# Patient Record
Sex: FEMALE | Race: WHITE | Employment: OTHER | URBAN - NONMETROPOLITAN AREA
[De-identification: names, ages, dates, MRNs, and addresses within clinical notes are randomized per-mention and may not be internally consistent; named-entity substitution may affect disease eponyms.]

---

## 2017-01-25 ENCOUNTER — TELEPHONE (OUTPATIENT)
Dept: FAMILY MEDICINE CLINIC | Age: 82
End: 2017-01-25

## 2017-02-21 DIAGNOSIS — R60.0 PEDAL EDEMA: ICD-10-CM

## 2017-02-21 DIAGNOSIS — I10 ESSENTIAL HYPERTENSION: Primary | ICD-10-CM

## 2017-02-21 RX ORDER — FUROSEMIDE 20 MG/1
TABLET ORAL
Qty: 270 TABLET | Refills: 1 | Status: SHIPPED | OUTPATIENT
Start: 2017-02-21 | End: 2017-11-17 | Stop reason: SDUPTHER

## 2017-02-22 RX ORDER — METOPROLOL SUCCINATE 100 MG/1
TABLET, EXTENDED RELEASE ORAL
Qty: 90 TABLET | Refills: 1 | Status: SHIPPED | OUTPATIENT
Start: 2017-02-22 | End: 2017-03-29 | Stop reason: SDUPTHER

## 2017-03-06 ENCOUNTER — TELEPHONE (OUTPATIENT)
Dept: NEPHROLOGY | Age: 82
End: 2017-03-06

## 2017-03-06 ENCOUNTER — OFFICE VISIT (OUTPATIENT)
Dept: NEPHROLOGY | Age: 82
End: 2017-03-06

## 2017-03-06 ENCOUNTER — OFFICE VISIT (OUTPATIENT)
Dept: PODIATRY | Age: 82
End: 2017-03-06

## 2017-03-06 ENCOUNTER — HOSPITAL ENCOUNTER (OUTPATIENT)
Age: 82
Setting detail: SPECIMEN
Discharge: HOME | End: 2017-03-06
Payer: MEDICARE

## 2017-03-06 VITALS
BODY MASS INDEX: 40.97 KG/M2 | HEART RATE: 62 BPM | HEIGHT: 61 IN | WEIGHT: 217 LBS | SYSTOLIC BLOOD PRESSURE: 122 MMHG | DIASTOLIC BLOOD PRESSURE: 76 MMHG

## 2017-03-06 VITALS
WEIGHT: 221 LBS | DIASTOLIC BLOOD PRESSURE: 60 MMHG | OXYGEN SATURATION: 96 % | HEIGHT: 61 IN | HEART RATE: 69 BPM | SYSTOLIC BLOOD PRESSURE: 118 MMHG | BODY MASS INDEX: 41.72 KG/M2

## 2017-03-06 DIAGNOSIS — N18.30 CKD (CHRONIC KIDNEY DISEASE) STAGE 3, GFR 30-59 ML/MIN (HCC): ICD-10-CM

## 2017-03-06 DIAGNOSIS — I10 ESSENTIAL HYPERTENSION: ICD-10-CM

## 2017-03-06 DIAGNOSIS — E11.9 TYPE 2 DIABETES MELLITUS WITHOUT COMPLICATION, WITHOUT LONG-TERM CURRENT USE OF INSULIN (HCC): ICD-10-CM

## 2017-03-06 DIAGNOSIS — E11.51 CONTROLLED TYPE 2 DM WITH PERIPHERAL CIRCULATORY DISORDER (HCC): Primary | ICD-10-CM

## 2017-03-06 DIAGNOSIS — B35.1 DERMATOPHYTOSIS OF NAIL: ICD-10-CM

## 2017-03-06 DIAGNOSIS — R76.8 P-ANCA AND MPO ANTIBODIES POSITIVE: Primary | ICD-10-CM

## 2017-03-06 LAB
CALCIUM IONIZED: 1.2 MMOL/L (ref 1.13–1.33)
PTH INTACT: 90.28 PG/ML (ref 15–65)

## 2017-03-06 PROCEDURE — 11721 DEBRIDE NAIL 6 OR MORE: CPT | Performed by: PODIATRIST

## 2017-03-06 PROCEDURE — 99213 OFFICE O/P EST LOW 20 MIN: CPT | Performed by: INTERNAL MEDICINE

## 2017-03-07 LAB — VITAMIN D 25-HYDROXY: 60 NG/ML (ref 30–100)

## 2017-03-09 LAB
ANCA MYELOPEROXIDASE: 204 AU/ML
ANCA PROTEINASE 3: 1 AU/ML
ANCA REFERENCE RANGE:: ABNORMAL
GBM ANTIBODY IGG: 6 AU/ML

## 2017-03-10 ENCOUNTER — TELEPHONE (OUTPATIENT)
Dept: NEPHROLOGY | Age: 82
End: 2017-03-10

## 2017-03-15 ENCOUNTER — PROCEDURE VISIT (OUTPATIENT)
Dept: CARDIOLOGY | Age: 82
End: 2017-03-15
Payer: MEDICARE

## 2017-03-15 DIAGNOSIS — I44.2 CHB (COMPLETE HEART BLOCK) (HCC): ICD-10-CM

## 2017-03-15 DIAGNOSIS — Z95.0 CARDIAC PACEMAKER IN SITU: Primary | ICD-10-CM

## 2017-03-15 PROCEDURE — 93288 INTERROG EVL PM/LDLS PM IP: CPT | Performed by: INTERNAL MEDICINE

## 2017-03-28 ENCOUNTER — HOSPITAL ENCOUNTER (OUTPATIENT)
Age: 82
Setting detail: SPECIMEN
Discharge: HOME | End: 2017-03-28
Payer: MEDICARE

## 2017-03-28 DIAGNOSIS — N18.30 CKD (CHRONIC KIDNEY DISEASE) STAGE 3, GFR 30-59 ML/MIN (HCC): ICD-10-CM

## 2017-03-28 DIAGNOSIS — R76.8 P-ANCA AND MPO ANTIBODIES POSITIVE: ICD-10-CM

## 2017-03-28 LAB
-: ABNORMAL
-: ABNORMAL
ABSOLUTE EOS #: 0.2 K/UL (ref 0–0.4)
ABSOLUTE LYMPH #: 2.2 K/UL (ref 1–4.8)
ABSOLUTE MONO #: 0.5 K/UL (ref 0.1–1.2)
AMORPHOUS: ABNORMAL
AMORPHOUS: ABNORMAL
ANION GAP SERPL CALCULATED.3IONS-SCNC: 14 MMOL/L (ref 9–17)
BACTERIA: ABNORMAL
BACTERIA: ABNORMAL
BASOPHILS # BLD: 1 % (ref 0–2)
BASOPHILS ABSOLUTE: 0.1 K/UL (ref 0–0.2)
BILIRUBIN URINE: NEGATIVE
BILIRUBIN URINE: NEGATIVE
BUN BLDV-MCNC: 31 MG/DL (ref 8–23)
BUN/CREAT BLD: 29 (ref 9–20)
CALCIUM IONIZED: 1.22 MMOL/L (ref 1.13–1.33)
CALCIUM SERPL-MCNC: 9.2 MG/DL (ref 8.6–10.4)
CASTS UA: ABNORMAL /LPF (ref 0–2)
CASTS UA: ABNORMAL /LPF (ref 0–2)
CHLORIDE BLD-SCNC: 99 MMOL/L (ref 98–107)
CO2: 29 MMOL/L (ref 20–31)
COLOR: ABNORMAL
COLOR: ABNORMAL
COMMENT UA: ABNORMAL
COMMENT UA: ABNORMAL
CREAT SERPL-MCNC: 1.08 MG/DL (ref 0.5–0.9)
CREATININE URINE: 71.6 MG/DL (ref 28–217)
CRYSTALS, UA: ABNORMAL /HPF
CRYSTALS, UA: ABNORMAL /HPF
DIFFERENTIAL TYPE: ABNORMAL
EOSINOPHILS RELATIVE PERCENT: 2 % (ref 1–4)
EPITHELIAL CELLS UA: ABNORMAL /HPF (ref 0–5)
EPITHELIAL CELLS UA: ABNORMAL /HPF (ref 0–5)
GFR AFRICAN AMERICAN: 58 ML/MIN
GFR NON-AFRICAN AMERICAN: 48 ML/MIN
GFR SERPL CREATININE-BSD FRML MDRD: ABNORMAL ML/MIN/{1.73_M2}
GFR SERPL CREATININE-BSD FRML MDRD: ABNORMAL ML/MIN/{1.73_M2}
GLUCOSE BLD-MCNC: 125 MG/DL (ref 70–99)
GLUCOSE URINE: NEGATIVE
GLUCOSE URINE: NEGATIVE
HCT VFR BLD CALC: 40.6 % (ref 36–46)
HEMOGLOBIN: 12.9 G/DL (ref 12–16)
KETONES, URINE: NEGATIVE
KETONES, URINE: NEGATIVE
LEUKOCYTE ESTERASE, URINE: NEGATIVE
LEUKOCYTE ESTERASE, URINE: NEGATIVE
LYMPHOCYTES # BLD: 23 % (ref 24–44)
MCH RBC QN AUTO: 30.5 PG (ref 26–34)
MCHC RBC AUTO-ENTMCNC: 31.8 G/DL (ref 31–37)
MCV RBC AUTO: 95.8 FL (ref 80–100)
MONOCYTES # BLD: 5 % (ref 1–7)
MUCUS: ABNORMAL
MUCUS: ABNORMAL
NITRITE, URINE: NEGATIVE
NITRITE, URINE: NEGATIVE
OTHER OBSERVATIONS UA: ABNORMAL
OTHER OBSERVATIONS UA: ABNORMAL
PDW BLD-RTO: 14.3 % (ref 11–14.5)
PH UA: 7 (ref 5–6)
PH UA: 7 (ref 5–6)
PHOSPHORUS: 3.5 MG/DL (ref 2.6–4.5)
PLATELET # BLD: 258 K/UL (ref 140–450)
PLATELET ESTIMATE: ABNORMAL
PMV BLD AUTO: 8.1 FL (ref 6–12)
POTASSIUM SERPL-SCNC: 4.8 MMOL/L (ref 3.7–5.3)
PROTEIN UA: NEGATIVE
PROTEIN UA: NEGATIVE
PTH INTACT: 70.98 PG/ML (ref 15–65)
RBC # BLD: 4.23 M/UL (ref 4–5.2)
RBC # BLD: ABNORMAL 10*6/UL
RBC UA: ABNORMAL /HPF (ref 0–4)
RBC UA: ABNORMAL /HPF (ref 0–4)
RENAL EPITHELIAL, UA: ABNORMAL /HPF
RENAL EPITHELIAL, UA: ABNORMAL /HPF
SEG NEUTROPHILS: 69 % (ref 36–66)
SEGMENTED NEUTROPHILS ABSOLUTE COUNT: 6.8 K/UL (ref 1.8–7.7)
SODIUM BLD-SCNC: 142 MMOL/L (ref 135–144)
SPECIFIC GRAVITY UA: 1 (ref 1.01–1.02)
SPECIFIC GRAVITY UA: 1 (ref 1.01–1.02)
TOTAL PROTEIN, URINE: 16 MG/DL
TRICHOMONAS: ABNORMAL
TRICHOMONAS: ABNORMAL
TURBIDITY: ABNORMAL
TURBIDITY: ABNORMAL
URINE HGB: NEGATIVE
URINE HGB: NEGATIVE
UROBILINOGEN, URINE: NORMAL
UROBILINOGEN, URINE: NORMAL
VITAMIN D 25-HYDROXY: 66.9 NG/ML (ref 30–100)
VITAMIN D 25-HYDROXY: 66.9 NG/ML (ref 30–100)
WBC # BLD: 9.8 K/UL (ref 3.5–11)
WBC # BLD: ABNORMAL 10*3/UL
WBC UA: ABNORMAL /HPF (ref 0–4)
WBC UA: ABNORMAL /HPF (ref 0–4)
YEAST: ABNORMAL
YEAST: ABNORMAL

## 2017-03-28 PROCEDURE — 83735 ASSAY OF MAGNESIUM: CPT | Performed by: PHYSICIAN ASSISTANT

## 2017-03-28 PROCEDURE — 84156 ASSAY OF PROTEIN URINE: CPT | Performed by: INTERNAL MEDICINE

## 2017-03-28 PROCEDURE — 82570 ASSAY OF URINE CREATININE: CPT | Performed by: INTERNAL MEDICINE

## 2017-03-28 PROCEDURE — 85025 COMPLETE CBC W/AUTO DIFF WBC: CPT | Performed by: INTERNAL MEDICINE

## 2017-03-28 PROCEDURE — 81001 URINALYSIS AUTO W/SCOPE: CPT | Performed by: INTERNAL MEDICINE

## 2017-03-28 PROCEDURE — 84100 ASSAY OF PHOSPHORUS: CPT | Performed by: INTERNAL MEDICINE

## 2017-03-29 ENCOUNTER — OFFICE VISIT (OUTPATIENT)
Dept: FAMILY MEDICINE CLINIC | Age: 82
End: 2017-03-29
Payer: MEDICARE

## 2017-03-29 VITALS
RESPIRATION RATE: 16 BRPM | HEART RATE: 80 BPM | WEIGHT: 212 LBS | HEIGHT: 61 IN | DIASTOLIC BLOOD PRESSURE: 68 MMHG | SYSTOLIC BLOOD PRESSURE: 116 MMHG | BODY MASS INDEX: 40.02 KG/M2

## 2017-03-29 DIAGNOSIS — I10 ESSENTIAL HYPERTENSION: ICD-10-CM

## 2017-03-29 DIAGNOSIS — E78.00 PURE HYPERCHOLESTEROLEMIA: ICD-10-CM

## 2017-03-29 DIAGNOSIS — E66.01 MORBID OBESITY WITH BMI OF 40.0-44.9, ADULT (HCC): ICD-10-CM

## 2017-03-29 DIAGNOSIS — G47.33 OSA (OBSTRUCTIVE SLEEP APNEA): ICD-10-CM

## 2017-03-29 DIAGNOSIS — M15.9 PRIMARY OSTEOARTHRITIS INVOLVING MULTIPLE JOINTS: ICD-10-CM

## 2017-03-29 DIAGNOSIS — R89.9 ABNORMAL LABORATORY TEST: ICD-10-CM

## 2017-03-29 DIAGNOSIS — E11.22 TYPE 2 DIABETES MELLITUS WITH STAGE 3 CHRONIC KIDNEY DISEASE, WITHOUT LONG-TERM CURRENT USE OF INSULIN (HCC): ICD-10-CM

## 2017-03-29 DIAGNOSIS — N18.3 CKD (CHRONIC KIDNEY DISEASE), STAGE 3 (MODERATE): ICD-10-CM

## 2017-03-29 DIAGNOSIS — H91.93 HEARING LOSS, BILATERAL: ICD-10-CM

## 2017-03-29 DIAGNOSIS — N18.30 TYPE 2 DIABETES MELLITUS WITH STAGE 3 CHRONIC KIDNEY DISEASE, WITHOUT LONG-TERM CURRENT USE OF INSULIN (HCC): ICD-10-CM

## 2017-03-29 DIAGNOSIS — Z91.81 AT HIGH RISK FOR FALLS: Primary | ICD-10-CM

## 2017-03-29 LAB — MAGNESIUM: 2.3 MG/DL (ref 1.6–2.6)

## 2017-03-29 PROCEDURE — 99214 OFFICE O/P EST MOD 30 MIN: CPT | Performed by: PHYSICIAN ASSISTANT

## 2017-03-29 RX ORDER — LISINOPRIL 20 MG/1
20 TABLET ORAL DAILY
Qty: 90 TABLET | Refills: 1 | Status: SHIPPED | OUTPATIENT
Start: 2017-03-29 | End: 2017-12-08 | Stop reason: SDUPTHER

## 2017-03-29 RX ORDER — METOPROLOL SUCCINATE 100 MG/1
TABLET, EXTENDED RELEASE ORAL
Qty: 90 TABLET | Refills: 1 | Status: SHIPPED | OUTPATIENT
Start: 2017-03-29 | End: 2017-11-17 | Stop reason: SDUPTHER

## 2017-03-29 ASSESSMENT — PATIENT HEALTH QUESTIONNAIRE - PHQ9
SUM OF ALL RESPONSES TO PHQ9 QUESTIONS 1 & 2: 0
2. FEELING DOWN, DEPRESSED OR HOPELESS: 0
1. LITTLE INTEREST OR PLEASURE IN DOING THINGS: 0
SUM OF ALL RESPONSES TO PHQ QUESTIONS 1-9: 0

## 2017-03-29 ASSESSMENT — ENCOUNTER SYMPTOMS: SHORTNESS OF BREATH: 1

## 2017-04-03 ENCOUNTER — OFFICE VISIT (OUTPATIENT)
Dept: NEPHROLOGY | Age: 82
End: 2017-04-03
Payer: MEDICARE

## 2017-04-03 VITALS
SYSTOLIC BLOOD PRESSURE: 142 MMHG | HEIGHT: 61 IN | DIASTOLIC BLOOD PRESSURE: 74 MMHG | BODY MASS INDEX: 40.78 KG/M2 | WEIGHT: 216 LBS

## 2017-04-03 DIAGNOSIS — I50.30 DIASTOLIC CONGESTIVE HEART FAILURE, UNSPECIFIED CONGESTIVE HEART FAILURE CHRONICITY: ICD-10-CM

## 2017-04-03 DIAGNOSIS — N18.30 CKD (CHRONIC KIDNEY DISEASE), STAGE III (HCC): Primary | ICD-10-CM

## 2017-04-03 DIAGNOSIS — I10 ESSENTIAL HYPERTENSION: ICD-10-CM

## 2017-04-03 DIAGNOSIS — E11.9 TYPE 2 DIABETES MELLITUS WITHOUT COMPLICATION, WITHOUT LONG-TERM CURRENT USE OF INSULIN (HCC): ICD-10-CM

## 2017-04-03 PROCEDURE — 99213 OFFICE O/P EST LOW 20 MIN: CPT | Performed by: INTERNAL MEDICINE

## 2017-04-21 ENCOUNTER — OFFICE VISIT (OUTPATIENT)
Dept: FAMILY MEDICINE CLINIC | Age: 82
End: 2017-04-21
Payer: MEDICARE

## 2017-04-21 VITALS
BODY MASS INDEX: 40.59 KG/M2 | DIASTOLIC BLOOD PRESSURE: 60 MMHG | RESPIRATION RATE: 16 BRPM | SYSTOLIC BLOOD PRESSURE: 124 MMHG | WEIGHT: 215 LBS | HEIGHT: 61 IN | HEART RATE: 60 BPM

## 2017-04-21 DIAGNOSIS — N18.30 TYPE 2 DIABETES MELLITUS WITH STAGE 3 CHRONIC KIDNEY DISEASE, WITHOUT LONG-TERM CURRENT USE OF INSULIN (HCC): Primary | ICD-10-CM

## 2017-04-21 DIAGNOSIS — E11.22 TYPE 2 DIABETES MELLITUS WITH STAGE 3 CHRONIC KIDNEY DISEASE, WITHOUT LONG-TERM CURRENT USE OF INSULIN (HCC): Primary | ICD-10-CM

## 2017-04-21 PROCEDURE — 99214 OFFICE O/P EST MOD 30 MIN: CPT | Performed by: PHYSICIAN ASSISTANT

## 2017-04-27 ASSESSMENT — ENCOUNTER SYMPTOMS
CHEST TIGHTNESS: 0
WHEEZING: 0
NAUSEA: 0
DIARRHEA: 0
CONSTIPATION: 0
TROUBLE SWALLOWING: 0
VOMITING: 0
COUGH: 0
SHORTNESS OF BREATH: 0
SORE THROAT: 0

## 2017-05-02 DIAGNOSIS — F41.9 ANXIETY: ICD-10-CM

## 2017-05-02 RX ORDER — SERTRALINE HYDROCHLORIDE 25 MG/1
12.5 TABLET, FILM COATED ORAL DAILY
Qty: 30 TABLET | Refills: 0 | Status: SHIPPED | OUTPATIENT
Start: 2017-05-02 | End: 2017-06-22 | Stop reason: SDUPTHER

## 2017-05-15 ENCOUNTER — OFFICE VISIT (OUTPATIENT)
Dept: PODIATRY | Age: 82
End: 2017-05-15
Payer: MEDICARE

## 2017-05-15 VITALS
WEIGHT: 220.6 LBS | DIASTOLIC BLOOD PRESSURE: 74 MMHG | HEIGHT: 61 IN | BODY MASS INDEX: 41.65 KG/M2 | SYSTOLIC BLOOD PRESSURE: 132 MMHG | HEART RATE: 64 BPM

## 2017-05-15 DIAGNOSIS — B35.1 DERMATOPHYTOSIS OF NAIL: ICD-10-CM

## 2017-05-15 DIAGNOSIS — E11.51 CONTROLLED TYPE 2 DM WITH PERIPHERAL CIRCULATORY DISORDER (HCC): Primary | ICD-10-CM

## 2017-05-15 PROCEDURE — 11721 DEBRIDE NAIL 6 OR MORE: CPT | Performed by: PODIATRIST

## 2017-06-12 ENCOUNTER — OFFICE VISIT (OUTPATIENT)
Dept: PRIMARY CARE CLINIC | Age: 82
End: 2017-06-12
Payer: MEDICARE

## 2017-06-12 VITALS
SYSTOLIC BLOOD PRESSURE: 178 MMHG | OXYGEN SATURATION: 94 % | HEIGHT: 61 IN | HEART RATE: 68 BPM | DIASTOLIC BLOOD PRESSURE: 100 MMHG | BODY MASS INDEX: 40.02 KG/M2 | WEIGHT: 212 LBS

## 2017-06-12 DIAGNOSIS — T63.481A INSECT STING ALLERGY, CURRENT REACTION, ACCIDENTAL OR UNINTENTIONAL, INITIAL ENCOUNTER: Primary | ICD-10-CM

## 2017-06-12 PROCEDURE — 99213 OFFICE O/P EST LOW 20 MIN: CPT | Performed by: FAMILY MEDICINE

## 2017-06-12 RX ORDER — PREDNISONE 20 MG/1
40 TABLET ORAL DAILY
Qty: 8 TABLET | Refills: 0 | Status: SHIPPED | OUTPATIENT
Start: 2017-06-12 | End: 2017-06-16

## 2017-06-12 ASSESSMENT — ENCOUNTER SYMPTOMS: COLOR CHANGE: 1

## 2017-06-22 ENCOUNTER — OFFICE VISIT (OUTPATIENT)
Dept: CARDIOLOGY | Age: 82
End: 2017-06-22
Payer: MEDICARE

## 2017-06-22 VITALS
DIASTOLIC BLOOD PRESSURE: 82 MMHG | HEIGHT: 61 IN | WEIGHT: 213 LBS | BODY MASS INDEX: 40.22 KG/M2 | SYSTOLIC BLOOD PRESSURE: 124 MMHG | HEART RATE: 68 BPM

## 2017-06-22 DIAGNOSIS — I49.5 SSS (SICK SINUS SYNDROME) (HCC): ICD-10-CM

## 2017-06-22 DIAGNOSIS — F41.9 ANXIETY: ICD-10-CM

## 2017-06-22 DIAGNOSIS — I10 ESSENTIAL HYPERTENSION: Primary | ICD-10-CM

## 2017-06-22 PROCEDURE — 99213 OFFICE O/P EST LOW 20 MIN: CPT | Performed by: INTERNAL MEDICINE

## 2017-06-22 PROCEDURE — 93000 ELECTROCARDIOGRAM COMPLETE: CPT | Performed by: INTERNAL MEDICINE

## 2017-06-22 RX ORDER — SERTRALINE HYDROCHLORIDE 25 MG/1
12.5 TABLET, FILM COATED ORAL DAILY
Qty: 30 TABLET | Refills: 5 | Status: SHIPPED | OUTPATIENT
Start: 2017-06-22 | End: 2017-11-17 | Stop reason: SDUPTHER

## 2017-07-24 ENCOUNTER — OFFICE VISIT (OUTPATIENT)
Dept: PODIATRY | Age: 82
End: 2017-07-24
Payer: MEDICARE

## 2017-07-24 VITALS
SYSTOLIC BLOOD PRESSURE: 122 MMHG | DIASTOLIC BLOOD PRESSURE: 74 MMHG | HEIGHT: 61 IN | WEIGHT: 216.4 LBS | BODY MASS INDEX: 40.86 KG/M2 | HEART RATE: 68 BPM

## 2017-07-24 DIAGNOSIS — B35.1 DERMATOPHYTOSIS OF NAIL: ICD-10-CM

## 2017-07-24 DIAGNOSIS — E11.51 CONTROLLED TYPE 2 DM WITH PERIPHERAL CIRCULATORY DISORDER (HCC): Primary | ICD-10-CM

## 2017-07-24 DIAGNOSIS — M19.079 INFLAMMATION OF FOOT JOINT: ICD-10-CM

## 2017-07-24 PROCEDURE — 73630 X-RAY EXAM OF FOOT: CPT | Performed by: PODIATRIST

## 2017-07-24 PROCEDURE — 99213 OFFICE O/P EST LOW 20 MIN: CPT | Performed by: PODIATRIST

## 2017-07-24 PROCEDURE — L3260 AMBULATORY SURGICAL BOOT EAC: HCPCS | Performed by: PODIATRIST

## 2017-07-24 PROCEDURE — 11721 DEBRIDE NAIL 6 OR MORE: CPT | Performed by: PODIATRIST

## 2017-07-27 ENCOUNTER — OFFICE VISIT (OUTPATIENT)
Dept: FAMILY MEDICINE CLINIC | Age: 82
End: 2017-07-27
Payer: MEDICARE

## 2017-07-27 VITALS
DIASTOLIC BLOOD PRESSURE: 78 MMHG | BODY MASS INDEX: 40.63 KG/M2 | WEIGHT: 215.2 LBS | RESPIRATION RATE: 12 BRPM | TEMPERATURE: 97.9 F | OXYGEN SATURATION: 97 % | HEIGHT: 61 IN | HEART RATE: 69 BPM | SYSTOLIC BLOOD PRESSURE: 124 MMHG

## 2017-07-27 DIAGNOSIS — S80.811A ABRASION, RIGHT LOWER LEG, INITIAL ENCOUNTER: Primary | ICD-10-CM

## 2017-07-27 PROCEDURE — 99213 OFFICE O/P EST LOW 20 MIN: CPT | Performed by: NURSE PRACTITIONER

## 2017-07-27 ASSESSMENT — ENCOUNTER SYMPTOMS
SINUS PRESSURE: 0
TROUBLE SWALLOWING: 0
CHEST TIGHTNESS: 0
ALLERGIC/IMMUNOLOGIC NEGATIVE: 1
DIARRHEA: 0
NAUSEA: 0
COUGH: 0
RESPIRATORY NEGATIVE: 1
GASTROINTESTINAL NEGATIVE: 1
COLOR CHANGE: 1
VOMITING: 0
CONSTIPATION: 0
EYES NEGATIVE: 1
SHORTNESS OF BREATH: 0
ABDOMINAL PAIN: 0

## 2017-08-04 ENCOUNTER — OFFICE VISIT (OUTPATIENT)
Dept: FAMILY MEDICINE CLINIC | Age: 82
End: 2017-08-04
Payer: MEDICARE

## 2017-08-04 VITALS
BODY MASS INDEX: 40.78 KG/M2 | HEIGHT: 61 IN | WEIGHT: 216 LBS | OXYGEN SATURATION: 98 % | HEART RATE: 63 BPM | DIASTOLIC BLOOD PRESSURE: 70 MMHG | SYSTOLIC BLOOD PRESSURE: 110 MMHG

## 2017-08-04 DIAGNOSIS — E11.9 TYPE 2 DIABETES MELLITUS WITHOUT COMPLICATION, WITHOUT LONG-TERM CURRENT USE OF INSULIN (HCC): ICD-10-CM

## 2017-08-04 DIAGNOSIS — I10 ESSENTIAL HYPERTENSION: ICD-10-CM

## 2017-08-04 DIAGNOSIS — Z87.39 HISTORY OF GOUT: ICD-10-CM

## 2017-08-04 DIAGNOSIS — M79.672 LEFT FOOT PAIN: ICD-10-CM

## 2017-08-04 DIAGNOSIS — T14.8XXA ABRASION: Primary | ICD-10-CM

## 2017-08-04 PROCEDURE — 99214 OFFICE O/P EST MOD 30 MIN: CPT | Performed by: PHYSICIAN ASSISTANT

## 2017-08-04 RX ORDER — COLCHICINE 0.6 MG/1
0.6 TABLET ORAL DAILY
Qty: 30 TABLET | Refills: 5 | Status: SHIPPED | OUTPATIENT
Start: 2017-08-04 | End: 2017-12-28 | Stop reason: CLARIF

## 2017-08-04 RX ORDER — CLOTRIMAZOLE AND BETAMETHASONE DIPROPIONATE 10; .64 MG/G; MG/G
CREAM TOPICAL
Qty: 30 G | Refills: 0 | Status: ON HOLD | OUTPATIENT
Start: 2017-08-04 | End: 2018-11-28

## 2017-08-04 ASSESSMENT — ENCOUNTER SYMPTOMS
COLOR CHANGE: 1
DIARRHEA: 0
CONSTIPATION: 1
NAUSEA: 0

## 2017-08-08 ENCOUNTER — NURSE ONLY (OUTPATIENT)
Dept: LAB | Age: 82
End: 2017-08-08

## 2017-08-08 VITALS — SYSTOLIC BLOOD PRESSURE: 130 MMHG | DIASTOLIC BLOOD PRESSURE: 80 MMHG

## 2017-08-08 DIAGNOSIS — Z01.30 BLOOD PRESSURE CHECK: Primary | ICD-10-CM

## 2017-09-14 ENCOUNTER — NURSE ONLY (OUTPATIENT)
Dept: LAB | Age: 82
End: 2017-09-14
Payer: MEDICARE

## 2017-09-14 VITALS — SYSTOLIC BLOOD PRESSURE: 140 MMHG | DIASTOLIC BLOOD PRESSURE: 68 MMHG

## 2017-09-14 DIAGNOSIS — Z01.30 BLOOD PRESSURE CHECK: ICD-10-CM

## 2017-09-14 DIAGNOSIS — Z23 NEED FOR VACCINATION: Primary | ICD-10-CM

## 2017-09-14 PROCEDURE — 90662 IIV NO PRSV INCREASED AG IM: CPT | Performed by: PHYSICIAN ASSISTANT

## 2017-09-14 PROCEDURE — G0008 ADMIN INFLUENZA VIRUS VAC: HCPCS | Performed by: PHYSICIAN ASSISTANT

## 2017-09-14 PROCEDURE — 99999 PR OFFICE/OUTPT VISIT,PROCEDURE ONLY: CPT | Performed by: PHYSICIAN ASSISTANT

## 2017-09-20 ENCOUNTER — PROCEDURE VISIT (OUTPATIENT)
Dept: CARDIOLOGY | Age: 82
End: 2017-09-20
Payer: MEDICARE

## 2017-09-20 DIAGNOSIS — I49.5 SSS (SICK SINUS SYNDROME) (HCC): ICD-10-CM

## 2017-09-20 DIAGNOSIS — I44.2 THIRD DEGREE HEART BLOCK (HCC): ICD-10-CM

## 2017-09-20 DIAGNOSIS — Z95.0 CARDIAC PACEMAKER IN SITU: Primary | ICD-10-CM

## 2017-09-20 PROCEDURE — 93288 INTERROG EVL PM/LDLS PM IP: CPT | Performed by: INTERNAL MEDICINE

## 2017-09-28 ENCOUNTER — HOSPITAL ENCOUNTER (OUTPATIENT)
Dept: LAB | Age: 82
Setting detail: SPECIMEN
Discharge: HOME | End: 2017-09-28
Payer: MEDICARE

## 2017-09-28 ENCOUNTER — NURSE ONLY (OUTPATIENT)
Dept: LAB | Age: 82
End: 2017-09-28

## 2017-09-28 VITALS — SYSTOLIC BLOOD PRESSURE: 140 MMHG | DIASTOLIC BLOOD PRESSURE: 78 MMHG

## 2017-09-28 DIAGNOSIS — Z01.30 BLOOD PRESSURE CHECK: Primary | ICD-10-CM

## 2017-09-28 DIAGNOSIS — N18.30 CKD (CHRONIC KIDNEY DISEASE), STAGE III (HCC): ICD-10-CM

## 2017-09-28 LAB
-: ABNORMAL
ABSOLUTE EOS #: 0.1 K/UL (ref 0–0.4)
ABSOLUTE LYMPH #: 2.6 K/UL (ref 1–4.8)
ABSOLUTE MONO #: 0.6 K/UL (ref 0.1–1.2)
AMORPHOUS: ABNORMAL
ANION GAP SERPL CALCULATED.3IONS-SCNC: 13 MMOL/L (ref 9–17)
BACTERIA: ABNORMAL
BASOPHILS # BLD: 1 % (ref 0–1)
BASOPHILS ABSOLUTE: 0.1 K/UL (ref 0–0.2)
BILIRUBIN URINE: NEGATIVE
BUN BLDV-MCNC: 32 MG/DL (ref 8–23)
BUN/CREAT BLD: 33 (ref 9–20)
CALCIUM IONIZED: 1.22 MMOL/L (ref 1.13–1.33)
CALCIUM SERPL-MCNC: 9.1 MG/DL (ref 8.6–10.4)
CASTS UA: ABNORMAL /LPF (ref 0–2)
CHLORIDE BLD-SCNC: 103 MMOL/L (ref 98–107)
CO2: 29 MMOL/L (ref 20–31)
COLOR: NORMAL
COMMENT UA: NORMAL
CREAT SERPL-MCNC: 0.98 MG/DL (ref 0.5–0.9)
CREATININE URINE: 14.5 MG/DL (ref 28–217)
CRYSTALS, UA: ABNORMAL /HPF
DIFFERENTIAL TYPE: NORMAL
EOSINOPHILS RELATIVE PERCENT: 1 % (ref 1–7)
EPITHELIAL CELLS UA: ABNORMAL /HPF (ref 0–5)
GFR AFRICAN AMERICAN: >60 ML/MIN
GFR NON-AFRICAN AMERICAN: 54 ML/MIN
GFR SERPL CREATININE-BSD FRML MDRD: ABNORMAL ML/MIN/{1.73_M2}
GFR SERPL CREATININE-BSD FRML MDRD: ABNORMAL ML/MIN/{1.73_M2}
GLUCOSE BLD-MCNC: 116 MG/DL (ref 70–99)
GLUCOSE URINE: NEGATIVE
HCT VFR BLD CALC: 40.5 % (ref 36–46)
HEMOGLOBIN: 12.9 G/DL (ref 12–16)
KETONES, URINE: NEGATIVE
LEUKOCYTE ESTERASE, URINE: NEGATIVE
LYMPHOCYTES # BLD: 27 % (ref 16–46)
MCH RBC QN AUTO: 29.7 PG (ref 26–34)
MCHC RBC AUTO-ENTMCNC: 31.9 G/DL (ref 31–37)
MCV RBC AUTO: 92.9 FL (ref 80–100)
MONOCYTES # BLD: 6 % (ref 4–11)
MUCUS: ABNORMAL
NITRITE, URINE: NEGATIVE
OTHER OBSERVATIONS UA: ABNORMAL
PDW BLD-RTO: 13.7 % (ref 11–14.5)
PH UA: 6 (ref 5–6)
PHOSPHORUS: 3 MG/DL (ref 2.6–4.5)
PLATELET # BLD: 247 K/UL (ref 140–450)
PLATELET ESTIMATE: NORMAL
PMV BLD AUTO: 7.9 FL (ref 6–12)
POTASSIUM SERPL-SCNC: 4.6 MMOL/L (ref 3.7–5.3)
PROTEIN UA: NEGATIVE
PTH INTACT: 78.13 PG/ML (ref 15–65)
RBC # BLD: 4.36 M/UL (ref 4–5.2)
RBC # BLD: NORMAL 10*6/UL
RBC UA: ABNORMAL /HPF (ref 0–4)
RENAL EPITHELIAL, UA: ABNORMAL /HPF
SEG NEUTROPHILS: 65 % (ref 43–77)
SEGMENTED NEUTROPHILS ABSOLUTE COUNT: 6.4 K/UL (ref 1.8–7.7)
SODIUM BLD-SCNC: 145 MMOL/L (ref 135–144)
SPECIFIC GRAVITY UA: 1.01 (ref 1.01–1.02)
TOTAL PROTEIN, URINE: 5 MG/DL
TRICHOMONAS: ABNORMAL
TURBIDITY: NORMAL
URINE HGB: NEGATIVE
UROBILINOGEN, URINE: NORMAL
VITAMIN D 25-HYDROXY: 68.4 NG/ML (ref 30–100)
WBC # BLD: 9.8 K/UL (ref 3.5–11)
WBC # BLD: NORMAL 10*3/UL
WBC UA: ABNORMAL /HPF (ref 0–4)
YEAST: ABNORMAL

## 2017-09-28 PROCEDURE — 80048 BASIC METABOLIC PNL TOTAL CA: CPT

## 2017-09-28 PROCEDURE — 81001 URINALYSIS AUTO W/SCOPE: CPT

## 2017-09-28 PROCEDURE — 36415 COLL VENOUS BLD VENIPUNCTURE: CPT

## 2017-09-28 PROCEDURE — 82306 VITAMIN D 25 HYDROXY: CPT

## 2017-09-28 PROCEDURE — 82330 ASSAY OF CALCIUM: CPT

## 2017-09-28 PROCEDURE — 83516 IMMUNOASSAY NONANTIBODY: CPT

## 2017-09-28 PROCEDURE — 84100 ASSAY OF PHOSPHORUS: CPT

## 2017-09-28 PROCEDURE — 82570 ASSAY OF URINE CREATININE: CPT

## 2017-09-28 PROCEDURE — 85025 COMPLETE CBC W/AUTO DIFF WBC: CPT

## 2017-09-28 PROCEDURE — 83970 ASSAY OF PARATHORMONE: CPT

## 2017-09-28 PROCEDURE — 84156 ASSAY OF PROTEIN URINE: CPT

## 2017-10-02 ENCOUNTER — OFFICE VISIT (OUTPATIENT)
Dept: PODIATRY | Age: 82
End: 2017-10-02
Payer: MEDICARE

## 2017-10-02 ENCOUNTER — OFFICE VISIT (OUTPATIENT)
Dept: NEPHROLOGY | Age: 82
End: 2017-10-02
Payer: MEDICARE

## 2017-10-02 VITALS
BODY MASS INDEX: 42.29 KG/M2 | HEIGHT: 61 IN | DIASTOLIC BLOOD PRESSURE: 70 MMHG | RESPIRATION RATE: 20 BRPM | WEIGHT: 224 LBS | SYSTOLIC BLOOD PRESSURE: 136 MMHG | HEART RATE: 72 BPM

## 2017-10-02 VITALS
WEIGHT: 224.8 LBS | HEIGHT: 61 IN | HEART RATE: 72 BPM | SYSTOLIC BLOOD PRESSURE: 140 MMHG | BODY MASS INDEX: 42.44 KG/M2 | DIASTOLIC BLOOD PRESSURE: 76 MMHG

## 2017-10-02 DIAGNOSIS — E11.51 CONTROLLED TYPE 2 DM WITH PERIPHERAL CIRCULATORY DISORDER (HCC): Primary | ICD-10-CM

## 2017-10-02 DIAGNOSIS — N18.30 CKD (CHRONIC KIDNEY DISEASE), STAGE III (HCC): Primary | ICD-10-CM

## 2017-10-02 DIAGNOSIS — R76.8 P-ANCA AND MPO ANTIBODIES POSITIVE: ICD-10-CM

## 2017-10-02 DIAGNOSIS — B35.1 DERMATOPHYTOSIS OF NAIL: ICD-10-CM

## 2017-10-02 DIAGNOSIS — I10 ESSENTIAL HYPERTENSION: ICD-10-CM

## 2017-10-02 DIAGNOSIS — E11.9 TYPE 2 DIABETES MELLITUS WITHOUT COMPLICATION, WITHOUT LONG-TERM CURRENT USE OF INSULIN (HCC): ICD-10-CM

## 2017-10-02 LAB
ANCA MYELOPEROXIDASE: 198 AU/ML
ANCA PROTEINASE 3: 8 AU/ML

## 2017-10-02 PROCEDURE — 11721 DEBRIDE NAIL 6 OR MORE: CPT | Performed by: PODIATRIST

## 2017-10-02 PROCEDURE — 99213 OFFICE O/P EST LOW 20 MIN: CPT | Performed by: INTERNAL MEDICINE

## 2017-10-02 NOTE — PROGRESS NOTES
Subjective:  Patient presents to [facility] today for routine diabetic foot care. Patient denies any new problems with their feet. Patient's diabetic control has been not changed. Allergies   Allergen Reactions    Statins [Statins] Other (See Comments)     Muscle aches, elevated LFT's       Past Medical History:   Diagnosis Date    Basal cell cancer     nose    Dry skin dermatitis 12/3/2015    Dyspnea     chronic    Edema     Chronic    Gout     Hard of hearing     Hiatal hernia     Hypercholesterolemia     Hypertension     Hypomagnesemia 12/3/2015    Macular degeneration, dry     Obstructive sleep apnea     Open-angle glaucoma     Borderline.  Peripheral edema 12/3/2015    Sick sinus syndrome [facility])     with pacemaker  placement    Type 2 diabetes mellitus ([facility] [address]     Venous insufficiency     Vitamin D deficiency        Prior to Admission medications    Medication Sig Start Date End Date Taking? Authorizing Provider   clotrimazole-betamethasone (LOTRISONE) 1-0.05 % cream Apply topically 2 times daily for 7 days.  8/4/17  Yes JARRETT Wright   colchicine (COLCRYS) 0.6 MG tablet Take 1 tablet by mouth daily 8/4/17  Yes JARRETT Wright   sertraline (ZOLOFT) 25 MG tablet Take 0.5 tablets by mouth daily 6/22/17  Yes JARRETT Shanks   metoprolol succinate (TOPROL XL) 100 MG extended release tablet TAKE 1 TABLET DAILY 3/29/17  Yes JARRETT Wright   lisinopril (PRINIVIL;ZESTRIL) 20 MG tablet Take 1 tablet by mouth daily 3/29/17  Yes JARRETT Wright   furosemide (LASIX) 20 MG tablet TAKE 1 TABLET (20 MG) AND ALTERNATE WITH 2 TABLETS (40 MG) EVERY OTHER DAY  Patient taking differently: Takes 40 mg in AM and 40 mg in PM 2/21/17  Yes JARRETT Shanks   omeprazole (PRILOSEC) 20 MG delayed release capsule Take 1 capsule by mouth daily as needed (gastritis) 12/28/16  Yes JARRETT Wright   clotrimazole-betamethasone (LOTRISONE) 1-0.05 % cream Apply topically 2 times daily for seven days. 10/31/16  Yes JARRETT Lemus   Handicap Placard MISC by Does not apply route Issue parking placard for person with disability. [facility] [phone]   Applicant meets the qualifying disability criteria. Length of time expected to have disability: __x___Lifetime   _____Other: 7/29/16  Yes JARRETT Lemus   magnesium oxide (MAG-OX) 400 MG tablet Take 1 tablet by mouth nightly 12/21/15  Yes Blanka Negrete DO   colchicine (COLCRYS) 0.6 MG tablet Use as directed in case of gout flare. Two at onset of gout attack and may repeat in one hour if needed. 12/21/15  Yes Blanka Negrete DO   Cholecalciferol (VITAMIN D) 2000 UNITS CAPS capsule Take 1 capsule by mouth daily   Yes Historical Provider, MD   ammonium lactate (LAC-HYDRIN) 12 % lotion Apply to bilateral feet and legs and scaly spots twice daily to moisturize. 12/3/15  Yes Blanka Negrete DO   latanoprost (XALATAN) 0.005 % ophthalmic solution  1/15/15  Yes Historical Provider, MD   polyethylene glycol (MIRALAX) powder Take 17 g by mouth daily as needed. 8/19/13  Yes Keisha Low MD   Acetaminophen (TYLENOL PO) Take  by mouth as needed. Rarely uses   Yes Historical Provider, MD   aspirin 81 MG EC tablet Take 81 mg by mouth daily    Yes Historical Provider, MD       Social History   Substance Use Topics    Smoking status: Never Smoker    Smokeless tobacco: Never Used    Alcohol use No     Review of Systems: All 12 systems reviewed and pertinent positives noted above. Objective:  Vascular: DP and PT pulses palpable 2/4, bilateral.  CFT <3 seconds, bilateral.  Hair growth present to the level of the digits, bilateral.  Edema present, bilateral.  Varicosities present, bilateral. Erythema absent, bilateral. Distal Rubor absent bilateral.  Temperature within normal limits bilateral. Hyperpigmentation present bilateral. Atrophic skin yes.     Neurological: Sensation intact to light touch to level

## 2017-10-02 NOTE — MR AVS SNAPSHOT
After Visit Summary             Lanette Cardona   10/2/2017 1:10 PM   Office Visit    Description:  Female : 10/27/1928   Provider:  Lavonne Ellison MD   Department:  [facility] Nephrology              Your Follow-Up and Future Appointments         Below is a list of your follow-up and future appointments. This may not be a complete list as you may have made appointments directly with providers that we are not aware of or your providers may have made some for you. Please call your providers to confirm appointments. It is important to keep your appointments. Please bring your current insurance card, photo ID, co-pay, and all medication bottles to your appointment. If self-pay, payment is expected at the time of service. Your To-Do List     Future Appointments Provider Department Dept Phone    10/2/2017 1:30 PM Kathrin Tse DPM [facility] Podiatry [phone]    Please arrive 15 minutes prior to appointment, bring photo ID and insurance card. 2017 2:00 PM Nazanin Johnson, [address] [facility] [phone]    Please arrive 15 minutes prior to appointment, bring photo ID and insurance card. 2017 2:00 PM Jan Leahy MD [facility] Cardiology [phone]    Please arrive 15 minutes prior to appointment, bring photo ID and insurance card. 2017 9:00 AM SCHEDULE, [address] [place] [address] Cardiology [phone]    3/28/2018 11:00 AM SCHEDULE, [address] [facility]  Laboratory [phone]    If this is a fasting lab, please do not eat or drink past midnight other than water. 2018 1:10 PM Lavonne Ellison MD [facility] Nephrology [phone]    Please arrive 15 minutes prior to appointment, bring photo ID and insurance card.     Standing Orders Interval Expires    Anti-Neutrophilic Cytoplasmic Antibody [MXG899 Custom]  Every 6 Months until 10/2/2018 10/3/6623    Basic Metabolic Panel [JMC90 Custom]   10/3/2018    CBC Auto Differential [FSI2359 Custom]   10/3/2018 Creatinine, Random Urine [NTU620 Custom]   10/3/2018    Phosphorus [NTI676 Custom]   10/3/2018    Protein, urine, random [ZTQ488 Custom]   10/3/2018    PTH, INTACT WITH IONIZED CALCIUM [KPR6565 Custom]   10/3/2018    Vitamin D 25 Hydroxy [DPI368 Custom]   10/3/2018         Information from Your Visit        Department     Name Address Phone Fax    [address] [place] [address] Nephrology [facility] [address] Bozena Shelton [phone]      You Were Seen for:         Comments    CKD (chronic kidney disease), stage III   [666823]         Vital Signs     Blood Pressure Pulse Height Weight Body Mass Index Smoking Status    140/76 72 5' 1\" (1.549 m) 224 lb 12.8 oz (102 kg) 42.48 kg/m2 Never Smoker      Additional Information about your Body Mass Index (BMI)           Your BMI as listed above is considered obese (30 or more). BMI is an estimate of body fat, calculated from your height and weight. The higher your BMI, the greater your risk of heart disease, high blood pressure, type 2 diabetes, stroke, gallstones, arthritis, sleep apnea, and certain cancers. BMI is not perfect. It may overestimate body fat in athletes and people who are more muscular. Even a small weight loss (between 5 and 10 percent of your current weight) by decreasing your calorie intake and becoming more physically active will help lower your risk of developing or worsening diseases associated with obesity. Learn more at: ZALP.co.uk             Medications and Orders      Your Current Medications Are              clotrimazole-betamethasone (LOTRISONE) 1-0.05 % cream Apply topically 2 times daily for 7 days.     colchicine (COLCRYS) 0.6 MG tablet Take 1 tablet by mouth daily    sertraline (ZOLOFT) 25 MG tablet Take 0.5 tablets by mouth daily    metoprolol succinate (TOPROL XL) 100 MG extended release tablet TAKE 1 TABLET DAILY    lisinopril (PRINIVIL;ZESTRIL) 20 MG tablet Take 1 tablet by mouth daily

## 2017-10-02 NOTE — PROGRESS NOTES
Nephrology Progress Note    Lanette YAN [address] [facility], [place]      SUBJECTIVE      Chief Complaint   Patient presents with    Follow-up     6mo vadim ckd stage 3   10/2/2017  Patient is here for follow-up of her chronic kidney disease stage III. She feels okay, energy levels are good, she does complain of some right knee pain but does not take any pain medication. She is inconsistent with her Lasix use and is only taking it once a day instead of twice a day. She has gained about 8 pounds of weight since last visit. Looks like a blood pressure medications were discontinued through her PCP except for Lasix and lisinopril due to low blood pressure episode. Blood pressures are stable at this time. Labs from September 2017 shows a blood urea nitrogen of 32 and a creatinine of 0.9, potassium of 4.6, calcium ionized 1.2, PTH was 78, vitamin D was 68, hemoglobin was 12. 9.\  MPO antibodies still 193, PR3  was normal at 8 - she has no symptoms of malaise and tiredness , rash , sinusitis , cough , fever . She does not want to be referred to rheumatology       4.3.17  Patient is here for followup. She feels well, leg edema is down with Lasix 40 mg by mouth twice a day. She is not taking Aldactone currently. Pt's repeat ANCA testing is positive for MPO Level was 200 and PR3 was negative at 1. She does not have any microscopic hematuria, proteinuria is 0.2grams and creatinine is 1 which is her baseline . No signs and symptoms for vasculitis, no cutaneous lesions, sinusitis. Likely she has ANCA/MPO positivity sec to allopurinol use without any active renal vasculitis although could only be confirmed on kidney biopsy. This is discussed with patient and her daughter when her results showed up. No need for kidney biopsy and patient also does not want it.   Most recent labs from 3/28/2017 shows a blood urea nitrogen of 21 and cr of 1.08, sodium 142, potassium of 4.8, hemoglobin of 12.9, calcium of 9.2, PTH of 70, was 0.4, UA had 0-4 RBCs  Blood pressures are stable. Leg swelling has increased and she has gained 7 pounds from last visit. She is currently taking Lasix 20 alternating with 40 mg every other day and Aldactone 12.5 by mouth daily    5/2/2016  Patient here for followup. She was last seen by me last month for her renal dysfunction. Chronic kidney disease workup with the paraproteinemia workup was all negative, k/l was 1.7 and serum immunofixation was negative. Creatinine from April 2016 was up at 1.48 and baseline normally runs around 1.2. Sodium was slightly elevated at 145, bun was 35. She could have been a little dehydrated at that time and she had upper respiratory tract infection at that time. Urine analysis that was done in the past 5-10 RBCs and she tells me she had seen urology for cystoscopy and that was negative. Urine protein creatinine ratio was about 0.2. Ultrasound of the kidneys showed 9.3-9.4 cm kidneys. She was taken off losartan 100 mg on last visit that she was taking in conjunction with lisinopril 20 mg twice a day and was put on Aldactone 12.5 mg. When she was sick and blood pressure was slightly low in the 335 systolic but now it is in the 240M and 634D systolic. Her blood pressure seems to be a well-controlled and today it was 140/70. She complains of abdominal pain and tells me that Prilosec helps and was advised to take it on a daily basis. Her edema is controlled     Pt denies any hx of heavy or prolonged NSAID use and there is no history of OTC herbal medications . No history of dysuria or frequency. Pt denies any hx of recent UTI , incontinence or nocturia or recurrent nephrolithiasis. No unusual skin rashes . No tea coloured urine . No recent procedures involving IV contrast.     Patient Active Problem List    Diagnosis Date Noted    Controlled type 2 DM with peripheral circulatory disorder ([facility] [address] 04/15/2016    Complete heart block ([facility] [address] 04/11/2016    Palpitation 04/11/2016  Peripheral edema 12/03/2015    Hypomagnesemia 12/03/2015    Dry skin dermatitis 12/03/2015    Dermatophytosis of nail 1-5R/L 01/26/2015    Gout     Pacemaker 10/24/2013     Overview Note:     DUAL CHAMBER      Hypercholesterolemia     Obstructive sleep apnea     Hard of hearing     Venous insufficiency     Dyspnea      Overview Note:     chronic      Sick sinus syndrome (HCC)      Overview Note:     with pacemaker  placement      Vitamin D deficiency     Malignant basal cell neoplasm of skin      Overview Note:     nose  replace inactive diagnosis      Hypertension     Type 2 diabetes mellitus without complication (HCC)     Hiatal hernia          CURRENT MEDICATIONS      Current Outpatient Prescriptions   Medication Sig Dispense Refill    clotrimazole-betamethasone (LOTRISONE) 1-0.05 % cream Apply topically 2 times daily for 7 days. 30 g 0    colchicine (COLCRYS) 0.6 MG tablet Take 1 tablet by mouth daily 30 tablet 5    sertraline (ZOLOFT) 25 MG tablet Take 0.5 tablets by mouth daily 30 tablet 5    metoprolol succinate (TOPROL XL) 100 MG extended release tablet TAKE 1 TABLET DAILY 90 tablet 1    lisinopril (PRINIVIL;ZESTRIL) 20 MG tablet Take 1 tablet by mouth daily 90 tablet 1    furosemide (LASIX) 20 MG tablet TAKE 1 TABLET (20 MG) AND ALTERNATE WITH 2 TABLETS (40 MG) EVERY OTHER DAY (Patient taking differently: Takes 40 mg in AM and 40 mg in PM) 270 tablet 1    omeprazole (PRILOSEC) 20 MG delayed release capsule Take 1 capsule by mouth daily as needed (gastritis) 90 capsule 1    clotrimazole-betamethasone (LOTRISONE) 1-0.05 % cream Apply topically 2 times daily for seven days. 30 g 0    Handicap Placard [facility] by Does not apply route Issue parking placard for person with disability. [facility] [phone]   Applicant meets the qualifying disability criteria.   Length of time expected to have disability: __x___Lifetime   _____Other: 1 each 0    magnesium oxide (MAG-OX) 400 MG tablet Take 1 ascites   EXTREMITIES: + edema  NEURO: alert and oriented, no deficits    LABS    CBC:   Lab Results   Component Value Date    WBC 9.8 09/28/2017    HGB 12.9 09/28/2017    HCT 40.5 09/28/2017    MCV 92.9 09/28/2017    RDW 13.7 09/28/2017     09/28/2017    MPV 7.9 09/28/2017      BMP:   Lab Results   Component Value Date     09/28/2017    K 4.6 09/28/2017     09/28/2017    CO2 29 09/28/2017    BUN 32 09/28/2017    CREATININE 0.98 09/28/2017    GLUCOSE 116 09/28/2017    CALCIUM 9.1 09/28/2017      PHOSPHORUS:    Lab Results   Component Value Date    PHOS 3.0 09/28/2017     MAGNESIUM:   Lab Results   Component Value Date    MG 2.3 03/28/2017     ALBUMIN:   Lab Results   Component Value Date    LABALBU 3.7 07/24/2017     PTH: No components found for: PTHINTACT  VIT D3,25 HYDROXY: No results found for: VITD3     IRON:  No results found for: IRON  IRON SATURATION:  No results found for: LABIRON  TIBC:  No results found for: TIBC  FERRITIN:  No results found for: FERRITIN          TAMERA: No results found for: TAMERA    SPEP:   Lab Results   Component Value Date    PROT 7.2 07/24/2017    ALBCAL 4.0 04/13/2016    ALBPCT 55 04/13/2016    LABALPH 0.2 04/13/2016    LABALPH 0.9 04/13/2016    A1PCT 3 04/13/2016    A2PCT 12 04/13/2016    LABBETA 1.1 04/13/2016    BETAPCT 15 04/13/2016    GAMGLOB 1.1 04/13/2016    GGPCT 15 04/13/2016    PATH ELECTRONICALLY SIGNED. Oneyda Rubalcava M.D. 04/14/2016     UPEP:   Lab Results   Component Value Date    TPU 9 04/14/2016    TPU 9 04/14/2016      HEPBSAG:No results found for: HEPBSAG  HEPCAB:No results found for: HEPCAB  C3:   Lab Results   Component Value Date    C3 157 04/13/2016     C4:   Lab Results   Component Value Date    C4 36 04/13/2016     MPO ANCA:   Lab Results   Component Value Date     09/28/2017    .   PR3 ANCA:    Lab Results   Component Value Date    PR3 8 09/28/2017                      URINALYSIS/URINE CHEMISTRIES      URINE CREATININE:    Lab Results Component Value Date    LABCREA 14.5 09/28/2017     URINE EOSINOPHILS : No results found for: UREO  URINE PROTEIN:    Lab Results   Component Value Date    TPU 9 04/14/2016    TPU 9 04/14/2016           RADIOLOGY    No results found for this or any previous visit. ASSESSMENT     1. Chronic kidney disease stage III with baseline creatinine running between 1.2-1.4 milligrams per DL. Most recent creatinine was 0.9 from September 2017 which could be dilutional   With a GFR of around 40-45 ml/min. Etiology likely secondary to diabetic nephropathy  CKD workup was negative and urine protein creatinine ratio  is 0.3 , no significant microscopic hematuria. UA had 0-4 RBCs   Previously urinalysis had microscopic hematuria and she has a history of cystoscopy which was negative in the past. Most recent UA had No diabetes  Ultrasound of the kidneys showed 9.3 and 9.4 cm kidneys   2. Hypertension well-controlled  BP Readings from Last 3 Encounters:   10/02/17 136/70   10/02/17 (!) 140/76   09/28/17 (!) 140/78    :  3. Type 2 Diabetes mellitus    4. Diastolic CHF  5. Aortic valve sclerosis  6. Pulmonary hypertension with right ventricular systolic pressure being 53  7. Lower extremity edema Stable  8. Secondary hyperparathyroidism PTH was 78, vitamin D was 68  9. ANCA/MPO positivity  without any active renal vasculitis although could only be confirmed on kidney biopsy. This is discussed with patient and her daughter when her results showed up. Patient does not want further workup and does not want to be referred to rheumatology . will continue to monitor     PLAN     1. Based on available labs patients renal function is stable. patient's volume status is  acceptable. Importance of tight blood pressure, glycemic control, lipid control was outlined to patient . 2.. Advised to take Lasix 40 mg twice a day due to increased swelling and weight . Watch salt and fluid intake   3.  Repeat ANCA /mpo In 6 months with next set of

## 2017-10-02 NOTE — PROGRESS NOTES
Foot Care Worksheet  PCP: JARRETT Crespo/C  Last visit: 8/4/2017      Nail description:  Thick , Yellow , Crumbly , Marked limitation of ambulation     Pain resulting from thickened and dystrophy of nail plate No    Nails involved  Right   1, 2, 5  (T5-T9)  Left     1, 2, 5  (TA-T4)    Q7 1 Class A Finding - Non traumatic amputation of foot No    Q8 2 Class B Findings - Absent DP pulse No, Absent PT pulse No, Advanced tropic changes (3 required) Yes    Decrease hair growth Yes, Nail changes/thickening Yes, Pigmented changes/discoloration Yes, Skin texture (thin, shiny) Yes, Skin color (rubor/redness) No    Q9 1 Class B and 2 Class C Findings  Claudication No, Temperature change Yes, Paresthesia No, Burning No, Edema Yes

## 2017-10-02 NOTE — MR AVS SNAPSHOT
After Visit Summary             Chichi Cardona   10/2/2017 1:30 PM   Office Visit    Description:  Female : 10/27/1928   Provider:  Grover Gardner DPM   Department:  [address] [place] [address] Road and Future Appointments         Below is a list of your follow-up and future appointments. This may not be a complete list as you may have made appointments directly with providers that we are not aware of or your providers may have made some for you. Please call your providers to confirm appointments. It is important to keep your appointments. Please bring your current insurance card, photo ID, co-pay, and all medication bottles to your appointment. If self-pay, payment is expected at the time of service. Your To-Do List     Future Appointments Provider Department Dept Phone    2017 2:00 PM Haile Engel [place], [facility] [phone]    Please arrive 15 minutes prior to appointment, bring photo ID and insurance card. 2017 1:45 PM Grover Gardner  [address] Podiatry [phone]    Please arrive 15 minutes prior to appointment, bring photo ID and insurance card. 2017 2:00 PM Wicho Engle  [address] Cardiology [phone]    Please arrive 15 minutes prior to appointment, bring photo ID and insurance card. 2017 9:00 AM SCHEDULE, [address] [place] [address] Cardiology [phone]    3/28/2018 11:00 AM SCHEDULE, Lilly Kuo [facility]  Laboratory [phone]    If this is a fasting lab, please do not eat or drink past midnight other than water. 2018 1:10 PM Fred Huynh  [address] Nephrology [phone]    Please arrive 15 minutes prior to appointment, bring photo ID and insurance card.          Information from Your Visit        Department     Name Address Phone Fax    [address] Podiatry [address]zen[address] [place] [phone]      You Were Seen for:         Comments Controlled type 2 DM with peripheral circulatory disorder [facility])   [579000]         Vital Signs     Blood Pressure Pulse Respirations Height Weight Body Mass Index    136/70 (Site: Right Arm, Position: Sitting, Cuff Size: Large Adult) 72 20 5' 1\" (1.549 m) 224 lb (101.6 kg) 42.32 kg/m2    Smoking Status                   Never Smoker           Additional Information about your Body Mass Index (BMI)           Your BMI as listed above is considered obese (30 or more). BMI is an estimate of body fat, calculated from your height and weight. The higher your BMI, the greater your risk of heart disease, high blood pressure, type 2 diabetes, stroke, gallstones, arthritis, sleep apnea, and certain cancers. BMI is not perfect. It may overestimate body fat in athletes and people who are more muscular. Even a small weight loss (between 5 and 10 percent of your current weight) by decreasing your calorie intake and becoming more physically active will help lower your risk of developing or worsening diseases associated with obesity. Learn more at: [email or web address]             Medications and Orders      Your Current Medications Are              clotrimazole-betamethasone (LOTRISONE) 1-0.05 % cream Apply topically 2 times daily for 7 days. colchicine (COLCRYS) 0.6 MG tablet Take 1 tablet by mouth daily    sertraline (ZOLOFT) 25 MG tablet Take 0.5 tablets by mouth daily    metoprolol succinate (TOPROL XL) 100 MG extended release tablet TAKE 1 TABLET DAILY    lisinopril (PRINIVIL;ZESTRIL) 20 MG tablet Take 1 tablet by mouth daily    furosemide (LASIX) 20 MG tablet TAKE 1 TABLET (20 MG) AND ALTERNATE WITH 2 TABLETS (40 MG) EVERY OTHER DAY    omeprazole (PRILOSEC) 20 MG delayed release capsule Take 1 capsule by mouth daily as needed (gastritis)    clotrimazole-betamethasone (LOTRISONE) 1-0.05 % cream Apply topically 2 times daily for seven days. Influenza Virus Vaccine 11/2/2012, 10/20/2011, 9/16/2010    Influenza, High Dose 9/14/2017, 10/12/2016, 10/12/2015, 10/31/2014, 10/16/2013    Pneumococcal 13-valent Conjugate ([id number])  Deferred (Contraindication), 12/3/2015    Pneumococcal Polysaccharide ([id number]) 1/1/2011    Tdap (Boostrix, Adacel) 10/29/2015    Zoster 10/12/2015      Preventive Care        Date Due    Tetanus Combination Vaccine (2 - Td) 10/29/2025            MyChart Signup           [employer] allows you to send messages to your doctor, view your test results, renew your prescriptions, schedule appointments, view visit notes, and more. How Do I Sign Up? 1. In your Internet browser, go to [email or web address]  2. Click on the Sign Up Now link in the Sign In box. You will see the New Member Sign Up page. 3. Enter your [employer] Access Code exactly as it appears below. You will not need to use this code after youve completed the sign-up process. If you do not sign up before the expiration date, you must request a new code. [employer] Access Code: [email or web address]  Expires: 12/1/2017  1:28 PM    4. Enter your Social Security Number ([id number]) and Date of Birth (mm/dd/yyyy) as indicated and click Submit. You will be taken to the next sign-up page. 5. Create a [employer] ID. This will be your [employer] login ID and cannot be changed, so think of one that is secure and easy to remember. 6. Create a [employer] password. You can change your password at any time. 7. Enter your Password Reset Question and Answer. This can be used at a later time if you forget your password. 8. Enter your e-mail address. You will receive e-mail notification when new information is available in [address] 9. Click Sign Up. You can now view your medical record. Additional Information  If you have questions, please contact the physician practice where you receive care. Remember, [employer] is NOT to be used for urgent needs.  For medical emergencies, dial 911. For questions regarding your [employer] account call [phone]. If you have a clinical question, please call your doctor's office.

## 2017-10-12 ENCOUNTER — TELEPHONE (OUTPATIENT)
Dept: FAMILY MEDICINE CLINIC | Age: 82
End: 2017-10-12

## 2017-10-12 NOTE — TELEPHONE ENCOUNTER
Called patient's daughter, Sander Nunez, to inform her to have patient come and repeat lab work. Daughter not available to speak to.  Will call back at a later time

## 2017-10-30 ENCOUNTER — TELEPHONE (OUTPATIENT)
Dept: PRIMARY CARE CLINIC | Age: 82
End: 2017-10-30

## 2017-10-30 ENCOUNTER — NURSE ONLY (OUTPATIENT)
Dept: LAB | Age: 82
End: 2017-10-30

## 2017-10-30 ENCOUNTER — HOSPITAL ENCOUNTER (OUTPATIENT)
Dept: LAB | Age: 82
Setting detail: SPECIMEN
Discharge: HOME | End: 2017-10-30
Payer: MEDICARE

## 2017-10-30 DIAGNOSIS — Z87.39 HISTORY OF GOUT: ICD-10-CM

## 2017-10-30 DIAGNOSIS — Z01.30 BLOOD PRESSURE CHECK: Primary | ICD-10-CM

## 2017-10-30 DIAGNOSIS — M79.672 LEFT FOOT PAIN: ICD-10-CM

## 2017-10-30 LAB
SEDIMENTATION RATE, ERYTHROCYTE: 61 MM (ref 0–30)
URIC ACID: 9.5 MG/DL (ref 2.4–5.7)

## 2017-10-30 PROCEDURE — 36415 COLL VENOUS BLD VENIPUNCTURE: CPT

## 2017-10-30 PROCEDURE — 85651 RBC SED RATE NONAUTOMATED: CPT

## 2017-10-30 PROCEDURE — 84550 ASSAY OF BLOOD/URIC ACID: CPT

## 2017-10-30 NOTE — TELEPHONE ENCOUNTER
----- Message from Mando Alvarez, [address] sent at 10/30/2017 12:38 PM EDT -----      ----- Message -----  From: Nayely Hull LPN  Sent: [id number]/09/9032  11:10 AM  To: JARRETT Sosa

## 2017-11-17 ENCOUNTER — OFFICE VISIT (OUTPATIENT)
Dept: FAMILY MEDICINE CLINIC | Age: 82
End: 2017-11-17
Payer: MEDICARE

## 2017-11-17 VITALS
BODY MASS INDEX: 41.16 KG/M2 | DIASTOLIC BLOOD PRESSURE: 76 MMHG | HEIGHT: 61 IN | OXYGEN SATURATION: 98 % | WEIGHT: 218 LBS | SYSTOLIC BLOOD PRESSURE: 124 MMHG | HEART RATE: 69 BPM

## 2017-11-17 DIAGNOSIS — F41.9 ANXIETY: ICD-10-CM

## 2017-11-17 DIAGNOSIS — N18.30 TYPE 2 DIABETES MELLITUS WITH STAGE 3 CHRONIC KIDNEY DISEASE, WITHOUT LONG-TERM CURRENT USE OF INSULIN (HCC): Primary | ICD-10-CM

## 2017-11-17 DIAGNOSIS — R60.0 PEDAL EDEMA: ICD-10-CM

## 2017-11-17 DIAGNOSIS — K21.9 GASTROESOPHAGEAL REFLUX DISEASE WITHOUT ESOPHAGITIS: ICD-10-CM

## 2017-11-17 DIAGNOSIS — I10 ESSENTIAL HYPERTENSION: ICD-10-CM

## 2017-11-17 DIAGNOSIS — E11.22 TYPE 2 DIABETES MELLITUS WITH STAGE 3 CHRONIC KIDNEY DISEASE, WITHOUT LONG-TERM CURRENT USE OF INSULIN (HCC): Primary | ICD-10-CM

## 2017-11-17 PROCEDURE — 99214 OFFICE O/P EST MOD 30 MIN: CPT | Performed by: PHYSICIAN ASSISTANT

## 2017-11-17 RX ORDER — SERTRALINE HYDROCHLORIDE 25 MG/1
25 TABLET, FILM COATED ORAL DAILY
Qty: 90 TABLET | Refills: 1 | Status: SHIPPED | OUTPATIENT
Start: 2017-11-17 | End: 2018-03-15 | Stop reason: DRUGHIGH

## 2017-11-17 RX ORDER — METOPROLOL SUCCINATE 100 MG/1
TABLET, EXTENDED RELEASE ORAL
Qty: 90 TABLET | Refills: 1 | Status: SHIPPED | OUTPATIENT
Start: 2017-11-17 | End: 2018-04-23 | Stop reason: SDUPTHER

## 2017-11-17 RX ORDER — FUROSEMIDE 20 MG/1
TABLET ORAL
Qty: 270 TABLET | Refills: 1 | Status: SHIPPED | OUTPATIENT
Start: 2017-11-17 | End: 2018-04-02 | Stop reason: ALTCHOICE

## 2017-11-17 RX ORDER — SERTRALINE HYDROCHLORIDE 25 MG/1
25 TABLET, FILM COATED ORAL DAILY
Qty: 30 TABLET | Refills: 3 | Status: SHIPPED | OUTPATIENT
Start: 2017-11-17 | End: 2018-03-15 | Stop reason: DRUGHIGH

## 2017-11-17 ASSESSMENT — ENCOUNTER SYMPTOMS
CHEST TIGHTNESS: 1
CONSTIPATION: 0

## 2017-11-17 NOTE — PROGRESS NOTES
Subjective:      Patient ID: Aurea Jha is a 80 y.o. female. Patient is seen for follow up on her HTN and other health issues. Denies recent illness and her pedal edema has been down lately. She and her daughter are happy about this. They do have support hose and use at times. Has some questions about her zoloft dose. Wondering if it should be adjusted. Review of Systems   Constitutional: Positive for activity change and fatigue. Negative for appetite change and chills. HENT: Negative for congestion, postnasal drip, rhinorrhea, sinus pain, sinus pressure, sneezing, sore throat and trouble swallowing. Respiratory: Positive for chest tightness. Negative for cough, shortness of breath and wheezing. At 3 am has chest heaviness. Daughter feels it is anxiety. No brain cracking. Sees cardiology in 2 weeks. Cardiovascular: Positive for chest pain. Negative for palpitations and leg swelling. Gastrointestinal: Negative for abdominal distention, constipation, diarrhea, nausea and vomiting. Genitourinary: Negative for difficulty urinating and dysuria. Musculoskeletal: Positive for gait problem. Chaparrita Nail in bathroom bent over too far lost balance. Her son was there. No injury. Skin: Negative for rash. Neurological: Negative for dizziness, weakness, light-headedness and headaches. Psychiatric/Behavioral: Positive for sleep disturbance. Past Medical History:   Diagnosis Date    Basal cell cancer     nose    Dry skin dermatitis 12/3/2015    Dyspnea     chronic    Edema     Chronic    Gout     Hard of hearing     Hiatal hernia     Hypercholesterolemia     Hypertension     Hypomagnesemia 12/3/2015    Macular degeneration, dry     Obstructive sleep apnea     Open-angle glaucoma     Borderline.    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     FRACTURE SURGERY Right     wrist    HYSTERECTOMY      PACEMAKER INSERTION      sick sinus syndrome    TUBAL LIGATION  1970     Social History     Social History    Marital status:      Spouse name: N/A    Number of children: N/A    Years of education: N/A     Occupational History    retired       Social History Main Topics    Smoking status: Never Smoker    Smokeless tobacco: Never Used    Alcohol use No    Drug use: No    Sexual activity: Not on file     Other Topics Concern    Not on file     Social History Narrative    No narrative on file     Family History   Problem Relation Age of Onset    Other Mother      sepsis    Diabetes Mother     Other Father      rheumatic heart disease       Allergies   Allergen Reactions    Statins [Statins] Other (See Comments)     Muscle aches, elevated LFT's       /76   Pulse 69   Ht 5' 1\" (1.549 m)   Wt 218 lb (98.9 kg)   SpO2 98%   BMI 41.19 kg/m²     Objective:   Physical Exam   Constitutional: She is oriented to person, place, and time. She appears well-developed and well-nourished. No distress. HENT:   Head: Normocephalic and atraumatic. Nose: Nose normal.   Mouth/Throat: Oropharynx is clear and moist. No oropharyngeal exudate. TM's are dull. Uvula midline tongue symmetric. Eyes: Conjunctivae are normal. No scleral icterus. Neck: Normal range of motion. Neck supple. No JVD present. No thyromegaly present. Thyroid is non tender nor palpable. No carotid bruits noted. Cardiovascular: Normal rate and regular rhythm. Murmur heard. II/VI systolic murmur noted along LLSB to apex.    Pulmonary/Chest:

## 2017-11-17 NOTE — PROGRESS NOTES
Chronic Disease Visit Information    BP Readings from Last 3 Encounters:   11/17/17 124/76   10/02/17 136/70   10/02/17 (!) 140/76          Hemoglobin A1C (%)   Date Value   07/24/2017 6.1 (H)   03/28/2017 5.9   11/01/2016 5.9     Microalb/Crt. Ratio (mcg/mg creat)   Date Value   03/28/2017 30 (H)     LDL Cholesterol (mg/dL)   Date Value   03/28/2017 160 (H)     HDL (mg/dL)   Date Value   03/28/2017 40 (L)     BUN (mg/dL)   Date Value   09/28/2017 32 (H)     CREATININE (mg/dL)   Date Value   09/28/2017 0.98 (H)     Glucose (mg/dL)   Date Value   09/28/2017 116 (H)            Have you changed or started any medications since your last visit including any over-the-counter medicines, vitamins, or herbal medicines? no   Are you having any side effects from any of your medications? -  no  Have you stopped taking any of your medications? Is so, why? -  no    Have you seen any other physician or provider since your last visit? No  Have you had any other diagnostic tests since your last visit? No  Have you been seen in the emergency room and/or had an admission to a hospital since we last saw you? No  Have you had your annual diabetic retinal (eye) exam? No  Have you had your routine dental cleaning in the past 6 months? yes     Have you activated your "[employer] account? If not, what are your barriers?  No:     Patient Care Team:  JARRETT Mancini as PCP - General (Family Medicine)  Federico Mancini as PCP - S Attributed Provider         Medical History Review  Past Medical, Family, and Social History reviewed and does contribute to the patient presenting condition    Health Maintenance   Topic Date Due    DTaP/Tdap/Td vaccine (2 - Td) 10/29/2025    Zostavax vaccine  Completed    Flu vaccine  Completed    Pneumococcal low/med risk  Completed

## 2017-11-20 RX ORDER — FUROSEMIDE 20 MG/1
40 TABLET ORAL 2 TIMES DAILY
Qty: 270 TABLET | Refills: 1 | Status: SHIPPED | OUTPATIENT
Start: 2017-11-20 | End: 2018-04-02 | Stop reason: ALTCHOICE

## 2017-11-20 RX ORDER — OMEPRAZOLE 20 MG/1
20 CAPSULE, DELAYED RELEASE ORAL DAILY
Qty: 30 CAPSULE | Refills: 5 | Status: SHIPPED | OUTPATIENT
Start: 2017-11-20 | End: 2018-03-20 | Stop reason: SDUPTHER

## 2017-11-27 ASSESSMENT — ENCOUNTER SYMPTOMS
COUGH: 0
NAUSEA: 0
SINUS PAIN: 0
WHEEZING: 0
SHORTNESS OF BREATH: 0
DIARRHEA: 0
VOMITING: 0
TROUBLE SWALLOWING: 0
SINUS PRESSURE: 0
SORE THROAT: 0
ABDOMINAL DISTENTION: 0
RHINORRHEA: 0

## 2017-12-08 DIAGNOSIS — I10 ESSENTIAL HYPERTENSION: ICD-10-CM

## 2017-12-08 RX ORDER — LISINOPRIL 20 MG/1
20 TABLET ORAL DAILY
Qty: 90 TABLET | Refills: 1 | Status: SHIPPED | OUTPATIENT
Start: 2017-12-08 | End: 2018-05-15 | Stop reason: SDUPTHER

## 2017-12-08 NOTE — TELEPHONE ENCOUNTER
Try having patient take half tab daily and monitor BP's   If elevate  Will have to stay on same dose of zestril but if stay low this will be good for her kidneys

## 2017-12-14 ENCOUNTER — OFFICE VISIT (OUTPATIENT)
Dept: PODIATRY | Age: 82
End: 2017-12-14
Payer: MEDICARE

## 2017-12-14 ENCOUNTER — HOSPITAL ENCOUNTER (OUTPATIENT)
Dept: LAB | Age: 82
Setting detail: SPECIMEN
Discharge: HOME | End: 2017-12-14
Payer: MEDICARE

## 2017-12-14 ENCOUNTER — OFFICE VISIT (OUTPATIENT)
Dept: CARDIOLOGY | Age: 82
End: 2017-12-14
Payer: MEDICARE

## 2017-12-14 VITALS
HEART RATE: 76 BPM | RESPIRATION RATE: 20 BRPM | DIASTOLIC BLOOD PRESSURE: 72 MMHG | BODY MASS INDEX: 41.42 KG/M2 | SYSTOLIC BLOOD PRESSURE: 126 MMHG | HEIGHT: 61 IN | WEIGHT: 219.4 LBS

## 2017-12-14 VITALS
HEART RATE: 76 BPM | BODY MASS INDEX: 41.35 KG/M2 | WEIGHT: 219 LBS | HEIGHT: 61 IN | SYSTOLIC BLOOD PRESSURE: 126 MMHG | DIASTOLIC BLOOD PRESSURE: 72 MMHG

## 2017-12-14 DIAGNOSIS — E11.22 TYPE 2 DIABETES MELLITUS WITH STAGE 3 CHRONIC KIDNEY DISEASE, WITHOUT LONG-TERM CURRENT USE OF INSULIN (HCC): ICD-10-CM

## 2017-12-14 DIAGNOSIS — E11.51 CONTROLLED TYPE 2 DM WITH PERIPHERAL CIRCULATORY DISORDER (HCC): Primary | ICD-10-CM

## 2017-12-14 DIAGNOSIS — R06.02 SOB (SHORTNESS OF BREATH): ICD-10-CM

## 2017-12-14 DIAGNOSIS — N18.30 TYPE 2 DIABETES MELLITUS WITH STAGE 3 CHRONIC KIDNEY DISEASE, WITHOUT LONG-TERM CURRENT USE OF INSULIN (HCC): ICD-10-CM

## 2017-12-14 DIAGNOSIS — I10 ESSENTIAL HYPERTENSION: Primary | ICD-10-CM

## 2017-12-14 DIAGNOSIS — I49.5 SICK SINUS SYNDROME (HCC): ICD-10-CM

## 2017-12-14 DIAGNOSIS — I10 ESSENTIAL HYPERTENSION: ICD-10-CM

## 2017-12-14 DIAGNOSIS — B35.1 DERMATOPHYTOSIS OF NAIL: ICD-10-CM

## 2017-12-14 LAB
ALBUMIN SERPL-MCNC: 3.8 G/DL (ref 3.5–5.2)
ALBUMIN/GLOBULIN RATIO: 1 (ref 1–2.5)
ALP BLD-CCNC: 108 U/L (ref 35–104)
ALT SERPL-CCNC: 11 U/L (ref 5–33)
ANION GAP SERPL CALCULATED.3IONS-SCNC: 10 MMOL/L (ref 9–17)
AST SERPL-CCNC: 17 U/L
BILIRUB SERPL-MCNC: 0.39 MG/DL (ref 0.3–1.2)
BUN BLDV-MCNC: 30 MG/DL (ref 8–23)
BUN/CREAT BLD: 27 (ref 9–20)
CALCIUM SERPL-MCNC: 9.4 MG/DL (ref 8.6–10.4)
CHLORIDE BLD-SCNC: 98 MMOL/L (ref 98–107)
CHOLESTEROL/HDL RATIO: 5.9
CHOLESTEROL: 243 MG/DL
CO2: 34 MMOL/L (ref 20–31)
CREAT SERPL-MCNC: 1.12 MG/DL (ref 0.5–0.9)
ESTIMATED AVERAGE GLUCOSE: 137 MG/DL
GFR AFRICAN AMERICAN: 56 ML/MIN
GFR NON-AFRICAN AMERICAN: 46 ML/MIN
GFR SERPL CREATININE-BSD FRML MDRD: ABNORMAL ML/MIN/{1.73_M2}
GFR SERPL CREATININE-BSD FRML MDRD: ABNORMAL ML/MIN/{1.73_M2}
GLUCOSE BLD-MCNC: 116 MG/DL (ref 70–99)
HBA1C MFR BLD: 6.4 % (ref 4.8–5.9)
HDLC SERPL-MCNC: 41 MG/DL
LDL CHOLESTEROL: 161 MG/DL (ref 0–130)
POTASSIUM SERPL-SCNC: 5 MMOL/L (ref 3.7–5.3)
SODIUM BLD-SCNC: 142 MMOL/L (ref 135–144)
TOTAL PROTEIN: 7.6 G/DL (ref 6.4–8.3)
TRIGL SERPL-MCNC: 203 MG/DL
VLDLC SERPL CALC-MCNC: ABNORMAL MG/DL (ref 1–30)

## 2017-12-14 PROCEDURE — 11721 DEBRIDE NAIL 6 OR MORE: CPT | Performed by: PODIATRIST

## 2017-12-14 PROCEDURE — 83036 HEMOGLOBIN GLYCOSYLATED A1C: CPT

## 2017-12-14 PROCEDURE — 36415 COLL VENOUS BLD VENIPUNCTURE: CPT

## 2017-12-14 PROCEDURE — 93000 ELECTROCARDIOGRAM COMPLETE: CPT | Performed by: INTERNAL MEDICINE

## 2017-12-14 PROCEDURE — 80053 COMPREHEN METABOLIC PANEL: CPT

## 2017-12-14 PROCEDURE — 99214 OFFICE O/P EST MOD 30 MIN: CPT | Performed by: INTERNAL MEDICINE

## 2017-12-14 PROCEDURE — 80061 LIPID PANEL: CPT

## 2017-12-14 ASSESSMENT — ENCOUNTER SYMPTOMS
SHORTNESS OF BREATH: 1
CHEST TIGHTNESS: 0
NAUSEA: 0
ABDOMINAL DISTENTION: 0
VOMITING: 0

## 2017-12-14 NOTE — PROGRESS NOTES
Subjective:  Patient presents to [facility] today for routine diabetic foot care. Patient denies any new problems with their feet. Patient's diabetic control has been not changed. Allergies   Allergen Reactions    Statins [Statins] Other (See Comments)     Muscle aches, elevated LFT's       Past Medical History:   Diagnosis Date    Basal cell cancer     nose    Dry skin dermatitis 12/3/2015    Dyspnea     chronic    Edema     Chronic    Gout     Hard of hearing     Hiatal hernia     Hypercholesterolemia     Hypertension     Hypomagnesemia 12/3/2015    Macular degeneration, dry     Obstructive sleep apnea     Open-angle glaucoma     Borderline.  Peripheral edema 12/3/2015    Sick sinus syndrome [facility])     with pacemaker  placement    Type 2 diabetes mellitus ([facility] [address]     Venous insufficiency     Vitamin D deficiency        Prior to Admission medications    Medication Sig Start Date End Date Taking? Authorizing Provider   lisinopril (PRINIVIL;ZESTRIL) 20 MG tablet Take 1 tablet by mouth daily 12/8/17  Yes JARRETT Gray   omeprazole (PRILOSEC) 20 MG delayed release capsule Take 1 capsule by mouth Daily prn 11/20/17  Yes JARRETT Engle   furosemide (LASIX) 20 MG tablet Take 2 tablets by mouth 2 times daily Prn edema 11/20/17  Yes JARRETT Engle   metoprolol succinate (TOPROL XL) 100 MG extended release tablet TAKE 1 TABLET DAILY 11/17/17  Yes JARRETT Gray   furosemide (LASIX) 20 MG tablet Takes 40 mg in AM and 40 mg in PM 11/17/17  Yes JARRETT Gray   sertraline (ZOLOFT) 25 MG tablet Take 1 tablet by mouth daily 11/17/17  Yes JARRETT Engle   sertraline (ZOLOFT) 25 MG tablet Take 1 tablet by mouth daily 11/17/17  Yes JARRETT Engle   clotrimazole-betamethasone (LOTRISONE) 1-0.05 % cream Apply topically 2 times daily for 7 days.  8/4/17  Yes JARRETT Gray   colchicine (COLCRYS) 0.6 MG tablet Take 1 tablet by mouth [place] Monofilament, bilateral.  negative Tinel's, bilateral.  negative Valleix sign, bilateral.  Vibratory intact bilateral.  Reflexes Decreased bilateral.  Paresthesias negative. Dysthesias negative. Sharp/dull intact bilateral.    Integument:  Open lesion absent, Bilateral.  Interdigital maceration absent to web spaces,absent Bilateral.  Nails left 1, 2, 5 and right 1, 2, 5 thickened, dystrophic and crumbly, discolored with subungual debris. Fissures absent, Bilateral. Hyperkeratotic tissue is absent. Assessment:   1. Controlled type 2 DM with peripheral circulatory disorder (HCC)  HM DIABETES FOOT EXAM    ME DEBRIDEMENT OF NAILS, 6 OR MORE   2. Dermatophytosis of nail  HM DIABETES FOOT EXAM    ME DEBRIDEMENT OF NAILS, 6 OR MORE       Plan:  Diabetic foot education and exam.  All Nails as mentioned above debrided in length and thickness. Patient advised about OTC treatments for nails and callous. Patient will follow up in 10 weeks for routine foot care or PRN if any further problems arise.

## 2017-12-14 NOTE — PROGRESS NOTES
JARRETT Garcia   clotrimazole-betamethasone (LOTRISONE) 1-0.05 % cream Apply topically 2 times daily for 7 days. 8/4/17  Yes JARRETT Mancini   colchicine (COLCRYS) 0.6 MG tablet Take 1 tablet by mouth daily 8/4/17  Yes JARRETT Mancini   omeprazole (PRILOSEC) 20 MG delayed release capsule Take 1 capsule by mouth daily as needed (gastritis) 12/28/16  Yes JARRETT Mancini   Handicap Placard MISC by Does not apply route Issue parking placard for person with disability. [facility] [id number]   Applicant meets the qualifying disability criteria. Length of time expected to have disability: __x___Lifetime   _____Other: 7/29/16  Yes JARRETT Mancini   magnesium oxide (MAG-OX) 400 MG tablet Take 1 tablet by mouth nightly 12/21/15  Yes Mandi Olvera DO   Cholecalciferol (VITAMIN D) 2000 UNITS CAPS capsule Take 1 capsule by mouth daily   Yes Historical Provider, MD   ammonium lactate (LAC-HYDRIN) 12 % lotion Apply to bilateral feet and legs and scaly spots twice daily to moisturize. 12/3/15  Yes Mandi Olvera DO   latanoprost (XALATAN) 0.005 % ophthalmic solution  1/15/15  Yes Historical Provider, MD   polyethylene glycol (MIRALAX) powder Take 17 g by mouth daily as needed. 8/19/13  Yes Joanne Marquez MD   Acetaminophen (TYLENOL PO) Take  by mouth as needed. Rarely uses   Yes Historical Provider, MD   aspirin 81 MG EC tablet Take 81 mg by mouth daily    Yes Historical Provider, MD       Allergies:  Statins [statins]    Social History:   reports that she has never smoked. She has never used smokeless tobacco. She reports that she does not drink alcohol or use drugs. Review of Systems:  Review of Systems   Constitutional: Negative for chills and fever. HENT: Negative for congestion. Respiratory: Positive for shortness of breath. Negative for chest tightness. Cardiovascular: Negative for chest pain and palpitations.    Gastrointestinal: Negative for abdominal distention, nausea type 2 DM with peripheral circulatory disorder (HCC)       Assessment/Plan:  1. SSS s/p PM insertion. Checked on 9/2017 and functioning normally. 2. Echo on 10/2015, EF 63%, Grade II DD, Borderline LVH. Mild to moderate TR, RVSP 53 mm Hg. Cath in 2011, minimal CAD with EF 55%. Has been feeling short of breath, does have ejection systolic murmur, will do echo. 3. HTN. Well controlled on current medications  4. CKD. Followed by nephrology. 5. DM 2.  Followed by PCP      Jose R Waldron MD  [facility]           [phone]

## 2017-12-14 NOTE — PROGRESS NOTES
Foot Care Worksheet  PCP: JARRETT Guaman/C  Last visit: 11/17/17      Nail description:  Thick , Yellow , Crumbly , Marked limitation of ambulation     Pain resulting from thickened and dystrophy of nail plate No    Nails involved  Right   1, 2, 5  (T5-T9)  Left     1, 2, 5  (TA-T4)    Q7 1 Class A Finding - Non traumatic amputation of foot No    Q8 2 Class B Findings - Absent DP pulse No, Absent PT pulse No, Advanced tropic changes (3 required) Yes    Decrease hair growth Yes, Nail changes/thickening Yes, Pigmented changes/discoloration Yes, Skin texture (thin, shiny) Yes, Skin color (rubor/redness) No    Q9 1 Class B and 2 Class C Findings  Claudication No, Temperature change Yes, Paresthesia No, Burning No, Edema Yes

## 2017-12-15 DIAGNOSIS — E78.00 HYPERCHOLESTEROLEMIA: Primary | ICD-10-CM

## 2017-12-15 DIAGNOSIS — E11.9 TYPE 2 DIABETES MELLITUS WITHOUT COMPLICATION, WITHOUT LONG-TERM CURRENT USE OF INSULIN (HCC): ICD-10-CM

## 2017-12-20 ENCOUNTER — OFFICE VISIT (OUTPATIENT)
Dept: PRIMARY CARE CLINIC | Age: 82
End: 2017-12-20
Payer: MEDICARE

## 2017-12-20 ENCOUNTER — HOSPITAL ENCOUNTER (OUTPATIENT)
Age: 82
Setting detail: SPECIMEN
Discharge: HOME | End: 2017-12-20
Payer: MEDICARE

## 2017-12-20 ENCOUNTER — PROCEDURE VISIT (OUTPATIENT)
Dept: CARDIOLOGY | Age: 82
End: 2017-12-20
Payer: MEDICARE

## 2017-12-20 VITALS
WEIGHT: 221 LBS | OXYGEN SATURATION: 94 % | SYSTOLIC BLOOD PRESSURE: 130 MMHG | TEMPERATURE: 97.4 F | RESPIRATION RATE: 18 BRPM | DIASTOLIC BLOOD PRESSURE: 80 MMHG | HEIGHT: 61 IN | HEART RATE: 73 BPM | BODY MASS INDEX: 41.72 KG/M2

## 2017-12-20 DIAGNOSIS — M10.00 IDIOPATHIC GOUT, UNSPECIFIED CHRONICITY, UNSPECIFIED SITE: Primary | ICD-10-CM

## 2017-12-20 DIAGNOSIS — N39.45 CONTINUOUS LEAKAGE OF URINE: ICD-10-CM

## 2017-12-20 DIAGNOSIS — G47.33 OSA (OBSTRUCTIVE SLEEP APNEA): ICD-10-CM

## 2017-12-20 DIAGNOSIS — I49.5 SICK SINUS SYNDROME (HCC): ICD-10-CM

## 2017-12-20 DIAGNOSIS — I10 ESSENTIAL HYPERTENSION: ICD-10-CM

## 2017-12-20 DIAGNOSIS — F41.1 GENERALIZED ANXIETY DISORDER: ICD-10-CM

## 2017-12-20 DIAGNOSIS — Z95.0 PACEMAKER: Primary | ICD-10-CM

## 2017-12-20 DIAGNOSIS — E11.22 TYPE 2 DIABETES MELLITUS WITH STAGE 3 CHRONIC KIDNEY DISEASE, WITHOUT LONG-TERM CURRENT USE OF INSULIN (HCC): ICD-10-CM

## 2017-12-20 DIAGNOSIS — N18.30 TYPE 2 DIABETES MELLITUS WITH STAGE 3 CHRONIC KIDNEY DISEASE, WITHOUT LONG-TERM CURRENT USE OF INSULIN (HCC): ICD-10-CM

## 2017-12-20 LAB
-: NORMAL
AMORPHOUS: NORMAL
BACTERIA: NORMAL
BILIRUBIN URINE: NEGATIVE
CASTS UA: NORMAL /LPF (ref 0–2)
COLOR: ABNORMAL
COMMENT UA: ABNORMAL
CRYSTALS, UA: NORMAL /HPF
EPITHELIAL CELLS UA: NORMAL /HPF (ref 0–5)
GLUCOSE URINE: NEGATIVE
KETONES, URINE: NEGATIVE
LEUKOCYTE ESTERASE, URINE: NEGATIVE
MUCUS: NORMAL
NITRITE, URINE: NEGATIVE
OTHER OBSERVATIONS UA: NORMAL
PH UA: 7.5 (ref 5–6)
PROTEIN UA: NEGATIVE
RBC UA: NORMAL /HPF (ref 0–4)
RENAL EPITHELIAL, UA: NORMAL /HPF
SPECIFIC GRAVITY UA: 1.01 (ref 1.01–1.02)
TRICHOMONAS: NORMAL
TURBIDITY: ABNORMAL
URINE HGB: ABNORMAL
UROBILINOGEN, URINE: NORMAL
WBC UA: NORMAL /HPF (ref 0–4)
YEAST: NORMAL

## 2017-12-20 PROCEDURE — 99214 OFFICE O/P EST MOD 30 MIN: CPT | Performed by: PHYSICIAN ASSISTANT

## 2017-12-20 PROCEDURE — 81001 URINALYSIS AUTO W/SCOPE: CPT

## 2017-12-20 PROCEDURE — 93288 INTERROG EVL PM/LDLS PM IP: CPT | Performed by: INTERNAL MEDICINE

## 2017-12-20 RX ORDER — COLCHICINE 0.6 MG/1
0.6 TABLET ORAL DAILY
Qty: 90 TABLET | Refills: 1 | Status: SHIPPED | OUTPATIENT
Start: 2017-12-20 | End: 2018-03-20 | Stop reason: SDUPTHER

## 2017-12-20 RX ORDER — COLCHICINE 0.6 MG/1
0.6 TABLET ORAL DAILY
Qty: 30 TABLET | Refills: 3 | Status: SHIPPED | OUTPATIENT
Start: 2017-12-20 | End: 2018-11-28

## 2017-12-20 RX ORDER — TOLTERODINE 4 MG/1
4 CAPSULE, EXTENDED RELEASE ORAL DAILY
Qty: 30 CAPSULE | Refills: 3 | Status: SHIPPED | OUTPATIENT
Start: 2017-12-20 | End: 2018-11-19

## 2017-12-20 ASSESSMENT — ENCOUNTER SYMPTOMS
TROUBLE SWALLOWING: 1
SHORTNESS OF BREATH: 1

## 2017-12-20 NOTE — PROGRESS NOTES
Subjective:      Patient ID: Edil Olson is a 80 y.o. female. Patient is seen following up on lab work and mood. Daughter is concerned about her urinary incontinence. Her pads are dripping. It will run down her leg. It is worse with her lasix. Pads are saturated. No real frequency. Her Mood is much better. Not having anxiety attacks at night. Sleeping better. Not tearful or fretting like she was. Review of Systems   Constitutional: Negative for appetite change, fatigue and fever. HENT: Positive for trouble swallowing. Negative for sore throat. Sometimes has trouble swallowing pills especially vitamins. Bread can be difficult it gets stuck. Does not want to do anything for this right now. Warm water helps. Had a hard time 2 nights ago. Respiratory: Positive for shortness of breath. Cardiovascular: Negative for chest pain. Getting echo in 2 days. Gastrointestinal: Negative for blood in stool, constipation, diarrhea, nausea and vomiting. Genitourinary: Negative for difficulty urinating, dysuria, frequency and hematuria. Musculoskeletal: Positive for arthralgias and gait problem. Skin: Negative for rash and wound. Neurological: Negative for dizziness, light-headedness and headaches. Psychiatric/Behavioral: Negative for sleep disturbance. The patient is nervous/anxious. Sleeping better and not sleeping as much in afternoon. No worrying like she was. Past Medical History:   Diagnosis Date    Basal cell cancer     nose    Dry skin dermatitis 12/3/2015    Dyspnea     chronic    Edema     Chronic    Gout     Hard of hearing     Hiatal hernia     Hypercholesterolemia     Hypertension     Hypomagnesemia 12/3/2015    Macular degeneration, dry     Obstructive sleep apnea     Open-angle glaucoma     Borderline.     Peripheral edema 12/3/2015    Sick sinus syndrome [facility])     with pacemaker  placement    Type 2 diabetes mellitus ([facility] [address]     [address] [place] [address] [phone]      Mucus, UA 12/20/2017 NOT REPORTED  NONE Final    Trichomonas, UA 12/20/2017 NOT REPORTED  NONE Final    Amorphous, UA 12/20/2017 NOT REPORTED  NONE Final    Other Observations UA 12/20/2017 NOT REPORTED  NREQ Final    Yeast, UA 12/20/2017 NOT REPORTED  NONE Final   Hospital Outpatient Visit on 12/14/2017   Component Date Value Ref Range Status    Glucose 12/14/2017 116* 70 - 99 mg/dL Final    BUN 12/14/2017 30* 8 - 23 mg/dL Final    CREATININE 12/14/2017 1.12* 0.50 - 0.90 mg/dL Final    Bun/Cre Ratio 12/14/2017 27* 9 - 20 Final    Calcium 12/14/2017 9.4  8.6 - 10.4 mg/dL Final    Sodium 12/14/2017 142  135 - 144 mmol/L Final    Potassium 12/14/2017 5.0  3.7 - 5.3 mmol/L Final    Chloride 12/14/2017 98  98 - 107 mmol/L Final    CO2 12/14/2017 34* 20 - 31 mmol/L Final    Anion Gap 12/14/2017 10  9 - 17 mmol/L Final    Alkaline Phosphatase 12/14/2017 108* 35 - 104 U/L Final    ALT 12/14/2017 11  5 - 33 U/L Final    AST 12/14/2017 17  <32 U/L Final    Total Bilirubin 12/14/2017 0.39  0.3 - 1.2 mg/dL Final    Total Protein 12/14/2017 7.6  6.4 - 8.3 g/dL Final    Alb 12/14/2017 3.8  3.5 - 5.2 g/dL Final    Albumin/Globulin Ratio 12/14/2017 1.0  1.0 - 2.5 Final    GFR Non- 12/14/2017 46* >60 mL/min Final    GFR  12/14/2017 56* >60 mL/min Final    GFR Comment 12/14/2017        Final    Comment: Average GFR for 79or more years old:   76 mL/min/1.73sq m  Chronic Kidney Disease:   <60 mL/min/1.73sq m  Kidney failure:   <15 mL/min/1.73sq m              eGFR calculated using average adult body mass. Additional eGFR calculator   available at:        [email or web address]        Performed at [facility] [address] [facility] [address] [phone]      GFR Staging 12/14/2017 NOT REPORTED   Final    Hemoglobin A1C 12/14/2017 6.4* 4.8 - 5.9 %

## 2017-12-22 ENCOUNTER — HOSPITAL ENCOUNTER (OUTPATIENT)
Dept: NON INVASIVE DIAGNOSTICS | Age: 82
Discharge: HOME | End: 2017-12-22
Payer: MEDICARE

## 2017-12-22 DIAGNOSIS — R06.02 SOB (SHORTNESS OF BREATH): ICD-10-CM

## 2017-12-22 PROCEDURE — 93306 TTE W/DOPPLER COMPLETE: CPT

## 2017-12-26 NOTE — PROCEDURES
Joeanne marie [address]                   [address] [place] [address]                                  ECHOCARDIOGRAM    PATIENT NAME: Linda Ruth                      :        10/27/1928  MED REC NO:   [id number]                             ROOM:  ACCOUNT NO:   [de-identified]                           ADMIT DATE: 2017  PROVIDER:     Maia Rodgers    DATE OF PROCEDURE:  2017    ORDERING PROVIDER:   Maia Rodgers MD    PRIMARY CARE PROVIDER:  Nichelle Box PA-C    CLINICAL DIAGNOSIS:  Shortness of breath    M-MODE/SECTOR MEASUREMENTS:   (*Measurement made in subxiphoid view)    1. LVEDD (3.7-5.6cm<3.2cm/m2)     4.7  2. LVESD (2.2-4.0cm)    3.3  3. MAS (24-42%)    30%  4. MESS (<9cm)  5. LVIVS thickness (.6-1.2cm)     1.2  6. LVPW thickness (.5-1.0cm) 1.2  7. Estimated mitral valve are by Planimetry  8. Blood pressure  9. LA (1.9-4.0cm<2.2cm/m2)   4.1  10.  RVIDD (.7-2.6cm<1.4cm/m2)  11.  RVW (.3-.9cm)  12.  Pulmonary artery  13. LVOT diameter  14. Ao Root (2.0-3.7<2.2cm/m2)    3.4  15. Heart rate  16. LV Mass - Diastolic (391SB)  17. LV Mass - Systolic (425YV)  18. Estimated Ejection Fraction (Kennedy's) 55%  19. IVC Collapse   YES  20. Diameter (0.9-1.5 cm)  21. LA Volume Index  22. LA Volume  23. RVSP 28    FINDINGS:  1. Global left ventricular systolic function is normal with an estimated  ejection fraction of around 55%. 2. Grade 1 diastolic dysfunction is seen. 3. Mild increased left ventricular wall thickness. 4. Mild biatrial enlargement. 5. Mildly dilated right ventricle with normal systolic function. No  significant valvular regurgitation or stenosis is seen. Estimated RVSP of  28 mmHg. 6. Pacer lead seen on the right side of the heart. 7. No pericardial effusion seen. 8. Normal aortic root dimension.         Clifton Urbano    D: 2017 12:26:49       T: 2017 14:12:14     [email or web address]  Job#: [id number]     Doc#:

## 2017-12-28 ENCOUNTER — TELEPHONE (OUTPATIENT)
Dept: FAMILY MEDICINE CLINIC | Age: 82
End: 2017-12-28

## 2017-12-28 DIAGNOSIS — M10.00 IDIOPATHIC GOUT, UNSPECIFIED CHRONICITY, UNSPECIFIED SITE: Primary | ICD-10-CM

## 2017-12-28 RX ORDER — COLCHICINE 0.6 MG/1
1 CAPSULE ORAL DAILY
Qty: 90 CAPSULE | Refills: 1 | Status: SHIPPED | OUTPATIENT
Start: 2017-12-28 | End: 2018-03-20 | Stop reason: SDUPTHER

## 2017-12-28 NOTE — TELEPHONE ENCOUNTER
Pt's PA for colcrys was approved, but it is  $160/30 days. [employer] did say an alternative was available, mitigare capsules. Do you want to prescribe?

## 2018-01-03 ENCOUNTER — TELEPHONE (OUTPATIENT)
Dept: CARDIOLOGY | Age: 83
End: 2018-01-03

## 2018-01-03 NOTE — TELEPHONE ENCOUNTER
Percy Boas, pt daughter, calling to get results of echo that was done on 12/22/17. Please call Percy Boas  as the pt cannot hear on the phone.     [phone]

## 2018-02-20 ENCOUNTER — TELEPHONE (OUTPATIENT)
Dept: FAMILY MEDICINE CLINIC | Age: 83
End: 2018-02-20

## 2018-03-15 ENCOUNTER — OFFICE VISIT (OUTPATIENT)
Dept: FAMILY MEDICINE CLINIC | Age: 83
End: 2018-03-15
Payer: MEDICARE

## 2018-03-15 ENCOUNTER — HOSPITAL ENCOUNTER (OUTPATIENT)
Dept: LAB | Age: 83
Setting detail: SPECIMEN
Discharge: HOME | End: 2018-03-15
Payer: MEDICARE

## 2018-03-15 ENCOUNTER — HOSPITAL ENCOUNTER (OUTPATIENT)
Dept: GENERAL RADIOLOGY | Age: 83
Discharge: HOME | End: 2018-03-17
Payer: MEDICARE

## 2018-03-15 VITALS
SYSTOLIC BLOOD PRESSURE: 138 MMHG | OXYGEN SATURATION: 94 % | BODY MASS INDEX: 42.29 KG/M2 | DIASTOLIC BLOOD PRESSURE: 70 MMHG | HEART RATE: 75 BPM | WEIGHT: 224 LBS | HEIGHT: 61 IN

## 2018-03-15 DIAGNOSIS — R06.02 SOB (SHORTNESS OF BREATH): ICD-10-CM

## 2018-03-15 DIAGNOSIS — R13.10 DYSPHAGIA, UNSPECIFIED TYPE: ICD-10-CM

## 2018-03-15 DIAGNOSIS — F41.8 DEPRESSION WITH ANXIETY: Primary | ICD-10-CM

## 2018-03-15 DIAGNOSIS — F41.8 DEPRESSION WITH ANXIETY: ICD-10-CM

## 2018-03-15 DIAGNOSIS — I44.2 THIRD DEGREE HEART BLOCK (HCC): ICD-10-CM

## 2018-03-15 DIAGNOSIS — E66.01 MORBID OBESITY WITH BMI OF 40.0-44.9, ADULT (HCC): ICD-10-CM

## 2018-03-15 DIAGNOSIS — E11.51 TYPE II DIABETES MELLITUS WITH PERIPHERAL CIRCULATORY DISORDER (HCC): ICD-10-CM

## 2018-03-15 LAB
ABSOLUTE EOS #: 0.1 K/UL (ref 0–0.4)
ABSOLUTE IMMATURE GRANULOCYTE: ABNORMAL K/UL (ref 0–0.3)
ABSOLUTE LYMPH #: 2 K/UL (ref 1–4.8)
ABSOLUTE MONO #: 1.1 K/UL (ref 0.1–1.2)
ALBUMIN SERPL-MCNC: 3.6 G/DL (ref 3.5–5.2)
ALBUMIN/GLOBULIN RATIO: 0.9 (ref 1–2.5)
ALP BLD-CCNC: 91 U/L (ref 35–104)
ALT SERPL-CCNC: 9 U/L (ref 5–33)
ANION GAP SERPL CALCULATED.3IONS-SCNC: 15 MMOL/L (ref 9–17)
AST SERPL-CCNC: 18 U/L
BASOPHILS # BLD: 1 % (ref 0–1)
BASOPHILS ABSOLUTE: 0.1 K/UL (ref 0–0.2)
BILIRUB SERPL-MCNC: 0.84 MG/DL (ref 0.3–1.2)
BNP INTERPRETATION: ABNORMAL
BUN BLDV-MCNC: 25 MG/DL (ref 8–23)
BUN/CREAT BLD: 23 (ref 9–20)
CALCIUM SERPL-MCNC: 9.4 MG/DL (ref 8.6–10.4)
CHLORIDE BLD-SCNC: 97 MMOL/L (ref 98–107)
CO2: 30 MMOL/L (ref 20–31)
CREAT SERPL-MCNC: 1.08 MG/DL (ref 0.5–0.9)
DIFFERENTIAL TYPE: ABNORMAL
EOSINOPHILS RELATIVE PERCENT: 1 % (ref 1–7)
GFR AFRICAN AMERICAN: 58 ML/MIN
GFR NON-AFRICAN AMERICAN: 48 ML/MIN
GFR SERPL CREATININE-BSD FRML MDRD: ABNORMAL ML/MIN/{1.73_M2}
GFR SERPL CREATININE-BSD FRML MDRD: ABNORMAL ML/MIN/{1.73_M2}
GLUCOSE BLD-MCNC: 101 MG/DL (ref 70–99)
HCT VFR BLD CALC: 40 % (ref 36–46)
HEMOGLOBIN: 13.2 G/DL (ref 12–16)
IMMATURE GRANULOCYTES: ABNORMAL %
LYMPHOCYTES # BLD: 14 % (ref 16–46)
MCH RBC QN AUTO: 30.3 PG (ref 26–34)
MCHC RBC AUTO-ENTMCNC: 33 G/DL (ref 31–37)
MCV RBC AUTO: 91.7 FL (ref 80–100)
MONOCYTES # BLD: 8 % (ref 4–11)
NRBC AUTOMATED: ABNORMAL PER 100 WBC
PDW BLD-RTO: 13.8 % (ref 11–14.5)
PLATELET # BLD: 242 K/UL (ref 140–450)
PLATELET ESTIMATE: ABNORMAL
PMV BLD AUTO: 8.1 FL (ref 6–12)
POTASSIUM SERPL-SCNC: 4.7 MMOL/L (ref 3.7–5.3)
PRO-BNP: 6229 PG/ML
RBC # BLD: 4.37 M/UL (ref 4–5.2)
RBC # BLD: ABNORMAL 10*6/UL
SEG NEUTROPHILS: 76 % (ref 43–77)
SEGMENTED NEUTROPHILS ABSOLUTE COUNT: 10.8 K/UL (ref 1.8–7.7)
SODIUM BLD-SCNC: 142 MMOL/L (ref 135–144)
TOTAL PROTEIN: 7.7 G/DL (ref 6.4–8.3)
WBC # BLD: 14 K/UL (ref 3.5–11)
WBC # BLD: ABNORMAL 10*3/UL

## 2018-03-15 PROCEDURE — 85025 COMPLETE CBC W/AUTO DIFF WBC: CPT

## 2018-03-15 PROCEDURE — 80053 COMPREHEN METABOLIC PANEL: CPT

## 2018-03-15 PROCEDURE — 71046 X-RAY EXAM CHEST 2 VIEWS: CPT

## 2018-03-15 PROCEDURE — 99214 OFFICE O/P EST MOD 30 MIN: CPT | Performed by: PHYSICIAN ASSISTANT

## 2018-03-15 PROCEDURE — 83880 ASSAY OF NATRIURETIC PEPTIDE: CPT

## 2018-03-15 PROCEDURE — 36415 COLL VENOUS BLD VENIPUNCTURE: CPT

## 2018-03-15 ASSESSMENT — ENCOUNTER SYMPTOMS
DIARRHEA: 0
CONSTIPATION: 1
COUGH: 1
APNEA: 0
NAUSEA: 0
CHOKING: 0
TROUBLE SWALLOWING: 1
SHORTNESS OF BREATH: 1
RHINORRHEA: 1
WHEEZING: 0

## 2018-03-15 NOTE — PROGRESS NOTES
rate, regular rhythm and normal heart sounds. Exam reveals no gallop and no friction rub. No murmur heard. Pulmonary/Chest: Effort normal and breath sounds normal. No respiratory distress. She has no wheezes. She has no rales. Abdominal: Soft. Bowel sounds are normal. She exhibits no distension, no fluid wave, no abdominal bruit, no ascites and no mass. There is no hepatosplenomegaly. There is no tenderness. There is no rebound and no guarding. No hernia. Abdomen is obese. Musculoskeletal: She exhibits edema. Legs:  Actually her pedal edema is less than usual.  Has her nl mild lymphedema bilaterally in lower extremities. Skin is dry. Lymphadenopathy:     She has no cervical adenopathy. Neurological: She is alert and oriented to person, place, and time. Skin: Skin is warm and dry. No rash noted. Feet and legs are dry. Gluteal areas were evaluated. She has resolving erythema it's really healing nicely. is really on the right gluteal area. I could see where she had a bedsore but it has flaking and healing nicely. No induration no pain. No real redness per se. No drainage. Psychiatric: She has a normal mood and affect. Her speech is normal and behavior is normal. Judgment and thought content normal. She is not agitated and not actively hallucinating. Thought content is not paranoid and not delusional. Cognition and memory are normal.   Speech is nl responds appropriately when questioned. Good eye contact. She is coherent. Memory intact. She is smiling and interactive. Nursing note and vitals reviewed. BP Readings from Last 3 Encounters:   03/20/18 122/84   03/15/18 138/70   12/20/17 130/80        Wt Readings from Last 3 Encounters:   03/21/18 220 lb 3.2 oz (99.9 kg)   03/20/18 224 lb (101.6 kg)   03/15/18 224 lb (101.6 kg)       Assessment:      1. Depression with anxiety  sertraline (ZOLOFT) 50 MG tablet    Comprehensive Metabolic Panel   2.  Dysphagia, unspecified type  FL

## 2018-03-16 ENCOUNTER — TELEPHONE (OUTPATIENT)
Dept: FAMILY MEDICINE CLINIC | Age: 83
End: 2018-03-16

## 2018-03-16 DIAGNOSIS — R06.02 SOB (SHORTNESS OF BREATH): Primary | ICD-10-CM

## 2018-03-16 DIAGNOSIS — R60.9 EDEMA, UNSPECIFIED TYPE: ICD-10-CM

## 2018-03-16 DIAGNOSIS — R60.0 PEDAL EDEMA: ICD-10-CM

## 2018-03-16 DIAGNOSIS — I50.9 CONGESTIVE HEART FAILURE, UNSPECIFIED CONGESTIVE HEART FAILURE CHRONICITY, UNSPECIFIED CONGESTIVE HEART FAILURE TYPE: ICD-10-CM

## 2018-03-16 RX ORDER — BUMETANIDE 1 MG/1
1 TABLET ORAL DAILY
Qty: 30 TABLET | Refills: 3 | Status: SHIPPED | OUTPATIENT
Start: 2018-03-16 | End: 2018-04-02 | Stop reason: SDUPTHER

## 2018-03-16 NOTE — TELEPHONE ENCOUNTER
Laura Covarrubias calling stating that pt has already been taking 40mg of lasix AM and PM. Please call Laura Covarrubias

## 2018-03-20 ENCOUNTER — OFFICE VISIT (OUTPATIENT)
Dept: CARDIOLOGY CLINIC | Age: 83
End: 2018-03-20
Payer: MEDICARE

## 2018-03-20 VITALS
WEIGHT: 224 LBS | DIASTOLIC BLOOD PRESSURE: 84 MMHG | BODY MASS INDEX: 42.32 KG/M2 | SYSTOLIC BLOOD PRESSURE: 122 MMHG | HEART RATE: 68 BPM

## 2018-03-20 DIAGNOSIS — I49.5 SICK SINUS SYNDROME (HCC): Primary | ICD-10-CM

## 2018-03-20 PROCEDURE — 99213 OFFICE O/P EST LOW 20 MIN: CPT | Performed by: INTERNAL MEDICINE

## 2018-03-21 ENCOUNTER — PROCEDURE VISIT (OUTPATIENT)
Dept: CARDIOLOGY | Age: 83
End: 2018-03-21
Payer: MEDICARE

## 2018-03-21 ENCOUNTER — TELEPHONE (OUTPATIENT)
Dept: FAMILY MEDICINE CLINIC | Age: 83
End: 2018-03-21

## 2018-03-21 ENCOUNTER — TELEPHONE (OUTPATIENT)
Dept: PAIN MANAGEMENT | Age: 83
End: 2018-03-21

## 2018-03-21 ENCOUNTER — OFFICE VISIT (OUTPATIENT)
Dept: PODIATRY | Age: 83
End: 2018-03-21
Payer: MEDICARE

## 2018-03-21 VITALS
BODY MASS INDEX: 41.57 KG/M2 | DIASTOLIC BLOOD PRESSURE: 76 MMHG | HEIGHT: 61 IN | SYSTOLIC BLOOD PRESSURE: 122 MMHG | WEIGHT: 220.2 LBS | RESPIRATION RATE: 20 BRPM | HEART RATE: 68 BPM

## 2018-03-21 DIAGNOSIS — Z95.0 PACEMAKER: Primary | ICD-10-CM

## 2018-03-21 DIAGNOSIS — E11.51 CONTROLLED TYPE 2 DM WITH PERIPHERAL CIRCULATORY DISORDER (HCC): Primary | ICD-10-CM

## 2018-03-21 DIAGNOSIS — I49.5 SSS (SICK SINUS SYNDROME) (HCC): ICD-10-CM

## 2018-03-21 DIAGNOSIS — B35.1 DERMATOPHYTOSIS OF NAIL: ICD-10-CM

## 2018-03-21 DIAGNOSIS — I44.2 COMPLETE HEART BLOCK (HCC): ICD-10-CM

## 2018-03-21 PROCEDURE — 11721 DEBRIDE NAIL 6 OR MORE: CPT | Performed by: PODIATRIST

## 2018-03-21 PROCEDURE — 93288 INTERROG EVL PM/LDLS PM IP: CPT | Performed by: INTERNAL MEDICINE

## 2018-03-21 NOTE — TELEPHONE ENCOUNTER
Pt's daughter presented to the  questioning instructions for her mother's medications. Pt was told my KW to take 2 Lasix and 1 Bumex pill daily. When pt went to Cardio Dr. Dionisio Jaramillo stated pt should not take both medications and that pt could switch to Bumex daily and to BID if needed. Daughter wants to know if they should follow what he says or what KW said to do. Please advise, thank you!

## 2018-03-21 NOTE — PROGRESS NOTES
Foot Care Worksheet  PCP: JARRETT Baldwin/JAYLIN  Last visit: 3/15/2018      Nail description:  Thick , Yellow , Crumbly , Marked limitation of ambulation     Pain resulting from thickened and dystrophy of nail plate No    Nails involved  Right   1, 2, 5  (T5-T9)  Left     1, 2, 5  (TA-T4)    Q7 1 Class A Finding - Non traumatic amputation of foot No    Q8 2 Class B Findings - Absent DP pulse No, Absent PT pulse No, Advanced tropic changes (3 required) Yes    Decrease hair growth Yes, Nail changes/thickening Yes, Pigmented changes/discoloration Yes, Skin texture (thin, shiny) Yes, Skin color (rubor/redness) No    Q9 1 Class B and 2 Class C Findings  Claudication No, Temperature change Yes, Paresthesia No, Burning No, Edema Yes

## 2018-03-21 NOTE — TELEPHONE ENCOUNTER
Patient states she saw Dr. Shakira Heck yesterday and was advised to drink less, patient was unsure what that exactly means. She wanted to know if there is a limit to how much fluids she can intake daily? Please advise.

## 2018-03-22 ENCOUNTER — HOSPITAL ENCOUNTER (OUTPATIENT)
Dept: SPEECH THERAPY | Age: 83
Setting detail: THERAPIES SERIES
Discharge: HOME | End: 2018-03-22
Payer: MEDICARE

## 2018-03-22 ENCOUNTER — HOSPITAL ENCOUNTER (OUTPATIENT)
Dept: GENERAL RADIOLOGY | Age: 83
Discharge: HOME | End: 2018-03-24
Payer: MEDICARE

## 2018-03-22 DIAGNOSIS — R13.10 DYSPHAGIA, UNSPECIFIED TYPE: ICD-10-CM

## 2018-03-22 PROCEDURE — G8998 SWALLOW D/C STATUS: HCPCS

## 2018-03-22 PROCEDURE — G8997 SWALLOW GOAL STATUS: HCPCS

## 2018-03-22 PROCEDURE — 92611 MOTION FLUOROSCOPY/SWALLOW: CPT | Performed by: SPEECH-LANGUAGE PATHOLOGIST

## 2018-03-22 PROCEDURE — 74230 X-RAY XM SWLNG FUNCJ C+: CPT

## 2018-03-22 PROCEDURE — G8996 SWALLOW CURRENT STATUS: HCPCS

## 2018-03-27 ENCOUNTER — HOSPITAL ENCOUNTER (OUTPATIENT)
Dept: LAB | Age: 83
Setting detail: SPECIMEN
Discharge: HOME | End: 2018-03-27
Payer: MEDICARE

## 2018-03-27 DIAGNOSIS — N18.30 CKD (CHRONIC KIDNEY DISEASE), STAGE III (HCC): ICD-10-CM

## 2018-03-27 LAB
ABSOLUTE EOS #: 0.2 K/UL (ref 0–0.4)
ABSOLUTE IMMATURE GRANULOCYTE: NORMAL K/UL (ref 0–0.3)
ABSOLUTE LYMPH #: 2.5 K/UL (ref 1–4.8)
ABSOLUTE MONO #: 0.6 K/UL (ref 0.1–1.2)
ANION GAP SERPL CALCULATED.3IONS-SCNC: 13 MMOL/L (ref 9–17)
BASOPHILS # BLD: 1 % (ref 0–1)
BASOPHILS ABSOLUTE: 0.1 K/UL (ref 0–0.2)
BUN BLDV-MCNC: 29 MG/DL (ref 8–23)
BUN/CREAT BLD: 20 (ref 9–20)
CALCIUM SERPL-MCNC: 9.2 MG/DL (ref 8.6–10.4)
CHLORIDE BLD-SCNC: 100 MMOL/L (ref 98–107)
CO2: 31 MMOL/L (ref 20–31)
CREAT SERPL-MCNC: 1.44 MG/DL (ref 0.5–0.9)
CREATININE URINE: 179.3 MG/DL (ref 28–217)
DIFFERENTIAL TYPE: NORMAL
EOSINOPHILS RELATIVE PERCENT: 2 % (ref 1–7)
GFR AFRICAN AMERICAN: 42 ML/MIN
GFR NON-AFRICAN AMERICAN: 34 ML/MIN
GFR SERPL CREATININE-BSD FRML MDRD: ABNORMAL ML/MIN/{1.73_M2}
GFR SERPL CREATININE-BSD FRML MDRD: ABNORMAL ML/MIN/{1.73_M2}
GLUCOSE BLD-MCNC: 127 MG/DL (ref 70–99)
HCT VFR BLD CALC: 42.5 % (ref 36–46)
HEMOGLOBIN: 13.9 G/DL (ref 12–16)
IMMATURE GRANULOCYTES: NORMAL %
LYMPHOCYTES # BLD: 29 % (ref 16–46)
MCH RBC QN AUTO: 30 PG (ref 26–34)
MCHC RBC AUTO-ENTMCNC: 32.6 G/DL (ref 31–37)
MCV RBC AUTO: 92.1 FL (ref 80–100)
MONOCYTES # BLD: 6 % (ref 4–11)
NRBC AUTOMATED: NORMAL PER 100 WBC
PDW BLD-RTO: 14.1 % (ref 11–14.5)
PHOSPHORUS: 3.6 MG/DL (ref 2.6–4.5)
PLATELET # BLD: 262 K/UL (ref 140–450)
PLATELET ESTIMATE: NORMAL
PMV BLD AUTO: 8 FL (ref 6–12)
POTASSIUM SERPL-SCNC: 4 MMOL/L (ref 3.7–5.3)
RBC # BLD: 4.61 M/UL (ref 4–5.2)
RBC # BLD: NORMAL 10*6/UL
SEG NEUTROPHILS: 62 % (ref 43–77)
SEGMENTED NEUTROPHILS ABSOLUTE COUNT: 5.5 K/UL (ref 1.8–7.7)
SODIUM BLD-SCNC: 144 MMOL/L (ref 135–144)
TOTAL PROTEIN, URINE: 52 MG/DL
VITAMIN D 25-HYDROXY: 67.6 NG/ML (ref 30–100)
WBC # BLD: 8.8 K/UL (ref 3.5–11)
WBC # BLD: NORMAL 10*3/UL

## 2018-03-27 PROCEDURE — 85025 COMPLETE CBC W/AUTO DIFF WBC: CPT

## 2018-03-27 PROCEDURE — 82570 ASSAY OF URINE CREATININE: CPT

## 2018-03-27 PROCEDURE — 36415 COLL VENOUS BLD VENIPUNCTURE: CPT

## 2018-03-27 PROCEDURE — 80048 BASIC METABOLIC PNL TOTAL CA: CPT

## 2018-03-27 PROCEDURE — 83970 ASSAY OF PARATHORMONE: CPT

## 2018-03-27 PROCEDURE — 83516 IMMUNOASSAY NONANTIBODY: CPT

## 2018-03-27 PROCEDURE — 84100 ASSAY OF PHOSPHORUS: CPT

## 2018-03-27 PROCEDURE — 84156 ASSAY OF PROTEIN URINE: CPT

## 2018-03-27 PROCEDURE — 82330 ASSAY OF CALCIUM: CPT

## 2018-03-27 PROCEDURE — 82306 VITAMIN D 25 HYDROXY: CPT

## 2018-03-28 LAB
CALCIUM IONIZED: 1.04 MMOL/L (ref 1.13–1.33)
PTH INTACT: 101.7 PG/ML (ref 15–65)

## 2018-03-29 LAB
ANCA MYELOPEROXIDASE: 236 AU/ML
ANCA PROTEINASE 3: 6 AU/ML

## 2018-04-02 ENCOUNTER — OFFICE VISIT (OUTPATIENT)
Dept: NEPHROLOGY | Age: 83
End: 2018-04-02
Payer: MEDICARE

## 2018-04-02 VITALS
DIASTOLIC BLOOD PRESSURE: 72 MMHG | BODY MASS INDEX: 41.95 KG/M2 | SYSTOLIC BLOOD PRESSURE: 130 MMHG | WEIGHT: 222.2 LBS | HEIGHT: 61 IN | HEART RATE: 80 BPM

## 2018-04-02 DIAGNOSIS — N18.30 CKD (CHRONIC KIDNEY DISEASE), STAGE III (HCC): Primary | ICD-10-CM

## 2018-04-02 DIAGNOSIS — I10 ESSENTIAL HYPERTENSION: ICD-10-CM

## 2018-04-02 DIAGNOSIS — I50.30 DIASTOLIC CONGESTIVE HEART FAILURE, UNSPECIFIED CONGESTIVE HEART FAILURE CHRONICITY: ICD-10-CM

## 2018-04-02 DIAGNOSIS — R60.0 PEDAL EDEMA: ICD-10-CM

## 2018-04-02 DIAGNOSIS — R60.9 EDEMA, UNSPECIFIED TYPE: ICD-10-CM

## 2018-04-02 DIAGNOSIS — R06.02 SOB (SHORTNESS OF BREATH): ICD-10-CM

## 2018-04-02 DIAGNOSIS — I50.9 CONGESTIVE HEART FAILURE, UNSPECIFIED CONGESTIVE HEART FAILURE CHRONICITY, UNSPECIFIED CONGESTIVE HEART FAILURE TYPE: ICD-10-CM

## 2018-04-02 PROCEDURE — 99213 OFFICE O/P EST LOW 20 MIN: CPT | Performed by: INTERNAL MEDICINE

## 2018-04-02 RX ORDER — BUMETANIDE 1 MG/1
1 TABLET ORAL DAILY
Qty: 180 TABLET | Refills: 3 | Status: SHIPPED | OUTPATIENT
Start: 2018-04-02 | End: 2018-12-14 | Stop reason: SDUPTHER

## 2018-04-09 ENCOUNTER — TELEPHONE (OUTPATIENT)
Dept: FAMILY MEDICINE CLINIC | Age: 83
End: 2018-04-09

## 2018-04-09 DIAGNOSIS — R00.2 PALPITATION: ICD-10-CM

## 2018-04-09 DIAGNOSIS — M10.071 ACUTE IDIOPATHIC GOUT OF RIGHT FOOT: Primary | ICD-10-CM

## 2018-04-16 ENCOUNTER — TELEPHONE (OUTPATIENT)
Dept: FAMILY MEDICINE CLINIC | Age: 83
End: 2018-04-16

## 2018-04-16 ENCOUNTER — HOSPITAL ENCOUNTER (OUTPATIENT)
Dept: LAB | Age: 83
Setting detail: SPECIMEN
Discharge: HOME | End: 2018-04-16
Payer: MEDICARE

## 2018-04-16 DIAGNOSIS — R00.2 PALPITATION: ICD-10-CM

## 2018-04-16 DIAGNOSIS — M10.071 ACUTE IDIOPATHIC GOUT OF RIGHT FOOT: ICD-10-CM

## 2018-04-16 LAB
TSH SERPL DL<=0.05 MIU/L-ACNC: 1.57 MIU/L (ref 0.3–5)
URIC ACID: 12.5 MG/DL (ref 2.4–5.7)

## 2018-04-16 PROCEDURE — 84550 ASSAY OF BLOOD/URIC ACID: CPT

## 2018-04-16 PROCEDURE — 36415 COLL VENOUS BLD VENIPUNCTURE: CPT

## 2018-04-16 PROCEDURE — 84443 ASSAY THYROID STIM HORMONE: CPT

## 2018-04-19 ENCOUNTER — OFFICE VISIT (OUTPATIENT)
Dept: FAMILY MEDICINE CLINIC | Age: 83
End: 2018-04-19
Payer: MEDICARE

## 2018-04-19 VITALS
HEART RATE: 74 BPM | HEIGHT: 61 IN | SYSTOLIC BLOOD PRESSURE: 102 MMHG | BODY MASS INDEX: 40.02 KG/M2 | OXYGEN SATURATION: 98 % | DIASTOLIC BLOOD PRESSURE: 64 MMHG | WEIGHT: 212 LBS

## 2018-04-19 DIAGNOSIS — N18.2 DIABETES MELLITUS DUE TO UNDERLYING CONDITION WITH STAGE 2 CHRONIC KIDNEY DISEASE, WITHOUT LONG-TERM CURRENT USE OF INSULIN (HCC): ICD-10-CM

## 2018-04-19 DIAGNOSIS — E08.22 DIABETES MELLITUS DUE TO UNDERLYING CONDITION WITH STAGE 2 CHRONIC KIDNEY DISEASE, WITHOUT LONG-TERM CURRENT USE OF INSULIN (HCC): ICD-10-CM

## 2018-04-19 DIAGNOSIS — I10 ESSENTIAL HYPERTENSION: ICD-10-CM

## 2018-04-19 DIAGNOSIS — M10.00 IDIOPATHIC GOUT, UNSPECIFIED CHRONICITY, UNSPECIFIED SITE: ICD-10-CM

## 2018-04-19 DIAGNOSIS — G47.33 OSA (OBSTRUCTIVE SLEEP APNEA): ICD-10-CM

## 2018-04-19 DIAGNOSIS — H91.10 PRESBYCUSIS, UNSPECIFIED LATERALITY: ICD-10-CM

## 2018-04-19 DIAGNOSIS — N39.46 MIXED STRESS AND URGE URINARY INCONTINENCE: Primary | ICD-10-CM

## 2018-04-19 DIAGNOSIS — E78.00 PURE HYPERCHOLESTEROLEMIA: ICD-10-CM

## 2018-04-19 DIAGNOSIS — E55.9 VITAMIN D DEFICIENCY: ICD-10-CM

## 2018-04-19 DIAGNOSIS — Z45.010 PACEMAKER AT END OF BATTERY LIFE: ICD-10-CM

## 2018-04-19 DIAGNOSIS — R60.0 PEDAL EDEMA: ICD-10-CM

## 2018-04-19 DIAGNOSIS — Z23 NEED FOR SHINGLES VACCINE: ICD-10-CM

## 2018-04-19 PROCEDURE — 99214 OFFICE O/P EST MOD 30 MIN: CPT | Performed by: PHYSICIAN ASSISTANT

## 2018-04-19 ASSESSMENT — PATIENT HEALTH QUESTIONNAIRE - PHQ9
SUM OF ALL RESPONSES TO PHQ9 QUESTIONS 1 & 2: 0
SUM OF ALL RESPONSES TO PHQ QUESTIONS 1-9: 0
2. FEELING DOWN, DEPRESSED OR HOPELESS: 0
1. LITTLE INTEREST OR PLEASURE IN DOING THINGS: 0

## 2018-04-23 ENCOUNTER — PROCEDURE VISIT (OUTPATIENT)
Dept: CARDIOLOGY | Age: 83
End: 2018-04-23
Payer: MEDICARE

## 2018-04-23 DIAGNOSIS — Z95.0 PACEMAKER: Primary | ICD-10-CM

## 2018-04-23 DIAGNOSIS — I10 ESSENTIAL HYPERTENSION: ICD-10-CM

## 2018-04-23 DIAGNOSIS — I49.5 SSS (SICK SINUS SYNDROME) (HCC): ICD-10-CM

## 2018-04-23 PROCEDURE — 93288 INTERROG EVL PM/LDLS PM IP: CPT | Performed by: INTERNAL MEDICINE

## 2018-04-23 RX ORDER — METOPROLOL SUCCINATE 100 MG/1
TABLET, EXTENDED RELEASE ORAL
Qty: 15 TABLET | Refills: 2 | Status: SHIPPED | OUTPATIENT
Start: 2018-04-23 | End: 2018-11-28

## 2018-05-15 DIAGNOSIS — M10.00 IDIOPATHIC GOUT, UNSPECIFIED CHRONICITY, UNSPECIFIED SITE: ICD-10-CM

## 2018-05-15 DIAGNOSIS — I10 ESSENTIAL HYPERTENSION: ICD-10-CM

## 2018-05-15 RX ORDER — LISINOPRIL 20 MG/1
TABLET ORAL
Qty: 90 TABLET | Refills: 1 | Status: SHIPPED | OUTPATIENT
Start: 2018-05-15 | End: 2018-12-20 | Stop reason: SDUPTHER

## 2018-05-15 RX ORDER — COLCHICINE 0.6 MG/1
TABLET, FILM COATED ORAL
Qty: 90 TABLET | Refills: 1 | Status: SHIPPED | OUTPATIENT
Start: 2018-05-15 | End: 2018-12-20 | Stop reason: SDUPTHER

## 2018-05-16 ENCOUNTER — PROCEDURE VISIT (OUTPATIENT)
Dept: CARDIOLOGY | Age: 83
End: 2018-05-16
Payer: MEDICARE

## 2018-05-16 DIAGNOSIS — Z95.0 PACEMAKER: Primary | ICD-10-CM

## 2018-05-16 DIAGNOSIS — I49.5 SSS (SICK SINUS SYNDROME) (HCC): ICD-10-CM

## 2018-05-16 PROCEDURE — 93288 INTERROG EVL PM/LDLS PM IP: CPT | Performed by: INTERNAL MEDICINE

## 2018-05-30 ENCOUNTER — OFFICE VISIT (OUTPATIENT)
Dept: PODIATRY | Age: 83
End: 2018-05-30
Payer: MEDICARE

## 2018-05-30 VITALS
HEART RATE: 68 BPM | HEIGHT: 61 IN | DIASTOLIC BLOOD PRESSURE: 74 MMHG | WEIGHT: 213.8 LBS | SYSTOLIC BLOOD PRESSURE: 128 MMHG | BODY MASS INDEX: 40.37 KG/M2

## 2018-05-30 DIAGNOSIS — B35.1 DERMATOPHYTOSIS OF NAIL: ICD-10-CM

## 2018-05-30 DIAGNOSIS — E11.51 CONTROLLED TYPE 2 DM WITH PERIPHERAL CIRCULATORY DISORDER (HCC): Primary | ICD-10-CM

## 2018-05-30 PROCEDURE — 99999 PR OFFICE/OUTPT VISIT,PROCEDURE ONLY: CPT | Performed by: PODIATRIST

## 2018-05-30 PROCEDURE — 11721 DEBRIDE NAIL 6 OR MORE: CPT | Performed by: PODIATRIST

## 2018-06-13 ENCOUNTER — HOSPITAL ENCOUNTER (OUTPATIENT)
Dept: LAB | Age: 83
Setting detail: SPECIMEN
Discharge: HOME | End: 2018-06-13
Payer: MEDICARE

## 2018-06-13 ENCOUNTER — NURSE ONLY (OUTPATIENT)
Dept: LAB | Age: 83
End: 2018-06-13

## 2018-06-13 VITALS — SYSTOLIC BLOOD PRESSURE: 162 MMHG | HEART RATE: 73 BPM | DIASTOLIC BLOOD PRESSURE: 74 MMHG

## 2018-06-13 DIAGNOSIS — E11.9 TYPE 2 DIABETES MELLITUS WITHOUT COMPLICATION, WITHOUT LONG-TERM CURRENT USE OF INSULIN (HCC): ICD-10-CM

## 2018-06-13 DIAGNOSIS — N39.0 URINARY TRACT INFECTION WITHOUT HEMATURIA, SITE UNSPECIFIED: ICD-10-CM

## 2018-06-13 DIAGNOSIS — N39.46 MIXED STRESS AND URGE URINARY INCONTINENCE: ICD-10-CM

## 2018-06-13 DIAGNOSIS — N39.0 URINARY TRACT INFECTION WITHOUT HEMATURIA, SITE UNSPECIFIED: Primary | ICD-10-CM

## 2018-06-13 DIAGNOSIS — Z01.30 BP CHECK: Primary | ICD-10-CM

## 2018-06-13 DIAGNOSIS — E78.00 HYPERCHOLESTEROLEMIA: ICD-10-CM

## 2018-06-13 LAB
-: ABNORMAL
ALBUMIN SERPL-MCNC: 3.9 G/DL (ref 3.5–5.2)
ALBUMIN/GLOBULIN RATIO: 1.1 (ref 1–2.5)
ALP BLD-CCNC: 85 U/L (ref 35–104)
ALT SERPL-CCNC: 8 U/L (ref 5–33)
AMORPHOUS: ABNORMAL
ANION GAP SERPL CALCULATED.3IONS-SCNC: 11 MMOL/L (ref 9–17)
AST SERPL-CCNC: 13 U/L
BACTERIA: ABNORMAL
BILIRUB SERPL-MCNC: 0.41 MG/DL (ref 0.3–1.2)
BILIRUBIN URINE: NEGATIVE
BUN BLDV-MCNC: 23 MG/DL (ref 8–23)
BUN/CREAT BLD: 21 (ref 9–20)
CALCIUM SERPL-MCNC: 9.3 MG/DL (ref 8.6–10.4)
CASTS UA: ABNORMAL /LPF (ref 0–2)
CHLORIDE BLD-SCNC: 102 MMOL/L (ref 98–107)
CHOLESTEROL/HDL RATIO: 5.9
CHOLESTEROL: 273 MG/DL
CO2: 30 MMOL/L (ref 20–31)
COLOR: ABNORMAL
COMMENT UA: ABNORMAL
CREAT SERPL-MCNC: 1.07 MG/DL (ref 0.5–0.9)
CRYSTALS, UA: ABNORMAL /HPF
EPITHELIAL CELLS UA: >50 /HPF (ref 0–5)
ESTIMATED AVERAGE GLUCOSE: 128 MG/DL
GFR AFRICAN AMERICAN: 59 ML/MIN
GFR NON-AFRICAN AMERICAN: 48 ML/MIN
GFR SERPL CREATININE-BSD FRML MDRD: ABNORMAL ML/MIN/{1.73_M2}
GFR SERPL CREATININE-BSD FRML MDRD: ABNORMAL ML/MIN/{1.73_M2}
GLUCOSE BLD-MCNC: 116 MG/DL (ref 70–99)
GLUCOSE URINE: NEGATIVE
HBA1C MFR BLD: 6.1 % (ref 4.8–5.9)
HDLC SERPL-MCNC: 46 MG/DL
KETONES, URINE: NEGATIVE
LDL CHOLESTEROL: 189 MG/DL (ref 0–130)
LEUKOCYTE ESTERASE, URINE: ABNORMAL
MUCUS: ABNORMAL
NITRITE, URINE: NEGATIVE
OTHER OBSERVATIONS UA: ABNORMAL
PH UA: 6.5 (ref 5–6)
POTASSIUM SERPL-SCNC: 4.4 MMOL/L (ref 3.7–5.3)
PROTEIN UA: ABNORMAL
RBC UA: ABNORMAL /HPF (ref 0–4)
RENAL EPITHELIAL, UA: ABNORMAL /HPF
SODIUM BLD-SCNC: 143 MMOL/L (ref 135–144)
SPECIFIC GRAVITY UA: 1.01 (ref 1.01–1.02)
TOTAL PROTEIN: 7.4 G/DL (ref 6.4–8.3)
TRICHOMONAS: ABNORMAL
TRIGL SERPL-MCNC: 188 MG/DL
TURBIDITY: ABNORMAL
URINE HGB: ABNORMAL
UROBILINOGEN, URINE: NORMAL
VLDLC SERPL CALC-MCNC: ABNORMAL MG/DL (ref 1–30)
WBC UA: ABNORMAL /HPF (ref 0–4)
YEAST: ABNORMAL

## 2018-06-13 PROCEDURE — 80053 COMPREHEN METABOLIC PANEL: CPT

## 2018-06-13 PROCEDURE — 87086 URINE CULTURE/COLONY COUNT: CPT

## 2018-06-13 PROCEDURE — 83036 HEMOGLOBIN GLYCOSYLATED A1C: CPT

## 2018-06-13 PROCEDURE — 36415 COLL VENOUS BLD VENIPUNCTURE: CPT

## 2018-06-13 PROCEDURE — 81001 URINALYSIS AUTO W/SCOPE: CPT

## 2018-06-13 PROCEDURE — 80061 LIPID PANEL: CPT

## 2018-06-20 ENCOUNTER — NURSE ONLY (OUTPATIENT)
Dept: LAB | Age: 83
End: 2018-06-20

## 2018-06-20 ENCOUNTER — NURSE ONLY (OUTPATIENT)
Dept: CARDIOLOGY | Age: 83
End: 2018-06-20
Payer: MEDICARE

## 2018-06-20 VITALS — DIASTOLIC BLOOD PRESSURE: 88 MMHG | SYSTOLIC BLOOD PRESSURE: 138 MMHG

## 2018-06-20 DIAGNOSIS — I10 ESSENTIAL HYPERTENSION: Primary | ICD-10-CM

## 2018-06-20 DIAGNOSIS — I49.5 SSS (SICK SINUS SYNDROME) (HCC): ICD-10-CM

## 2018-06-20 DIAGNOSIS — I44.2 THIRD DEGREE HEART BLOCK (HCC): ICD-10-CM

## 2018-06-20 DIAGNOSIS — Z95.0 CARDIAC PACEMAKER IN SITU: Primary | ICD-10-CM

## 2018-06-20 PROCEDURE — 93288 INTERROG EVL PM/LDLS PM IP: CPT | Performed by: INTERNAL MEDICINE

## 2018-06-21 ENCOUNTER — OFFICE VISIT (OUTPATIENT)
Dept: CARDIOLOGY | Age: 83
End: 2018-06-21
Payer: MEDICARE

## 2018-06-21 VITALS
WEIGHT: 213.6 LBS | HEIGHT: 61 IN | BODY MASS INDEX: 40.33 KG/M2 | DIASTOLIC BLOOD PRESSURE: 84 MMHG | SYSTOLIC BLOOD PRESSURE: 140 MMHG | HEART RATE: 64 BPM

## 2018-06-21 DIAGNOSIS — I10 ESSENTIAL HYPERTENSION: Primary | ICD-10-CM

## 2018-06-21 DIAGNOSIS — E78.00 HYPERCHOLESTEROLEMIA: ICD-10-CM

## 2018-06-21 DIAGNOSIS — I87.2 VENOUS INSUFFICIENCY: ICD-10-CM

## 2018-06-21 PROCEDURE — 93000 ELECTROCARDIOGRAM COMPLETE: CPT | Performed by: INTERNAL MEDICINE

## 2018-06-21 PROCEDURE — 99213 OFFICE O/P EST LOW 20 MIN: CPT | Performed by: INTERNAL MEDICINE

## 2018-07-04 DIAGNOSIS — I10 ESSENTIAL HYPERTENSION: ICD-10-CM

## 2018-07-06 RX ORDER — METOPROLOL SUCCINATE 100 MG/1
TABLET, EXTENDED RELEASE ORAL
Qty: 90 TABLET | Refills: 1 | Status: SHIPPED | OUTPATIENT
Start: 2018-07-06 | End: 2018-12-20 | Stop reason: SDUPTHER

## 2018-07-17 ENCOUNTER — NURSE ONLY (OUTPATIENT)
Dept: LAB | Age: 83
End: 2018-07-17

## 2018-07-17 ENCOUNTER — OFFICE VISIT (OUTPATIENT)
Dept: FAMILY MEDICINE CLINIC | Age: 83
End: 2018-07-17
Payer: MEDICARE

## 2018-07-17 VITALS
SYSTOLIC BLOOD PRESSURE: 126 MMHG | HEIGHT: 61 IN | DIASTOLIC BLOOD PRESSURE: 60 MMHG | BODY MASS INDEX: 39.08 KG/M2 | RESPIRATION RATE: 16 BRPM | HEART RATE: 62 BPM | OXYGEN SATURATION: 96 % | WEIGHT: 207 LBS

## 2018-07-17 DIAGNOSIS — Z23 NEED FOR SHINGLES VACCINE: Primary | ICD-10-CM

## 2018-07-17 DIAGNOSIS — E55.9 VITAMIN D DEFICIENCY: ICD-10-CM

## 2018-07-17 DIAGNOSIS — R80.9 TYPE 2 DIABETES MELLITUS WITH MICROALBUMINURIA, WITHOUT LONG-TERM CURRENT USE OF INSULIN (HCC): ICD-10-CM

## 2018-07-17 DIAGNOSIS — R60.9 PERIPHERAL EDEMA: ICD-10-CM

## 2018-07-17 DIAGNOSIS — G47.33 OSA (OBSTRUCTIVE SLEEP APNEA): ICD-10-CM

## 2018-07-17 DIAGNOSIS — I87.2 VENOUS INSUFFICIENCY: ICD-10-CM

## 2018-07-17 DIAGNOSIS — F41.8 DEPRESSION WITH ANXIETY: ICD-10-CM

## 2018-07-17 DIAGNOSIS — I49.5 SSS (SICK SINUS SYNDROME) (HCC): ICD-10-CM

## 2018-07-17 DIAGNOSIS — H91.13 PRESBYCUSIS OF BOTH EARS: ICD-10-CM

## 2018-07-17 DIAGNOSIS — Z23 NEED FOR VACCINATION: Primary | ICD-10-CM

## 2018-07-17 DIAGNOSIS — F41.1 GENERALIZED ANXIETY DISORDER: ICD-10-CM

## 2018-07-17 DIAGNOSIS — M19.90 GENERALIZED ARTHRITIS: ICD-10-CM

## 2018-07-17 DIAGNOSIS — I10 ESSENTIAL HYPERTENSION: ICD-10-CM

## 2018-07-17 DIAGNOSIS — M10.00 IDIOPATHIC GOUT, UNSPECIFIED CHRONICITY, UNSPECIFIED SITE: ICD-10-CM

## 2018-07-17 DIAGNOSIS — Z45.010 PACEMAKER AT END OF BATTERY LIFE: ICD-10-CM

## 2018-07-17 DIAGNOSIS — E11.29 TYPE 2 DIABETES MELLITUS WITH MICROALBUMINURIA, WITHOUT LONG-TERM CURRENT USE OF INSULIN (HCC): ICD-10-CM

## 2018-07-17 PROCEDURE — 99214 OFFICE O/P EST MOD 30 MIN: CPT | Performed by: PHYSICIAN ASSISTANT

## 2018-07-17 ASSESSMENT — PATIENT HEALTH QUESTIONNAIRE - PHQ9
2. FEELING DOWN, DEPRESSED OR HOPELESS: 0
SUM OF ALL RESPONSES TO PHQ QUESTIONS 1-9: 0
SUM OF ALL RESPONSES TO PHQ9 QUESTIONS 1 & 2: 0
1. LITTLE INTEREST OR PLEASURE IN DOING THINGS: 0

## 2018-07-17 ASSESSMENT — ENCOUNTER SYMPTOMS
CONSTIPATION: 1
DIARRHEA: 0

## 2018-07-17 NOTE — PROGRESS NOTES
[facility]    Subjective:      Patient ID: Tera Garcia is a 80 y.o. y.o. female. Patient is seen following up on health issues hypertension and diabetes. Patient states she is doing really well no real concerns today and daughters agree with this. No recent illness. Denies any shortness of breath chest pain. She follows closely with cardiology since her pacemaker is at end-of-life she still worries about this but they are keeping close tabs on her. She had an anxiety attack one morning about 3 days ago. Had not had in quite a while. In general she states her anxiety and mood has been good and daughters agree with this. She is seen today with her 2 daughters and they are all happy with how she has been doing. She has been a really pleasant mood and sleeps frequently but also is active. Past Medical History:   Diagnosis Date    Basal cell cancer     nose    Dry skin dermatitis 12/3/2015    Dyspnea     chronic    Edema     Chronic    Gout     Hard of hearing     Hiatal hernia     Hypercholesterolemia     Hypertension     Hypomagnesemia 12/3/2015    Macular degeneration, dry     Obstructive sleep apnea     Open-angle glaucoma     Borderline.     Peripheral edema 12/3/2015    Sick sinus syndrome (HCC)     with pacemaker  placement    Type 2 diabetes mellitus ([facility] [address]     Venous insufficiency     Vitamin D deficiency        Past Surgical History:   Procedure Laterality Date    CARDIAC CATHETERIZATION  2010    no blockage    CATARACT REMOVAL WITH IMPLANT Bilateral     cataract    CHOLECYSTECTOMY      COLONOSCOPY      EYE SURGERY  03/31/2017    Biopsy to right eye lid with surgery on 05/31/2017 for squamous cell     FRACTURE SURGERY Right     wrist    HYSTERECTOMY      PACEMAKER INSERTION      sick sinus syndrome    TUBAL LIGATION  1970       Family History   Problem Relation Age of Onset    Other Mother         sepsis    Diabetes Mother    Lacey Maki Other Father normal.   Mouth/Throat: Oropharynx is clear and moist. No oropharyngeal exudate. She is wearing her hearing aids today. TMs are dull. Eyes: Conjunctivae are normal. No scleral icterus. Neck: Normal range of motion. Neck supple. No JVD present. No thyromegaly present. Thyroid nontender no palpable. No carotid bruits noted. Cardiovascular: Normal rate, regular rhythm and normal heart sounds. Exam reveals no gallop and no friction rub. No murmur heard. Pulmonary/Chest: Effort normal and breath sounds normal. She has no wheezes. She has no rales. Kyphosis noted. Abdominal: Soft. Bowel sounds are normal. She exhibits no distension and no mass. There is no tenderness. There is no rebound and no guarding. Abdomen is obese. Musculoskeletal: She exhibits edema. No significant pitting edema noted in the pretibial areas marked improvement. She does have very mild edema in the dorsum of the feet bilaterally. No redness or discoloration noted. Uses a cane in the office. Lymphadenopathy:     She has no cervical adenopathy. Neurological: She is alert and oriented to person, place, and time. Skin: Skin is warm and dry. No rash noted. Psychiatric: She has a normal mood and affect. Her behavior is normal. Judgment and thought content normal.   Speech is normal response properly when questioned. She is coherent. Overall good historian. Nursing note and vitals reviewed. No visits with results within 1 Month(s) from this visit.    Latest known visit with results is:   Hospital Outpatient Visit on 06/13/2018   Component Date Value Ref Range Status    Glucose 06/13/2018 116* 70 - 99 mg/dL Final    BUN 06/13/2018 23  8 - 23 mg/dL Final    CREATININE 06/13/2018 1.07* 0.50 - 0.90 mg/dL Final    Bun/Cre Ratio 06/13/2018 21* 9 - 20 Final    Calcium 06/13/2018 9.3  8.6 - 10.4 mg/dL Final    Sodium 06/13/2018 143  135 - 144 mmol/L Final    Potassium 06/13/2018 4.4  3.7 - 5.3 mmol/L Final    Chloride 06/13/2018 102  98 - 107 mmol/L Final    CO2 06/13/2018 30  20 - 31 mmol/L Final    Anion Gap 06/13/2018 11  9 - 17 mmol/L Final    Alkaline Phosphatase 06/13/2018 85  35 - 104 U/L Final    ALT 06/13/2018 8  5 - 33 U/L Final    AST 06/13/2018 13  <32 U/L Final    Total Bilirubin 06/13/2018 0.41  0.3 - 1.2 mg/dL Final    Total Protein 06/13/2018 7.4  6.4 - 8.3 g/dL Final    Alb 06/13/2018 3.9  3.5 - 5.2 g/dL Final    Albumin/Globulin Ratio 06/13/2018 1.1  1.0 - 2.5 Final    GFR Non- 06/13/2018 48* >60 mL/min Final    GFR  06/13/2018 59* >60 mL/min Final    GFR Comment 06/13/2018        Final    Comment: Average GFR for 79or more years old:   76 mL/min/1.73sq m  Chronic Kidney Disease:   <60 mL/min/1.73sq m  Kidney failure:   <15 mL/min/1.73sq m              eGFR calculated using average adult body mass. Additional eGFR calculator   available at:        [email or web address]        Performed at [facility] [address] [facility] [address] [place] [phone]      GFR Staging 06/13/2018 NOT REPORTED   Final    Cholesterol 06/13/2018 273* <200 mg/dL Final    Comment:    Cholesterol Guidelines:      <200  Desirable   200-240  Borderline      >240  Undesirable         HDL 06/13/2018 46  >40 mg/dL Final    Comment:    HDL Guidelines:    <40     Undesirable   40-59    Borderline    >59     Desirable         LDL Cholesterol 06/13/2018 189* 0 - 130 mg/dL Final    Comment:    LDL Guidelines:     <100    Desirable   100-129   Near to/above Desirable   130-159   Borderline      >159   Undesirable     Direct (measured) LDL and calculated LDL are not interchangeable tests.       Chol/HDL Ratio 06/13/2018 5.9* <5 Final            Triglycerides 06/13/2018 188* <150 mg/dL Final    Comment:    Triglyceride Guidelines:     <150   Desirable   150-199  Borderline   200-499  High     >499   Very high   Based on AHA Guidelines for fasting triglyceride, October 2012. Performed at [facility] [address] [address] [phone]      VLDL 06/13/2018 NOT REPORTED  1 - 30 mg/dL Final    Hemoglobin A1C 06/13/2018 6.1* 4.8 - 5.9 % Final    Estimated Avg Glucose 06/13/2018 128  mg/dL Final    Comment: Performed at [facility] [address] [id number] [phone]      Color, UA 06/13/2018 NOT REPORTED  YEL Final    Turbidity UA 06/13/2018 NOT REPORTED  CLEAR Final    Glucose, Ur 06/13/2018 NEGATIVE  NEG Final    Bilirubin Urine 06/13/2018 NEGATIVE  NEG Final    Ketones, Urine 06/13/2018 NEGATIVE  NEG Final    Specific Gravity, UA 06/13/2018 1.010  1.010 - 1.025 Final    Urine Hgb 06/13/2018 TRACE* NEG Final    pH, UA 06/13/2018 6.5* 5.0 - 6.0 Final    Protein, UA 06/13/2018 TRACE* NEG Final    Urobilinogen, Urine 06/13/2018 Normal  NORM Final    Nitrite, Urine 06/13/2018 NEGATIVE  NEG Final    Leukocyte Esterase, Urine 06/13/2018 TRACE* NEG Final    Comment: Performed at [facility] [address] [address] [phone]      Urinalysis Comments 06/13/2018 NOT REPORTED   Final    - 06/13/2018        Final    WBC, UA 06/13/2018 0 TO 4  0 - 4 /HPF Final    RBC, UA 06/13/2018 0 TO 4  0 - 4 /HPF Final    Casts UA 06/13/2018 0 TO 4  0 - 2 /LPF Final    HYALINE    Crystals UA 06/13/2018 NOT REPORTED  NONE /HPF Final    Epithelial Cells UA 06/13/2018 >50  0 - 5 /HPF Final    Renal Epithelial, Urine 06/13/2018 NOT REPORTED  0 /HPF Final    Bacteria, UA 06/13/2018 TRACE* NONE Final    Comment: Performed at [facility] [address] [address] [phone]      Mucus, UA 06/13/2018 NOT REPORTED  NONE Final    Trichomonas, UA 06/13/2018 NOT REPORTED  NONE Final    Amorphous, UA 06/13/2018 NOT REPORTED  NONE Final    Other Observations UA 06/13/2018 NOT REPORTED  NREQ Final    Yeast, UA 06/13/2018 NOT REPORTED  NONE Final         Assessment & Plan:       Diagnosis Orders   1. Need for shingles vaccine  zoster recombinant adjuvanted vaccine (SHINGRIX) 50 MCG SUSR injection   2. Depression with anxiety     3. Type 2 diabetes mellitus with microalbuminuria, without long-term current use of insulin ([address]     4. SSS (sick sinus syndrome) ([address]     5. Pacemaker at end of battery life     6. Essential hypertension     7. ÓSCAR (obstructive sleep apnea)     8. Venous insufficiency     9. Presbycusis of both ears     10. Vitamin D deficiency     11. Idiopathic gout, unspecified chronicity, unspecified site     12. Peripheral edema     13. Generalized anxiety disorder     14. Generalized arthritis         Reviewed recent labs with patient and daughters  Gave copy of labs as well  Answered all their questions  Diet as tolerated  Good nutrition hydration  Coping mechanisms  Fall prevention  Follow-up with me 3 months sooner if problems  Follow-up with specialty as scheduled    Lanette received counseling on the following healthy behaviors: nutrition and medication adherence    Patient given educational materials on Diabetes, Hyperlipidemia, Nutrition and Hypertension    I have instructed Lanette to complete a self tracking handout on Blood Sugars , Blood Pressures , Weights and mood and instructed them to bring it with them to her next appointment. Discussed use, benefit, and side effects of prescribed medications. Barriers to medication compliance addressed. All patient questions answered. Pt voiced understanding.        91 JARRETT Juarez  7/17/2018 2:17 PM    (Please note that portions of this note were completed with a voice recognition program.  Efforts were made to edit the dictations but occasionally words are mis-transcribed.)

## 2018-07-17 NOTE — PROGRESS NOTES
Shingrix immunization given IM, left arm, as ordered. Patient tolerated it well, no questions re: VIS information. Hugo Quick,  NDC# 67688-230-79, Lot number [id number], expiration date 2/8/21. Patient supplied medication. See attached scan.

## 2018-07-25 ENCOUNTER — PROCEDURE VISIT (OUTPATIENT)
Dept: CARDIOLOGY | Age: 83
End: 2018-07-25
Payer: MEDICARE

## 2018-07-25 DIAGNOSIS — Z95.0 CARDIAC PACEMAKER IN SITU: Primary | ICD-10-CM

## 2018-07-25 PROCEDURE — 93288 INTERROG EVL PM/LDLS PM IP: CPT | Performed by: INTERNAL MEDICINE

## 2018-07-28 ASSESSMENT — ENCOUNTER SYMPTOMS
WHEEZING: 0
NAUSEA: 0
CHEST TIGHTNESS: 0
VOMITING: 0
ABDOMINAL PAIN: 0
COUGH: 0
ABDOMINAL DISTENTION: 0
SHORTNESS OF BREATH: 0
BLOOD IN STOOL: 0

## 2018-08-08 ENCOUNTER — OFFICE VISIT (OUTPATIENT)
Dept: PODIATRY | Age: 83
End: 2018-08-08
Payer: MEDICARE

## 2018-08-08 VITALS
RESPIRATION RATE: 20 BRPM | HEIGHT: 61 IN | WEIGHT: 210.2 LBS | DIASTOLIC BLOOD PRESSURE: 64 MMHG | HEART RATE: 76 BPM | BODY MASS INDEX: 39.69 KG/M2 | SYSTOLIC BLOOD PRESSURE: 126 MMHG

## 2018-08-08 DIAGNOSIS — E11.51 CONTROLLED TYPE 2 DM WITH PERIPHERAL CIRCULATORY DISORDER (HCC): Primary | ICD-10-CM

## 2018-08-08 DIAGNOSIS — B35.1 DERMATOPHYTOSIS OF NAIL: ICD-10-CM

## 2018-08-08 PROCEDURE — 11721 DEBRIDE NAIL 6 OR MORE: CPT | Performed by: PODIATRIST

## 2018-08-08 PROCEDURE — 99999 PR OFFICE/OUTPT VISIT,PROCEDURE ONLY: CPT | Performed by: PODIATRIST

## 2018-08-08 NOTE — PROGRESS NOTES
Foot Care Worksheet  PCP: JARRETT Don/C  Last visit: 07/17/2018      Nail description:  Thick , Yellow , Crumbly , Marked limitation of ambulation     Pain resulting from thickened and dystrophy of nail plate No    Nails involved  Right   1, 2, 5  (T5-T9)  Left     1, 2, 5  (TA-T4)    Q7 1 Class A Finding - Non traumatic amputation of foot No    Q8 2 Class B Findings - Absent DP pulse No, Absent PT pulse No, Advanced tropic changes (3 required) Yes    Decrease hair growth Yes, Nail changes/thickening Yes, Pigmented changes/discoloration Yes, Skin texture (thin, shiny) Yes, Skin color (rubor/redness) No    Q9 1 Class B and 2 Class C Findings  Claudication No, Temperature change Yes, Paresthesia No, Burning No, Edema Yes
touch to level of digits, bilateral.  Protective sensation intact 10/10 sites via 5.07/10g Hale-Shlomo Monofilament, bilateral.  negative Tinel's, bilateral.  negative Valleix sign, bilateral.  Vibratory intact bilateral.  Reflexes Decreased bilateral.  Paresthesias negative. Dysthesias negative. Sharp/dull intact bilateral.    Integument:  Open lesion absent, Bilateral.  Interdigital maceration absent to web spaces,absent Bilateral.  Nails left 1, 2, 5 and right 1, 2, 5 thickened, dystrophic and crumbly, discolored with subungual debris. Fissures absent, Bilateral. Hyperkeratotic tissue is absent. Assessment:    Diagnosis Orders   1. Controlled type 2 DM with peripheral circulatory disorder ([facility] [address]     2. Dermatophytosis of nail 1-5R/L         Plan:  Diabetic foot education and exam.  All Nails as mentioned above debrided in length and thickness. Patient advised about OTC treatments for nails and callous. Patient will follow up in 10 weeks for routine foot care or PRN if any further problems arise.

## 2018-08-15 ENCOUNTER — PROCEDURE VISIT (OUTPATIENT)
Dept: CARDIOLOGY | Age: 83
End: 2018-08-15
Payer: MEDICARE

## 2018-08-15 DIAGNOSIS — I49.5 SSS (SICK SINUS SYNDROME) (HCC): ICD-10-CM

## 2018-08-15 DIAGNOSIS — Z95.0 PACEMAKER: Primary | ICD-10-CM

## 2018-08-15 PROCEDURE — 93296 REM INTERROG EVL PM/IDS: CPT | Performed by: INTERNAL MEDICINE

## 2018-08-15 PROCEDURE — 93294 REM INTERROG EVL PM/LDLS PM: CPT | Performed by: INTERNAL MEDICINE

## 2018-09-19 ENCOUNTER — PROCEDURE VISIT (OUTPATIENT)
Dept: CARDIOLOGY | Age: 83
End: 2018-09-19

## 2018-09-19 DIAGNOSIS — I49.5 SSS (SICK SINUS SYNDROME) (HCC): Primary | ICD-10-CM

## 2018-10-08 DIAGNOSIS — F41.8 DEPRESSION WITH ANXIETY: ICD-10-CM

## 2018-10-17 ENCOUNTER — PROCEDURE VISIT (OUTPATIENT)
Dept: CARDIOLOGY | Age: 83
End: 2018-10-17
Payer: MEDICARE

## 2018-10-17 ENCOUNTER — OFFICE VISIT (OUTPATIENT)
Dept: FAMILY MEDICINE CLINIC | Age: 83
End: 2018-10-17
Payer: MEDICARE

## 2018-10-17 ENCOUNTER — HOSPITAL ENCOUNTER (OUTPATIENT)
Dept: LAB | Age: 83
Discharge: HOME | End: 2018-10-17
Payer: MEDICARE

## 2018-10-17 ENCOUNTER — OFFICE VISIT (OUTPATIENT)
Dept: PODIATRY | Age: 83
End: 2018-10-17
Payer: MEDICARE

## 2018-10-17 VITALS
HEIGHT: 61 IN | BODY MASS INDEX: 39.69 KG/M2 | WEIGHT: 210.2 LBS | SYSTOLIC BLOOD PRESSURE: 122 MMHG | DIASTOLIC BLOOD PRESSURE: 68 MMHG | RESPIRATION RATE: 20 BRPM | HEART RATE: 68 BPM

## 2018-10-17 VITALS
OXYGEN SATURATION: 92 % | SYSTOLIC BLOOD PRESSURE: 110 MMHG | WEIGHT: 210 LBS | HEART RATE: 68 BPM | BODY MASS INDEX: 39.65 KG/M2 | HEIGHT: 61 IN | DIASTOLIC BLOOD PRESSURE: 62 MMHG

## 2018-10-17 DIAGNOSIS — G47.33 OSA (OBSTRUCTIVE SLEEP APNEA): ICD-10-CM

## 2018-10-17 DIAGNOSIS — Z95.0 PACEMAKER: Primary | ICD-10-CM

## 2018-10-17 DIAGNOSIS — E11.51 CONTROLLED TYPE 2 DM WITH PERIPHERAL CIRCULATORY DISORDER (HCC): ICD-10-CM

## 2018-10-17 DIAGNOSIS — J02.9 SORE THROAT: ICD-10-CM

## 2018-10-17 DIAGNOSIS — E11.9 TYPE 2 DIABETES MELLITUS WITHOUT COMPLICATION, WITHOUT LONG-TERM CURRENT USE OF INSULIN (HCC): ICD-10-CM

## 2018-10-17 DIAGNOSIS — I49.5 SICK SINUS SYNDROME (HCC): ICD-10-CM

## 2018-10-17 DIAGNOSIS — I44.2 THIRD DEGREE HEART BLOCK (HCC): ICD-10-CM

## 2018-10-17 DIAGNOSIS — F41.1 GENERALIZED ANXIETY DISORDER: ICD-10-CM

## 2018-10-17 DIAGNOSIS — N18.30 STAGE 3 CHRONIC KIDNEY DISEASE (HCC): ICD-10-CM

## 2018-10-17 DIAGNOSIS — R53.82 CHRONIC FATIGUE: ICD-10-CM

## 2018-10-17 DIAGNOSIS — B35.1 DERMATOPHYTOSIS OF NAIL: ICD-10-CM

## 2018-10-17 DIAGNOSIS — Z23 FLU VACCINE NEED: Primary | ICD-10-CM

## 2018-10-17 DIAGNOSIS — H91.13 PRESBYCUSIS OF BOTH EARS: ICD-10-CM

## 2018-10-17 DIAGNOSIS — M10.00 IDIOPATHIC GOUT, UNSPECIFIED CHRONICITY, UNSPECIFIED SITE: ICD-10-CM

## 2018-10-17 DIAGNOSIS — E78.00 PURE HYPERCHOLESTEROLEMIA: ICD-10-CM

## 2018-10-17 DIAGNOSIS — M76.821 POSTERIOR TIBIAL TENDINITIS OF RIGHT LEG: Primary | ICD-10-CM

## 2018-10-17 DIAGNOSIS — I10 ESSENTIAL HYPERTENSION: ICD-10-CM

## 2018-10-17 LAB
-: ABNORMAL
ABSOLUTE EOS #: 0.1 K/UL (ref 0–0.4)
ABSOLUTE IMMATURE GRANULOCYTE: NORMAL K/UL (ref 0–0.3)
ABSOLUTE LYMPH #: 2.7 K/UL (ref 1–4.8)
ABSOLUTE MONO #: 0.7 K/UL (ref 0.1–1.2)
ALBUMIN SERPL-MCNC: 3.8 G/DL (ref 3.5–5.2)
ALBUMIN/GLOBULIN RATIO: 0.9 (ref 1–2.5)
ALP BLD-CCNC: 91 U/L (ref 35–104)
ALT SERPL-CCNC: 8 U/L (ref 5–33)
AMORPHOUS: ABNORMAL
ANION GAP SERPL CALCULATED.3IONS-SCNC: 14 MMOL/L (ref 9–17)
AST SERPL-CCNC: 15 U/L
BACTERIA: ABNORMAL
BASOPHILS # BLD: 1 % (ref 0–1)
BASOPHILS ABSOLUTE: 0.1 K/UL (ref 0–0.2)
BILIRUB SERPL-MCNC: 0.3 MG/DL (ref 0.3–1.2)
BILIRUBIN URINE: NEGATIVE
BUN BLDV-MCNC: 30 MG/DL (ref 8–23)
BUN/CREAT BLD: 20 (ref 9–20)
CALCIUM SERPL-MCNC: 9.5 MG/DL (ref 8.6–10.4)
CASTS UA: ABNORMAL /LPF (ref 0–2)
CHLORIDE BLD-SCNC: 100 MMOL/L (ref 98–107)
CO2: 28 MMOL/L (ref 20–31)
COLOR: NORMAL
COMMENT UA: NORMAL
CREAT SERPL-MCNC: 1.47 MG/DL (ref 0.5–0.9)
CRYSTALS, UA: ABNORMAL /HPF
DIFFERENTIAL TYPE: NORMAL
EOSINOPHILS RELATIVE PERCENT: 1 % (ref 1–7)
EPITHELIAL CELLS UA: ABNORMAL /HPF (ref 0–5)
GFR AFRICAN AMERICAN: 41 ML/MIN
GFR NON-AFRICAN AMERICAN: 33 ML/MIN
GFR SERPL CREATININE-BSD FRML MDRD: ABNORMAL ML/MIN/{1.73_M2}
GFR SERPL CREATININE-BSD FRML MDRD: ABNORMAL ML/MIN/{1.73_M2}
GLUCOSE BLD-MCNC: 96 MG/DL (ref 70–99)
GLUCOSE URINE: NEGATIVE
HCT VFR BLD CALC: 45.1 % (ref 36–46)
HEMOGLOBIN: 15 G/DL (ref 12–16)
IMMATURE GRANULOCYTES: NORMAL %
KETONES, URINE: NEGATIVE
LEUKOCYTE ESTERASE, URINE: NEGATIVE
LYMPHOCYTES # BLD: 28 % (ref 16–46)
MCH RBC QN AUTO: 31.4 PG (ref 26–34)
MCHC RBC AUTO-ENTMCNC: 33.2 G/DL (ref 31–37)
MCV RBC AUTO: 94.4 FL (ref 80–100)
MONOCYTES # BLD: 7 % (ref 4–11)
MUCUS: ABNORMAL
NITRITE, URINE: NEGATIVE
NRBC AUTOMATED: NORMAL PER 100 WBC
OTHER OBSERVATIONS UA: ABNORMAL
PDW BLD-RTO: 14.1 % (ref 11–14.5)
PH UA: 5.5 (ref 5–6)
PLATELET # BLD: 246 K/UL (ref 140–450)
PLATELET ESTIMATE: NORMAL
PMV BLD AUTO: 8.5 FL (ref 6–12)
POTASSIUM SERPL-SCNC: 4.4 MMOL/L (ref 3.7–5.3)
PROTEIN UA: NEGATIVE
RBC # BLD: 4.78 M/UL (ref 4–5.2)
RBC # BLD: NORMAL 10*6/UL
RBC UA: ABNORMAL /HPF (ref 0–4)
RENAL EPITHELIAL, UA: ABNORMAL /HPF
S PYO AG THROAT QL: NORMAL
SEG NEUTROPHILS: 63 % (ref 43–77)
SEGMENTED NEUTROPHILS ABSOLUTE COUNT: 6.2 K/UL (ref 1.8–7.7)
SODIUM BLD-SCNC: 142 MMOL/L (ref 135–144)
SPECIFIC GRAVITY UA: 1.01 (ref 1.01–1.02)
TOTAL PROTEIN: 8 G/DL (ref 6.4–8.3)
TRICHOMONAS: ABNORMAL
TSH SERPL DL<=0.05 MIU/L-ACNC: 1.51 MIU/L (ref 0.3–5)
TURBIDITY: NORMAL
URINE HGB: NEGATIVE
UROBILINOGEN, URINE: NORMAL
WBC # BLD: 9.8 K/UL (ref 3.5–11)
WBC # BLD: NORMAL 10*3/UL
WBC UA: ABNORMAL /HPF (ref 0–4)
YEAST: ABNORMAL

## 2018-10-17 PROCEDURE — 11721 DEBRIDE NAIL 6 OR MORE: CPT | Performed by: PODIATRIST

## 2018-10-17 PROCEDURE — L3040 FT ARCH SUPRT PREMOLD LONGIT: HCPCS | Performed by: PODIATRIST

## 2018-10-17 PROCEDURE — G0008 ADMIN INFLUENZA VIRUS VAC: HCPCS | Performed by: PHYSICIAN ASSISTANT

## 2018-10-17 PROCEDURE — 80053 COMPREHEN METABOLIC PANEL: CPT

## 2018-10-17 PROCEDURE — 84443 ASSAY THYROID STIM HORMONE: CPT

## 2018-10-17 PROCEDURE — 90662 IIV NO PRSV INCREASED AG IM: CPT | Performed by: PHYSICIAN ASSISTANT

## 2018-10-17 PROCEDURE — 87880 STREP A ASSAY W/OPTIC: CPT | Performed by: PHYSICIAN ASSISTANT

## 2018-10-17 PROCEDURE — 99213 OFFICE O/P EST LOW 20 MIN: CPT | Performed by: PODIATRIST

## 2018-10-17 PROCEDURE — 99214 OFFICE O/P EST MOD 30 MIN: CPT | Performed by: PHYSICIAN ASSISTANT

## 2018-10-17 PROCEDURE — 93288 INTERROG EVL PM/LDLS PM IP: CPT | Performed by: INTERNAL MEDICINE

## 2018-10-17 PROCEDURE — 85025 COMPLETE CBC W/AUTO DIFF WBC: CPT

## 2018-10-17 PROCEDURE — 36415 COLL VENOUS BLD VENIPUNCTURE: CPT

## 2018-10-17 PROCEDURE — 81001 URINALYSIS AUTO W/SCOPE: CPT

## 2018-10-17 RX ORDER — MELOXICAM 7.5 MG/1
7.5 TABLET ORAL DAILY
Qty: 30 TABLET | Refills: 0 | Status: SHIPPED | OUTPATIENT
Start: 2018-10-17 | End: 2018-11-10

## 2018-10-17 ASSESSMENT — ENCOUNTER SYMPTOMS
SORE THROAT: 1
RESPIRATORY NEGATIVE: 1
RHINORRHEA: 1
TROUBLE SWALLOWING: 1

## 2018-10-17 NOTE — PROGRESS NOTES
daily  Patient taking differently: Take 1 mg by mouth 2 times daily  4/2/18  Yes Ulysses Mcelroy MD   colchicine (COLCRYS) 0.6 MG tablet Take 1 tablet by mouth daily 12/20/17  Yes JARRETT Palacios   tolterodine (DETROL LA) 4 MG extended release capsule Take 1 capsule by mouth daily 12/20/17  Yes Juan Daniel Gruver, PA   clotrimazole-betamethasone (LOTRISONE) 1-0.05 % cream Apply topically 2 times daily for 7 days. 8/4/17  Yes JARRETT Palacios   omeprazole (PRILOSEC) 20 MG delayed release capsule Take 1 capsule by mouth daily as needed (gastritis) 12/28/16  Yes JARRETT Palacios   Handicap Placard MISC by Does not apply route Issue parking placard for person with disability. [facility] [address] [id number]   Applicant meets the qualifying disability criteria. Length of time expected to have disability: __x___Lifetime   _____Other: 7/29/16  Yes JARRETT Palacios   magnesium oxide (MAG-OX) 400 MG tablet Take 1 tablet by mouth nightly 12/21/15  Yes Evonne Pinto DO   Cholecalciferol (VITAMIN D) 2000 UNITS CAPS capsule Take 1 capsule by mouth daily   Yes Historical Provider, MD   ammonium lactate (LAC-HYDRIN) 12 % lotion Apply to bilateral feet and legs and scaly spots twice daily to moisturize. 12/3/15  Yes Evonne Pinto DO   latanoprost (XALATAN) 0.005 % ophthalmic solution  1/15/15  Yes Historical Provider, MD   polyethylene glycol (MIRALAX) powder Take 17 g by mouth daily as needed. 8/19/13  Yes Pola Romero MD   Acetaminophen (TYLENOL PO) Take  by mouth as needed.  Rarely uses   Yes Historical Provider, MD   aspirin 81 MG EC tablet Take 81 mg by mouth daily    Yes Historical Provider, MD       Past Surgical History:   Procedure Laterality Date    CARDIAC CATHETERIZATION  2010    no blockage    CATARACT REMOVAL WITH IMPLANT Bilateral     cataract    CHOLECYSTECTOMY      COLONOSCOPY      EYE SURGERY  03/31/2017    Biopsy to right eye lid with surgery on 05/31/2017 for squamous cell debris. Fissures absent, bilateral. Hyperkeratotic tissue is absent. Asessment: Patient is a 80 y.o. female with:    Diagnosis Orders   1. Posterior tibial tendinitis of right leg  HM DIABETES FOOT EXAM    LA FT ARCH SUPRT PREMOLD LONGIT   2. Controlled type 2 DM with peripheral circulatory disorder (HCC)  LA DEBRIDEMENT OF NAILS, 6 OR MORE   3. Dermatophytosis of nail 1-5R/L  LA DEBRIDEMENT OF NAILS, 6 OR MORE       Plan: Patient examined and evaluated. Current condition and treatment options discussed in detail. Pt was given the option of pre fabricated insoles. Pt was advised on appropriate use and how they can help their condition. Pt should use these in shoe gear 100% of the time WB. Orders Placed This Encounter   Medications    meloxicam (MOBIC) 7.5 MG tablet     Sig: Take 1 tablet by mouth daily     Dispense:  30 tablet     Refill:  0     All nails as mentioned above debrided in length and thickness. Patient advised OTC methods for treatment of the mycotic nails. Patient will follow up in 10 weeks. Contact office with any questions/problems/concerns.   RTC in 3 week(s) if pain persists

## 2018-10-17 NOTE — PROGRESS NOTES
Have you had an allergic reaction to the flu (influenza) shot? no  Are you allergic to eggs or any component of the flu vaccine? no  Do you have a history of Guillain-Bob White Syndrome (GBS), which is paralysis after receiving the flu vaccine? no  Are you feeling well today? yes  Flu vaccine given as ordered. Patient tolerated it well. No questions re: VIS information.

## 2018-11-10 ENCOUNTER — OFFICE VISIT (OUTPATIENT)
Dept: PRIMARY CARE CLINIC | Age: 83
End: 2018-11-10
Payer: MEDICARE

## 2018-11-10 VITALS
OXYGEN SATURATION: 98 % | HEIGHT: 61 IN | TEMPERATURE: 97.9 F | WEIGHT: 214 LBS | SYSTOLIC BLOOD PRESSURE: 126 MMHG | BODY MASS INDEX: 40.4 KG/M2 | HEART RATE: 62 BPM | DIASTOLIC BLOOD PRESSURE: 86 MMHG

## 2018-11-10 DIAGNOSIS — M10.042 ACUTE IDIOPATHIC GOUT OF LEFT HAND: Primary | ICD-10-CM

## 2018-11-10 PROCEDURE — 99213 OFFICE O/P EST LOW 20 MIN: CPT | Performed by: FAMILY MEDICINE

## 2018-11-10 RX ORDER — PREDNISONE 20 MG/1
40 TABLET ORAL DAILY
Qty: 10 TABLET | Refills: 0 | Status: SHIPPED | OUTPATIENT
Start: 2018-11-10 | End: 2018-11-15

## 2018-11-10 ASSESSMENT — ENCOUNTER SYMPTOMS
ALLERGIC/IMMUNOLOGIC NEGATIVE: 1
RESPIRATORY NEGATIVE: 1
GASTROINTESTINAL NEGATIVE: 1
EYES NEGATIVE: 1

## 2018-11-10 NOTE — PROGRESS NOTES
Authorizing Provider   sertraline (ZOLOFT) 50 MG tablet TAKE 1 TABLET BY MOUTH ONE TIME DAILY Yes JARRETT Yin   lisinopril (PRINIVIL;ZESTRIL) 20 MG tablet TAKE 1 TABLET DAILY Yes Federico Yin   metoprolol succinate (TOPROL XL) 100 MG extended release tablet TAKE 1 TABLET DAILY Yes JARRETT Yin   bumetanide (BUMEX) 1 MG tablet Take 1 tablet by mouth daily  Patient taking differently: Take 1 mg by mouth 2 times daily  Yes Pippa Villar MD   colchicine (COLCRYS) 0.6 MG tablet Take 1 tablet by mouth daily Yes JARRETT Yin   omeprazole (PRILOSEC) 20 MG delayed release capsule Take 1 capsule by mouth daily as needed (gastritis) Yes JARRETT Yin   magnesium oxide (MAG-OX) 400 MG tablet Take 1 tablet by mouth nightly Yes Violeta Diamond DO   Cholecalciferol (VITAMIN D) 2000 UNITS CAPS capsule Take 1 capsule by mouth daily Yes Historical Provider, MD   latanoprost (XALATAN) 0.005 % ophthalmic solution  Yes Historical Provider, MD   aspirin 81 MG EC tablet Take 81 mg by mouth daily  Yes Historical Provider, MD   meloxicam (MOBIC) 7.5 MG tablet Take 1 tablet by mouth daily  Lester Serrano DPM   metoprolol succinate (TOPROL XL) 100 MG extended release tablet TAKE 1 TABLET DAILY  ArnoldoJARRETT Piper   COLCRYS 0.6 MG tablet TAKE 1 TABLET DAILY  JARRETT Yin   tolterodine (DETROL LA) 4 MG extended release capsule Take 1 capsule by mouth daily  JARRETT Yin   clotrimazole-betamethasone (LOTRISONE) 1-0.05 % cream Apply topically 2 times daily for 7 days. JARRETT Yin   Handicap Placard MISC by Does not apply route Issue parking placard for person with disability. [facility] [phone]   Applicant meets the qualifying disability criteria.   Length of time expected to have disability: __x___Lifetime   _____Other:  JARRETT Yin   ammonium lactate (LAC-HYDRIN) 12 % lotion Apply to bilateral feet and legs and scaly spots twice daily

## 2018-11-15 ENCOUNTER — HOSPITAL ENCOUNTER (OUTPATIENT)
Dept: LAB | Age: 83
Discharge: HOME | End: 2018-11-15
Payer: MEDICARE

## 2018-11-15 ENCOUNTER — PROCEDURE VISIT (OUTPATIENT)
Dept: CARDIOLOGY | Age: 83
End: 2018-11-15
Payer: MEDICARE

## 2018-11-15 DIAGNOSIS — E11.9 TYPE 2 DIABETES MELLITUS WITHOUT COMPLICATION, WITHOUT LONG-TERM CURRENT USE OF INSULIN (HCC): Primary | ICD-10-CM

## 2018-11-15 DIAGNOSIS — E11.9 TYPE 2 DIABETES MELLITUS WITHOUT COMPLICATION, WITHOUT LONG-TERM CURRENT USE OF INSULIN (HCC): ICD-10-CM

## 2018-11-15 DIAGNOSIS — N18.30 CKD (CHRONIC KIDNEY DISEASE), STAGE III (HCC): ICD-10-CM

## 2018-11-15 DIAGNOSIS — I49.5 SICK SINUS SYNDROME (HCC): ICD-10-CM

## 2018-11-15 DIAGNOSIS — Z95.0 CARDIAC PACEMAKER IN SITU: Primary | ICD-10-CM

## 2018-11-15 DIAGNOSIS — I44.2 THIRD DEGREE HEART BLOCK (HCC): ICD-10-CM

## 2018-11-15 DIAGNOSIS — Z45.010 CARDIAC PACEMAKER BATTERY WORN OUT: Primary | ICD-10-CM

## 2018-11-15 LAB
ABSOLUTE EOS #: 0.1 K/UL (ref 0–0.4)
ABSOLUTE IMMATURE GRANULOCYTE: NORMAL K/UL (ref 0–0.3)
ABSOLUTE LYMPH #: 2.2 K/UL (ref 1–4.8)
ABSOLUTE MONO #: 0.6 K/UL (ref 0.1–1.2)
ANION GAP SERPL CALCULATED.3IONS-SCNC: 11 MMOL/L (ref 9–17)
BASOPHILS # BLD: 1 % (ref 0–1)
BASOPHILS ABSOLUTE: 0.1 K/UL (ref 0–0.2)
BUN BLDV-MCNC: 23 MG/DL (ref 8–23)
BUN/CREAT BLD: 24 (ref 9–20)
CALCIUM IONIZED: 1.15 MMOL/L (ref 1.13–1.33)
CALCIUM SERPL-MCNC: 9 MG/DL (ref 8.6–10.4)
CHLORIDE BLD-SCNC: 103 MMOL/L (ref 98–107)
CHOLESTEROL/HDL RATIO: 5.2
CHOLESTEROL: 235 MG/DL
CO2: 29 MMOL/L (ref 20–31)
CREAT SERPL-MCNC: 0.96 MG/DL (ref 0.5–0.9)
CREATININE URINE: 177.1 MG/DL (ref 28–217)
DIFFERENTIAL TYPE: NORMAL
EOSINOPHILS RELATIVE PERCENT: 1 % (ref 1–7)
ESTIMATED AVERAGE GLUCOSE: 120 MG/DL
GFR AFRICAN AMERICAN: >60 ML/MIN
GFR NON-AFRICAN AMERICAN: 55 ML/MIN
GFR SERPL CREATININE-BSD FRML MDRD: ABNORMAL ML/MIN/{1.73_M2}
GFR SERPL CREATININE-BSD FRML MDRD: ABNORMAL ML/MIN/{1.73_M2}
GLUCOSE BLD-MCNC: 115 MG/DL (ref 70–99)
HBA1C MFR BLD: 5.8 % (ref 4.8–5.9)
HCT VFR BLD CALC: 40.4 % (ref 36–46)
HDLC SERPL-MCNC: 45 MG/DL
HEMOGLOBIN: 13.4 G/DL (ref 12–16)
IMMATURE GRANULOCYTES: NORMAL %
LDL CHOLESTEROL: 158 MG/DL (ref 0–130)
LYMPHOCYTES # BLD: 23 % (ref 16–46)
MCH RBC QN AUTO: 31 PG (ref 26–34)
MCHC RBC AUTO-ENTMCNC: 33.1 G/DL (ref 31–37)
MCV RBC AUTO: 93.7 FL (ref 80–100)
MONOCYTES # BLD: 7 % (ref 4–11)
NRBC AUTOMATED: NORMAL PER 100 WBC
PDW BLD-RTO: 13.8 % (ref 11–14.5)
PHOSPHORUS: 2.6 MG/DL (ref 2.6–4.5)
PLATELET # BLD: 235 K/UL (ref 140–450)
PLATELET ESTIMATE: NORMAL
PMV BLD AUTO: 8 FL (ref 6–12)
POTASSIUM SERPL-SCNC: 3.6 MMOL/L (ref 3.7–5.3)
PTH INTACT: 63 PG/ML (ref 15–65)
RBC # BLD: 4.31 M/UL (ref 4–5.2)
RBC # BLD: NORMAL 10*6/UL
SEG NEUTROPHILS: 68 % (ref 43–77)
SEGMENTED NEUTROPHILS ABSOLUTE COUNT: 6.6 K/UL (ref 1.8–7.7)
SODIUM BLD-SCNC: 143 MMOL/L (ref 135–144)
TOTAL PROTEIN, URINE: 39 MG/DL
TRIGL SERPL-MCNC: 162 MG/DL
VITAMIN D 25-HYDROXY: 54.7 NG/ML (ref 30–100)
VLDLC SERPL CALC-MCNC: ABNORMAL MG/DL (ref 1–30)
WBC # BLD: 9.5 K/UL (ref 3.5–11)
WBC # BLD: NORMAL 10*3/UL

## 2018-11-15 PROCEDURE — 36415 COLL VENOUS BLD VENIPUNCTURE: CPT

## 2018-11-15 PROCEDURE — 84100 ASSAY OF PHOSPHORUS: CPT

## 2018-11-15 PROCEDURE — 93288 INTERROG EVL PM/LDLS PM IP: CPT | Performed by: INTERNAL MEDICINE

## 2018-11-15 PROCEDURE — 80061 LIPID PANEL: CPT

## 2018-11-15 PROCEDURE — 82306 VITAMIN D 25 HYDROXY: CPT

## 2018-11-15 PROCEDURE — 83036 HEMOGLOBIN GLYCOSYLATED A1C: CPT

## 2018-11-15 PROCEDURE — 83970 ASSAY OF PARATHORMONE: CPT

## 2018-11-15 PROCEDURE — 82570 ASSAY OF URINE CREATININE: CPT

## 2018-11-15 PROCEDURE — 85025 COMPLETE CBC W/AUTO DIFF WBC: CPT

## 2018-11-15 PROCEDURE — 84156 ASSAY OF PROTEIN URINE: CPT

## 2018-11-15 PROCEDURE — 82330 ASSAY OF CALCIUM: CPT

## 2018-11-15 PROCEDURE — 80048 BASIC METABOLIC PNL TOTAL CA: CPT

## 2018-11-19 ENCOUNTER — HOSPITAL ENCOUNTER (OUTPATIENT)
Dept: CARDIAC CATH/INVASIVE PROCEDURES | Age: 83
Discharge: HOME | End: 2018-11-19
Payer: MEDICARE

## 2018-11-19 VITALS
SYSTOLIC BLOOD PRESSURE: 156 MMHG | BODY MASS INDEX: 39.65 KG/M2 | TEMPERATURE: 98 F | HEIGHT: 61 IN | DIASTOLIC BLOOD PRESSURE: 52 MMHG | WEIGHT: 210 LBS | RESPIRATION RATE: 14 BRPM | HEART RATE: 61 BPM | OXYGEN SATURATION: 98 %

## 2018-11-19 DIAGNOSIS — Z45.010 PACEMAKER AT END OF BATTERY LIFE: ICD-10-CM

## 2018-11-19 PROCEDURE — 7100000010 HC PHASE II RECOVERY - FIRST 15 MIN

## 2018-11-19 PROCEDURE — 2500000003 HC RX 250 WO HCPCS

## 2018-11-19 PROCEDURE — 6360000002 HC RX W HCPCS

## 2018-11-19 PROCEDURE — 2720000010 HC SURG SUPPLY STERILE

## 2018-11-19 PROCEDURE — 7100000011 HC PHASE II RECOVERY - ADDTL 15 MIN

## 2018-11-19 PROCEDURE — 76937 US GUIDE VASCULAR ACCESS: CPT

## 2018-11-19 PROCEDURE — 2580000003 HC RX 258

## 2018-11-19 PROCEDURE — 33228 REMV&REPLC PM GEN DUAL LEAD: CPT | Performed by: INTERNAL MEDICINE

## 2018-11-19 PROCEDURE — C1785 PMKR, DUAL, RATE-RESP: HCPCS

## 2018-11-19 PROCEDURE — 2709999900 HC NON-CHARGEABLE SUPPLY

## 2018-11-19 RX ORDER — SODIUM CHLORIDE 9 MG/ML
INJECTION, SOLUTION INTRAVENOUS CONTINUOUS
Status: DISCONTINUED | OUTPATIENT
Start: 2018-11-19 | End: 2018-11-20 | Stop reason: HOSPADM

## 2018-11-19 RX ORDER — SODIUM CHLORIDE 9 MG/ML
INJECTION, SOLUTION INTRAVENOUS CONTINUOUS
Status: CANCELLED | OUTPATIENT
Start: 2018-11-19

## 2018-11-19 RX ORDER — CEFAZOLIN SODIUM 1 G/50ML
1 INJECTION, SOLUTION INTRAVENOUS EVERY 8 HOURS
Status: DISCONTINUED | OUTPATIENT
Start: 2018-11-19 | End: 2018-11-20 | Stop reason: HOSPADM

## 2018-11-19 RX ORDER — ACETAMINOPHEN 325 MG/1
650 TABLET ORAL EVERY 4 HOURS PRN
Status: CANCELLED | OUTPATIENT
Start: 2018-11-19

## 2018-11-19 RX ORDER — SODIUM CHLORIDE 0.9 % (FLUSH) 0.9 %
10 SYRINGE (ML) INJECTION PRN
Status: CANCELLED | OUTPATIENT
Start: 2018-11-19

## 2018-11-19 RX ORDER — SODIUM CHLORIDE 0.9 % (FLUSH) 0.9 %
10 SYRINGE (ML) INJECTION EVERY 12 HOURS SCHEDULED
Status: CANCELLED | OUTPATIENT
Start: 2018-11-19

## 2018-11-19 RX ADMIN — SODIUM CHLORIDE: 9 INJECTION, SOLUTION INTRAVENOUS at 15:23

## 2018-11-20 NOTE — PROGRESS NOTES
Ambulated to bathroom and in halls. Gait steady using cane and daughters assistance assessment unchanged.

## 2018-11-20 NOTE — OP NOTE
[facility] Consult[facility]     Electrophysiology Device Implant   Pacemaker                 [address] [facility] (69 y.o., female)  10/27/1928      11/19/2018      Procedure: Pacemaker    Pre Procedure Conscious Sedation Data:    ASA Class:    [] I [] II [x] III [] IV    Mallampati Class:  [] I [] II [x] III [] IV      Device / Data:     Model # Serial #   Pacer/ICD/Bi-V [id number]       All lead test and parameters were documented in chart    · Sedation monitoring  · Intra - OP Lead Testing  · Pocket Revision  · Generators Implant  · Fluoro time: 0 min    Procedure  After the usual preparation of the left neck and chest, the patient was draped in the usual sterile manner. Local anesthesia was infused below the left clavicle from the midline laterally. An incision was made inferior and parallel to the clavicle. The incision was carried down to the fascia, dissecting the leads and generator in the previously made pocket. Generator Change out:    The old generator was removed, and the previously implanted leads were attached to the new generator using the setscrews. The pocket was irrigated with antibiotic solution. The pulse generator and leads were coiled and placed in the pocket. Fluoroscopy was used to verify the final placement of the pacemaker and leads. The pocket was closed using multiple layers of suture and a dry sterile dressing was applied. There were no complications, patient tolerated the procedure well. The patient left the EP lab in stable condition.         Impression / Device:  Successful Generator Change Out      Plan:  Telemetry monitoring  Interigate pacemaker before discharge  CXR if needed  Wound check at [facility] in 7days  Discharge if patient remains stable        Electronically signed by Ana Paula Perez MD on 11/19/2018 at 7:41 PM  [facility]  [phone]

## 2018-11-28 ENCOUNTER — HOSPITAL ENCOUNTER (INPATIENT)
Age: 83
LOS: 3 days | Discharge: NURSING, REHAB OR LONG-TERM CARE | DRG: 291 | End: 2018-12-01
Attending: EMERGENCY MEDICINE | Admitting: INTERNAL MEDICINE
Payer: MEDICARE

## 2018-11-28 ENCOUNTER — APPOINTMENT (OUTPATIENT)
Dept: CT IMAGING | Age: 83
DRG: 291 | End: 2018-11-28
Payer: MEDICARE

## 2018-11-28 ENCOUNTER — APPOINTMENT (OUTPATIENT)
Dept: GENERAL RADIOLOGY | Age: 83
DRG: 291 | End: 2018-11-28
Payer: MEDICARE

## 2018-11-28 DIAGNOSIS — I50.9 ACUTE ON CHRONIC CONGESTIVE HEART FAILURE, UNSPECIFIED HEART FAILURE TYPE (HCC): Primary | ICD-10-CM

## 2018-11-28 PROBLEM — I50.31 ACUTE DIASTOLIC CHF (CONGESTIVE HEART FAILURE) (HCC): Status: ACTIVE | Noted: 2018-11-28

## 2018-11-28 LAB
ABSOLUTE EOS #: 0.1 K/UL (ref 0–0.4)
ABSOLUTE IMMATURE GRANULOCYTE: ABNORMAL K/UL (ref 0–0.3)
ABSOLUTE LYMPH #: 5.3 K/UL (ref 1–4.8)
ABSOLUTE MONO #: 1.1 K/UL (ref 0.1–1.2)
ANION GAP SERPL CALCULATED.3IONS-SCNC: 16 MMOL/L (ref 9–17)
BASOPHILS # BLD: 1 % (ref 0–1)
BASOPHILS ABSOLUTE: 0.1 K/UL (ref 0–0.2)
BNP INTERPRETATION: ABNORMAL
BUN BLDV-MCNC: 24 MG/DL (ref 8–23)
BUN/CREAT BLD: 26 (ref 9–20)
CALCIUM SERPL-MCNC: 8.9 MG/DL (ref 8.6–10.4)
CHLORIDE BLD-SCNC: 100 MMOL/L (ref 98–107)
CO2: 25 MMOL/L (ref 20–31)
CREAT SERPL-MCNC: 0.92 MG/DL (ref 0.5–0.9)
D DIMER: 1740 NG/ML
DIFFERENTIAL TYPE: ABNORMAL
EOSINOPHILS RELATIVE PERCENT: 1 % (ref 1–7)
FIO2: 60
GFR AFRICAN AMERICAN: >60 ML/MIN
GFR NON-AFRICAN AMERICAN: 57 ML/MIN
GFR SERPL CREATININE-BSD FRML MDRD: ABNORMAL ML/MIN/{1.73_M2}
GFR SERPL CREATININE-BSD FRML MDRD: ABNORMAL ML/MIN/{1.73_M2}
GLUCOSE BLD-MCNC: 108 MG/DL (ref 65–105)
GLUCOSE BLD-MCNC: 137 MG/DL (ref 65–105)
GLUCOSE BLD-MCNC: 263 MG/DL (ref 70–99)
HCT VFR BLD CALC: 43.7 % (ref 36–46)
HEMOGLOBIN: 13.8 G/DL (ref 12–16)
IMMATURE GRANULOCYTES: ABNORMAL %
INR BLD: 1
LACTIC ACID: 2 MMOL/L (ref 0.5–2.2)
LYMPHOCYTES # BLD: 25 % (ref 16–46)
MCH RBC QN AUTO: 30.6 PG (ref 26–34)
MCHC RBC AUTO-ENTMCNC: 31.7 G/DL (ref 31–37)
MCV RBC AUTO: 96.6 FL (ref 80–100)
MONOCYTES # BLD: 5 % (ref 4–11)
NEGATIVE BASE EXCESS, ART: ABNORMAL (ref 0–2)
NRBC AUTOMATED: ABNORMAL PER 100 WBC
O2 DEVICE/FLOW/%: ABNORMAL
PATIENT TEMP: ABNORMAL
PDW BLD-RTO: 14.4 % (ref 11–14.5)
PLATELET # BLD: 279 K/UL (ref 140–450)
PLATELET ESTIMATE: ABNORMAL
PMV BLD AUTO: 8.9 FL (ref 6–12)
POC HCO3: 30.5 MMOL/L (ref 22–27)
POC O2 SATURATION: 99 %
POC PCO2 TEMP: ABNORMAL MM HG
POC PCO2: 49 MM HG (ref 32–45)
POC PH TEMP: ABNORMAL
POC PH: 7.41 (ref 7.35–7.45)
POC PO2 TEMP: ABNORMAL MM HG
POC PO2: 152 MM HG (ref 75–95)
POSITIVE BASE EXCESS, ART: 6 (ref 0–2)
POTASSIUM SERPL-SCNC: 4.6 MMOL/L (ref 3.7–5.3)
PRO-BNP: 4465 PG/ML
PROTHROMBIN TIME: 10.1 SEC (ref 9.4–11.3)
RBC # BLD: 4.52 M/UL (ref 4–5.2)
RBC # BLD: ABNORMAL 10*6/UL
SEG NEUTROPHILS: 68 % (ref 43–77)
SEGMENTED NEUTROPHILS ABSOLUTE COUNT: 14.7 K/UL (ref 1.8–7.7)
SODIUM BLD-SCNC: 141 MMOL/L (ref 135–144)
TCO2 (CALC), ART: 32 MMOL/L (ref 23–28)
TROPONIN INTERP: ABNORMAL
TROPONIN INTERP: ABNORMAL
TROPONIN INTERP: NORMAL
TROPONIN T: 0.06 NG/ML
TROPONIN T: 0.09 NG/ML
TROPONIN T: <0.03 NG/ML
WBC # BLD: 21.3 K/UL (ref 3.5–11)
WBC # BLD: ABNORMAL 10*3/UL

## 2018-11-28 PROCEDURE — 84484 ASSAY OF TROPONIN QUANT: CPT

## 2018-11-28 PROCEDURE — 6360000002 HC RX W HCPCS: Performed by: INTERNAL MEDICINE

## 2018-11-28 PROCEDURE — 71045 X-RAY EXAM CHEST 1 VIEW: CPT

## 2018-11-28 PROCEDURE — 2060000000 HC ICU INTERMEDIATE R&B

## 2018-11-28 PROCEDURE — 71260 CT THORAX DX C+: CPT

## 2018-11-28 PROCEDURE — 85025 COMPLETE CBC W/AUTO DIFF WBC: CPT

## 2018-11-28 PROCEDURE — 82805 BLOOD GASES W/O2 SATURATION: CPT

## 2018-11-28 PROCEDURE — 94660 CPAP INITIATION&MGMT: CPT

## 2018-11-28 PROCEDURE — 2580000003 HC RX 258: Performed by: INTERNAL MEDICINE

## 2018-11-28 PROCEDURE — 94640 AIRWAY INHALATION TREATMENT: CPT

## 2018-11-28 PROCEDURE — 82947 ASSAY GLUCOSE BLOOD QUANT: CPT

## 2018-11-28 PROCEDURE — 36415 COLL VENOUS BLD VENIPUNCTURE: CPT

## 2018-11-28 PROCEDURE — 99285 EMERGENCY DEPT VISIT HI MDM: CPT

## 2018-11-28 PROCEDURE — 83880 ASSAY OF NATRIURETIC PEPTIDE: CPT

## 2018-11-28 PROCEDURE — 99223 1ST HOSP IP/OBS HIGH 75: CPT | Performed by: INTERNAL MEDICINE

## 2018-11-28 PROCEDURE — 6360000002 HC RX W HCPCS: Performed by: EMERGENCY MEDICINE

## 2018-11-28 PROCEDURE — 93005 ELECTROCARDIOGRAM TRACING: CPT

## 2018-11-28 PROCEDURE — 96374 THER/PROPH/DIAG INJ IV PUSH: CPT

## 2018-11-28 PROCEDURE — 2700000000 HC OXYGEN THERAPY PER DAY

## 2018-11-28 PROCEDURE — 85610 PROTHROMBIN TIME: CPT

## 2018-11-28 PROCEDURE — 2500000003 HC RX 250 WO HCPCS: Performed by: INTERNAL MEDICINE

## 2018-11-28 PROCEDURE — 80048 BASIC METABOLIC PNL TOTAL CA: CPT

## 2018-11-28 PROCEDURE — 82803 BLOOD GASES ANY COMBINATION: CPT

## 2018-11-28 PROCEDURE — 36600 WITHDRAWAL OF ARTERIAL BLOOD: CPT

## 2018-11-28 PROCEDURE — 94761 N-INVAS EAR/PLS OXIMETRY MLT: CPT

## 2018-11-28 PROCEDURE — 83605 ASSAY OF LACTIC ACID: CPT

## 2018-11-28 PROCEDURE — 51702 INSERT TEMP BLADDER CATH: CPT

## 2018-11-28 PROCEDURE — 6360000004 HC RX CONTRAST MEDICATION: Performed by: EMERGENCY MEDICINE

## 2018-11-28 PROCEDURE — 6370000000 HC RX 637 (ALT 250 FOR IP)

## 2018-11-28 PROCEDURE — 85379 FIBRIN DEGRADATION QUANT: CPT

## 2018-11-28 PROCEDURE — 6370000000 HC RX 637 (ALT 250 FOR IP): Performed by: INTERNAL MEDICINE

## 2018-11-28 RX ORDER — ALBUTEROL SULFATE 2.5 MG/3ML
2.5 SOLUTION RESPIRATORY (INHALATION)
Status: DISCONTINUED | OUTPATIENT
Start: 2018-11-28 | End: 2018-11-28

## 2018-11-28 RX ORDER — ALBUTEROL SULFATE 2.5 MG/3ML
2.5 SOLUTION RESPIRATORY (INHALATION) EVERY 4 HOURS
Status: DISCONTINUED | OUTPATIENT
Start: 2018-11-28 | End: 2018-12-01 | Stop reason: HOSPADM

## 2018-11-28 RX ORDER — IPRATROPIUM BROMIDE AND ALBUTEROL SULFATE 2.5; .5 MG/3ML; MG/3ML
SOLUTION RESPIRATORY (INHALATION)
Status: COMPLETED
Start: 2018-11-28 | End: 2018-11-28

## 2018-11-28 RX ORDER — SODIUM CHLORIDE FOR INHALATION 0.9 %
3 VIAL, NEBULIZER (ML) INHALATION
Status: DISCONTINUED | OUTPATIENT
Start: 2018-11-28 | End: 2018-11-28 | Stop reason: ALTCHOICE

## 2018-11-28 RX ORDER — LISINOPRIL 20 MG/1
20 TABLET ORAL DAILY
Status: DISCONTINUED | OUTPATIENT
Start: 2018-11-28 | End: 2018-11-29

## 2018-11-28 RX ORDER — INSULIN GLARGINE 100 [IU]/ML
10 INJECTION, SOLUTION SUBCUTANEOUS DAILY
Status: DISCONTINUED | OUTPATIENT
Start: 2018-11-28 | End: 2018-12-01 | Stop reason: HOSPADM

## 2018-11-28 RX ORDER — ALBUTEROL SULFATE 2.5 MG/3ML
2.5 SOLUTION RESPIRATORY (INHALATION)
Status: DISCONTINUED | OUTPATIENT
Start: 2018-11-28 | End: 2018-11-28 | Stop reason: SDUPTHER

## 2018-11-28 RX ORDER — FUROSEMIDE 10 MG/ML
40 INJECTION INTRAMUSCULAR; INTRAVENOUS ONCE
Status: COMPLETED | OUTPATIENT
Start: 2018-11-28 | End: 2018-11-28

## 2018-11-28 RX ORDER — SODIUM CHLORIDE 0.9 % (FLUSH) 0.9 %
10 SYRINGE (ML) INJECTION EVERY 12 HOURS SCHEDULED
Status: DISCONTINUED | OUTPATIENT
Start: 2018-11-28 | End: 2018-12-01 | Stop reason: HOSPADM

## 2018-11-28 RX ORDER — LATANOPROST 50 UG/ML
1 SOLUTION/ DROPS OPHTHALMIC NIGHTLY
Status: DISCONTINUED | OUTPATIENT
Start: 2018-11-28 | End: 2018-12-01 | Stop reason: HOSPADM

## 2018-11-28 RX ORDER — ASPIRIN 81 MG/1
81 TABLET ORAL DAILY
Status: DISCONTINUED | OUTPATIENT
Start: 2018-11-28 | End: 2018-12-01 | Stop reason: HOSPADM

## 2018-11-28 RX ORDER — PANTOPRAZOLE SODIUM 40 MG/1
40 TABLET, DELAYED RELEASE ORAL
Status: DISCONTINUED | OUTPATIENT
Start: 2018-11-29 | End: 2018-12-01 | Stop reason: HOSPADM

## 2018-11-28 RX ORDER — DEXTROSE MONOHYDRATE 50 MG/ML
100 INJECTION, SOLUTION INTRAVENOUS PRN
Status: DISCONTINUED | OUTPATIENT
Start: 2018-11-28 | End: 2018-12-01 | Stop reason: HOSPADM

## 2018-11-28 RX ORDER — SODIUM CHLORIDE 0.9 % (FLUSH) 0.9 %
10 SYRINGE (ML) INJECTION PRN
Status: DISCONTINUED | OUTPATIENT
Start: 2018-11-28 | End: 2018-12-01 | Stop reason: HOSPADM

## 2018-11-28 RX ORDER — METOPROLOL SUCCINATE 50 MG/1
100 TABLET, EXTENDED RELEASE ORAL DAILY
Status: DISCONTINUED | OUTPATIENT
Start: 2018-11-28 | End: 2018-11-29

## 2018-11-28 RX ORDER — IPRATROPIUM BROMIDE AND ALBUTEROL SULFATE 2.5; .5 MG/3ML; MG/3ML
1 SOLUTION RESPIRATORY (INHALATION) ONCE
Status: COMPLETED | OUTPATIENT
Start: 2018-11-28 | End: 2018-11-28

## 2018-11-28 RX ORDER — NICOTINE POLACRILEX 4 MG
15 LOZENGE BUCCAL PRN
Status: DISCONTINUED | OUTPATIENT
Start: 2018-11-28 | End: 2018-12-01 | Stop reason: HOSPADM

## 2018-11-28 RX ORDER — COLCHICINE 0.6 MG/1
0.6 TABLET ORAL DAILY
Status: DISCONTINUED | OUTPATIENT
Start: 2018-11-28 | End: 2018-12-01 | Stop reason: HOSPADM

## 2018-11-28 RX ORDER — BUMETANIDE 0.25 MG/ML
2 INJECTION, SOLUTION INTRAMUSCULAR; INTRAVENOUS 2 TIMES DAILY
Status: DISCONTINUED | OUTPATIENT
Start: 2018-11-28 | End: 2018-11-29

## 2018-11-28 RX ORDER — DEXTROSE MONOHYDRATE 25 G/50ML
12.5 INJECTION, SOLUTION INTRAVENOUS PRN
Status: DISCONTINUED | OUTPATIENT
Start: 2018-11-28 | End: 2018-12-01 | Stop reason: HOSPADM

## 2018-11-28 RX ADMIN — LATANOPROST 1 DROP: 50 SOLUTION/ DROPS OPHTHALMIC at 21:30

## 2018-11-28 RX ADMIN — BUMETANIDE 2 MG: 0.25 INJECTION INTRAMUSCULAR; INTRAVENOUS at 17:46

## 2018-11-28 RX ADMIN — IPRATROPIUM BROMIDE AND ALBUTEROL SULFATE 1 AMPULE: .5; 3 SOLUTION RESPIRATORY (INHALATION) at 11:00

## 2018-11-28 RX ADMIN — IPRATROPIUM BROMIDE AND ALBUTEROL SULFATE 1 AMPULE: 2.5; .5 SOLUTION RESPIRATORY (INHALATION) at 11:00

## 2018-11-28 RX ADMIN — ALBUTEROL SULFATE 2.5 MG: 2.5 SOLUTION RESPIRATORY (INHALATION) at 20:05

## 2018-11-28 RX ADMIN — ALBUTEROL SULFATE 2.5 MG: 2.5 SOLUTION RESPIRATORY (INHALATION) at 23:58

## 2018-11-28 RX ADMIN — MICONAZOLE NITRATE: 20 POWDER TOPICAL at 21:30

## 2018-11-28 RX ADMIN — ALBUTEROL SULFATE 2.5 MG: 2.5 SOLUTION RESPIRATORY (INHALATION) at 16:06

## 2018-11-28 RX ADMIN — Medication 400 MG: at 21:30

## 2018-11-28 RX ADMIN — FUROSEMIDE 40 MG: 10 INJECTION, SOLUTION INTRAMUSCULAR; INTRAVENOUS at 11:53

## 2018-11-28 RX ADMIN — ENOXAPARIN SODIUM 30 MG: 40 INJECTION SUBCUTANEOUS at 15:41

## 2018-11-28 RX ADMIN — Medication 10 ML: at 21:31

## 2018-11-28 RX ADMIN — IOPAMIDOL 80 ML: 755 INJECTION, SOLUTION INTRAVENOUS at 12:22

## 2018-11-28 RX ADMIN — ASPIRIN 81 MG: 81 TABLET, COATED ORAL at 21:29

## 2018-11-28 RX ADMIN — LISINOPRIL 20 MG: 20 TABLET ORAL at 21:30

## 2018-11-28 RX ADMIN — INSULIN GLARGINE 10 UNITS: 100 INJECTION, SOLUTION SUBCUTANEOUS at 17:46

## 2018-11-28 RX ADMIN — MICONAZOLE NITRATE: 20 POWDER TOPICAL at 15:41

## 2018-11-28 ASSESSMENT — PAIN SCALES - GENERAL
PAINLEVEL_OUTOF10: 0

## 2018-11-28 ASSESSMENT — ENCOUNTER SYMPTOMS
DIARRHEA: 0
EYE PAIN: 0
ABDOMINAL PAIN: 0
NAUSEA: 0
CONSTIPATION: 0
VOMITING: 0
COUGH: 0
SHORTNESS OF BREATH: 0
BLOOD IN STOOL: 0
BACK PAIN: 0

## 2018-11-28 NOTE — H&P
mild-to moderate TR---RVSP ~ 53 mm Hg---pacemaker wire                        right heart--RVSP ~ 53 mm Hg--Grade II moderate DD---                       LVEF ~ 55%           Cardiac catheterization----2010----no obstruction   Acute respiratory failure---hypoxia--11.28.2018---requiring---BiPAP  Non-compliance with medical regimen---did not take diuretics  Leukocytosis--etiology uncertain  Sick sinus syndrome---complete heart block           PACEMAKER--change out--11.29.2018  Hypertension  Hyperlipidemia   Diabetes Mellitus Type 2  CKD---Stage 3  Peripheral edema---chronic  ÓSCAR---obstructive sleep apnea  Depression   Dermatitis---skin folds---maceration   HEARING IMPAIRMENT---bilaterally  PMH:   gout, HH, BCC--nose, dry skin--2015, hypomagnesemia--2015,              macular degeneration, open angle glaucoma, Vitamin D deficiency,               esophageal stenosis---stricture  PSH:    see above, hysterectomy---cervix unknown, right wrist fracture,                bilateral cataract---IOL, right eye lid biopsy--SCC--2017, esophageal              dilatation--2017    Allergies:         NKDA  Intolerances:   statins--atorvastatin--muscle aches---elevated LFTs    Code Status:  FULL CODE  OARRS---11.28.2018---[-]  PLAN:    1. CHF---IV diuretics--daily weight---IO--fluid restriction----2D ECHO----Cardiology   2. Respiratory failure----BiPAP--wean as tolerated---respiratory regimen---ABG  3. Speech evaluation given esophageal dilatation history  4. Home medications reviewed  5.   See orders     Note that over 50 minutes was spent in evaluation of the patient, review of the chart and pertinent records, discussion with family/staff, etc    Angelika Patel MD  2:59 PM  11/28/2018

## 2018-11-28 NOTE — FLOWSHEET NOTE
Mikayla Fulton is well supported by family. Daughter Racquel Maier is with her and her 5 siblings will be visiting. Family will notify her  this evening.      11/28/18 1441   Encounter Summary   Services provided to: Patient and family together   Referral/Consult From: Rounding   Place of [address] [place] [address] No   Complexity of Encounter Low   Length of Encounter 15 minutes   Routine   Type Initial   Assessment Approachable   Intervention Active listening;Prayer   Outcome Engaged in conversation

## 2018-11-28 NOTE — ED PROVIDER NOTES
bruise/bleed easily. Psychiatric/Behavioral: Negative for confusion, self-injury and suicidal ideas. PAST MEDICAL HISTORY    has a past medical history of Abnormal ANCA (antineutrophil cytoplasmic antibody); Basal cell cancer; CKD (chronic kidney disease), stage III ([address] Dry skin dermatitis; Dyspnea; Edema; Gout; Hard of hearing; Hiatal hernia; Hypercholesterolemia; Hypertension; Hypomagnesemia; Lower extremity edema; Macular degeneration, dry; Obstructive sleep apnea; Open-angle glaucoma; Peripheral edema; Pulmonary hypertension ([address] Sick sinus syndrome ([address] Type 2 diabetes mellitus ([address] Venous insufficiency; and Vitamin D deficiency. SURGICAL HISTORY      has a past surgical history that includes Pacemaker insertion; Hysterectomy; fracture surgery (Right); Cardiac catheterization (2010); Tubal ligation (1970); Cataract removal with implant (Bilateral); and eye surgery (03/31/2017).     CURRENT MEDICATIONS       Discharge Medication List as of 12/1/2018  1:40 PM      CONTINUE these medications which have NOT CHANGED    Details   Multiple Vitamins-Minerals (ICAPS AREDS 2) CHEW Take 1 tablet by mouth 2 times dailyHistorical Med      sertraline (ZOLOFT) 50 MG tablet TAKE 1 TABLET BY MOUTH ONE TIME DAILY, Disp-30 tablet, R-5Normal      metoprolol succinate (TOPROL XL) 100 MG extended release tablet TAKE 1 TABLET DAILY, Disp-90 tablet, R-1Normal      lisinopril (PRINIVIL;ZESTRIL) 20 MG tablet TAKE 1 TABLET DAILY, Disp-90 tablet, R-1Normal      COLCRYS 0.6 MG tablet TAKE 1 TABLET DAILY, Disp-90 tablet, R-1Normal      bumetanide (BUMEX) 1 MG tablet Take 1 tablet by mouth daily, Disp-180 tablet, R-3Normal      omeprazole (PRILOSEC) 20 MG delayed release capsule Take 1 capsule by mouth daily as needed (gastritis), Disp-90 capsule, R-1      magnesium oxide (MAG-OX) 400 MG tablet Take 1 tablet by mouth nightly, Disp-30 tablet, R-3      Cholecalciferol (VITAMIN D) 2000 UNITS CAPS capsule Take 1 capsule by GLUCOSE   POCT GLUCOSE   POCT GLUCOSE   POCT GLUCOSE       Elevated BNP, elevated d-dimer    EMERGENCY DEPARTMENT COURSE:   Vitals:    Vitals:    12/01/18 0859 12/01/18 0900 12/01/18 0926 12/01/18 1007   BP:    (!) 101/50   Pulse:    77   Resp:    16   Temp:    98.9 °F (37.2 °C)   TempSrc:    Tympanic   SpO2: 98% 93% 93% 93%   Weight:       Height:         -------------------------  BP: (!) 101/50, Temp: 98.9 °F (37.2 °C), Pulse: 77, Resp: 16        Re-evaluation Notes    Resting much more comfortably on BiPAP sats in the mid high 90s respiratory rate has slowed    CRITICAL CARE:   IP CONSULT TO CARDIOLOGY        CONSULTS:      PROCEDURES:  None    FINAL IMPRESSION      1. Acute on chronic congestive heart failure, unspecified heart failure type [facility])          DISPOSITION/PLAN   DISPOSITION Admitted    Condition on Disposition    Stable    PATIENT REFERRED TO:  JARRETT Santiago  [address]  [address] [facility] [address] [facility]  [address] [id number]  [address] [facility], [address] [place] [address]  [phone]      in two weeks      DISCHARGE MEDICATIONS:  Discharge Medication List as of 12/1/2018  1:40 PM          (Please note that portions of this note were completed with a voice recognition program.  Efforts were made to edit the dictations but occasionally words are mis-transcribed.)    Smith MD, F.A.A.E.M.   Attending Emergency Physician                            Luis Adkins MD  12/02/18 0716       Luis Adikns MD  12/07/18 [id number]

## 2018-11-29 ENCOUNTER — TELEPHONE (OUTPATIENT)
Dept: CARDIOLOGY | Age: 83
End: 2018-11-29

## 2018-11-29 ENCOUNTER — APPOINTMENT (OUTPATIENT)
Dept: GENERAL RADIOLOGY | Age: 83
DRG: 291 | End: 2018-11-29
Payer: MEDICARE

## 2018-11-29 ENCOUNTER — TELEPHONE (OUTPATIENT)
Dept: INTERNAL MEDICINE | Age: 83
End: 2018-11-29

## 2018-11-29 LAB
ABSOLUTE EOS #: 0.1 K/UL (ref 0–0.4)
ABSOLUTE IMMATURE GRANULOCYTE: ABNORMAL K/UL (ref 0–0.3)
ABSOLUTE LYMPH #: 2.3 K/UL (ref 1–4.8)
ABSOLUTE MONO #: 0.8 K/UL (ref 0.1–1.2)
ANION GAP SERPL CALCULATED.3IONS-SCNC: 12 MMOL/L (ref 9–17)
BASOPHILS # BLD: 1 % (ref 0–1)
BASOPHILS ABSOLUTE: 0.1 K/UL (ref 0–0.2)
BUN BLDV-MCNC: 27 MG/DL (ref 8–23)
BUN/CREAT BLD: 23 (ref 9–20)
CALCIUM SERPL-MCNC: 8.4 MG/DL (ref 8.6–10.4)
CHLORIDE BLD-SCNC: 102 MMOL/L (ref 98–107)
CO2: 29 MMOL/L (ref 20–31)
CREAT SERPL-MCNC: 1.17 MG/DL (ref 0.5–0.9)
DIFFERENTIAL TYPE: ABNORMAL
EKG ATRIAL RATE: 65 BPM
EKG ATRIAL RATE: 85 BPM
EKG P AXIS: 57 DEGREES
EKG P AXIS: 84 DEGREES
EKG P-R INTERVAL: 128 MS
EKG P-R INTERVAL: 178 MS
EKG Q-T INTERVAL: 446 MS
EKG Q-T INTERVAL: 500 MS
EKG QRS DURATION: 144 MS
EKG QRS DURATION: 178 MS
EKG QTC CALCULATION (BAZETT): 520 MS
EKG QTC CALCULATION (BAZETT): 530 MS
EKG R AXIS: -42 DEGREES
EKG R AXIS: -54 DEGREES
EKG T AXIS: 106 DEGREES
EKG T AXIS: 127 DEGREES
EKG VENTRICULAR RATE: 65 BPM
EKG VENTRICULAR RATE: 85 BPM
EOSINOPHILS RELATIVE PERCENT: 1 % (ref 1–7)
GFR AFRICAN AMERICAN: 53 ML/MIN
GFR NON-AFRICAN AMERICAN: 43 ML/MIN
GFR SERPL CREATININE-BSD FRML MDRD: ABNORMAL ML/MIN/{1.73_M2}
GFR SERPL CREATININE-BSD FRML MDRD: ABNORMAL ML/MIN/{1.73_M2}
GLUCOSE BLD-MCNC: 119 MG/DL (ref 65–105)
GLUCOSE BLD-MCNC: 119 MG/DL (ref 65–105)
GLUCOSE BLD-MCNC: 136 MG/DL (ref 65–105)
GLUCOSE BLD-MCNC: 94 MG/DL (ref 70–99)
HCT VFR BLD CALC: 34.2 % (ref 36–46)
HEMOGLOBIN: 11.1 G/DL (ref 12–16)
IMMATURE GRANULOCYTES: ABNORMAL %
LV EF: 55 %
LVEF MODALITY: NORMAL
LYMPHOCYTES # BLD: 19 % (ref 16–46)
MCH RBC QN AUTO: 30.8 PG (ref 26–34)
MCHC RBC AUTO-ENTMCNC: 32.4 G/DL (ref 31–37)
MCV RBC AUTO: 95 FL (ref 80–100)
MONOCYTES # BLD: 7 % (ref 4–11)
NRBC AUTOMATED: ABNORMAL PER 100 WBC
PDW BLD-RTO: 13.9 % (ref 11–14.5)
PLATELET # BLD: 190 K/UL (ref 140–450)
PLATELET ESTIMATE: ABNORMAL
PMV BLD AUTO: 9.1 FL (ref 6–12)
POTASSIUM SERPL-SCNC: 4 MMOL/L (ref 3.7–5.3)
RBC # BLD: 3.6 M/UL (ref 4–5.2)
RBC # BLD: ABNORMAL 10*6/UL
SEG NEUTROPHILS: 72 % (ref 43–77)
SEGMENTED NEUTROPHILS ABSOLUTE COUNT: 8.9 K/UL (ref 1.8–7.7)
SODIUM BLD-SCNC: 143 MMOL/L (ref 135–144)
TROPONIN INTERP: ABNORMAL
TROPONIN INTERP: ABNORMAL
TROPONIN T: 0.05 NG/ML
TROPONIN T: 0.06 NG/ML
WBC # BLD: 12.2 K/UL (ref 3.5–11)
WBC # BLD: ABNORMAL 10*3/UL

## 2018-11-29 PROCEDURE — 71045 X-RAY EXAM CHEST 1 VIEW: CPT

## 2018-11-29 PROCEDURE — 82947 ASSAY GLUCOSE BLOOD QUANT: CPT

## 2018-11-29 PROCEDURE — 6370000000 HC RX 637 (ALT 250 FOR IP): Performed by: INTERNAL MEDICINE

## 2018-11-29 PROCEDURE — 84484 ASSAY OF TROPONIN QUANT: CPT

## 2018-11-29 PROCEDURE — 94150 VITAL CAPACITY TEST: CPT

## 2018-11-29 PROCEDURE — 99232 SBSQ HOSP IP/OBS MODERATE 35: CPT | Performed by: INTERNAL MEDICINE

## 2018-11-29 PROCEDURE — 6360000002 HC RX W HCPCS: Performed by: INTERNAL MEDICINE

## 2018-11-29 PROCEDURE — 94664 DEMO&/EVAL PT USE INHALER: CPT

## 2018-11-29 PROCEDURE — 93306 TTE W/DOPPLER COMPLETE: CPT

## 2018-11-29 PROCEDURE — 36415 COLL VENOUS BLD VENIPUNCTURE: CPT

## 2018-11-29 PROCEDURE — 94761 N-INVAS EAR/PLS OXIMETRY MLT: CPT

## 2018-11-29 PROCEDURE — G8978 MOBILITY CURRENT STATUS: HCPCS | Performed by: PHYSICAL THERAPIST

## 2018-11-29 PROCEDURE — 85025 COMPLETE CBC W/AUTO DIFF WBC: CPT

## 2018-11-29 PROCEDURE — 97161 PT EVAL LOW COMPLEX 20 MIN: CPT | Performed by: PHYSICAL THERAPIST

## 2018-11-29 PROCEDURE — 6370000000 HC RX 637 (ALT 250 FOR IP): Performed by: NURSE PRACTITIONER

## 2018-11-29 PROCEDURE — 99222 1ST HOSP IP/OBS MODERATE 55: CPT | Performed by: INTERNAL MEDICINE

## 2018-11-29 PROCEDURE — G8997 SWALLOW GOAL STATUS: HCPCS | Performed by: SPEECH-LANGUAGE PATHOLOGIST

## 2018-11-29 PROCEDURE — G8979 MOBILITY GOAL STATUS: HCPCS | Performed by: PHYSICAL THERAPIST

## 2018-11-29 PROCEDURE — 2700000000 HC OXYGEN THERAPY PER DAY

## 2018-11-29 PROCEDURE — 93005 ELECTROCARDIOGRAM TRACING: CPT

## 2018-11-29 PROCEDURE — 2500000003 HC RX 250 WO HCPCS: Performed by: INTERNAL MEDICINE

## 2018-11-29 PROCEDURE — 97165 OT EVAL LOW COMPLEX 30 MIN: CPT | Performed by: OCCUPATIONAL THERAPIST

## 2018-11-29 PROCEDURE — G8996 SWALLOW CURRENT STATUS: HCPCS | Performed by: SPEECH-LANGUAGE PATHOLOGIST

## 2018-11-29 PROCEDURE — G8987 SELF CARE CURRENT STATUS: HCPCS | Performed by: OCCUPATIONAL THERAPIST

## 2018-11-29 PROCEDURE — 2580000003 HC RX 258: Performed by: INTERNAL MEDICINE

## 2018-11-29 PROCEDURE — 94640 AIRWAY INHALATION TREATMENT: CPT

## 2018-11-29 PROCEDURE — 92610 EVALUATE SWALLOWING FUNCTION: CPT | Performed by: SPEECH-LANGUAGE PATHOLOGIST

## 2018-11-29 PROCEDURE — 2060000000 HC ICU INTERMEDIATE R&B

## 2018-11-29 PROCEDURE — 80048 BASIC METABOLIC PNL TOTAL CA: CPT

## 2018-11-29 PROCEDURE — G8988 SELF CARE GOAL STATUS: HCPCS | Performed by: OCCUPATIONAL THERAPIST

## 2018-11-29 RX ORDER — LISINOPRIL 5 MG/1
10 TABLET ORAL DAILY
Status: DISCONTINUED | OUTPATIENT
Start: 2018-11-29 | End: 2018-11-29

## 2018-11-29 RX ORDER — BUMETANIDE 0.25 MG/ML
1 INJECTION, SOLUTION INTRAMUSCULAR; INTRAVENOUS DAILY
Status: DISCONTINUED | OUTPATIENT
Start: 2018-11-30 | End: 2018-11-29

## 2018-11-29 RX ORDER — BUMETANIDE 0.25 MG/ML
1 INJECTION, SOLUTION INTRAMUSCULAR; INTRAVENOUS 2 TIMES DAILY
Status: DISCONTINUED | OUTPATIENT
Start: 2018-11-30 | End: 2018-12-01 | Stop reason: HOSPADM

## 2018-11-29 RX ORDER — ACETAMINOPHEN 325 MG/1
650 TABLET ORAL EVERY 6 HOURS PRN
Status: DISCONTINUED | OUTPATIENT
Start: 2018-11-29 | End: 2018-12-01 | Stop reason: HOSPADM

## 2018-11-29 RX ADMIN — MICONAZOLE NITRATE: 20 POWDER TOPICAL at 08:34

## 2018-11-29 RX ADMIN — PANTOPRAZOLE SODIUM 40 MG: 40 TABLET, DELAYED RELEASE ORAL at 08:40

## 2018-11-29 RX ADMIN — ALBUTEROL SULFATE 2.5 MG: 2.5 SOLUTION RESPIRATORY (INHALATION) at 04:30

## 2018-11-29 RX ADMIN — BUMETANIDE 2 MG: 0.25 INJECTION INTRAMUSCULAR; INTRAVENOUS at 08:40

## 2018-11-29 RX ADMIN — COLCHICINE 0.6 MG: 0.6 TABLET, FILM COATED ORAL at 08:33

## 2018-11-29 RX ADMIN — ALBUTEROL SULFATE 2.5 MG: 2.5 SOLUTION RESPIRATORY (INHALATION) at 20:45

## 2018-11-29 RX ADMIN — ACETAMINOPHEN 650 MG: 325 TABLET ORAL at 03:25

## 2018-11-29 RX ADMIN — Medication 10 ML: at 21:32

## 2018-11-29 RX ADMIN — Medication 10 ML: at 08:42

## 2018-11-29 RX ADMIN — MICONAZOLE NITRATE: 20 POWDER TOPICAL at 21:37

## 2018-11-29 RX ADMIN — ALBUTEROL SULFATE 2.5 MG: 2.5 SOLUTION RESPIRATORY (INHALATION) at 07:48

## 2018-11-29 RX ADMIN — Medication 400 MG: at 21:32

## 2018-11-29 RX ADMIN — VITAMIN D, TAB 1000IU (100/BT) 2000 UNITS: 25 TAB at 08:33

## 2018-11-29 RX ADMIN — ALBUTEROL SULFATE 2.5 MG: 2.5 SOLUTION RESPIRATORY (INHALATION) at 13:30

## 2018-11-29 RX ADMIN — LATANOPROST 1 DROP: 50 SOLUTION/ DROPS OPHTHALMIC at 21:37

## 2018-11-29 RX ADMIN — ASPIRIN 81 MG: 81 TABLET, COATED ORAL at 08:33

## 2018-11-29 RX ADMIN — SERTRALINE HYDROCHLORIDE 50 MG: 50 TABLET ORAL at 08:33

## 2018-11-29 RX ADMIN — ENOXAPARIN SODIUM 30 MG: 40 INJECTION SUBCUTANEOUS at 08:34

## 2018-11-29 RX ADMIN — INSULIN GLARGINE 10 UNITS: 100 INJECTION, SOLUTION SUBCUTANEOUS at 08:33

## 2018-11-29 RX ADMIN — ALBUTEROL SULFATE 2.5 MG: 2.5 SOLUTION RESPIRATORY (INHALATION) at 17:04

## 2018-11-29 ASSESSMENT — PAIN SCALES - GENERAL
PAINLEVEL_OUTOF10: 2
PAINLEVEL_OUTOF10: 0
PAINLEVEL_OUTOF10: 0
PAINLEVEL_OUTOF10: 8
PAINLEVEL_OUTOF10: 8
PAINLEVEL_OUTOF10: 0

## 2018-11-29 ASSESSMENT — PAIN DESCRIPTION - PAIN TYPE
TYPE: ACUTE PAIN
TYPE: ACUTE PAIN

## 2018-11-29 ASSESSMENT — PAIN DESCRIPTION - LOCATION
LOCATION: ARM
LOCATION: ARM

## 2018-11-29 ASSESSMENT — PAIN DESCRIPTION - FREQUENCY
FREQUENCY: CONTINUOUS
FREQUENCY: CONTINUOUS

## 2018-11-29 ASSESSMENT — PAIN DESCRIPTION - ORIENTATION
ORIENTATION: RIGHT
ORIENTATION: RIGHT

## 2018-11-29 ASSESSMENT — PAIN DESCRIPTION - DESCRIPTORS
DESCRIPTORS: SORE
DESCRIPTORS: SORE

## 2018-11-29 NOTE — CONSULTS
COLCRYS 0.6 MG tablet TAKE 1 TABLET DAILY 5/15/18  Yes JARRETT Jones   bumetanide (BUMEX) 1 MG tablet Take 1 tablet by mouth daily  Patient taking differently: Take 1 mg by mouth 2 times daily  4/2/18  Yes Afshan Rodriguez MD   omeprazole (PRILOSEC) 20 MG delayed release capsule Take 1 capsule by mouth daily as needed (gastritis) 12/28/16  Yes JARRETT Callahan   magnesium oxide (MAG-OX) 400 MG tablet Take 1 tablet by mouth nightly 12/21/15  Yes Josias Cutler DO   Cholecalciferol (VITAMIN D) 2000 UNITS CAPS capsule Take 1 capsule by mouth daily   Yes Historical Provider, MD   latanoprost (XALATAN) 0.005 % ophthalmic solution Place 1 drop into both eyes nightly  1/15/15  Yes Historical Provider, MD   polyethylene glycol (MIRALAX) powder Take 17 g by mouth daily as needed. 8/19/13  Yes Clive Snow MD   aspirin 81 MG EC tablet Take 81 mg by mouth daily    Yes Historical Provider, MD   Handicap Placard MISC by Does not apply route Issue parking placard for person with disability. [facility] [phone]   Applicant meets the qualifying disability criteria. Length of time expected to have disability: __x___Lifetime   _____Other: 7/29/16   JARRETT Callahan   Acetaminophen (TYLENOL PO) Take  by mouth as needed. Rarely uses    Historical Provider, MD       Allergies:  Atorvastatin and Statins [statins]    Social History:   reports that she has never smoked. She has never used smokeless tobacco. She reports that she does not drink alcohol or use drugs. Family History:   neg for early CAD    REVIEW OF SYSTEMS:    · Constitutional: there has been no unanticipated weight loss. There's been No change in energy level, No change in activity level. · Eyes: No visual changes or diplopia. No scleral icterus. · ENT: No Headaches, hearing loss or vertigo. No mouth sores or sore throat.   · Cardiovascular: As HPI  · Respiratory: As HPI  · Gastrointestinal: No abdominal pain, appetite loss, blood in

## 2018-11-29 NOTE — PROGRESS NOTES
JARRETT Garcia   Acetaminophen (TYLENOL PO) Take  by mouth as needed.  Rarely uses    Historical Provider, MD   .  Recent Surgical History: None = 0     Assessment     Peak Flow (asthma only)    Predicted: 282  Personal Best: 152    % Predicted 54%  Peak Flow : 50-59% = 3    FEV1/FVC    FEV1 Predicted 1.99      FEV1 0.5    FEV1 % Predicted 25%  FVC 0.7  IS volume 1200  IBW na    RR 18  Breath Sounds: diminished      · Bronchodilator assessment at level  4  · Hyperinflation assessment at level   1  · Secretion Management assessment at level   1  ·   · [x]    Bronchodilator Assessment  BRONCHODILATOR ASSESSMENT SCORE  Score 0 1 2 3 4 5   Breath Sounds   []  Patient Baseline []  No Wheeze good aeration []  Faint, scattered wheezing, good aeration [x]  Expiratory Wheezing and or moderately diminished []  Insp/Exp wheeze and/or very diminished []  Insp/Exp and/ or marked distress   Respiratory Rate   []  Patient Baseline []  Less than 20 []  Less than 20 [x]  20-25 []  Greater than 25 []  Greater than 25   Peak flow % of Pred or PB []  NA   []  Greater than 90%  []  81-90% []  71-80% [x]  Less than or equal to 70%  or unable to perform []  Unable due to Respiratory Distress   Dyspnea re []  Patient Baseline []  No SOB []  No SOB []  SOB on exertion [x]  SOB min activity []  At rest/acute   e FEV% Predicted       []  NA []  Above 69%  []  Unable []  Above 60-69%  []  Unable []  Above 50-59%  []  Unable []  Above 35-49%  []  Unable [x]  Less than 35%  []  Unable                 [x]  Hyperinflation Assessment  Score 1 2 3   CXR and Breath Sounds   [x]  Clear []  No atelectasis  Basilar aeration []  Atelectasis or absent basilar breath sounds   Incentive Spirometry Volume  (Per IBW)   [x]  Greater than or equal to 15ml/Kg []  less than 15ml/Kg []  less than 15ml/Kg   Surgery within last 2 weeks [x]  None or general   []  Abdominal or thoracic surgery  []  Abdominal or thoracic   Chronic Pulmonary Historyre [x]  No

## 2018-11-30 ENCOUNTER — APPOINTMENT (OUTPATIENT)
Dept: GENERAL RADIOLOGY | Age: 83
DRG: 291 | End: 2018-11-30
Payer: MEDICARE

## 2018-11-30 LAB
ABSOLUTE EOS #: 0.2 K/UL (ref 0–0.4)
ABSOLUTE IMMATURE GRANULOCYTE: ABNORMAL K/UL (ref 0–0.3)
ABSOLUTE LYMPH #: 1.6 K/UL (ref 1–4.8)
ABSOLUTE MONO #: 0.7 K/UL (ref 0.1–1.2)
ANION GAP SERPL CALCULATED.3IONS-SCNC: 12 MMOL/L (ref 9–17)
BASOPHILS # BLD: 1 % (ref 0–1)
BASOPHILS ABSOLUTE: 0.1 K/UL (ref 0–0.2)
BUN BLDV-MCNC: 28 MG/DL (ref 8–23)
BUN/CREAT BLD: 27 (ref 9–20)
CALCIUM SERPL-MCNC: 8.7 MG/DL (ref 8.6–10.4)
CHLORIDE BLD-SCNC: 102 MMOL/L (ref 98–107)
CO2: 28 MMOL/L (ref 20–31)
CREAT SERPL-MCNC: 1.03 MG/DL (ref 0.5–0.9)
DIFFERENTIAL TYPE: ABNORMAL
EOSINOPHILS RELATIVE PERCENT: 2 % (ref 1–7)
GFR AFRICAN AMERICAN: >60 ML/MIN
GFR NON-AFRICAN AMERICAN: 50 ML/MIN
GFR SERPL CREATININE-BSD FRML MDRD: ABNORMAL ML/MIN/{1.73_M2}
GFR SERPL CREATININE-BSD FRML MDRD: ABNORMAL ML/MIN/{1.73_M2}
GLUCOSE BLD-MCNC: 112 MG/DL (ref 70–99)
GLUCOSE BLD-MCNC: 120 MG/DL (ref 65–105)
GLUCOSE BLD-MCNC: 135 MG/DL (ref 65–105)
GLUCOSE BLD-MCNC: 94 MG/DL (ref 65–105)
HCT VFR BLD CALC: 35 % (ref 36–46)
HEMOGLOBIN: 11.3 G/DL (ref 12–16)
IMMATURE GRANULOCYTES: ABNORMAL %
LYMPHOCYTES # BLD: 18 % (ref 16–46)
MCH RBC QN AUTO: 30.7 PG (ref 26–34)
MCHC RBC AUTO-ENTMCNC: 32.4 G/DL (ref 31–37)
MCV RBC AUTO: 94.9 FL (ref 80–100)
MONOCYTES # BLD: 8 % (ref 4–11)
NRBC AUTOMATED: ABNORMAL PER 100 WBC
PDW BLD-RTO: 13.9 % (ref 11–14.5)
PLATELET # BLD: 205 K/UL (ref 140–450)
PLATELET ESTIMATE: ABNORMAL
PMV BLD AUTO: 8.9 FL (ref 6–12)
POTASSIUM SERPL-SCNC: 4 MMOL/L (ref 3.7–5.3)
RBC # BLD: 3.69 M/UL (ref 4–5.2)
RBC # BLD: ABNORMAL 10*6/UL
SEG NEUTROPHILS: 71 % (ref 43–77)
SEGMENTED NEUTROPHILS ABSOLUTE COUNT: 6.3 K/UL (ref 1.8–7.7)
SODIUM BLD-SCNC: 142 MMOL/L (ref 135–144)
WBC # BLD: 8.9 K/UL (ref 3.5–11)
WBC # BLD: ABNORMAL 10*3/UL

## 2018-11-30 PROCEDURE — 2500000003 HC RX 250 WO HCPCS: Performed by: INTERNAL MEDICINE

## 2018-11-30 PROCEDURE — 2060000000 HC ICU INTERMEDIATE R&B

## 2018-11-30 PROCEDURE — 6360000002 HC RX W HCPCS: Performed by: INTERNAL MEDICINE

## 2018-11-30 PROCEDURE — 2580000003 HC RX 258: Performed by: INTERNAL MEDICINE

## 2018-11-30 PROCEDURE — 85025 COMPLETE CBC W/AUTO DIFF WBC: CPT

## 2018-11-30 PROCEDURE — G8998 SWALLOW D/C STATUS: HCPCS | Performed by: SPEECH-LANGUAGE PATHOLOGIST

## 2018-11-30 PROCEDURE — G8996 SWALLOW CURRENT STATUS: HCPCS | Performed by: SPEECH-LANGUAGE PATHOLOGIST

## 2018-11-30 PROCEDURE — 94761 N-INVAS EAR/PLS OXIMETRY MLT: CPT

## 2018-11-30 PROCEDURE — 71046 X-RAY EXAM CHEST 2 VIEWS: CPT

## 2018-11-30 PROCEDURE — 36415 COLL VENOUS BLD VENIPUNCTURE: CPT

## 2018-11-30 PROCEDURE — 99232 SBSQ HOSP IP/OBS MODERATE 35: CPT | Performed by: INTERNAL MEDICINE

## 2018-11-30 PROCEDURE — 2700000000 HC OXYGEN THERAPY PER DAY

## 2018-11-30 PROCEDURE — 80048 BASIC METABOLIC PNL TOTAL CA: CPT

## 2018-11-30 PROCEDURE — 94664 DEMO&/EVAL PT USE INHALER: CPT

## 2018-11-30 PROCEDURE — 82947 ASSAY GLUCOSE BLOOD QUANT: CPT

## 2018-11-30 PROCEDURE — 74220 X-RAY XM ESOPHAGUS 1CNTRST: CPT

## 2018-11-30 PROCEDURE — 94150 VITAL CAPACITY TEST: CPT

## 2018-11-30 PROCEDURE — 92611 MOTION FLUOROSCOPY/SWALLOW: CPT | Performed by: SPEECH-LANGUAGE PATHOLOGIST

## 2018-11-30 PROCEDURE — 6370000000 HC RX 637 (ALT 250 FOR IP): Performed by: INTERNAL MEDICINE

## 2018-11-30 PROCEDURE — G8997 SWALLOW GOAL STATUS: HCPCS | Performed by: SPEECH-LANGUAGE PATHOLOGIST

## 2018-11-30 PROCEDURE — 94640 AIRWAY INHALATION TREATMENT: CPT

## 2018-11-30 PROCEDURE — 74230 X-RAY XM SWLNG FUNCJ C+: CPT

## 2018-11-30 RX ORDER — METOPROLOL SUCCINATE 50 MG/1
50 TABLET, EXTENDED RELEASE ORAL DAILY
Status: DISCONTINUED | OUTPATIENT
Start: 2018-12-01 | End: 2018-12-01 | Stop reason: HOSPADM

## 2018-11-30 RX ADMIN — MICONAZOLE NITRATE: 20 POWDER TOPICAL at 10:33

## 2018-11-30 RX ADMIN — BUMETANIDE 1 MG: 0.25 INJECTION INTRAMUSCULAR; INTRAVENOUS at 21:03

## 2018-11-30 RX ADMIN — VITAMIN D, TAB 1000IU (100/BT) 2000 UNITS: 25 TAB at 10:29

## 2018-11-30 RX ADMIN — BARIUM SULFATE 140 ML: 980 POWDER, FOR SUSPENSION ORAL at 10:12

## 2018-11-30 RX ADMIN — BUMETANIDE 1 MG: 0.25 INJECTION INTRAMUSCULAR; INTRAVENOUS at 10:33

## 2018-11-30 RX ADMIN — MICONAZOLE NITRATE: 20 POWDER TOPICAL at 21:03

## 2018-11-30 RX ADMIN — ALBUTEROL SULFATE 2.5 MG: 2.5 SOLUTION RESPIRATORY (INHALATION) at 16:21

## 2018-11-30 RX ADMIN — ALBUTEROL SULFATE 2.5 MG: 2.5 SOLUTION RESPIRATORY (INHALATION) at 23:11

## 2018-11-30 RX ADMIN — ALBUTEROL SULFATE 2.5 MG: 2.5 SOLUTION RESPIRATORY (INHALATION) at 06:07

## 2018-11-30 RX ADMIN — SERTRALINE HYDROCHLORIDE 50 MG: 50 TABLET ORAL at 10:29

## 2018-11-30 RX ADMIN — COLCHICINE 0.6 MG: 0.6 TABLET, FILM COATED ORAL at 10:30

## 2018-11-30 RX ADMIN — ALBUTEROL SULFATE 2.5 MG: 2.5 SOLUTION RESPIRATORY (INHALATION) at 13:04

## 2018-11-30 RX ADMIN — ASPIRIN 81 MG: 81 TABLET, COATED ORAL at 10:29

## 2018-11-30 RX ADMIN — BARIUM SULFATE 140 ML: 980 POWDER, FOR SUSPENSION ORAL at 14:47

## 2018-11-30 RX ADMIN — ENOXAPARIN SODIUM 30 MG: 40 INJECTION SUBCUTANEOUS at 10:34

## 2018-11-30 RX ADMIN — ALBUTEROL SULFATE 2.5 MG: 2.5 SOLUTION RESPIRATORY (INHALATION) at 19:48

## 2018-11-30 RX ADMIN — Medication 10 ML: at 21:04

## 2018-11-30 RX ADMIN — Medication 400 MG: at 21:03

## 2018-11-30 RX ADMIN — INSULIN GLARGINE 10 UNITS: 100 INJECTION, SOLUTION SUBCUTANEOUS at 10:50

## 2018-11-30 RX ADMIN — METOPROLOL SUCCINATE 75 MG: 50 TABLET, EXTENDED RELEASE ORAL at 10:29

## 2018-11-30 RX ADMIN — Medication 10 ML: at 10:37

## 2018-11-30 RX ADMIN — BARIUM SULFATE 140 ML: 960 POWDER, FOR SUSPENSION ORAL at 14:47

## 2018-11-30 RX ADMIN — LATANOPROST 1 DROP: 50 SOLUTION/ DROPS OPHTHALMIC at 21:04

## 2018-11-30 ASSESSMENT — PAIN SCALES - GENERAL
PAINLEVEL_OUTOF10: 0
PAINLEVEL_OUTOF10: 5

## 2018-11-30 ASSESSMENT — PAIN DESCRIPTION - ORIENTATION: ORIENTATION: RIGHT

## 2018-11-30 ASSESSMENT — PAIN DESCRIPTION - PAIN TYPE: TYPE: CHRONIC PAIN

## 2018-11-30 ASSESSMENT — PAIN DESCRIPTION - ONSET: ONSET: ON-GOING

## 2018-11-30 ASSESSMENT — PAIN DESCRIPTION - FREQUENCY: FREQUENCY: INTERMITTENT

## 2018-11-30 ASSESSMENT — PAIN DESCRIPTION - LOCATION: LOCATION: SHOULDER

## 2018-11-30 ASSESSMENT — PAIN DESCRIPTION - DESCRIPTORS: DESCRIPTORS: SORE

## 2018-11-30 ASSESSMENT — PAIN DESCRIPTION - PROGRESSION: CLINICAL_PROGRESSION: NOT CHANGED

## 2018-11-30 NOTE — PROGRESS NOTES
[]Spoon presentations [] Effective []Not Effective    [] Volitional cough [] Effective []Not Effective    []Spontaneous cough [] Effective []Not Effective    [x]OTHER: liquid wash [x]  Effective []Not Effective Clears stasis     DIAGNOSTIC IMPRESSIONS:  Patient presents with mild oropharyngeal dysphagia characterized by slow mastication d/t dentition and slow oral transit of dry solids. Patient exhibits reduced hyolaryngeal excursion resulting in flash penetration of thin liquids and minimal stasis in the vallecula. Stasis clears with liquid wash. No aspiration. Patient at minimal risk of aspiration and pulmonary compromise. DIET RECOMMENDATIONS:      [] NPO  [x] Regular [] Soft  [] Soft with ground meat [] Puree  [] Dysphagia Level 1 [] Dysphagia Level 2 [] Dysphagia Level 3    LIQUID RECOMMENDATIONS:   [x] Thin liquid [] Nectar thick liquid [] Honey thick liquid [] Pudding thick liquid    STRATEGIES:   [x] Full upright position  [x] Small bite/sip   [] No Straw   [] Multiple Swallow  [] Chin tuck   [] Head turn   [] Pulmonary monitoring [] Oral care after all meals  [] Supervision   [x] Medication in applesauce   [] Direct 1:1 Supervision [] Spoon all liquids   [x] Alternate solid / liquid [] Limit distractions   [] Monitor for fatigue  [] PMV in place for all po   [] OTHER:    PATIENT STRENGTHS:  [x] Motivated [x] Cooperative   [x] Good family support    [x] Prior level of function  [] OTHER:    REHAB POTENTIAL:   [] Excellent [x] Good [] Fair  [] Poor    EDUCATION:   Learner: [x] Patient            [] Significant other                                   [] Son/Daughter [] Parent [] Other:     Education: [x] Reviewed results and recommendations of this evaluation     [x] Reviewed diet and strategies     [] Reviewed signs, symptoms and risk of aspiration     [] Demonstrated how to thick liquid appropriately.      [] Reviewed goals and Plan of Care     [] OTHER:     Method: [x] Discussion  [] Demonstration [] Hand-out     [] Other:     Evaluation of Education:     [x] Verbalizes understanding      [] Demonstrates with assistance     [] Demonstrates without assistance                                  []Needs further instruction      [] No evidence of learning                                        [] Family not present    PATIENT GOALS: [] Pt did not state; will further assess during treatment. [] Return to the least restricted diet possible     [x] Return to previous level of function     [] Other:    PLAN / TREATMENT RECOMMENDATIONS:  [x] No further speech therapy services indicated. [] Speech Therapy evaluation to assess speech, language, cognition and/or voice  [] Skilled dysphagia treatment ___ times per week for ___ weeks.   [] OTHER        Electronically signed by:     Adelfa Gitelman, MS, CCC-SLP          Date: 11/30/2018

## 2018-11-30 NOTE — PROGRESS NOTES
[]  Yes     [x]  Secretion Management Assessment  Score 1 2 3   Bilateral Breath Sounds   [x]  Occasional Rhonchi []  Scattered Rhonchi []  Course Rhonchi and/or poor aeration   Sputum    [x]  Small amount of thin secretions []  Moderate amount of viscous secretions []  Copius, Viscious Yellow/ Secretions   CXR as reported by physician []  clear  []  Unavailable [x]  Infiltrates and/or consolidation  []  Unavailable []  Mucus Plugging and or lobar consolidation  []  Unavailable   Cough [x]  Strong, productive cough []  Weak productive cough []  No cough or weak non-productive cough   Bacilio Schultz  5:21 PM                            FEMALE                                  MALE                            FEV1 Predicted Normal Values                        FEV1 Predicted Normal Values          Age                                     Height in Feet and Inches       Age                                     Height in Feet and Inches       4' 11\" 5' 1\" 5' 3\" 5' 5\" 5' 7\" 5' 9\" 5' 11\" 6' 1\"  4' 11\" 5' 1\" 5' 3\" 5' 5\" 5' 7\" 5' 9\" 5' 11\" 6' 1\"   42 - 45 2.49 2.66 2.84 3.03 3.22 3.42 3.62 3.83 42 - 45 2.82 3.03 3.26 3.49 3.72 3.96 4.22 4.47   46 - 49 2.40 2.57 2.76 2.94 3.14 3.33 3.54 3.75 46 - 49 2.70 2.92 3.14 3.37 3.61 3.85 4.10 4.36   50 - 53 2.31 2.48 2.66 2.85 3.04 3.24 3.45 3.66 50 - 53 2.58 2.80 3.02 3.25 3.49 3.73 3.98 4.24   54 - 57 2.21 2.38 2.57 2.75 2.95 3.14 3.35 3.56 54 - 57 2.46 2.67 2.89 3.12 3.36 3.60 3.85 4.11   58 - 61 2.10 2.28 2.46 2.65 2.84 3.04 3.24 3.45 58 - 61 2.32 2.54 2.76 2.99 3.23 3.47 3.72 3.98   62 - 65 1.99 2.17 2.35 2.54 2.73 2.93 3.13 3.34 62 - 65 2.19 2.40 2.62 2.85 3.09 3.33 3.58 3.84   66 - 69 1.88 2.05 2.23 2.42 2.61 2.81 3.02 3.23 66 - 69 2.04 2.26 2.48 2.71 2.95 3.19 3.44 3.70   70+ 1.82 1.99 2.17 2.36 2.55 2.75 2.95 3.16 70+ 1.97 2.19 2.41 2.64 2.87 3.12 3.37 3.62             Predicted Peak Expiratory Flow Rate                                       Height (in)  Female       Height (in)

## 2018-11-30 NOTE — PLAN OF CARE
Problem: Falls - Risk of:  Goal: Will remain free from falls  Will remain free from falls   Outcome: Ongoing    Goal: Absence of physical injury  Absence of physical injury   Outcome: Ongoing      Problem: Risk for Impaired Skin Integrity  Goal: Tissue integrity - skin and mucous membranes  Structural intactness and normal physiological function of skin and  mucous membranes.    Outcome: Ongoing      Problem: SAFETY  Goal: Free from accidental physical injury  Outcome: Ongoing    Goal: Free from intentional harm  Outcome: Ongoing      Problem: DAILY CARE  Goal: Daily care needs are met  Outcome: Ongoing      Problem: PAIN  Goal: Patient's pain/discomfort is manageable  Outcome: Ongoing      Problem: OXYGENATION/RESPIRATORY FUNCTION  Goal: Patient will maintain patent airway  Outcome: Ongoing    Goal: Patient will achieve/maintain normal respiratory rate/effort  Respiratory rate and effort will be within normal limits for the patient   Outcome: Not Met This Shift      Problem: HEMODYNAMIC STATUS  Goal: Patient has stable vital signs and fluid balance  Outcome: Ongoing      Problem: FLUID AND ELECTROLYTE IMBALANCE  Goal: Fluid and electrolyte balance are achieved/maintained  Outcome: Ongoing      Problem: ACTIVITY INTOLERANCE/IMPAIRED MOBILITY  Goal: Mobility/activity is maintained at optimum level for patient  Outcome: Ongoing      Problem: Pain:  Goal: Pain level will decrease  Pain level will decrease   Outcome: Ongoing    Goal: Control of acute pain  Control of acute pain   Outcome: Ongoing    Goal: Control of chronic pain  Control of chronic pain   Outcome: Ongoing      Problem: IP BALANCE  Goal: LTG - Patient will maintain balance to allow for safe/functional mobility  Outcome: Ongoing

## 2018-12-01 ENCOUNTER — APPOINTMENT (OUTPATIENT)
Dept: GENERAL RADIOLOGY | Age: 83
DRG: 291 | End: 2018-12-01
Payer: MEDICARE

## 2018-12-01 VITALS
RESPIRATION RATE: 16 BRPM | OXYGEN SATURATION: 93 % | BODY MASS INDEX: 40.5 KG/M2 | DIASTOLIC BLOOD PRESSURE: 50 MMHG | HEART RATE: 77 BPM | WEIGHT: 214.5 LBS | HEIGHT: 61 IN | SYSTOLIC BLOOD PRESSURE: 101 MMHG | TEMPERATURE: 98.9 F

## 2018-12-01 LAB
ABSOLUTE EOS #: 0.2 K/UL (ref 0–0.4)
ABSOLUTE IMMATURE GRANULOCYTE: ABNORMAL K/UL (ref 0–0.3)
ABSOLUTE LYMPH #: 1.3 K/UL (ref 1–4.8)
ABSOLUTE MONO #: 0.7 K/UL (ref 0.1–1.2)
ANION GAP SERPL CALCULATED.3IONS-SCNC: 13 MMOL/L (ref 9–17)
BASOPHILS # BLD: 0 % (ref 0–1)
BASOPHILS ABSOLUTE: 0 K/UL (ref 0–0.2)
BUN BLDV-MCNC: 23 MG/DL (ref 8–23)
BUN/CREAT BLD: 26 (ref 9–20)
CALCIUM SERPL-MCNC: 8.7 MG/DL (ref 8.6–10.4)
CHLORIDE BLD-SCNC: 99 MMOL/L (ref 98–107)
CO2: 31 MMOL/L (ref 20–31)
CREAT SERPL-MCNC: 0.87 MG/DL (ref 0.5–0.9)
DIFFERENTIAL TYPE: ABNORMAL
EOSINOPHILS RELATIVE PERCENT: 2 % (ref 1–7)
GFR AFRICAN AMERICAN: >60 ML/MIN
GFR NON-AFRICAN AMERICAN: >60 ML/MIN
GFR SERPL CREATININE-BSD FRML MDRD: ABNORMAL ML/MIN/{1.73_M2}
GFR SERPL CREATININE-BSD FRML MDRD: ABNORMAL ML/MIN/{1.73_M2}
GLUCOSE BLD-MCNC: 123 MG/DL (ref 70–99)
GLUCOSE BLD-MCNC: 144 MG/DL (ref 65–105)
HCT VFR BLD CALC: 36.4 % (ref 36–46)
HEMOGLOBIN: 11.8 G/DL (ref 12–16)
IMMATURE GRANULOCYTES: ABNORMAL %
LYMPHOCYTES # BLD: 15 % (ref 16–46)
MCH RBC QN AUTO: 31 PG (ref 26–34)
MCHC RBC AUTO-ENTMCNC: 32.5 G/DL (ref 31–37)
MCV RBC AUTO: 95.2 FL (ref 80–100)
MONOCYTES # BLD: 8 % (ref 4–11)
NRBC AUTOMATED: ABNORMAL PER 100 WBC
PDW BLD-RTO: 13.7 % (ref 11–14.5)
PLATELET # BLD: 220 K/UL (ref 140–450)
PLATELET ESTIMATE: ABNORMAL
PMV BLD AUTO: 9 FL (ref 6–12)
POTASSIUM SERPL-SCNC: 3.7 MMOL/L (ref 3.7–5.3)
RBC # BLD: 3.82 M/UL (ref 4–5.2)
RBC # BLD: ABNORMAL 10*6/UL
SEG NEUTROPHILS: 75 % (ref 43–77)
SEGMENTED NEUTROPHILS ABSOLUTE COUNT: 6.4 K/UL (ref 1.8–7.7)
SODIUM BLD-SCNC: 143 MMOL/L (ref 135–144)
WBC # BLD: 8.5 K/UL (ref 3.5–11)
WBC # BLD: ABNORMAL 10*3/UL

## 2018-12-01 PROCEDURE — 6360000002 HC RX W HCPCS: Performed by: INTERNAL MEDICINE

## 2018-12-01 PROCEDURE — 94760 N-INVAS EAR/PLS OXIMETRY 1: CPT

## 2018-12-01 PROCEDURE — 94640 AIRWAY INHALATION TREATMENT: CPT

## 2018-12-01 PROCEDURE — 6370000000 HC RX 637 (ALT 250 FOR IP): Performed by: NURSE PRACTITIONER

## 2018-12-01 PROCEDURE — 71046 X-RAY EXAM CHEST 2 VIEWS: CPT

## 2018-12-01 PROCEDURE — 2700000000 HC OXYGEN THERAPY PER DAY

## 2018-12-01 PROCEDURE — 85025 COMPLETE CBC W/AUTO DIFF WBC: CPT

## 2018-12-01 PROCEDURE — 97116 GAIT TRAINING THERAPY: CPT | Performed by: PHYSICAL THERAPY ASSISTANT

## 2018-12-01 PROCEDURE — 82947 ASSAY GLUCOSE BLOOD QUANT: CPT

## 2018-12-01 PROCEDURE — 80048 BASIC METABOLIC PNL TOTAL CA: CPT

## 2018-12-01 PROCEDURE — 2500000003 HC RX 250 WO HCPCS: Performed by: INTERNAL MEDICINE

## 2018-12-01 PROCEDURE — 2580000003 HC RX 258: Performed by: INTERNAL MEDICINE

## 2018-12-01 PROCEDURE — 6370000000 HC RX 637 (ALT 250 FOR IP): Performed by: INTERNAL MEDICINE

## 2018-12-01 PROCEDURE — 99239 HOSP IP/OBS DSCHRG MGMT >30: CPT | Performed by: FAMILY MEDICINE

## 2018-12-01 PROCEDURE — 36415 COLL VENOUS BLD VENIPUNCTURE: CPT

## 2018-12-01 RX ADMIN — ALBUTEROL SULFATE 2.5 MG: 2.5 SOLUTION RESPIRATORY (INHALATION) at 04:19

## 2018-12-01 RX ADMIN — ALBUTEROL SULFATE 2.5 MG: 2.5 SOLUTION RESPIRATORY (INHALATION) at 12:59

## 2018-12-01 RX ADMIN — MICONAZOLE NITRATE: 20 POWDER TOPICAL at 10:09

## 2018-12-01 RX ADMIN — ACETAMINOPHEN 650 MG: 325 TABLET ORAL at 06:53

## 2018-12-01 RX ADMIN — Medication 10 ML: at 10:14

## 2018-12-01 RX ADMIN — PANTOPRAZOLE SODIUM 40 MG: 40 TABLET, DELAYED RELEASE ORAL at 06:51

## 2018-12-01 RX ADMIN — BUMETANIDE 1 MG: 0.25 INJECTION INTRAMUSCULAR; INTRAVENOUS at 10:09

## 2018-12-01 RX ADMIN — SERTRALINE HYDROCHLORIDE 50 MG: 50 TABLET ORAL at 10:09

## 2018-12-01 RX ADMIN — METOPROLOL SUCCINATE 50 MG: 50 TABLET, EXTENDED RELEASE ORAL at 10:09

## 2018-12-01 RX ADMIN — ALBUTEROL SULFATE 2.5 MG: 2.5 SOLUTION RESPIRATORY (INHALATION) at 08:57

## 2018-12-01 RX ADMIN — ASPIRIN 81 MG: 81 TABLET, COATED ORAL at 10:09

## 2018-12-01 RX ADMIN — COLCHICINE 0.6 MG: 0.6 TABLET, FILM COATED ORAL at 10:09

## 2018-12-01 RX ADMIN — ENOXAPARIN SODIUM 30 MG: 40 INJECTION SUBCUTANEOUS at 10:13

## 2018-12-01 RX ADMIN — INSULIN GLARGINE 10 UNITS: 100 INJECTION, SOLUTION SUBCUTANEOUS at 10:09

## 2018-12-01 RX ADMIN — VITAMIN D, TAB 1000IU (100/BT) 2000 UNITS: 25 TAB at 10:09

## 2018-12-01 ASSESSMENT — PAIN SCALES - GENERAL
PAINLEVEL_OUTOF10: 8
PAINLEVEL_OUTOF10: 0

## 2018-12-01 NOTE — DISCHARGE SUMMARY
[id number]   Applicant meets the qualifying disability criteria. Length of time expected to have disability: __x___Lifetime   _____Other:             latanoprost (XALATAN) 0.005 % ophthalmic solution  Place 1 drop into both eyes nightly              lisinopril (PRINIVIL;ZESTRIL) 20 MG tablet  TAKE 1 TABLET DAILY             magnesium oxide (MAG-OX) 400 MG tablet  Take 1 tablet by mouth nightly             metoprolol succinate (TOPROL XL) 100 MG extended release tablet  TAKE 1 TABLET DAILY             Multiple Vitamins-Minerals (ICAPS AREDS 2) CHEW  Take 1 tablet by mouth 2 times daily             omeprazole (PRILOSEC) 20 MG delayed release capsule  Take 1 capsule by mouth daily as needed (gastritis)             polyethylene glycol (MIRALAX) powder  Take 17 g by mouth daily as needed. sertraline (ZOLOFT) 50 MG tablet  TAKE 1 TABLET BY MOUTH ONE TIME DAILY                 Consults:  IP CONSULT TO HOSPITALIST  IP CONSULT TO CARDIOLOGY    Significant Diagnostic Studies:  Xr Chest Portable    Result Date: 11/29/2018  EXAMINATION: SINGLE XRAY VIEW OF THE CHEST 11/29/2018 7:19 am COMPARISON: November 28, 2018 HISTORY: ORDERING SYSTEM PROVIDED HISTORY: chf TECHNOLOGIST PROVIDED HISTORY: chf Ordering Physician Provided Reason for Exam: CHF, pacemaker replaced 1 week ago Acuity: Acute Type of Exam: Subsequent/Follow-up FINDINGS: Stable appearance of pacer device. Cardiomegaly with retrocardiac airspace disease slightly improved. Trace effusion present. Hazy opacity in the right lung base is present. No definite consolidation. This likely represents layering effusion. No pneumothorax. Degenerative changes of bilateral glenohumeral and acromioclavicular joints. 1. Improving left basilar airspace disease and effusion. 2. Otherwise stable chest with cardiomegaly and pacer device.      Xr Chest Portable    Result Date: 11/28/2018  EXAMINATION: SINGLE XRAY VIEW OF THE CHEST 11/28/2018 11:29 am COMPARISON:

## 2018-12-03 ENCOUNTER — OFFICE VISIT (OUTPATIENT)
Dept: NEPHROLOGY | Age: 83
End: 2018-12-03
Payer: MEDICARE

## 2018-12-03 VITALS
SYSTOLIC BLOOD PRESSURE: 140 MMHG | HEART RATE: 64 BPM | HEIGHT: 61 IN | BODY MASS INDEX: 39.46 KG/M2 | DIASTOLIC BLOOD PRESSURE: 68 MMHG | WEIGHT: 209 LBS

## 2018-12-03 DIAGNOSIS — R60.0 LOWER EXTREMITY EDEMA: ICD-10-CM

## 2018-12-03 DIAGNOSIS — I27.20 PULMONARY HYPERTENSION (HCC): ICD-10-CM

## 2018-12-03 DIAGNOSIS — N18.30 CKD (CHRONIC KIDNEY DISEASE), STAGE III (HCC): Primary | ICD-10-CM

## 2018-12-03 DIAGNOSIS — R76.8 ABNORMAL ANCA (ANTINEUTROPHIL CYTOPLASMIC ANTIBODY): ICD-10-CM

## 2018-12-03 DIAGNOSIS — I10 ESSENTIAL HYPERTENSION: ICD-10-CM

## 2018-12-03 DIAGNOSIS — E11.22 TYPE 2 DIABETES MELLITUS WITH STAGE 3 CHRONIC KIDNEY DISEASE, WITHOUT LONG-TERM CURRENT USE OF INSULIN (HCC): ICD-10-CM

## 2018-12-03 DIAGNOSIS — N18.30 TYPE 2 DIABETES MELLITUS WITH STAGE 3 CHRONIC KIDNEY DISEASE, WITHOUT LONG-TERM CURRENT USE OF INSULIN (HCC): ICD-10-CM

## 2018-12-03 PROCEDURE — 99213 OFFICE O/P EST LOW 20 MIN: CPT | Performed by: INTERNAL MEDICINE

## 2018-12-03 NOTE — PROGRESS NOTES
alert, in no acute distress  SKIN: warm and dry, no rash or erythema  EYES: conjunctivae normal and sclera anicteric  ENT: no thrush no pharyngeal congestion   NECK:  No jvd    PULMONARY: clear to auscultation and no wheezing noted   CADRDIOVASCULAR: :Normal S1 & S2,  no murmur   ABDOMEN: soft nontender, bowel sounds, present, no organomegaly,  no ascites   EXTREMITIES: ++ edema  NEURO: alert and oriented, no deficits    LABS    CBC:   Lab Results   Component Value Date    WBC 8.5 12/01/2018    HGB 11.8 12/01/2018    HCT 36.4 12/01/2018    MCV 95.2 12/01/2018    RDW 13.7 12/01/2018     12/01/2018    MPV 9.0 12/01/2018      BMP:   Lab Results   Component Value Date     12/01/2018    K 3.7 12/01/2018    CL 99 12/01/2018    CO2 31 12/01/2018    BUN 23 12/01/2018    CREATININE 0.87 12/01/2018    GLUCOSE 123 12/01/2018    CALCIUM 8.7 12/01/2018      PHOSPHORUS:    Lab Results   Component Value Date    PHOS 2.6 11/15/2018     MAGNESIUM:   Lab Results   Component Value Date    MG 2.3 03/28/2017     ALBUMIN:   Lab Results   Component Value Date    LABALBU 3.8 10/17/2018     PTH: No components found for: PTHINTACT  VIT D3,25 HYDROXY: No results found for: VITD3     IRON:  No results found for: IRON  IRON SATURATION:  No results found for: LABIRON  TIBC:  No results found for: TIBC  FERRITIN:  No results found for: FERRITIN          TAMERA: No results found for: TAMERA    SPEP:   Lab Results   Component Value Date    PROT 8.0 10/17/2018    ALBCAL 4.0 04/13/2016    ALBPCT 55 04/13/2016    LABALPH 0.2 04/13/2016    LABALPH 0.9 04/13/2016    A1PCT 3 04/13/2016    A2PCT 12 04/13/2016    LABBETA 1.1 04/13/2016    BETAPCT 15 04/13/2016    GAMGLOB 1.1 04/13/2016    GGPCT 15 04/13/2016    PATH ELECTRONICALLY SIGNED.  Faisal Ulloa M.D. 04/14/2016     UPEP:   Lab Results   Component Value Date    TPU 9 04/14/2016    TPU 9 04/14/2016      HEPBSAG:No results found for: HEPBSAG  HEPCAB:No results found for: HEPCAB  C3:   Lab

## 2018-12-04 ENCOUNTER — OUTSIDE SERVICES (OUTPATIENT)
Dept: INTERNAL MEDICINE | Age: 83
End: 2018-12-04
Payer: MEDICARE

## 2018-12-04 DIAGNOSIS — F32.A ANXIETY AND DEPRESSION: ICD-10-CM

## 2018-12-04 DIAGNOSIS — N18.30 CKD (CHRONIC KIDNEY DISEASE), STAGE III (HCC): ICD-10-CM

## 2018-12-04 DIAGNOSIS — R30.0 DYSURIA: ICD-10-CM

## 2018-12-04 DIAGNOSIS — F41.9 ANXIETY AND DEPRESSION: ICD-10-CM

## 2018-12-04 DIAGNOSIS — N18.30 TYPE 2 DIABETES MELLITUS WITH STAGE 3 CHRONIC KIDNEY DISEASE, WITHOUT LONG-TERM CURRENT USE OF INSULIN (HCC): ICD-10-CM

## 2018-12-04 DIAGNOSIS — I10 ESSENTIAL HYPERTENSION: ICD-10-CM

## 2018-12-04 DIAGNOSIS — K21.9 GASTROESOPHAGEAL REFLUX DISEASE WITHOUT ESOPHAGITIS: ICD-10-CM

## 2018-12-04 DIAGNOSIS — E11.22 TYPE 2 DIABETES MELLITUS WITH STAGE 3 CHRONIC KIDNEY DISEASE, WITHOUT LONG-TERM CURRENT USE OF INSULIN (HCC): ICD-10-CM

## 2018-12-04 DIAGNOSIS — R53.1 GENERAL WEAKNESS: ICD-10-CM

## 2018-12-04 DIAGNOSIS — I50.32 CHRONIC DIASTOLIC CONGESTIVE HEART FAILURE (HCC): Primary | ICD-10-CM

## 2018-12-04 DIAGNOSIS — K59.00 CONSTIPATION, UNSPECIFIED CONSTIPATION TYPE: ICD-10-CM

## 2018-12-04 PROBLEM — I50.31 ACUTE DIASTOLIC CHF (CONGESTIVE HEART FAILURE) (HCC): Status: RESOLVED | Noted: 2018-11-28 | Resolved: 2018-12-04

## 2018-12-04 PROCEDURE — 99309 SBSQ NF CARE MODERATE MDM 30: CPT | Performed by: NURSE PRACTITIONER

## 2018-12-05 ENCOUNTER — TELEPHONE (OUTPATIENT)
Dept: INTERNAL MEDICINE | Age: 83
End: 2018-12-05

## 2018-12-06 ENCOUNTER — OFFICE VISIT (OUTPATIENT)
Dept: CARDIOLOGY | Age: 83
End: 2018-12-06
Payer: MEDICARE

## 2018-12-06 VITALS
HEART RATE: 60 BPM | BODY MASS INDEX: 39.49 KG/M2 | WEIGHT: 209 LBS | DIASTOLIC BLOOD PRESSURE: 70 MMHG | SYSTOLIC BLOOD PRESSURE: 130 MMHG

## 2018-12-06 DIAGNOSIS — I49.5 SSS (SICK SINUS SYNDROME) (HCC): Primary | ICD-10-CM

## 2018-12-06 PROCEDURE — 99213 OFFICE O/P EST LOW 20 MIN: CPT | Performed by: INTERNAL MEDICINE

## 2018-12-06 NOTE — PROGRESS NOTES
Today's Date: 12/6/2018  Patient Name: Rhonda Chavez  Patient's age: 80 y. o., 10/27/1928          The patient is a 80 y.o.  female is in the office for f/u, was recently hospitalized for fluid overload. She was not taking he diuretics. She is feeling better with no PND/ORTHOPNEA. Past Medical History:   has a past medical history of Abnormal ANCA (antineutrophil cytoplasmic antibody); Basal cell cancer; CKD (chronic kidney disease), stage III ([address] Dry skin dermatitis; Dyspnea; Edema; Gout; Hard of hearing; Hiatal hernia; Hypercholesterolemia; Hypertension; Hypomagnesemia; Lower extremity edema; Macular degeneration, dry; Obstructive sleep apnea; Open-angle glaucoma; Peripheral edema; Pulmonary hypertension ([address] Sick sinus syndrome ([address] Type 2 diabetes mellitus ([address] Venous insufficiency; and Vitamin D deficiency. Past Surgical History:   has a past surgical history that includes Pacemaker insertion; Hysterectomy; fracture surgery (Right); Cardiac catheterization (2010); Tubal ligation (1970); Cataract removal with implant (Bilateral); and eye surgery (03/31/2017). Home Medications:    Prior to Admission medications    Medication Sig Start Date End Date Taking?  Authorizing Provider   Multiple Vitamins-Minerals (ICAPS AREDS 2) CHEW Take 1 tablet by mouth 2 times daily   Yes Historical Provider, MD   sertraline (ZOLOFT) 50 MG tablet TAKE 1 TABLET BY MOUTH ONE TIME DAILY 10/8/18  Yes JARRETT Callahan   metoprolol succinate (TOPROL XL) 100 MG extended release tablet TAKE 1 TABLET DAILY 7/6/18  Yes JARRETT Callahan   lisinopril (PRINIVIL;ZESTRIL) 20 MG tablet TAKE 1 TABLET DAILY 5/15/18  Yes JARRETT Jones   COLCRYS 0.6 MG tablet TAKE 1 TABLET DAILY 5/15/18  Yes JARRETT Callahan   bumetanide (BUMEX) 1 MG tablet Take 1 tablet by mouth daily  Patient taking differently: Take 1 mg by mouth 2 times daily  4/2/18  Yes Afshan Rodriguez MD   omeprazole

## 2018-12-07 ENCOUNTER — TELEPHONE (OUTPATIENT)
Dept: FAMILY MEDICINE CLINIC | Age: 83
End: 2018-12-07

## 2018-12-07 ASSESSMENT — ENCOUNTER SYMPTOMS
ABDOMINAL PAIN: 0
VOMITING: 0
DIARRHEA: 0
EYE PAIN: 0
FACIAL SWELLING: 0
CONSTIPATION: 1
COLOR CHANGE: 0
WHEEZING: 0
NAUSEA: 0
BLOOD IN STOOL: 0
TROUBLE SWALLOWING: 0
CHEST TIGHTNESS: 0
RHINORRHEA: 0
SORE THROAT: 0
SINUS PRESSURE: 0
SHORTNESS OF BREATH: 0
COUGH: 0

## 2018-12-10 ENCOUNTER — OUTSIDE SERVICES (OUTPATIENT)
Dept: INTERNAL MEDICINE | Age: 83
End: 2018-12-10
Payer: MEDICARE

## 2018-12-10 DIAGNOSIS — E11.22 TYPE 2 DIABETES MELLITUS WITH STAGE 3 CHRONIC KIDNEY DISEASE, WITHOUT LONG-TERM CURRENT USE OF INSULIN (HCC): ICD-10-CM

## 2018-12-10 DIAGNOSIS — N18.30 TYPE 2 DIABETES MELLITUS WITH STAGE 3 CHRONIC KIDNEY DISEASE, WITHOUT LONG-TERM CURRENT USE OF INSULIN (HCC): ICD-10-CM

## 2018-12-10 DIAGNOSIS — I10 ESSENTIAL HYPERTENSION: ICD-10-CM

## 2018-12-10 DIAGNOSIS — I50.32 CHRONIC DIASTOLIC CONGESTIVE HEART FAILURE (HCC): Primary | ICD-10-CM

## 2018-12-10 DIAGNOSIS — G47.33 OBSTRUCTIVE SLEEP APNEA: ICD-10-CM

## 2018-12-10 PROCEDURE — 99315 NF DSCHRG MGMT 30 MIN/LESS: CPT | Performed by: NURSE PRACTITIONER

## 2018-12-10 NOTE — PROGRESS NOTES
12/10/18  Lanette Cardona  10/27/1928    Patient Resident of [facility]    Chief Complaint: increased swelling, DC needs   1. Chronic diastolic congestive heart failure ([address]    2. Essential hypertension    3. Obstructive sleep apnea    4. Type 2 diabetes mellitus with stage 3 chronic kidney disease, without long-term current use of insulin (HCC)        HPI:  25-year-old patient being seen for concerns of increased swelling along with discharge needs from [facility]. Patient initially admitted to [facility] after short-term hospital stay at [facility] for acute CHF. Was sent to the [facility] for rehabilitation. Patient feels she is getting stronger. It is noted she does have some increased swelling to her ankles today. It is noted that her diuretics were decreased at the discharge versus increased. Patient denies any shortness of breath. Feels she is doing well with therapy and feels she will be safe in the home setting. Blood pressures have remained stable. Continues on her CPAP at night. Blood sugars have remained well controlled. Allergies   Allergen Reactions    Atorvastatin Other (See Comments)    Statins [Statins] Other (See Comments)     Muscle aches, elevated LFT's       Past Medical History:   Diagnosis Date    Abnormal ANCA (antineutrophil cytoplasmic antibody) 12/3/2018    Basal cell cancer     nose    CKD (chronic kidney disease), stage III ([address] 12/3/2018    Dry skin dermatitis 12/3/2015    Dyspnea     chronic    Edema     Chronic    Gout     Hard of hearing     Hiatal hernia     Hypercholesterolemia     Hypertension     Hypomagnesemia 12/3/2015    Lower extremity edema 12/3/2018    Macular degeneration, dry     Obstructive sleep apnea     Open-angle glaucoma     Borderline.     Peripheral edema 12/3/2015    Pulmonary hypertension ([address] 12/3/2018    Sick sinus syndrome (HCC)     with pacemaker  placement    Type 2 diabetes mellitus Neurological: Negative for dizziness, tremors, seizures, weakness, light-headedness, numbness and headaches. Psychiatric/Behavioral: Negative for confusion and hallucinations. The patient is not nervous/anxious. Physical Exam   Constitutional: She is oriented to person, place, and time. She appears well-developed and well-nourished. No distress. HENT:   Head: Normocephalic and atraumatic. Right Ear: External ear normal.   Left Ear: External ear normal.   Eyes: Right eye exhibits no discharge. Left eye exhibits no discharge. Neck: No tracheal deviation present. Cardiovascular: Normal rate, regular rhythm, normal heart sounds and intact distal pulses. Exam reveals no gallop and no friction rub. No murmur heard. Pulmonary/Chest: Effort normal and breath sounds normal. No respiratory distress. She has no wheezes. She has no rales. She exhibits no tenderness. Abdominal: Soft. Bowel sounds are normal. She exhibits no distension. There is no tenderness. Musculoskeletal: She exhibits no edema (+2 pedal edema). Neurological: She is alert and oriented to person, place, and time. No cranial nerve deficit or sensory deficit. Coordination normal.   Skin: Skin is warm and dry. Capillary refill takes less than 2 seconds. No rash noted. She is not diaphoretic. No pallor. Psychiatric: She has a normal mood and affect. Her behavior is normal. Judgment and thought content normal.   Nursing note and vitals reviewed. Vital Signs: Temperature 97.9°F, blood pressure 111/72, pulse 65, respirations 16, SPO2 97% on room air    Assessment:  1. Chronic diastolic congestive heart failure (HCC)  Increase her Bumex back to 1 mg by mouth twice a day, will get a BMP in office when she sees her PCP in 3 days. Continue to follow low-sodium diet and the home setting. Elevate legs throughout the day. Monitor weights daily. Call office sooner if concerns prior to appointment Friday.     2. Essential

## 2018-12-11 ENCOUNTER — CARE COORDINATION (OUTPATIENT)
Dept: CASE MANAGEMENT | Age: 83
End: 2018-12-11

## 2018-12-12 ENCOUNTER — CARE COORDINATION (OUTPATIENT)
Dept: CASE MANAGEMENT | Age: 83
End: 2018-12-12

## 2018-12-14 ENCOUNTER — OFFICE VISIT (OUTPATIENT)
Dept: FAMILY MEDICINE CLINIC | Age: 83
End: 2018-12-14
Payer: MEDICARE

## 2018-12-14 VITALS
HEIGHT: 61 IN | OXYGEN SATURATION: 90 % | BODY MASS INDEX: 39.27 KG/M2 | SYSTOLIC BLOOD PRESSURE: 112 MMHG | WEIGHT: 208 LBS | HEART RATE: 63 BPM | DIASTOLIC BLOOD PRESSURE: 60 MMHG

## 2018-12-14 DIAGNOSIS — E11.59 TYPE 2 DIABETES MELLITUS WITH OTHER CIRCULATORY COMPLICATION, WITHOUT LONG-TERM CURRENT USE OF INSULIN (HCC): Primary | ICD-10-CM

## 2018-12-14 DIAGNOSIS — R60.9 EDEMA, UNSPECIFIED TYPE: ICD-10-CM

## 2018-12-14 DIAGNOSIS — I27.20 PULMONARY HTN (HCC): ICD-10-CM

## 2018-12-14 DIAGNOSIS — Z95.0 S/P PLACEMENT OF CARDIAC PACEMAKER: ICD-10-CM

## 2018-12-14 DIAGNOSIS — I10 ESSENTIAL HYPERTENSION: ICD-10-CM

## 2018-12-14 DIAGNOSIS — R60.0 PEDAL EDEMA: ICD-10-CM

## 2018-12-14 DIAGNOSIS — R06.02 SOB (SHORTNESS OF BREATH): ICD-10-CM

## 2018-12-14 DIAGNOSIS — H91.13 PRESBYCUSIS OF BOTH EARS: ICD-10-CM

## 2018-12-14 DIAGNOSIS — N18.30 STAGE 3 CHRONIC KIDNEY DISEASE (HCC): ICD-10-CM

## 2018-12-14 DIAGNOSIS — E55.9 VITAMIN D DEFICIENCY: ICD-10-CM

## 2018-12-14 DIAGNOSIS — I50.33 ACUTE ON CHRONIC DIASTOLIC CHF (CONGESTIVE HEART FAILURE) (HCC): ICD-10-CM

## 2018-12-14 PROCEDURE — 99214 OFFICE O/P EST MOD 30 MIN: CPT | Performed by: PHYSICIAN ASSISTANT

## 2018-12-14 PROCEDURE — 1111F DSCHRG MED/CURRENT MED MERGE: CPT | Performed by: PHYSICIAN ASSISTANT

## 2018-12-14 RX ORDER — BUMETANIDE 1 MG/1
1 TABLET ORAL 2 TIMES DAILY
Qty: 180 TABLET | Refills: 2 | Status: SHIPPED | OUTPATIENT
Start: 2018-12-14 | End: 2019-04-03 | Stop reason: SDUPTHER

## 2018-12-14 ASSESSMENT — PATIENT HEALTH QUESTIONNAIRE - PHQ9
2. FEELING DOWN, DEPRESSED OR HOPELESS: 0
SUM OF ALL RESPONSES TO PHQ QUESTIONS 1-9: 0
1. LITTLE INTEREST OR PLEASURE IN DOING THINGS: 0
SUM OF ALL RESPONSES TO PHQ QUESTIONS 1-9: 0
SUM OF ALL RESPONSES TO PHQ9 QUESTIONS 1 & 2: 0

## 2018-12-14 NOTE — PROGRESS NOTES
noted involving the mid and distal esophagus. GE junction appears grossly unremarkable. *Limited study. No evidence of esophageal stricture. *Tertiary contractions involving the mid and distal esophagus. 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 Xr Chest Portable    Result Date: 11/29/2018  EXAMINATION: SINGLE XRAY VIEW OF THE CHEST 11/29/2018 7:19 am COMPARISON: November 28, 2018 HISTORY: ORDERING SYSTEM PROVIDED HISTORY: chf TECHNOLOGIST PROVIDED HISTORY: chf Ordering Physician Provided Reason for Exam: CHF, pacemaker replaced 1 week ago Acuity: Acute Type of Exam: Subsequent/Follow-up FINDINGS: Stable appearance of pacer device. Cardiomegaly with retrocardiac airspace disease slightly improved. Trace effusion present. Hazy opacity in the right lung base is present. No definite consolidation. This likely represents layering effusion. No pneumothorax. Degenerative changes of bilateral glenohumeral and acromioclavicular joints. 1. Improving left basilar airspace disease and effusion. 2. Otherwise stable chest with cardiomegaly and pacer device. Xr Chest Portable    Result Date: 11/28/2018  EXAMINATION: SINGLE XRAY VIEW OF THE CHEST 11/28/2018 11:29 am COMPARISON: March 15, 2018, September 30, 2015 HISTORY: ORDERING SYSTEM PROVIDED HISTORY: shortness of breath TECHNOLOGIST PROVIDED HISTORY: shortness of breath Ordering Physician Provided Reason for Exam: increased SOB FINDINGS: A left subclavian dual lead pacer is noted. Surgical clips project over the generator. Shallow inflation. Findings of pulmonary edema with likely basilar atelectasis and small effusions are noted. Cardiac silhouette enlargement is again demonstrated. No pneumothorax identified. Shallow inflation with findings consistent with edema, basilar atelectasis and small effusions.      Ct Chest Pulmonary Embolism W Contrast    Result Date: 11/28/2018  EXAMINATION: CTA OF THE CHEST 11/28/2018 respiratory motion artifact. Ground-glass opacities in the lungs, moderate-sized pleural effusions and dependent atelectasis are demonstrated. Overall findings are most consistent with edema. Bilateral adrenal adenomas. Fl Modified Barium Swallow W Video    Result Date: 11/30/2018  EXAMINATION: MODIFIED BARIUM SWALLOW WAS PERFORMED IN CONJUNCTION WITH SPEECH PATHOLOGY SERVICES TECHNIQUE: Fluoroscopic evaluation of the swallowing mechanism was performed with multiple consistency of barium product. FLUOROSCOPY DOSE AND TYPE OR TIME AND EXPOSURES: 1.1 minutes. 1 image was obtained. COMPARISON: None HISTORY: ORDERING SYSTEM PROVIDED HISTORY: esophageal stricture TECHNOLOGIST PROVIDED HISTORY: esophageal stricture Ordering Physician Provided Reason for Exam: Dysphagia; 1.1 min fluoro time, 7.49 mGy Acuity: Unknown Type of Exam: Unknown FINDINGS: Flash penetration was identified with thin barium. No aspiration was identified during the examination. Flash penetration with thin barium. No aspiration was identified. Please see separate speech pathology report for full discussion of findings and recommendations. Admission on 11/28/2018, Discharged on 12/01/2018   No results displayed because visit has over 200 results. Hospital Outpatient Visit on 11/15/2018   Component Date Value Ref Range Status    Cholesterol 11/15/2018 235* <200 mg/dL Final    Comment:    Cholesterol Guidelines:      <200  Desirable   200-240  Borderline      >240  Undesirable         HDL 11/15/2018 45  >40 mg/dL Final    Comment:    HDL Guidelines:    <40     Undesirable   40-59    Borderline    >59     Desirable         LDL Cholesterol 11/15/2018 158* 0 - 130 mg/dL Final    Comment:    LDL Guidelines:     <100    Desirable   100-129   Near to/above Desirable   130-159   Borderline      >159   Undesirable     Direct (measured) LDL and calculated LDL are not interchangeable tests.       Chol/HDL Ratio 11/15/2018 5.2* <5 Final

## 2018-12-17 ENCOUNTER — CARE COORDINATION (OUTPATIENT)
Dept: CASE MANAGEMENT | Age: 83
End: 2018-12-17

## 2018-12-17 NOTE — CARE COORDINATION
[facility] Transitions Follow Up Call    2018    Patient: Harini Cuellar  Patient : 10/27/1928   MRN: <[id number]>    Discharge Date: 18 RARS: Readmission Risk Score: 13       3rd and final attempt to contract for a transition call, LVM introducing self, role, nature of call and to inform patient/family of final attempt. Per chart patient seen by PCP and medications reconcile. Patient not in need of further care coordination d/t refusal to answer phones or return messages. Post acute care coordinator signing off.  JAYLEEN Yung RN  Care Transition Coordinator  [phone]        Follow Up  Future Appointments  Date Time Provider Craig Little   2018 12:30 PM SCHEDULE, [facility]IOLOG[facility]   3/13/2019 10:30 AM SCHEDULE, [facility] [place]   3/19/2019 1:00 PM Anay Soto DPM DPOD [facility]   3/20/2019 1:40 PM JARRETT Ramirez DFAM [facility]   2019 10:30 AM SCHEDULE, [facility] LAB [facility] [place]   6/3/2019 1:50 PM Elver Barclay MD [facility] CTR [facility]   2019 2:00 PM MD TOÑO GalindoIO [facility]       Georgia Ravi RN

## 2018-12-18 ASSESSMENT — ENCOUNTER SYMPTOMS
RHINORRHEA: 0
SHORTNESS OF BREATH: 0
TROUBLE SWALLOWING: 0
FACIAL SWELLING: 0
SINUS PRESSURE: 0
COLOR CHANGE: 0
DIARRHEA: 0
CONSTIPATION: 0
BLOOD IN STOOL: 0
WHEEZING: 0
SORE THROAT: 0
EYE PAIN: 0
NAUSEA: 0
ABDOMINAL PAIN: 0
VOMITING: 0
CHEST TIGHTNESS: 0
COUGH: 0

## 2018-12-20 ENCOUNTER — PROCEDURE VISIT (OUTPATIENT)
Dept: CARDIOLOGY | Age: 83
End: 2018-12-20
Payer: MEDICARE

## 2018-12-20 DIAGNOSIS — Z45.010 CARDIAC PACEMAKER BATTERY WORN OUT: Primary | ICD-10-CM

## 2018-12-20 DIAGNOSIS — I49.5 SSS (SICK SINUS SYNDROME) (HCC): ICD-10-CM

## 2018-12-20 DIAGNOSIS — M10.00 IDIOPATHIC GOUT, UNSPECIFIED CHRONICITY, UNSPECIFIED SITE: ICD-10-CM

## 2018-12-20 DIAGNOSIS — I10 ESSENTIAL HYPERTENSION: ICD-10-CM

## 2018-12-20 PROCEDURE — 93288 INTERROG EVL PM/LDLS PM IP: CPT | Performed by: INTERNAL MEDICINE

## 2018-12-20 RX ORDER — METOPROLOL SUCCINATE 100 MG/1
TABLET, EXTENDED RELEASE ORAL
Qty: 90 TABLET | Refills: 1 | Status: SHIPPED | OUTPATIENT
Start: 2018-12-20 | End: 2019-06-18 | Stop reason: SDUPTHER

## 2018-12-20 RX ORDER — LISINOPRIL 20 MG/1
TABLET ORAL
Qty: 90 TABLET | Refills: 1 | Status: SHIPPED | OUTPATIENT
Start: 2018-12-20 | End: 2019-04-03 | Stop reason: SDUPTHER

## 2018-12-20 RX ORDER — COLCHICINE 0.6 MG/1
TABLET ORAL
Qty: 90 TABLET | Refills: 1 | Status: SHIPPED | OUTPATIENT
Start: 2018-12-20 | End: 2019-04-03 | Stop reason: SDUPTHER

## 2018-12-27 ASSESSMENT — ENCOUNTER SYMPTOMS
CHEST TIGHTNESS: 0
WHEEZING: 0
COUGH: 0
VOMITING: 0
SHORTNESS OF BREATH: 0
DIARRHEA: 0
NAUSEA: 0

## 2019-02-07 ENCOUNTER — TELEPHONE (OUTPATIENT)
Dept: CARDIOLOGY | Age: 84
End: 2019-02-07

## 2019-02-19 ENCOUNTER — TELEPHONE (OUTPATIENT)
Dept: LAB | Age: 84
End: 2019-02-19

## 2019-03-12 ENCOUNTER — NURSE ONLY (OUTPATIENT)
Dept: LAB | Age: 84
End: 2019-03-12

## 2019-03-12 DIAGNOSIS — E11.9 TYPE 2 DIABETES MELLITUS WITHOUT COMPLICATION, WITHOUT LONG-TERM CURRENT USE OF INSULIN (HCC): ICD-10-CM

## 2019-03-12 DIAGNOSIS — M10.9 GOUT, UNSPECIFIED CAUSE, UNSPECIFIED CHRONICITY, UNSPECIFIED SITE: Primary | ICD-10-CM

## 2019-03-12 DIAGNOSIS — Z23 NEED FOR VACCINATION: Primary | ICD-10-CM

## 2019-03-12 DIAGNOSIS — E78.00 HYPERCHOLESTEREMIA: ICD-10-CM

## 2019-03-19 ENCOUNTER — OFFICE VISIT (OUTPATIENT)
Dept: PODIATRY | Age: 84
End: 2019-03-19
Payer: MEDICARE

## 2019-03-19 ENCOUNTER — HOSPITAL ENCOUNTER (OUTPATIENT)
Dept: LAB | Age: 84
Discharge: HOME | End: 2019-03-19
Payer: MEDICARE

## 2019-03-19 VITALS
HEIGHT: 61 IN | DIASTOLIC BLOOD PRESSURE: 68 MMHG | HEART RATE: 62 BPM | SYSTOLIC BLOOD PRESSURE: 130 MMHG | BODY MASS INDEX: 39.3 KG/M2

## 2019-03-19 DIAGNOSIS — E11.51 CONTROLLED TYPE 2 DM WITH PERIPHERAL CIRCULATORY DISORDER (HCC): Primary | ICD-10-CM

## 2019-03-19 DIAGNOSIS — N18.30 CKD (CHRONIC KIDNEY DISEASE), STAGE III (HCC): ICD-10-CM

## 2019-03-19 DIAGNOSIS — E78.00 HYPERCHOLESTEREMIA: ICD-10-CM

## 2019-03-19 DIAGNOSIS — E11.9 TYPE 2 DIABETES MELLITUS WITHOUT COMPLICATION, WITHOUT LONG-TERM CURRENT USE OF INSULIN (HCC): ICD-10-CM

## 2019-03-19 DIAGNOSIS — M10.9 GOUT, UNSPECIFIED CAUSE, UNSPECIFIED CHRONICITY, UNSPECIFIED SITE: ICD-10-CM

## 2019-03-19 DIAGNOSIS — B35.1 DERMATOPHYTOSIS OF NAIL: ICD-10-CM

## 2019-03-19 LAB
ALBUMIN SERPL-MCNC: 3.7 G/DL (ref 3.5–5.2)
ALBUMIN/GLOBULIN RATIO: 0.9 (ref 1–2.5)
ALP BLD-CCNC: 86 U/L (ref 35–104)
ALT SERPL-CCNC: 5 U/L (ref 5–33)
ANION GAP SERPL CALCULATED.3IONS-SCNC: 15 MMOL/L (ref 9–17)
ANION GAP SERPL CALCULATED.3IONS-SCNC: 15 MMOL/L (ref 9–17)
AST SERPL-CCNC: 11 U/L
BILIRUB SERPL-MCNC: 0.36 MG/DL (ref 0.3–1.2)
BUN BLDV-MCNC: 33 MG/DL (ref 8–23)
BUN BLDV-MCNC: 33 MG/DL (ref 8–23)
BUN/CREAT BLD: 25 (ref 9–20)
BUN/CREAT BLD: 25 (ref 9–20)
CALCIUM SERPL-MCNC: 9.4 MG/DL (ref 8.6–10.4)
CALCIUM SERPL-MCNC: 9.4 MG/DL (ref 8.6–10.4)
CHLORIDE BLD-SCNC: 101 MMOL/L (ref 98–107)
CHLORIDE BLD-SCNC: 101 MMOL/L (ref 98–107)
CHOLESTEROL/HDL RATIO: 5.3
CHOLESTEROL: 240 MG/DL
CO2: 31 MMOL/L (ref 20–31)
CO2: 31 MMOL/L (ref 20–31)
CREAT SERPL-MCNC: 1.3 MG/DL (ref 0.5–0.9)
CREAT SERPL-MCNC: 1.3 MG/DL (ref 0.5–0.9)
ESTIMATED AVERAGE GLUCOSE: 126 MG/DL
GFR AFRICAN AMERICAN: 47 ML/MIN
GFR AFRICAN AMERICAN: 47 ML/MIN
GFR NON-AFRICAN AMERICAN: 38 ML/MIN
GFR NON-AFRICAN AMERICAN: 38 ML/MIN
GFR SERPL CREATININE-BSD FRML MDRD: ABNORMAL ML/MIN/{1.73_M2}
GLUCOSE BLD-MCNC: 109 MG/DL (ref 70–99)
GLUCOSE BLD-MCNC: 109 MG/DL (ref 70–99)
HBA1C MFR BLD: 6 % (ref 4.8–5.9)
HDLC SERPL-MCNC: 45 MG/DL
LDL CHOLESTEROL: 170 MG/DL (ref 0–130)
POTASSIUM SERPL-SCNC: 4.6 MMOL/L (ref 3.7–5.3)
POTASSIUM SERPL-SCNC: 4.6 MMOL/L (ref 3.7–5.3)
SODIUM BLD-SCNC: 147 MMOL/L (ref 135–144)
SODIUM BLD-SCNC: 147 MMOL/L (ref 135–144)
TOTAL PROTEIN: 7.6 G/DL (ref 6.4–8.3)
TRIGL SERPL-MCNC: 127 MG/DL
URIC ACID: 10.1 MG/DL (ref 2.4–5.7)
VLDLC SERPL CALC-MCNC: ABNORMAL MG/DL (ref 1–30)

## 2019-03-19 PROCEDURE — 36415 COLL VENOUS BLD VENIPUNCTURE: CPT

## 2019-03-19 PROCEDURE — 84550 ASSAY OF BLOOD/URIC ACID: CPT

## 2019-03-19 PROCEDURE — 11721 DEBRIDE NAIL 6 OR MORE: CPT | Performed by: PODIATRIST

## 2019-03-19 PROCEDURE — 83036 HEMOGLOBIN GLYCOSYLATED A1C: CPT

## 2019-03-19 PROCEDURE — 80053 COMPREHEN METABOLIC PANEL: CPT

## 2019-03-19 PROCEDURE — 80061 LIPID PANEL: CPT

## 2019-03-19 PROCEDURE — 99999 PR OFFICE/OUTPT VISIT,PROCEDURE ONLY: CPT | Performed by: PODIATRIST

## 2019-03-20 ENCOUNTER — OFFICE VISIT (OUTPATIENT)
Dept: FAMILY MEDICINE CLINIC | Age: 84
End: 2019-03-20
Payer: MEDICARE

## 2019-03-20 VITALS
OXYGEN SATURATION: 98 % | RESPIRATION RATE: 20 BRPM | SYSTOLIC BLOOD PRESSURE: 102 MMHG | HEIGHT: 61 IN | DIASTOLIC BLOOD PRESSURE: 68 MMHG | WEIGHT: 210 LBS | BODY MASS INDEX: 39.65 KG/M2 | HEART RATE: 63 BPM | TEMPERATURE: 98.2 F

## 2019-03-20 DIAGNOSIS — N18.30 TYPE 2 DIABETES MELLITUS WITH STAGE 3 CHRONIC KIDNEY DISEASE, WITHOUT LONG-TERM CURRENT USE OF INSULIN (HCC): ICD-10-CM

## 2019-03-20 DIAGNOSIS — E11.51 CONTROLLED TYPE 2 DM WITH PERIPHERAL CIRCULATORY DISORDER (HCC): ICD-10-CM

## 2019-03-20 DIAGNOSIS — E11.22 TYPE 2 DIABETES MELLITUS WITH STAGE 3 CHRONIC KIDNEY DISEASE, WITHOUT LONG-TERM CURRENT USE OF INSULIN (HCC): ICD-10-CM

## 2019-03-20 DIAGNOSIS — I27.20 PULMONARY HYPERTENSION (HCC): ICD-10-CM

## 2019-03-20 DIAGNOSIS — E11.59 TYPE 2 DIABETES MELLITUS WITH OTHER CIRCULATORY COMPLICATION, WITHOUT LONG-TERM CURRENT USE OF INSULIN (HCC): ICD-10-CM

## 2019-03-20 DIAGNOSIS — G47.33 OSA (OBSTRUCTIVE SLEEP APNEA): ICD-10-CM

## 2019-03-20 DIAGNOSIS — I44.2 COMPLETE HEART BLOCK (HCC): ICD-10-CM

## 2019-03-20 DIAGNOSIS — E11.9 TYPE 2 DIABETES MELLITUS WITHOUT COMPLICATION, WITHOUT LONG-TERM CURRENT USE OF INSULIN (HCC): ICD-10-CM

## 2019-03-20 DIAGNOSIS — I50.32 CHRONIC DIASTOLIC CONGESTIVE HEART FAILURE (HCC): ICD-10-CM

## 2019-03-20 DIAGNOSIS — Z91.81 AT HIGH RISK FOR FALLS: Primary | ICD-10-CM

## 2019-03-20 DIAGNOSIS — I49.5 SICK SINUS SYNDROME (HCC): ICD-10-CM

## 2019-03-20 DIAGNOSIS — N18.2 STAGE 2 CHRONIC KIDNEY DISEASE: ICD-10-CM

## 2019-03-20 DIAGNOSIS — F41.9 ANXIETY: ICD-10-CM

## 2019-03-20 PROCEDURE — 99215 OFFICE O/P EST HI 40 MIN: CPT | Performed by: PHYSICIAN ASSISTANT

## 2019-03-20 SDOH — HEALTH STABILITY: MENTAL HEALTH: HOW OFTEN DO YOU HAVE A DRINK CONTAINING ALCOHOL?: NEVER

## 2019-03-20 ASSESSMENT — PATIENT HEALTH QUESTIONNAIRE - PHQ9
SUM OF ALL RESPONSES TO PHQ QUESTIONS 1-9: 0
2. FEELING DOWN, DEPRESSED OR HOPELESS: 0
SUM OF ALL RESPONSES TO PHQ9 QUESTIONS 1 & 2: 0
1. LITTLE INTEREST OR PLEASURE IN DOING THINGS: 0
SUM OF ALL RESPONSES TO PHQ QUESTIONS 1-9: 0

## 2019-03-20 ASSESSMENT — ENCOUNTER SYMPTOMS
SHORTNESS OF BREATH: 1
CONSTIPATION: 1

## 2019-03-28 ENCOUNTER — PROCEDURE VISIT (OUTPATIENT)
Dept: CARDIOLOGY | Age: 84
End: 2019-03-28
Payer: MEDICARE

## 2019-03-28 DIAGNOSIS — I49.5 SICK SINUS SYNDROME (HCC): ICD-10-CM

## 2019-03-28 DIAGNOSIS — Z95.0 PACEMAKER: ICD-10-CM

## 2019-03-28 PROCEDURE — 93288 INTERROG EVL PM/LDLS PM IP: CPT | Performed by: INTERNAL MEDICINE

## 2019-04-03 DIAGNOSIS — R06.02 SOB (SHORTNESS OF BREATH): ICD-10-CM

## 2019-04-03 DIAGNOSIS — M10.00 IDIOPATHIC GOUT, UNSPECIFIED CHRONICITY, UNSPECIFIED SITE: ICD-10-CM

## 2019-04-03 DIAGNOSIS — R60.9 EDEMA, UNSPECIFIED TYPE: ICD-10-CM

## 2019-04-03 DIAGNOSIS — F41.8 DEPRESSION WITH ANXIETY: ICD-10-CM

## 2019-04-03 DIAGNOSIS — I10 ESSENTIAL HYPERTENSION: ICD-10-CM

## 2019-04-03 DIAGNOSIS — R60.0 PEDAL EDEMA: ICD-10-CM

## 2019-04-03 RX ORDER — LISINOPRIL 20 MG/1
TABLET ORAL
Qty: 90 TABLET | Refills: 1 | Status: SHIPPED | OUTPATIENT
Start: 2019-04-03 | End: 2019-07-29 | Stop reason: SDUPTHER

## 2019-04-03 RX ORDER — BUMETANIDE 1 MG/1
1 TABLET ORAL 2 TIMES DAILY
Qty: 180 TABLET | Refills: 1 | Status: SHIPPED | OUTPATIENT
Start: 2019-04-03 | End: 2019-11-18 | Stop reason: SDUPTHER

## 2019-04-03 RX ORDER — COLCHICINE 0.6 MG/1
TABLET ORAL
Qty: 90 TABLET | Refills: 1 | Status: SHIPPED | OUTPATIENT
Start: 2019-04-03 | End: 2019-05-23 | Stop reason: CLARIF

## 2019-05-02 ENCOUNTER — TELEPHONE (OUTPATIENT)
Dept: FAMILY MEDICINE CLINIC | Age: 84
End: 2019-05-02

## 2019-05-02 NOTE — TELEPHONE ENCOUNTER
tian calling stating pt's ins won't cover the colcrys so they substituted it with mitigare, is this okay, please advise at above number.

## 2019-05-23 ENCOUNTER — HOSPITAL ENCOUNTER (OUTPATIENT)
Age: 84
Setting detail: SPECIMEN
Discharge: HOME | End: 2019-05-23
Payer: MEDICARE

## 2019-05-23 ENCOUNTER — HOSPITAL ENCOUNTER (OUTPATIENT)
Dept: GENERAL RADIOLOGY | Age: 84
Discharge: HOME | End: 2019-05-25
Payer: MEDICARE

## 2019-05-23 ENCOUNTER — HOSPITAL ENCOUNTER (OUTPATIENT)
Dept: LAB | Age: 84
Discharge: HOME | End: 2019-05-23
Payer: MEDICARE

## 2019-05-23 ENCOUNTER — OFFICE VISIT (OUTPATIENT)
Dept: FAMILY MEDICINE CLINIC | Age: 84
End: 2019-05-23
Payer: MEDICARE

## 2019-05-23 ENCOUNTER — HOSPITAL ENCOUNTER (OUTPATIENT)
Dept: INTERVENTIONAL RADIOLOGY/VASCULAR | Age: 84
Discharge: HOME | End: 2019-05-25
Payer: MEDICARE

## 2019-05-23 VITALS
OXYGEN SATURATION: 96 % | HEIGHT: 61 IN | SYSTOLIC BLOOD PRESSURE: 122 MMHG | BODY MASS INDEX: 37.95 KG/M2 | HEART RATE: 62 BPM | DIASTOLIC BLOOD PRESSURE: 64 MMHG | WEIGHT: 201 LBS

## 2019-05-23 DIAGNOSIS — M25.571 ACUTE RIGHT ANKLE PAIN: ICD-10-CM

## 2019-05-23 DIAGNOSIS — M10.00 IDIOPATHIC GOUT, UNSPECIFIED CHRONICITY, UNSPECIFIED SITE: ICD-10-CM

## 2019-05-23 DIAGNOSIS — M79.671 RIGHT FOOT PAIN: ICD-10-CM

## 2019-05-23 DIAGNOSIS — M79.604 RIGHT LEG PAIN: ICD-10-CM

## 2019-05-23 DIAGNOSIS — S90.221A CONTUSION OF LESSER TOE OF RIGHT FOOT WITH DAMAGE TO NAIL, INITIAL ENCOUNTER: ICD-10-CM

## 2019-05-23 DIAGNOSIS — Z87.898 HISTORY OF URINE COLOR CHANGES: ICD-10-CM

## 2019-05-23 DIAGNOSIS — N18.30 CKD (CHRONIC KIDNEY DISEASE), STAGE III (HCC): ICD-10-CM

## 2019-05-23 DIAGNOSIS — M79.671 RIGHT FOOT PAIN: Primary | ICD-10-CM

## 2019-05-23 LAB
-: NORMAL
ABSOLUTE EOS #: 0.2 K/UL (ref 0–0.4)
ABSOLUTE EOS #: 0.2 K/UL (ref 0–0.4)
ABSOLUTE IMMATURE GRANULOCYTE: NORMAL K/UL (ref 0–0.3)
ABSOLUTE IMMATURE GRANULOCYTE: NORMAL K/UL (ref 0–0.3)
ABSOLUTE LYMPH #: 2.6 K/UL (ref 1–4.8)
ABSOLUTE LYMPH #: 2.6 K/UL (ref 1–4.8)
ABSOLUTE MONO #: 0.7 K/UL (ref 0.1–1.2)
ABSOLUTE MONO #: 0.7 K/UL (ref 0.1–1.2)
AMORPHOUS: NORMAL
ANION GAP SERPL CALCULATED.3IONS-SCNC: 13 MMOL/L (ref 9–17)
BACTERIA: NORMAL
BASOPHILS # BLD: 1 % (ref 0–1)
BASOPHILS # BLD: 1 % (ref 0–1)
BASOPHILS ABSOLUTE: 0.1 K/UL (ref 0–0.2)
BASOPHILS ABSOLUTE: 0.1 K/UL (ref 0–0.2)
BILIRUBIN URINE: NEGATIVE
BUN BLDV-MCNC: 42 MG/DL (ref 8–23)
BUN/CREAT BLD: 28 (ref 9–20)
CALCIUM SERPL-MCNC: 9.3 MG/DL (ref 8.6–10.4)
CASTS UA: NORMAL /LPF (ref 0–2)
CHLORIDE BLD-SCNC: 97 MMOL/L (ref 98–107)
CO2: 32 MMOL/L (ref 20–31)
COLOR: ABNORMAL
COMMENT UA: ABNORMAL
CREAT SERPL-MCNC: 1.52 MG/DL (ref 0.5–0.9)
CREATININE URINE: 43.4 MG/DL (ref 28–217)
CRYSTALS, UA: NORMAL /HPF
DIFFERENTIAL TYPE: NORMAL
DIFFERENTIAL TYPE: NORMAL
EOSINOPHILS RELATIVE PERCENT: 2 % (ref 1–7)
EOSINOPHILS RELATIVE PERCENT: 2 % (ref 1–7)
EPITHELIAL CELLS UA: NORMAL /HPF (ref 0–5)
GFR AFRICAN AMERICAN: 39 ML/MIN
GFR NON-AFRICAN AMERICAN: 32 ML/MIN
GFR SERPL CREATININE-BSD FRML MDRD: ABNORMAL ML/MIN/{1.73_M2}
GFR SERPL CREATININE-BSD FRML MDRD: ABNORMAL ML/MIN/{1.73_M2}
GLUCOSE BLD-MCNC: 98 MG/DL (ref 70–99)
GLUCOSE URINE: NEGATIVE
HCT VFR BLD CALC: 44.5 % (ref 36–46)
HCT VFR BLD CALC: 44.5 % (ref 36–46)
HEMOGLOBIN: 14.8 G/DL (ref 12–16)
HEMOGLOBIN: 14.8 G/DL (ref 12–16)
IMMATURE GRANULOCYTES: NORMAL %
IMMATURE GRANULOCYTES: NORMAL %
KETONES, URINE: NEGATIVE
LEUKOCYTE ESTERASE, URINE: NEGATIVE
LYMPHOCYTES # BLD: 27 % (ref 16–46)
LYMPHOCYTES # BLD: 27 % (ref 16–46)
MCH RBC QN AUTO: 30.7 PG (ref 26–34)
MCH RBC QN AUTO: 30.7 PG (ref 26–34)
MCHC RBC AUTO-ENTMCNC: 33.3 G/DL (ref 31–37)
MCHC RBC AUTO-ENTMCNC: 33.3 G/DL (ref 31–37)
MCV RBC AUTO: 92.3 FL (ref 80–100)
MCV RBC AUTO: 92.3 FL (ref 80–100)
MONOCYTES # BLD: 7 % (ref 4–11)
MONOCYTES # BLD: 7 % (ref 4–11)
MUCUS: NORMAL
NITRITE, URINE: NEGATIVE
NRBC AUTOMATED: NORMAL PER 100 WBC
NRBC AUTOMATED: NORMAL PER 100 WBC
OTHER OBSERVATIONS UA: NORMAL
PDW BLD-RTO: 14.4 % (ref 11–14.5)
PDW BLD-RTO: 14.4 % (ref 11–14.5)
PH UA: 6.5 (ref 5–6)
PHOSPHORUS: 3.4 MG/DL (ref 2.6–4.5)
PLATELET # BLD: 234 K/UL (ref 140–450)
PLATELET # BLD: 234 K/UL (ref 140–450)
PLATELET ESTIMATE: NORMAL
PLATELET ESTIMATE: NORMAL
PMV BLD AUTO: 8.7 FL (ref 6–12)
PMV BLD AUTO: 8.7 FL (ref 6–12)
POTASSIUM SERPL-SCNC: 4.5 MMOL/L (ref 3.7–5.3)
PROTEIN UA: NEGATIVE
RBC # BLD: 4.82 M/UL (ref 4–5.2)
RBC # BLD: 4.82 M/UL (ref 4–5.2)
RBC # BLD: NORMAL 10*6/UL
RBC # BLD: NORMAL 10*6/UL
RBC UA: NORMAL /HPF (ref 0–4)
RENAL EPITHELIAL, UA: NORMAL /HPF
SEG NEUTROPHILS: 63 % (ref 43–77)
SEG NEUTROPHILS: 63 % (ref 43–77)
SEGMENTED NEUTROPHILS ABSOLUTE COUNT: 6 K/UL (ref 1.8–7.7)
SEGMENTED NEUTROPHILS ABSOLUTE COUNT: 6 K/UL (ref 1.8–7.7)
SODIUM BLD-SCNC: 142 MMOL/L (ref 135–144)
SPECIFIC GRAVITY UA: 1.01 (ref 1.01–1.02)
TOTAL PROTEIN, URINE: 7 MG/DL
TRICHOMONAS: NORMAL
TURBIDITY: ABNORMAL
URIC ACID: 10.7 MG/DL (ref 2.4–5.7)
URINE HGB: NEGATIVE
URINE TOTAL PROTEIN CREATININE RATIO: 0.16 (ref 0–0.2)
UROBILINOGEN, URINE: NORMAL
WBC # BLD: 9.5 K/UL (ref 3.5–11)
WBC # BLD: 9.5 K/UL (ref 3.5–11)
WBC # BLD: NORMAL 10*3/UL
WBC # BLD: NORMAL 10*3/UL
WBC UA: NORMAL /HPF (ref 0–4)
YEAST: NORMAL

## 2019-05-23 PROCEDURE — 85025 COMPLETE CBC W/AUTO DIFF WBC: CPT

## 2019-05-23 PROCEDURE — 73630 X-RAY EXAM OF FOOT: CPT

## 2019-05-23 PROCEDURE — 36415 COLL VENOUS BLD VENIPUNCTURE: CPT

## 2019-05-23 PROCEDURE — 83970 ASSAY OF PARATHORMONE: CPT

## 2019-05-23 PROCEDURE — 84100 ASSAY OF PHOSPHORUS: CPT

## 2019-05-23 PROCEDURE — 84156 ASSAY OF PROTEIN URINE: CPT

## 2019-05-23 PROCEDURE — 73610 X-RAY EXAM OF ANKLE: CPT

## 2019-05-23 PROCEDURE — 84550 ASSAY OF BLOOD/URIC ACID: CPT

## 2019-05-23 PROCEDURE — 82306 VITAMIN D 25 HYDROXY: CPT

## 2019-05-23 PROCEDURE — 81001 URINALYSIS AUTO W/SCOPE: CPT

## 2019-05-23 PROCEDURE — 82570 ASSAY OF URINE CREATININE: CPT

## 2019-05-23 PROCEDURE — 99214 OFFICE O/P EST MOD 30 MIN: CPT | Performed by: PHYSICIAN ASSISTANT

## 2019-05-23 PROCEDURE — 82330 ASSAY OF CALCIUM: CPT

## 2019-05-23 PROCEDURE — 80048 BASIC METABOLIC PNL TOTAL CA: CPT

## 2019-05-23 PROCEDURE — 93971 EXTREMITY STUDY: CPT

## 2019-05-23 RX ORDER — PREDNISONE 10 MG/1
TABLET ORAL
Qty: 15 TABLET | Refills: 0 | Status: SHIPPED | OUTPATIENT
Start: 2019-05-23 | End: 2019-06-03 | Stop reason: ALTCHOICE

## 2019-05-23 RX ORDER — COLCHICINE 0.6 MG/1
CAPSULE ORAL
COMMUNITY
Start: 2019-04-11 | End: 2019-12-02 | Stop reason: SDUPTHER

## 2019-05-23 NOTE — PROGRESS NOTES
Subjective:      Patient ID: Gabe Prince is a 80 y.o. female. Patient is seen to check right foot. It has been swollen and painful for 4 days now. 10 days ago hit the 3rd toe on a castor has a resolving blister. Daughter notices she is not as active and complaining more of pain. They are afraid she fractured something. Review of Systems   Constitutional: Positive for activity change and fatigue. Negative for appetite change and fever. HENT: Negative for congestion, postnasal drip, rhinorrhea, sinus pressure, sinus pain, sneezing and sore throat. Respiratory: Negative for cough, chest tightness, shortness of breath and wheezing. Cardiovascular: Negative for chest pain and palpitations. Gastrointestinal: Negative for diarrhea, nausea and vomiting. Genitourinary: Negative for difficulty urinating. Musculoskeletal: Negative for myalgias. Skin: Negative for rash. Neurological: Negative for light-headedness, numbness and headaches. Psychiatric/Behavioral: Negative for sleep disturbance. The patient is not nervous/anxious. Past Medical History:   Diagnosis Date    Abnormal ANCA (antineutrophil cytoplasmic antibody) 12/3/2018    Basal cell cancer     nose    CKD (chronic kidney disease), stage III (HCC) 12/3/2018    Dry skin dermatitis 12/3/2015    Dyspnea     chronic    Edema     Chronic    Gout     Hard of hearing     Hiatal hernia     Hypercholesterolemia     Hypertension     Hypomagnesemia 12/3/2015    Lower extremity edema 12/3/2018    Macular degeneration, dry     Obstructive sleep apnea     Open-angle glaucoma     Borderline.    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/2017 for squamous cell     FRACTURE SURGERY Right     wrist    HYSTERECTOMY      PACEMAKER INSERTION      sick sinus syndrome says was changed to medtronic 11/19/2018    TUBAL LIGATION  1970     Social History     Socioeconomic History    Marital status:       Spouse name: Not on file    Number of children: Not on file    Years of education: Not on file    Highest education level: Not on file   Occupational History    Occupation: retired    Social Needs    Financial resource strain: Not on file    Food insecurity:     Worry: Not on file     Inability: Not on file   [employer] needs:     Medical: Not on file     Non-medical: Not on file   Tobacco Use    Smoking status: Never Smoker    Smokeless tobacco: Never Used    Tobacco comment: Aubree Cisse RRT 11/28/18   Substance and Sexual Activity    Alcohol use: No     Alcohol/week: 0.0 oz     Frequency: Never    Drug use: No    Sexual activity: Not on file   Lifestyle    Physical activity:     Days per week: Not on file     Minutes per session: Not on file    Stress: Not on file   Relationships    Social connections:     Talks on phone: Not on file     Gets together: Not on file     Attends Evangelical service: Not on file     Active member of club or organization: Not on file     Attends meetings of clubs or organizations: Not on file     Relationship status: Not on file    Intimate partner violence:     Fear of current or ex partner: Not on file     Emotionally abused: Not on file     Physically abused: Not on file     Forced sexual activity: Not on file   Other Topics Concern    Not on file   Social History Narrative    Not on file     Family History   Problem Relation Age of Onset    Other Mother         sepsis    Diabetes Mother     Other Father         rheumatic heart disease       Allergies Allergen Reactions    Atorvastatin Other (See Comments)    Statins [Statins] Other (See Comments)     Muscle aches, elevated LFT's       /64   Pulse 62   Ht 5' 1\" (1.549 m)   Wt 201 lb (91.2 kg)   SpO2 96%   BMI 37.98 kg/m²     Objective:   Physical Exam   Constitutional: She is oriented to person, place, and time. She appears well-developed and well-nourished. No distress. HENT:   Head: Normocephalic and atraumatic. Nose: Nose normal.   Eyes: No scleral icterus. Cardiovascular: Intact distal pulses. Pulmonary/Chest: Effort normal.   Musculoskeletal: She exhibits edema and tenderness. Lymphedema noted in the lower extremities bilaterally and in the dorsum of the feet bilaterally. She has area with eschar on the dorsum right foot at the distal 3rd MTP area. The whole right foot is very tender to palpation in general.  Limited ROM due to swelling and pain. She had pain with palpation over the right calf. Negative homen's. No redness in lower leg. Neurological: She is alert and oriented to person, place, and time. Very slow gait. In wheel chair most of visit. Nl sensation and strength lower extremities bilaterally. Skin: Skin is warm and dry. No erythema. Psychiatric: She has a normal mood and affect. Her behavior is normal.   Nursing note and vitals reviewed. Xr Ankle Right (min 3 Views)    Result Date: 5/23/2019  EXAMINATION: 3 XRAY VIEWS OF THE RIGHT FOOT; 3 XRAY VIEWS OF THE RIGHT ANKLE 5/23/2019 1:54 pm COMPARISON: Right foot 15 April 2015 HISTORY: ORDERING SYSTEM PROVIDED HISTORY: Right foot pain TECHNOLOGIST PROVIDED HISTORY: right foot pain 4 days swelling Ordering Physician Provided Reason for Exam: C/o distal mid metatarsal pain and medial ankle pain after hitting foot into object Acuity: Acute Type of Exam: Initial FINDINGS: Right foot: Osteopenia limits assessment.   The patient has severe arthritic change in the 1st metatarsal-phalangeal joint with smoothly LOWER EXTREMITY VENOUS RIGHT    Uric Acid    CBC Auto Differential    DISCONTINUED: predniSONE (DELTASONE) 10 MG tablet   2. Acute right ankle pain  XR FOOT RIGHT (MIN 3 VIEWS)    XR ANKLE RIGHT (MIN 3 VIEWS)    VL DUP LOWER EXTREMITY VENOUS RIGHT    Uric Acid    CBC Auto Differential    DISCONTINUED: predniSONE (DELTASONE) 10 MG tablet   3. Right leg pain  XR FOOT RIGHT (MIN 3 VIEWS)    XR ANKLE RIGHT (MIN 3 VIEWS)    VL DUP LOWER EXTREMITY VENOUS RIGHT    Uric Acid    CBC Auto Differential    DISCONTINUED: predniSONE (DELTASONE) 10 MG tablet   4. Contusion of lesser toe of right foot with damage to nail, initial encounter  CBC Auto Differential    DISCONTINUED: predniSONE (DELTASONE) 10 MG tablet   5. History of urine color changes  Urinalysis Reflex to Culture   6. Idiopathic gout, unspecified chronicity, unspecified site  Uric Acid           Plan:      Reviewed all testing with patient and daughter  Answered their questions  Ice and warm compresses to foot  Fall prevention  HEP if possible.             91 JARRETT Juarez

## 2019-05-24 LAB
CALCIUM IONIZED: ABNORMAL MMOL/L (ref 1.13–1.33)
PTH INTACT: 108 PG/ML (ref 15–65)
VITAMIN D 25-HYDROXY: 68.1 NG/ML (ref 30–100)

## 2019-06-03 ENCOUNTER — OFFICE VISIT (OUTPATIENT)
Dept: PODIATRY | Age: 84
End: 2019-06-03
Payer: MEDICARE

## 2019-06-03 ENCOUNTER — OFFICE VISIT (OUTPATIENT)
Dept: NEPHROLOGY | Age: 84
End: 2019-06-03
Payer: MEDICARE

## 2019-06-03 VITALS
HEIGHT: 60 IN | SYSTOLIC BLOOD PRESSURE: 138 MMHG | WEIGHT: 202 LBS | HEART RATE: 70 BPM | DIASTOLIC BLOOD PRESSURE: 80 MMHG | BODY MASS INDEX: 39.66 KG/M2

## 2019-06-03 VITALS
HEIGHT: 61 IN | WEIGHT: 202.6 LBS | DIASTOLIC BLOOD PRESSURE: 82 MMHG | BODY MASS INDEX: 38.25 KG/M2 | HEART RATE: 72 BPM | SYSTOLIC BLOOD PRESSURE: 148 MMHG

## 2019-06-03 DIAGNOSIS — R76.8 P-ANCA AND MPO ANTIBODIES POSITIVE: ICD-10-CM

## 2019-06-03 DIAGNOSIS — R76.8 ABNORMAL ANCA (ANTINEUTROPHIL CYTOPLASMIC ANTIBODY): ICD-10-CM

## 2019-06-03 DIAGNOSIS — B35.1 DERMATOPHYTOSIS OF NAIL: ICD-10-CM

## 2019-06-03 DIAGNOSIS — I27.20 PULMONARY HYPERTENSION (HCC): ICD-10-CM

## 2019-06-03 DIAGNOSIS — E11.22 TYPE 2 DIABETES MELLITUS WITH STAGE 3 CHRONIC KIDNEY DISEASE, WITHOUT LONG-TERM CURRENT USE OF INSULIN (HCC): ICD-10-CM

## 2019-06-03 DIAGNOSIS — N18.30 TYPE 2 DIABETES MELLITUS WITH STAGE 3 CHRONIC KIDNEY DISEASE, WITHOUT LONG-TERM CURRENT USE OF INSULIN (HCC): ICD-10-CM

## 2019-06-03 DIAGNOSIS — E11.51 CONTROLLED TYPE 2 DM WITH PERIPHERAL CIRCULATORY DISORDER (HCC): Primary | ICD-10-CM

## 2019-06-03 DIAGNOSIS — I10 ESSENTIAL HYPERTENSION: ICD-10-CM

## 2019-06-03 DIAGNOSIS — R60.0 LOWER EXTREMITY EDEMA: ICD-10-CM

## 2019-06-03 DIAGNOSIS — N18.30 CKD (CHRONIC KIDNEY DISEASE), STAGE III (HCC): Primary | ICD-10-CM

## 2019-06-03 PROCEDURE — 11721 DEBRIDE NAIL 6 OR MORE: CPT | Performed by: PODIATRIST

## 2019-06-03 PROCEDURE — 99213 OFFICE O/P EST LOW 20 MIN: CPT | Performed by: INTERNAL MEDICINE

## 2019-06-03 PROCEDURE — 99999 PR OFFICE/OUTPT VISIT,PROCEDURE ONLY: CPT | Performed by: PODIATRIST

## 2019-06-03 ASSESSMENT — ENCOUNTER SYMPTOMS
CHEST TIGHTNESS: 0
SORE THROAT: 0
WHEEZING: 0
NAUSEA: 0
RHINORRHEA: 0
DIARRHEA: 0
VOMITING: 0
SHORTNESS OF BREATH: 0
COUGH: 0
SINUS PAIN: 0
SINUS PRESSURE: 0

## 2019-06-03 NOTE — PROGRESS NOTES
admitted with CHF exacerbation to the hospital.  She was not watching his salt or fluid intake very carefully. She is doing better with that now and edema has also improved a little  She is approaching her 89th birthday and is doing fairly well. Labs from March 2018 shows a blood area nitrogen of 29 and a creatinine of 1.4 with a sodium of 144 potassium of 4 bicarbonate of 31 calcium 9.2, vitamin D 67, , hemoglobin 13.9 and urine protein creatinine ratio was 0.3. Myeloperoxidase antibodies were repeated and it was 266-she does not have any signs or symptoms of vasculitis. She has refused kidney biopsy and rheumatology referral in the past.  She also complains of a dry cough that she's been noticing and happens only at nighttime, none during the day. His throat also feels scratchy and may be related to postnasal drip. She is also on lisinopril 20 mg oral daily for over 25 years-doubt could be related to lisinopril but if it continues then we may need to switch her to losartan and see that helps. 10/2/2017  Patient is here for follow-up of her chronic kidney disease stage III. She feels okay, energy levels are good, she does complain of some right knee pain but does not take any pain medication. She is inconsistent with her Lasix use and is only taking it once a day instead of twice a day. She has gained about 8 pounds of weight since last visit. Looks like a blood pressure medications were discontinued through her PCP except for Lasix and lisinopril due to low blood pressure episode. Blood pressures are stable at this time. Labs from September 2017 shows a blood urea nitrogen of 32 and a creatinine of 0.9, potassium of 4.6, calcium ionized 1.2, PTH was 78, vitamin D was 68, hemoglobin was 12. 9.\  MPO antibodies still 193, PR3  was normal at 8 - she has no symptoms of malaise and tiredness , rash , sinusitis , cough , fever .   She does not want to be referred to rheumatology here for followup. She feels okay. In November she developed acute kidney injury and creatinine went up to 1.6 likely prerenal but that has now resolved and the last creatinine from 11/16/2016 was 1.26. She takes Lasix 40 alternating with 20 mg and Aldactone 12.5 mg by mouth daily. Per daughter her by mouth intake was poor and she was taking less than 30/40 ounces of water or liquids. Labs from 11/16/2016 shows a blood urea nitrogen of 26 and a creatinine of 1.26 and a calcium of 9.2 and a potassium of 4.4 and a bicarbonate of 28. Blood pressures are stable, her edema is well controlled. She has lost about 7 pounds of weight since last visit      9/15/2016  Patient here for followup. She feels okay. Labs labs from 9/2/16 shows creatinine of 1.19, blood urea nitrogen of 35, hemoglobin of 13, bicarbonate 29 and potassium 4.7, urine protein creatinine ratio was 0.4, UA had 0-4 RBCs  Blood pressures are stable. Leg swelling has increased and she has gained 7 pounds from last visit. She is currently taking Lasix 20 alternating with 40 mg every other day and Aldactone 12.5 by mouth daily    5/2/2016  Patient here for followup. She was last seen by me last month for her renal dysfunction. Chronic kidney disease workup with the paraproteinemia workup was all negative, k/l was 1.7 and serum immunofixation was negative. Creatinine from April 2016 was up at 1.48 and baseline normally runs around 1.2. Sodium was slightly elevated at 145, bun was 35. She could have been a little dehydrated at that time and she had upper respiratory tract infection at that time. Urine analysis that was done in the past 5-10 RBCs and she tells me she had seen urology for cystoscopy and that was negative. Urine protein creatinine ratio was about 0.2. Ultrasound of the kidneys showed 9.3-9.4 cm kidneys.   She was taken off losartan 100 mg on last visit that she was taking in conjunction with lisinopril 20 mg twice a day and was put on Aldactone 12.5 mg. When she was sick and blood pressure was slightly low in the 426 systolic but now it is in the 358X and 044P systolic. Her blood pressure seems to be a well-controlled and today it was 140/70. She complains of abdominal pain and tells me that Prilosec helps and was advised to take it on a daily basis. Her edema is controlled     Pt denies any hx of heavy or prolonged NSAID use and there is no history of OTC herbal medications . No history of dysuria or frequency. Pt denies any hx of recent UTI , incontinence or nocturia or recurrent nephrolithiasis. No unusual skin rashes . No tea coloured urine . No recent procedures involving IV contrast.     Patient Active Problem List    Diagnosis Date Noted    Pacemaker at end of battery life - Change out 11/19/18 (Dr. Dahlia Urban) 11/19/2018     Priority: High    Chronic diastolic congestive heart failure ([address]     CKD (chronic kidney disease), stage III ([address] 12/03/2018    Lower extremity edema 12/03/2018    Abnormal ANCA (antineutrophil cytoplasmic antibody) 12/03/2018    Pulmonary hypertension ([address] 12/03/2018    Type 2 diabetes mellitus ([address]     Controlled type 2 DM with peripheral circulatory disorder ([address] 04/15/2016    Complete heart block ([address] 04/11/2016    Palpitation 04/11/2016    Peripheral edema 12/03/2015    Hypomagnesemia 12/03/2015    Dry skin dermatitis 12/03/2015    Dermatophytosis of nail 1-5R/L 01/26/2015    Gout     Pacemaker 10/24/2013     Overview Note:     DUAL CHAMBER      Hypercholesterolemia     Obstructive sleep apnea     Hard of hearing     Venous insufficiency     Dyspnea      Overview Note:     chronic      Sick sinus syndrome (HCC)      Overview Note:     with pacemaker  placement      Vitamin D deficiency     Malignant basal cell neoplasm of skin      Overview Note:     nose  replace inactive diagnosis      Hypertension     Type 2 diabetes mellitus without complication ([address]     Hiatal hernia CURRENT MEDICATIONS      Current Outpatient Medications   Medication Sig Dispense Refill    MITIGARE 0.6 MG CAPS       sertraline (ZOLOFT) 50 MG tablet Take 1 tablet by mouth daily 90 tablet 1    bumetanide (BUMEX) 1 MG tablet Take 1 tablet by mouth 2 times daily 180 tablet 1    lisinopril (PRINIVIL;ZESTRIL) 20 MG tablet TAKE 1 TABLET DAILY 90 tablet 1    metoprolol succinate (TOPROL XL) 100 MG extended release tablet TAKE 1 TABLET DAILY 90 tablet 1    Multiple Vitamins-Minerals (ICAPS AREDS 2) CHEW Take 1 tablet by mouth 2 times daily      omeprazole (PRILOSEC) 20 MG delayed release capsule Take 1 capsule by mouth daily as needed (gastritis) 90 capsule 1    magnesium oxide (MAG-OX) 400 MG tablet Take 1 tablet by mouth nightly 30 tablet 3    Cholecalciferol (VITAMIN D) 2000 UNITS CAPS capsule Take 1 capsule by mouth daily      latanoprost (XALATAN) 0.005 % ophthalmic solution Place 1 drop into both eyes nightly   3    polyethylene glycol (MIRALAX) powder Take 17 g by mouth daily as needed.  Acetaminophen (TYLENOL PO) Take  by mouth as needed. Rarely uses      aspirin 81 MG EC tablet Take 81 mg by mouth daily        No current facility-administered medications for this visit. REVIEW OF SYSTEMS     Constitutional: No fever, no chills, no lethargy, no weakness. HEENT:  No headache, otalgia, itchy eyes, nasal discharge or sore throat. Cardiac:  No chest pain, dyspnea, orthopnea or PND. Chest:   No cough, phlegm or wheezing or hemoptysis . Abdomen:  No abdominal pain, nausea or vomiting. Neuro:  No focal weakness, abnormal movements or seizure like activity. Skin:   No rashes, no itching. :   No hematuria, no pyuria, no dysuria, no flank pain. Extremities:    + swelling or joint pains. ROS was otherwise negative except as mentioned in the [address]      OBJECTIVE      Vitals:    06/03/19 1412   BP: (!) 148/82   Pulse: 72     Wt Readings from Last 2 Encounters:   06/03/19 202 lb 9.6 oz (91.9 kg)   05/23/19 201 lb (91.2 kg)     Body mass index is 38.3 kg/m². PHYSICAL EXAM      GENERAL APPEARANCE:Awake, alert, in no acute distress  SKIN: warm and dry, no rash or erythema  EYES: conjunctivae normal and sclera anicteric  ENT: no thrush no pharyngeal congestion   NECK:  supple  PULMONARY: clear to auscultation and no wheezing noted   CADRDIOVASCULAR: :Normal S1 & S2,  no murmur   ABDOMEN: soft nontender, bowel sounds, present, no organomegaly,  no ascites   EXTREMITIES: trace to 1+ edema  NEURO: alert and oriented, no deficits    LABS    CBC:   Lab Results   Component Value Date    WBC 9.5 05/23/2019    HGB 14.8 05/23/2019    HCT 44.5 05/23/2019    MCV 92.3 05/23/2019    RDW 14.4 05/23/2019     05/23/2019    MPV 8.7 05/23/2019      BMP:   Lab Results   Component Value Date     05/23/2019    K 4.5 05/23/2019    CL 97 05/23/2019    CO2 32 05/23/2019    BUN 42 05/23/2019    CREATININE 1.52 05/23/2019    GLUCOSE 98 05/23/2019    CALCIUM 9.3 05/23/2019      PHOSPHORUS:    Lab Results   Component Value Date    PHOS 3.4 05/23/2019     MAGNESIUM:   Lab Results   Component Value Date    MG 2.3 03/28/2017     ALBUMIN:   Lab Results   Component Value Date    LABALBU 3.7 03/19/2019     PTH: No components found for: PTHINTACT  VIT D3,25 HYDROXY: No results found for: VITD3     IRON:  No results found for: IRON  IRON SATURATION:  No results found for: LABIRON  TIBC:  No results found for: TIBC  FERRITIN:  No results found for: FERRITIN          TAMERA: No results found for: TAMERA    SPEP:   Lab Results   Component Value Date    PROT 7.6 03/19/2019    ALBCAL 4.0 04/13/2016    ALBPCT 55 04/13/2016    LABALPH 0.2 04/13/2016    LABALPH 0.9 04/13/2016    A1PCT 3 04/13/2016    A2PCT 12 04/13/2016    LABBETA 1.1 04/13/2016    BETAPCT 15 04/13/2016    GAMGLOB 1.1 04/13/2016    GGPCT 15 04/13/2016    PATH ELECTRONICALLY SIGNED.  Clive Jang M.D. 04/14/2016     UPEP:   Lab Results   Component Value Date cough at nighttime could be related to postnasal drip     PLAN     1. Based on available labs patients renal function is stable. patient's volume status is acceptable. Importance of tight blood pressure, glycemic control, lipid control was outlined to patient . 2. Cont Bumex 1 mg oral once a day. 3.  Avoid canned foods including soups, salt restriction <2gm/day and fluid restriction no more than 50-60 ounces a day. 4. BMP in 3 months. 5. Patient counseled about taking her meds regularly and not stopping abruptly without medical advise. 6. Follow up labs ordered : bmp, cbc,phos, intact pth, vitaminD 25 OH           7. Urine for random protein and creat to be done. 8. Follow up in the office in 6 months    Patient was asked to avoid NSAIDS. Please do not hesitate to call with questions. Electronically signed by Tori Childs MD on 6/3/2019 at 2:28 PM  [facility]. This note is created with the assistance of a speech-recognition program. While intending to generate a document that actually reflects the content of the visit, no guarantees can be provided that every mistake has been identified and corrected by editing.

## 2019-06-03 NOTE — PROGRESS NOTES
Subjective:  Patient presents to [facility] today for routine diabetic foot care. Patient denies any new problems with their feet. Patient's diabetic control has been not changed. Allergies   Allergen Reactions    Atorvastatin Other (See Comments)    Statins [Statins] Other (See Comments)     Muscle aches, elevated LFT's       Past Medical History:   Diagnosis Date    Abnormal ANCA (antineutrophil cytoplasmic antibody) 12/3/2018    Basal cell cancer     nose    CKD (chronic kidney disease), stage III ([address] 12/3/2018    Dry skin dermatitis 12/3/2015    Dyspnea     chronic    Edema     Chronic    Gout     Hard of hearing     Hiatal hernia     Hypercholesterolemia     Hypertension     Hypomagnesemia 12/3/2015    Lower extremity edema 12/3/2018    Macular degeneration, dry     Obstructive sleep apnea     Open-angle glaucoma     Borderline.  Peripheral edema 12/3/2015    Pulmonary hypertension ([facility] 12/3/2018    Sick sinus syndrome (HCC)     with pacemaker  placement    Type 2 diabetes mellitus ([facility]     Venous insufficiency     Vitamin D deficiency        Prior to Admission medications    Medication Sig Start Date End Date Taking?  Authorizing Provider   MITIGARE 0.6 MG CAPS  4/11/19  Yes Historical Provider, MD   sertraline (ZOLOFT) 50 MG tablet Take 1 tablet by mouth daily 4/3/19  Yes JARRETT Marx   bumetanide (BUMEX) 1 MG tablet Take 1 tablet by mouth 2 times daily 4/3/19  Yes JARRETT Marx   lisinopril (PRINIVIL;ZESTRIL) 20 MG tablet TAKE 1 TABLET DAILY 4/3/19  Yes JARRETT Shin   metoprolol succinate (TOPROL XL) 100 MG extended release tablet TAKE 1 TABLET DAILY 12/20/18  Yes JARRETT Shin   Multiple Vitamins-Minerals (ICAPS AREDS 2) CHEW Take 1 tablet by mouth 2 times daily   Yes Historical Provider, MD   omeprazole (PRILOSEC) 20 MG delayed release capsule Take 1 capsule by mouth daily as needed (gastritis) 12/28/16  Yes Nancy Nuñez JARRETT Garcia   magnesium oxide (MAG-OX) 400 MG tablet Take 1 tablet by mouth nightly 12/21/15  Yes Colbert Crigler, DO   Cholecalciferol (VITAMIN D) 2000 UNITS CAPS capsule Take 1 capsule by mouth daily   Yes Historical Provider, MD   latanoprost (XALATAN) 0.005 % ophthalmic solution Place 1 drop into both eyes nightly  1/15/15  Yes Historical Provider, MD   polyethylene glycol (MIRALAX) powder Take 17 g by mouth daily as needed. 8/19/13  Yes Alina Shelton MD   Acetaminophen (TYLENOL PO) Take  by mouth as needed. Rarely uses   Yes Historical Provider, MD   aspirin 81 MG EC tablet Take 81 mg by mouth daily    Yes Historical Provider, MD       Social History     Tobacco Use    Smoking status: Never Smoker    Smokeless tobacco: Never Used    Tobacco comment: [facility] [employer] 11/28/18   Substance Use Topics    Alcohol use: No     Alcohol/week: 0.0 oz     Frequency: Never     Review of Systems: All 12 systems reviewed and pertinent positives noted above. Objective:  Vascular: DP and PT pulses palpable 2/4, bilateral.  CFT <3 seconds, bilateral.  Hair growth present to the level of the digits, bilateral.  Edema present, bilateral.  Varicosities present, bilateral. Erythema absent, bilateral. Distal Rubor absent bilateral.  Temperature within normal limits bilateral. Hyperpigmentation present bilateral. Atrophic skin yes. Neurological: Sensation intact to light touch to level of digits, bilateral.  Protective sensation intact 10/10 sites via 5.07/10g [place] Monofilament, bilateral.  negative Tinel's, bilateral.  negative Valleix sign, bilateral.  Vibratory intact bilateral.  Reflexes Decreased bilateral.  Paresthesias negative. Dysthesias negative. Sharp/dull intact bilateral.    Integument:  Open lesion absent, Bilateral.  Interdigital maceration absent to web spaces,absent Bilateral.  Nails left 1, 2, 5 and right 1, 2, 5 thickened, dystrophic and crumbly, discolored with subungual debris. Fissures absent, Bilateral. Hyperkeratotic tissue is absent. Assessment:    Diagnosis Orders   1. Controlled type 2 DM with peripheral circulatory disorder ([address]     2. Dermatophytosis of nail 1-5R/L         Plan:  Diabetic foot education and exam.  All Nails as mentioned above debrided in length and thickness. Patient advised about OTC treatments for nails and callous. Patient will follow up in 10 weeks for routine foot care or PRN if any further problems arise.

## 2019-06-06 ENCOUNTER — OFFICE VISIT (OUTPATIENT)
Dept: CARDIOLOGY | Age: 84
End: 2019-06-06
Payer: MEDICARE

## 2019-06-06 VITALS
HEART RATE: 72 BPM | BODY MASS INDEX: 39.66 KG/M2 | WEIGHT: 202 LBS | DIASTOLIC BLOOD PRESSURE: 76 MMHG | HEIGHT: 60 IN | SYSTOLIC BLOOD PRESSURE: 148 MMHG

## 2019-06-06 DIAGNOSIS — I10 ESSENTIAL HYPERTENSION: ICD-10-CM

## 2019-06-06 DIAGNOSIS — I49.5 SICK SINUS SYNDROME (HCC): Primary | ICD-10-CM

## 2019-06-06 PROCEDURE — 93000 ELECTROCARDIOGRAM COMPLETE: CPT | Performed by: INTERNAL MEDICINE

## 2019-06-06 PROCEDURE — 99214 OFFICE O/P EST MOD 30 MIN: CPT | Performed by: INTERNAL MEDICINE

## 2019-06-06 RX ORDER — TIMOLOL MALEATE 5 MG/ML
1 SOLUTION/ DROPS OPHTHALMIC 2 TIMES DAILY
COMMUNITY

## 2019-06-06 NOTE — PROGRESS NOTES
Today's Date: 6/6/2019  Patient Name: Jessica Kay  Patient's age: 80 y. o., 10/27/1928          The patient is a 80 y.o.  female is in the office for f/u. No CP, +SOB. Follows with nephrology. Past Medical History:   has a past medical history of Abnormal ANCA (antineutrophil cytoplasmic antibody), Basal cell cancer, CKD (chronic kidney disease), stage III ([address] Dry skin dermatitis, Dyspnea, Edema, Gout, Hard of hearing, Hiatal hernia, Hypercholesterolemia, Hypertension, Hypomagnesemia, Lower extremity edema, Macular degeneration, dry, Obstructive sleep apnea, Open-angle glaucoma, Peripheral edema, Pulmonary hypertension ([address] Sick sinus syndrome (HCC), Type 2 diabetes mellitus ([address] Venous insufficiency, and Vitamin D deficiency. Past Surgical History:   has a past surgical history that includes Pacemaker insertion; Hysterectomy; fracture surgery (Right); Cardiac catheterization (2010); Tubal ligation (1970); Cataract removal with implant (Bilateral); and eye surgery (03/31/2017). Home Medications:    Prior to Admission medications    Medication Sig Start Date End Date Taking?  Authorizing Provider   timolol (TIMOPTIC) 0.5 % ophthalmic solution Place 1 drop into both eyes 2 times daily   Yes Historical Provider, MD   MITIGARE 0.6 MG CAPS  4/11/19  Yes Historical Provider, MD   sertraline (ZOLOFT) 50 MG tablet Take 1 tablet by mouth daily 4/3/19  Yes Kiara Redding Devers PA   bumetanide (BUMEX) 1 MG tablet Take 1 tablet by mouth 2 times daily 4/3/19  Yes JARRETT Fleming   lisinopril (PRINIVIL;ZESTRIL) 20 MG tablet TAKE 1 TABLET DAILY 4/3/19  Yes JARRETT Fleming   metoprolol succinate (TOPROL XL) 100 MG extended release tablet TAKE 1 TABLET DAILY 12/20/18  Yes JARRETT Fleming   Multiple Vitamins-Minerals (ICAPS AREDS 2) CHEW Take 1 tablet by mouth 2 times daily   Yes Historical Provider, MD   omeprazole (PRILOSEC) 20 MG delayed release capsule Take 1 capsule by mouth daily as needed (gastritis) 12/28/16  Yes Micaela Nascimento [place], PA   magnesium oxide (MAG-OX) 400 MG tablet Take 1 tablet by mouth nightly 12/21/15  Yes Rochelle Moore DO   Cholecalciferol (VITAMIN D) 2000 UNITS CAPS capsule Take 1 capsule by mouth daily   Yes Historical Provider, MD   latanoprost (XALATAN) 0.005 % ophthalmic solution Place 1 drop into both eyes nightly  1/15/15  Yes Historical Provider, MD   polyethylene glycol (MIRALAX) powder Take 17 g by mouth daily as needed. 8/19/13  Yes Harini Spencer MD   Acetaminophen (TYLENOL PO) Take  by mouth as needed. Rarely uses   Yes Historical Provider, MD   aspirin 81 MG EC tablet Take 81 mg by mouth daily    Yes Historical Provider, MD       Allergies:  Atorvastatin and Statins [statins]    Social History:   reports that she has never smoked. She has never used smokeless tobacco. She reports that she does not drink alcohol or use drugs. REVIEW OF SYSTEMS:  CONSTITUTIONAL:NEGATIVE  HEENT:NEG  Cardiovascular: No chest pain, Yes dyspnea on exertion, No palpitations. Lower extremity edema: Yes  RESPIRATORY: LYNNE  GASTROINTESTINAL:  negative  GENITOURINARY:  negative  INTEGUMENT:  negative  MUSCULOSKELETAL:  positive for  pain  NEUROLOGICAL:  negative    PHYSICAL EXAM:      BP (!) 148/76   Pulse 72   Ht 5' (1.524 m)   Wt 202 lb (91.6 kg)   BMI 39.45 kg/m²    HEENT: PERRL, no cervical lymphadenopathy. No masses palpable. Cardiovascular:  · The apical impulse is not displaced  · Heart  Sounds:RRR, NO S3  · Jugular venous pulsation Normal  · The carotid upstroke is NL  · Peripheral pulses are symmetrical and full  Respiratory: Good respiratory effort. On auscultation: clear to auscultation bilaterally  Abdomen:  · No masses or tenderness  · Bowel sounds present  Extremities:  ·  No Cyanosis or Clubbing  ·  Lower extremity edema: Yes  Skin: Warm and dry    Cardiac data:    ECG:  AVpaced    Echo 11/18:  Technically difficult study.   Left

## 2019-06-18 DIAGNOSIS — I10 ESSENTIAL HYPERTENSION: ICD-10-CM

## 2019-06-18 RX ORDER — METOPROLOL SUCCINATE 100 MG/1
TABLET, EXTENDED RELEASE ORAL
Qty: 90 TABLET | Refills: 1 | Status: SHIPPED | OUTPATIENT
Start: 2019-06-18 | End: 2019-11-21 | Stop reason: SDUPTHER

## 2019-06-25 ENCOUNTER — OFFICE VISIT (OUTPATIENT)
Dept: FAMILY MEDICINE CLINIC | Age: 84
End: 2019-06-25
Payer: MEDICARE

## 2019-06-25 VITALS
WEIGHT: 197 LBS | HEART RATE: 73 BPM | OXYGEN SATURATION: 97 % | BODY MASS INDEX: 37.19 KG/M2 | SYSTOLIC BLOOD PRESSURE: 128 MMHG | HEIGHT: 61 IN | DIASTOLIC BLOOD PRESSURE: 62 MMHG

## 2019-06-25 DIAGNOSIS — I10 ESSENTIAL HYPERTENSION: ICD-10-CM

## 2019-06-25 DIAGNOSIS — E61.2 MAGNESIUM DEFICIENCY: Primary | ICD-10-CM

## 2019-06-25 DIAGNOSIS — F41.1 GENERALIZED ANXIETY DISORDER: ICD-10-CM

## 2019-06-25 DIAGNOSIS — N18.2 STAGE 2 CHRONIC KIDNEY DISEASE: ICD-10-CM

## 2019-06-25 DIAGNOSIS — E55.9 VITAMIN D DEFICIENCY: ICD-10-CM

## 2019-06-25 DIAGNOSIS — H91.13 PRESBYCUSIS OF BOTH EARS: ICD-10-CM

## 2019-06-25 DIAGNOSIS — I27.20 PULMONARY HTN (HCC): ICD-10-CM

## 2019-06-25 DIAGNOSIS — E11.59 TYPE 2 DIABETES MELLITUS WITH OTHER CIRCULATORY COMPLICATION, WITHOUT LONG-TERM CURRENT USE OF INSULIN (HCC): ICD-10-CM

## 2019-06-25 DIAGNOSIS — M10.00 IDIOPATHIC GOUT, UNSPECIFIED CHRONICITY, UNSPECIFIED SITE: ICD-10-CM

## 2019-06-25 PROCEDURE — 99214 OFFICE O/P EST MOD 30 MIN: CPT | Performed by: PHYSICIAN ASSISTANT

## 2019-06-25 ASSESSMENT — PATIENT HEALTH QUESTIONNAIRE - PHQ9
SUM OF ALL RESPONSES TO PHQ9 QUESTIONS 1 & 2: 0
SUM OF ALL RESPONSES TO PHQ QUESTIONS 1-9: 0
1. LITTLE INTEREST OR PLEASURE IN DOING THINGS: 0
2. FEELING DOWN, DEPRESSED OR HOPELESS: 0
SUM OF ALL RESPONSES TO PHQ QUESTIONS 1-9: 0

## 2019-06-25 NOTE — PROGRESS NOTES
[facility]    Subjective:      Patient ID: Deng Salmeron is a 80 y.o. y.o. female. Patient is seen today following up on her many health issues and nerves. She is late today given the fact she fell while getting out of her chair today onto carpet. No injury. No recent illness. The family is staying with her at night. She is alone during the day but someone comes over in the evening for supper and then spends the night with her. They have noted with some family members she moans a lot in the evening and is anxious and not sleeping. When her daughter stays with her she seems pretty fine. One daughter makes her go to bed at 11 pm and patient does not like this. Having problems taking her magnesium and toprol they are too big and she is gagging on them. Some medications she takes at night and then 4-5 hours later awakens coughing and afraid she is choking. Past Medical History:   Diagnosis Date    Abnormal ANCA (antineutrophil cytoplasmic antibody) 12/3/2018    Basal cell cancer     nose    CKD (chronic kidney disease), stage III (HCC) 12/3/2018    Dry skin dermatitis 12/3/2015    Dyspnea     chronic    Edema     Chronic    Gout     Hard of hearing     Hiatal hernia     Hypercholesterolemia     Hypertension     Hypomagnesemia 12/3/2015    Lower extremity edema 12/3/2018    Macular degeneration, dry     Obstructive sleep apnea     Open-angle glaucoma     Borderline.    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     FRACTURE SURGERY Right     wrist    HYSTERECTOMY      PACEMAKER INSERTION      sick sinus syndrome says was changed to medtronic 11/19/2018    TUBAL LIGATION  1970       Family History   Problem Relation Age of Onset    Other Mother         sepsis    Diabetes Mother     Other Father         rheumatic heart disease       Allergies   Allergen Reactions    Atorvastatin Other (See Comments)    Statins [Statins] Other (See Comments)     Muscle aches, elevated LFT's       Current Outpatient Medications   Medication Sig Dispense Refill    metoprolol succinate (TOPROL XL) 100 MG extended release tablet TAKE 1 TABLET DAILY 90 tablet 1    timolol (TIMOPTIC) 0.5 % ophthalmic solution Place 1 drop into both eyes 2 times daily      MITIGARE 0.6 MG CAPS       sertraline (ZOLOFT) 50 MG tablet Take 1 tablet by mouth daily 90 tablet 1    bumetanide (BUMEX) 1 MG tablet Take 1 tablet by mouth 2 times daily 180 tablet 1    lisinopril (PRINIVIL;ZESTRIL) 20 MG tablet TAKE 1 TABLET DAILY 90 tablet 1    Multiple Vitamins-Minerals (ICAPS AREDS 2) CHEW Take 1 tablet by mouth 2 times daily      omeprazole (PRILOSEC) 20 MG delayed release capsule Take 1 capsule by mouth daily as needed (gastritis) 90 capsule 1    magnesium oxide (MAG-OX) 400 MG tablet Take 1 tablet by mouth nightly 30 tablet 3    Cholecalciferol (VITAMIN D) 2000 UNITS CAPS capsule Take 1 capsule by mouth daily      latanoprost (XALATAN) 0.005 % ophthalmic solution Place 1 drop into both eyes nightly   3    polyethylene glycol (MIRALAX) powder Take 17 g by mouth daily as needed.  Acetaminophen (TYLENOL PO) Take  by mouth as needed. Rarely uses      aspirin 81 MG EC tablet Take 81 mg by mouth daily        No current facility-administered medications for this visit. Review of Systems   Constitutional: Positive for activity change and fatigue. Negative for appetite change, chills and fever.    HENT: Negative for congestion, postnasal drip, rhinorrhea, sinus pressure, sinus pain, sneezing, sore throat and trouble swallowing. Eyes: Negative for visual disturbance. Respiratory: Negative. Negative for cough, chest tightness and shortness of breath. Cardiovascular: Positive for leg swelling. Gastrointestinal: Negative. Negative for constipation, diarrhea, nausea and vomiting. Genitourinary: Positive for difficulty urinating and frequency. Musculoskeletal: Positive for gait problem. Skin: Negative. Neurological: Negative for dizziness, tremors, weakness, light-headedness and headaches. Psychiatric/Behavioral: Positive for sleep disturbance. The patient is nervous/anxious. Objective:      /62   Pulse 73   Ht 5' 1\" (1.549 m)   Wt 197 lb (89.4 kg)   SpO2 97%   BMI 37.22 kg/m²     Physical Exam   Constitutional: She is oriented to person, place, and time. She appears well-developed and well-nourished. No distress. HENT:   Head: Normocephalic and atraumatic. Nose: Nose normal.   Mouth/Throat: Oropharynx is clear and moist. No oropharyngeal exudate. TM's are dull. Uvula midline tongue symmetric. Eyes: Conjunctivae are normal. No scleral icterus. Neck: Normal range of motion. Neck supple. No JVD present. No thyromegaly present. Thyroid is non tender nor palpable. No carotid bruits noted. Cardiovascular: Normal rate and regular rhythm. Exam reveals no gallop and no friction rub. Murmur heard. Pulmonary/Chest: Effort normal and breath sounds normal. She has no wheezes. She has no rales. Abdominal: Soft. Bowel sounds are normal. She exhibits no distension and no mass. There is no tenderness. There is no rebound and no guarding. No hernia. Musculoskeletal: She exhibits edema. Mild lymphedema noted in the feet and ankles. Actually no real pitting edema noted in the pretibial areas. Lymphadenopathy:     She has no cervical adenopathy. Neurological: She is alert and oriented to person, place, and time.    Has a slow

## 2019-07-01 ASSESSMENT — ENCOUNTER SYMPTOMS
SORE THROAT: 0
SINUS PAIN: 0
CONSTIPATION: 0
GASTROINTESTINAL NEGATIVE: 1
DIARRHEA: 0
NAUSEA: 0
TROUBLE SWALLOWING: 0
SINUS PRESSURE: 0
SHORTNESS OF BREATH: 0
CHEST TIGHTNESS: 0
RESPIRATORY NEGATIVE: 1
RHINORRHEA: 0
VOMITING: 0
COUGH: 0

## 2019-07-29 ENCOUNTER — OFFICE VISIT (OUTPATIENT)
Dept: FAMILY MEDICINE CLINIC | Age: 84
End: 2019-07-29
Payer: MEDICARE

## 2019-07-29 VITALS
HEART RATE: 62 BPM | HEIGHT: 61 IN | DIASTOLIC BLOOD PRESSURE: 72 MMHG | SYSTOLIC BLOOD PRESSURE: 128 MMHG | BODY MASS INDEX: 36.63 KG/M2 | OXYGEN SATURATION: 97 % | WEIGHT: 194 LBS

## 2019-07-29 DIAGNOSIS — R60.0 PEDAL EDEMA: ICD-10-CM

## 2019-07-29 DIAGNOSIS — I27.20 PULMONARY HTN (HCC): ICD-10-CM

## 2019-07-29 DIAGNOSIS — Z95.0 ARTIFICIAL PACEMAKER: ICD-10-CM

## 2019-07-29 DIAGNOSIS — I89.0 LYMPHEDEMA: ICD-10-CM

## 2019-07-29 DIAGNOSIS — K21.9 GASTROESOPHAGEAL REFLUX DISEASE WITHOUT ESOPHAGITIS: ICD-10-CM

## 2019-07-29 DIAGNOSIS — M10.00 IDIOPATHIC GOUT, UNSPECIFIED CHRONICITY, UNSPECIFIED SITE: ICD-10-CM

## 2019-07-29 DIAGNOSIS — F41.8 DEPRESSION WITH ANXIETY: ICD-10-CM

## 2019-07-29 DIAGNOSIS — Z00.00 MEDICARE ANNUAL WELLNESS VISIT, SUBSEQUENT: Primary | ICD-10-CM

## 2019-07-29 DIAGNOSIS — F41.1 GENERALIZED ANXIETY DISORDER: ICD-10-CM

## 2019-07-29 DIAGNOSIS — G47.33 OSA (OBSTRUCTIVE SLEEP APNEA): ICD-10-CM

## 2019-07-29 DIAGNOSIS — H91.13 PRESBYCUSIS OF BOTH EARS: ICD-10-CM

## 2019-07-29 DIAGNOSIS — N18.30 STAGE 3 CHRONIC KIDNEY DISEASE (HCC): ICD-10-CM

## 2019-07-29 DIAGNOSIS — I10 ESSENTIAL HYPERTENSION: ICD-10-CM

## 2019-07-29 DIAGNOSIS — E11.59 TYPE 2 DIABETES MELLITUS WITH OTHER CIRCULATORY COMPLICATION, WITHOUT LONG-TERM CURRENT USE OF INSULIN (HCC): ICD-10-CM

## 2019-07-29 PROCEDURE — G0439 PPPS, SUBSEQ VISIT: HCPCS | Performed by: PHYSICIAN ASSISTANT

## 2019-07-29 RX ORDER — LISINOPRIL 20 MG/1
TABLET ORAL
Qty: 90 TABLET | Refills: 1 | Status: SHIPPED | OUTPATIENT
Start: 2019-07-29 | End: 2019-11-21 | Stop reason: SDUPTHER

## 2019-07-29 ASSESSMENT — PATIENT HEALTH QUESTIONNAIRE - PHQ9
SUM OF ALL RESPONSES TO PHQ QUESTIONS 1-9: 0
SUM OF ALL RESPONSES TO PHQ QUESTIONS 1-9: 0

## 2019-07-29 ASSESSMENT — ENCOUNTER SYMPTOMS
SHORTNESS OF BREATH: 1
EYE REDNESS: 1
CHEST TIGHTNESS: 0
TROUBLE SWALLOWING: 1

## 2019-07-29 ASSESSMENT — LIFESTYLE VARIABLES: HOW OFTEN DO YOU HAVE A DRINK CONTAINING ALCOHOL: 0

## 2019-07-29 NOTE — PROGRESS NOTES
syndrome says was changed to [facility] 11/19/2018    TUBAL LIGATION  1970       Family History   Problem Relation Age of Onset    Other Mother         sepsis    Diabetes Mother     Other Father         rheumatic heart disease       Allergies   Allergen Reactions    Atorvastatin Other (See Comments)    Statins [Statins] Other (See Comments)     Muscle aches, elevated LFT's       Current Outpatient Medications   Medication Sig Dispense Refill    Magnesium Oxide POWD Take 400 mg by mouth Daily with supper 400 mg daily po nightly 100 g 2    metoprolol succinate (TOPROL XL) 100 MG extended release tablet TAKE 1 TABLET DAILY 90 tablet 1    timolol (TIMOPTIC) 0.5 % ophthalmic solution Place 1 drop into both eyes 2 times daily      MITIGARE 0.6 MG CAPS       sertraline (ZOLOFT) 50 MG tablet Take 1 tablet by mouth daily 90 tablet 1    bumetanide (BUMEX) 1 MG tablet Take 1 tablet by mouth 2 times daily 180 tablet 1    lisinopril (PRINIVIL;ZESTRIL) 20 MG tablet TAKE 1 TABLET DAILY 90 tablet 1    Multiple Vitamins-Minerals (ICAPS AREDS 2) CHEW Take 1 tablet by mouth 2 times daily      omeprazole (PRILOSEC) 20 MG delayed release capsule Take 1 capsule by mouth daily as needed (gastritis) 90 capsule 1    magnesium oxide (MAG-OX) 400 MG tablet Take 1 tablet by mouth nightly 30 tablet 3    Cholecalciferol (VITAMIN D) 2000 UNITS CAPS capsule Take 1 capsule by mouth daily      polyethylene glycol (MIRALAX) powder Take 17 g by mouth daily as needed.  Acetaminophen (TYLENOL PO) Take  by mouth as needed. Rarely uses      aspirin 81 MG EC tablet Take 81 mg by mouth daily        No current facility-administered medications for this visit. Review of Systems   Constitutional: Negative for activity change, appetite change, chills, fatigue and fever. She is not very active at all her daughter states. HENT: Positive for trouble swallowing.  Negative for congestion, postnasal drip, rhinorrhea, sinus

## 2019-07-31 ENCOUNTER — TELEPHONE (OUTPATIENT)
Dept: FAMILY MEDICINE CLINIC | Age: 84
End: 2019-07-31

## 2019-08-06 ASSESSMENT — ENCOUNTER SYMPTOMS
BACK PAIN: 1
DIARRHEA: 0
BLOOD IN STOOL: 0
SORE THROAT: 0
ABDOMINAL PAIN: 0
RHINORRHEA: 0
WHEEZING: 0
ABDOMINAL DISTENTION: 0
NAUSEA: 0
SINUS PAIN: 0
VOMITING: 0
CHOKING: 0
CONSTIPATION: 0
COUGH: 0
SINUS PRESSURE: 0

## 2019-08-08 ENCOUNTER — TELEPHONE (OUTPATIENT)
Dept: FAMILY MEDICINE CLINIC | Age: 84
End: 2019-08-08

## 2019-08-08 NOTE — TELEPHONE ENCOUNTER
Lucina Brown calling stating pt has been taking zantac for almost a week and her stomach has not been hurting, tian questioning if pt is to continue this medication everyday or should she just take when her stomach hurts, please advise at above number.

## 2019-08-12 ENCOUNTER — OFFICE VISIT (OUTPATIENT)
Dept: PODIATRY | Age: 84
End: 2019-08-12
Payer: MEDICARE

## 2019-08-12 VITALS
HEIGHT: 61 IN | SYSTOLIC BLOOD PRESSURE: 120 MMHG | WEIGHT: 196.8 LBS | HEART RATE: 68 BPM | BODY MASS INDEX: 37.16 KG/M2 | RESPIRATION RATE: 20 BRPM | DIASTOLIC BLOOD PRESSURE: 68 MMHG

## 2019-08-12 DIAGNOSIS — E11.51 CONTROLLED TYPE 2 DM WITH PERIPHERAL CIRCULATORY DISORDER (HCC): Primary | ICD-10-CM

## 2019-08-12 DIAGNOSIS — B35.1 DERMATOPHYTOSIS OF NAIL: ICD-10-CM

## 2019-08-12 PROCEDURE — 11721 DEBRIDE NAIL 6 OR MORE: CPT | Performed by: PODIATRIST

## 2019-08-12 PROCEDURE — 99999 PR OFFICE/OUTPT VISIT,PROCEDURE ONLY: CPT | Performed by: PODIATRIST

## 2019-09-19 ENCOUNTER — HOSPITAL ENCOUNTER (OUTPATIENT)
Dept: LAB | Age: 84
Discharge: HOME | End: 2019-09-19
Payer: MEDICARE

## 2019-09-19 DIAGNOSIS — N18.30 CKD (CHRONIC KIDNEY DISEASE), STAGE III (HCC): ICD-10-CM

## 2019-09-19 LAB
ABSOLUTE EOS #: 0.1 K/UL (ref 0–0.4)
ABSOLUTE IMMATURE GRANULOCYTE: ABNORMAL K/UL (ref 0–0.3)
ABSOLUTE LYMPH #: 1.9 K/UL (ref 1–4.8)
ABSOLUTE MONO #: 0.5 K/UL (ref 0.1–1.2)
ANION GAP SERPL CALCULATED.3IONS-SCNC: 12 MMOL/L (ref 9–17)
BASOPHILS # BLD: 1 % (ref 0–1)
BASOPHILS ABSOLUTE: 0.1 K/UL (ref 0–0.2)
BUN BLDV-MCNC: 27 MG/DL (ref 8–23)
BUN/CREAT BLD: 23 (ref 9–20)
CALCIUM IONIZED: NORMAL MMOL/L (ref 1.13–1.33)
CALCIUM SERPL-MCNC: 9.6 MG/DL (ref 8.6–10.4)
CHLORIDE BLD-SCNC: 100 MMOL/L (ref 98–107)
CO2: 30 MMOL/L (ref 20–31)
CREAT SERPL-MCNC: 1.15 MG/DL (ref 0.5–0.9)
CREATININE URINE: 145.4 MG/DL (ref 28–217)
DIFFERENTIAL TYPE: ABNORMAL
EOSINOPHILS RELATIVE PERCENT: 2 % (ref 1–7)
GFR AFRICAN AMERICAN: 54 ML/MIN
GFR NON-AFRICAN AMERICAN: 44 ML/MIN
GFR SERPL CREATININE-BSD FRML MDRD: ABNORMAL ML/MIN/{1.73_M2}
GFR SERPL CREATININE-BSD FRML MDRD: ABNORMAL ML/MIN/{1.73_M2}
GLUCOSE BLD-MCNC: 114 MG/DL (ref 70–99)
HCT VFR BLD CALC: 42.6 % (ref 36–46)
HEMOGLOBIN: 13.9 G/DL (ref 12–16)
IMMATURE GRANULOCYTES: ABNORMAL %
LYMPHOCYTES # BLD: 26 % (ref 16–46)
MCH RBC QN AUTO: 30.7 PG (ref 26–34)
MCHC RBC AUTO-ENTMCNC: 32.7 G/DL (ref 31–37)
MCV RBC AUTO: 93.8 FL (ref 80–100)
MONOCYTES # BLD: 7 % (ref 4–11)
NRBC AUTOMATED: ABNORMAL PER 100 WBC
PDW BLD-RTO: 14.9 % (ref 11–14.5)
PHOSPHORUS: 3.2 MG/DL (ref 2.6–4.5)
PLATELET # BLD: 252 K/UL (ref 140–450)
PLATELET ESTIMATE: ABNORMAL
PMV BLD AUTO: 8.6 FL (ref 6–12)
POTASSIUM SERPL-SCNC: 4.1 MMOL/L (ref 3.7–5.3)
PTH INTACT: 55.57 PG/ML (ref 15–65)
RBC # BLD: 4.54 M/UL (ref 4–5.2)
RBC # BLD: ABNORMAL 10*6/UL
SEG NEUTROPHILS: 64 % (ref 43–77)
SEGMENTED NEUTROPHILS ABSOLUTE COUNT: 4.8 K/UL (ref 1.8–7.7)
SODIUM BLD-SCNC: 142 MMOL/L (ref 135–144)
TOTAL PROTEIN, URINE: 33 MG/DL
URINE TOTAL PROTEIN CREATININE RATIO: 0.23 (ref 0–0.2)
VITAMIN D 25-HYDROXY: 61.5 NG/ML (ref 30–100)
WBC # BLD: 7.4 K/UL (ref 3.5–11)
WBC # BLD: ABNORMAL 10*3/UL

## 2019-09-19 PROCEDURE — 84100 ASSAY OF PHOSPHORUS: CPT

## 2019-09-19 PROCEDURE — 80048 BASIC METABOLIC PNL TOTAL CA: CPT

## 2019-09-19 PROCEDURE — 82570 ASSAY OF URINE CREATININE: CPT

## 2019-09-19 PROCEDURE — 82306 VITAMIN D 25 HYDROXY: CPT

## 2019-09-19 PROCEDURE — 84156 ASSAY OF PROTEIN URINE: CPT

## 2019-09-19 PROCEDURE — 82310 ASSAY OF CALCIUM: CPT

## 2019-09-19 PROCEDURE — 82330 ASSAY OF CALCIUM: CPT

## 2019-09-19 PROCEDURE — 85025 COMPLETE CBC W/AUTO DIFF WBC: CPT

## 2019-09-19 PROCEDURE — 36415 COLL VENOUS BLD VENIPUNCTURE: CPT

## 2019-09-19 PROCEDURE — 83970 ASSAY OF PARATHORMONE: CPT

## 2019-09-20 LAB — CALCIUM SERPL-MCNC: 9.3 MG/DL (ref 8.6–10.4)

## 2019-09-26 ENCOUNTER — PROCEDURE VISIT (OUTPATIENT)
Dept: CARDIOLOGY | Age: 84
End: 2019-09-26
Payer: MEDICARE

## 2019-09-26 DIAGNOSIS — I49.5 SICK SINUS SYNDROME (HCC): ICD-10-CM

## 2019-09-26 DIAGNOSIS — Z95.0 PACEMAKER: ICD-10-CM

## 2019-09-26 PROCEDURE — 93280 PM DEVICE PROGR EVAL DUAL: CPT | Performed by: INTERNAL MEDICINE

## 2019-10-14 DIAGNOSIS — F41.8 DEPRESSION WITH ANXIETY: ICD-10-CM

## 2019-10-21 ENCOUNTER — OFFICE VISIT (OUTPATIENT)
Dept: PODIATRY | Age: 84
End: 2019-10-21
Payer: MEDICARE

## 2019-10-21 VITALS
DIASTOLIC BLOOD PRESSURE: 60 MMHG | HEIGHT: 61 IN | HEART RATE: 62 BPM | BODY MASS INDEX: 37 KG/M2 | SYSTOLIC BLOOD PRESSURE: 110 MMHG | WEIGHT: 196 LBS

## 2019-10-21 DIAGNOSIS — E11.51 CONTROLLED TYPE 2 DM WITH PERIPHERAL CIRCULATORY DISORDER (HCC): Primary | ICD-10-CM

## 2019-10-21 DIAGNOSIS — B35.1 DERMATOPHYTOSIS OF NAIL: ICD-10-CM

## 2019-10-21 PROCEDURE — 11721 DEBRIDE NAIL 6 OR MORE: CPT | Performed by: PODIATRIST

## 2019-10-31 ENCOUNTER — HOSPITAL ENCOUNTER (OUTPATIENT)
Dept: GENERAL RADIOLOGY | Age: 84
Discharge: HOME | End: 2019-11-02
Payer: MEDICARE

## 2019-10-31 ENCOUNTER — HOSPITAL ENCOUNTER (OUTPATIENT)
Dept: LAB | Age: 84
Discharge: HOME | End: 2019-10-31
Payer: MEDICARE

## 2019-10-31 ENCOUNTER — OFFICE VISIT (OUTPATIENT)
Dept: PRIMARY CARE CLINIC | Age: 84
End: 2019-10-31
Payer: MEDICARE

## 2019-10-31 VITALS
DIASTOLIC BLOOD PRESSURE: 84 MMHG | TEMPERATURE: 97.8 F | RESPIRATION RATE: 24 BRPM | OXYGEN SATURATION: 100 % | WEIGHT: 197 LBS | HEIGHT: 61 IN | HEART RATE: 121 BPM | BODY MASS INDEX: 37.19 KG/M2 | SYSTOLIC BLOOD PRESSURE: 124 MMHG

## 2019-10-31 DIAGNOSIS — F41.1 GENERALIZED ANXIETY DISORDER: ICD-10-CM

## 2019-10-31 DIAGNOSIS — R60.9 EDEMA, UNSPECIFIED TYPE: ICD-10-CM

## 2019-10-31 DIAGNOSIS — R06.02 SOB (SHORTNESS OF BREATH): ICD-10-CM

## 2019-10-31 DIAGNOSIS — R13.10 DYSPHAGIA, UNSPECIFIED TYPE: ICD-10-CM

## 2019-10-31 DIAGNOSIS — T17.308A CHOKING, INITIAL ENCOUNTER: ICD-10-CM

## 2019-10-31 DIAGNOSIS — Z23 NEED FOR VACCINATION: ICD-10-CM

## 2019-10-31 DIAGNOSIS — R06.02 SOB (SHORTNESS OF BREATH): Primary | ICD-10-CM

## 2019-10-31 LAB
ABSOLUTE EOS #: 0.1 K/UL (ref 0–0.4)
ABSOLUTE IMMATURE GRANULOCYTE: NORMAL K/UL (ref 0–0.3)
ABSOLUTE LYMPH #: 1.5 K/UL (ref 1–4.8)
ABSOLUTE MONO #: 0.5 K/UL (ref 0.1–1.2)
ALBUMIN SERPL-MCNC: 3.8 G/DL (ref 3.5–5.2)
ALBUMIN/GLOBULIN RATIO: 1 (ref 1–2.5)
ALP BLD-CCNC: 84 U/L (ref 35–104)
ALT SERPL-CCNC: 6 U/L (ref 5–33)
ANION GAP SERPL CALCULATED.3IONS-SCNC: 10 MMOL/L (ref 9–17)
AST SERPL-CCNC: 12 U/L
BASOPHILS # BLD: 1 % (ref 0–1)
BASOPHILS ABSOLUTE: 0.1 K/UL (ref 0–0.2)
BILIRUB SERPL-MCNC: 0.53 MG/DL (ref 0.3–1.2)
BNP INTERPRETATION: ABNORMAL
BUN BLDV-MCNC: 25 MG/DL (ref 8–23)
BUN/CREAT BLD: 25 (ref 9–20)
CALCIUM SERPL-MCNC: 9.8 MG/DL (ref 8.6–10.4)
CHLORIDE BLD-SCNC: 100 MMOL/L (ref 98–107)
CO2: 31 MMOL/L (ref 20–31)
CREAT SERPL-MCNC: 1.02 MG/DL (ref 0.5–0.9)
DIFFERENTIAL TYPE: NORMAL
EOSINOPHILS RELATIVE PERCENT: 1 % (ref 1–7)
GFR AFRICAN AMERICAN: >60 ML/MIN
GFR NON-AFRICAN AMERICAN: 51 ML/MIN
GFR SERPL CREATININE-BSD FRML MDRD: ABNORMAL ML/MIN/{1.73_M2}
GFR SERPL CREATININE-BSD FRML MDRD: ABNORMAL ML/MIN/{1.73_M2}
GLUCOSE BLD-MCNC: 123 MG/DL (ref 70–99)
HCT VFR BLD CALC: 42.8 % (ref 36–46)
HEMOGLOBIN: 14.2 G/DL (ref 12–16)
IMMATURE GRANULOCYTES: NORMAL %
LYMPHOCYTES # BLD: 18 % (ref 16–46)
MCH RBC QN AUTO: 30.8 PG (ref 26–34)
MCHC RBC AUTO-ENTMCNC: 33.1 G/DL (ref 31–37)
MCV RBC AUTO: 93.1 FL (ref 80–100)
MONOCYTES # BLD: 6 % (ref 4–11)
NRBC AUTOMATED: NORMAL PER 100 WBC
PDW BLD-RTO: 14.4 % (ref 11–14.5)
PLATELET # BLD: 232 K/UL (ref 140–450)
PLATELET ESTIMATE: NORMAL
PMV BLD AUTO: 7.7 FL (ref 6–12)
POTASSIUM SERPL-SCNC: 4.4 MMOL/L (ref 3.7–5.3)
PRO-BNP: 3008 PG/ML
RBC # BLD: 4.6 M/UL (ref 4–5.2)
RBC # BLD: NORMAL 10*6/UL
SEG NEUTROPHILS: 74 % (ref 43–77)
SEGMENTED NEUTROPHILS ABSOLUTE COUNT: 6.3 K/UL (ref 1.8–7.7)
SODIUM BLD-SCNC: 141 MMOL/L (ref 135–144)
TOTAL PROTEIN: 7.6 G/DL (ref 6.4–8.3)
TSH SERPL DL<=0.05 MIU/L-ACNC: 1.64 MIU/L (ref 0.3–5)
WBC # BLD: 8.4 K/UL (ref 3.5–11)
WBC # BLD: NORMAL 10*3/UL

## 2019-10-31 PROCEDURE — 85025 COMPLETE CBC W/AUTO DIFF WBC: CPT

## 2019-10-31 PROCEDURE — G0008 ADMIN INFLUENZA VIRUS VAC: HCPCS | Performed by: PHYSICIAN ASSISTANT

## 2019-10-31 PROCEDURE — 71046 X-RAY EXAM CHEST 2 VIEWS: CPT

## 2019-10-31 PROCEDURE — 99215 OFFICE O/P EST HI 40 MIN: CPT | Performed by: PHYSICIAN ASSISTANT

## 2019-10-31 PROCEDURE — 36415 COLL VENOUS BLD VENIPUNCTURE: CPT

## 2019-10-31 PROCEDURE — 90653 IIV ADJUVANT VACCINE IM: CPT | Performed by: PHYSICIAN ASSISTANT

## 2019-10-31 PROCEDURE — 80053 COMPREHEN METABOLIC PANEL: CPT

## 2019-10-31 PROCEDURE — 84443 ASSAY THYROID STIM HORMONE: CPT

## 2019-10-31 PROCEDURE — 83880 ASSAY OF NATRIURETIC PEPTIDE: CPT

## 2019-10-31 RX ORDER — LORAZEPAM 2 MG/ML
1 CONCENTRATE ORAL EVERY 8 HOURS PRN
Qty: 40 ML | Refills: 2 | Status: SHIPPED | OUTPATIENT
Start: 2019-10-31 | End: 2019-11-30

## 2019-10-31 RX ORDER — SERTRALINE HYDROCHLORIDE 100 MG/1
100 TABLET, FILM COATED ORAL DAILY
Qty: 30 TABLET | Refills: 0 | Status: SHIPPED | OUTPATIENT
Start: 2019-10-31 | End: 2019-12-13 | Stop reason: SDUPTHER

## 2019-11-04 ENCOUNTER — OFFICE VISIT (OUTPATIENT)
Dept: FAMILY MEDICINE CLINIC | Age: 84
End: 2019-11-04
Payer: MEDICARE

## 2019-11-04 VITALS
HEART RATE: 69 BPM | BODY MASS INDEX: 36.28 KG/M2 | OXYGEN SATURATION: 96 % | RESPIRATION RATE: 12 BRPM | WEIGHT: 192 LBS | SYSTOLIC BLOOD PRESSURE: 122 MMHG | DIASTOLIC BLOOD PRESSURE: 60 MMHG

## 2019-11-04 DIAGNOSIS — F41.1 GENERALIZED ANXIETY DISORDER: Primary | ICD-10-CM

## 2019-11-04 DIAGNOSIS — R13.10 DYSPHAGIA, UNSPECIFIED TYPE: ICD-10-CM

## 2019-11-04 PROCEDURE — 99213 OFFICE O/P EST LOW 20 MIN: CPT | Performed by: PHYSICIAN ASSISTANT

## 2019-11-07 ENCOUNTER — INITIAL CONSULT (OUTPATIENT)
Dept: SURGERY | Age: 84
End: 2019-11-07
Payer: MEDICARE

## 2019-11-07 VITALS
BODY MASS INDEX: 36.72 KG/M2 | DIASTOLIC BLOOD PRESSURE: 74 MMHG | TEMPERATURE: 97 F | SYSTOLIC BLOOD PRESSURE: 138 MMHG | HEIGHT: 61 IN | HEART RATE: 64 BPM

## 2019-11-07 DIAGNOSIS — K29.70 GASTRITIS WITHOUT BLEEDING, UNSPECIFIED CHRONICITY, UNSPECIFIED GASTRITIS TYPE: ICD-10-CM

## 2019-11-07 DIAGNOSIS — R13.19 ESOPHAGEAL DYSPHAGIA: Primary | ICD-10-CM

## 2019-11-07 PROCEDURE — 99203 OFFICE O/P NEW LOW 30 MIN: CPT | Performed by: SURGERY

## 2019-11-07 RX ORDER — OMEPRAZOLE 20 MG/1
20 CAPSULE, DELAYED RELEASE ORAL DAILY
Qty: 90 CAPSULE | Refills: 1 | Status: SHIPPED | OUTPATIENT
Start: 2019-11-07 | End: 2020-01-08

## 2019-11-10 ASSESSMENT — ENCOUNTER SYMPTOMS
CHEST TIGHTNESS: 0
RHINORRHEA: 0
SORE THROAT: 0
TROUBLE SWALLOWING: 1
WHEEZING: 0
CHOKING: 1
SINUS PRESSURE: 0
COUGH: 0
BACK PAIN: 1
SHORTNESS OF BREATH: 0
SINUS PAIN: 0

## 2019-11-13 NOTE — PROGRESS NOTES
Spoke to Dr. Isha Aguirre regarding bradycardia with HR in the 40s. No new orders recieved - will continue to monitor. furosemide (LASIX) 20 MG tablet Take 2 tablets by mouth 2 times daily Prn edema 11/20/17   JARRETT Collins   metoprolol succinate (TOPROL XL) 100 MG extended release tablet TAKE 1 TABLET DAILY 11/17/17   JARRETT Collins   furosemide (LASIX) 20 MG tablet Takes 40 mg in AM and 40 mg in PM 11/17/17   JARRETT Collins   clotrimazole-betamethasone (LOTRISONE) 1-0.05 % cream Apply topically 2 times daily for 7 days. 8/4/17   JARRETT Collins   omeprazole (PRILOSEC) 20 MG delayed release capsule Take 1 capsule by mouth daily as needed (gastritis) 12/28/16   JARRETT Collins   Handicap Placard MISC by Does not apply route Issue parking placard for person with disability. [facility] [phone]   Applicant meets the qualifying disability criteria. Length of time expected to have disability: __x___Lifetime   _____Other: 7/29/16   JARRETT Collins   magnesium oxide (MAG-OX) 400 MG tablet Take 1 tablet by mouth nightly 12/21/15   Kay Chester DO   Cholecalciferol (VITAMIN D) 2000 UNITS CAPS capsule Take 1 capsule by mouth daily    Historical Provider, MD   ammonium lactate (LAC-HYDRIN) 12 % lotion Apply to bilateral feet and legs and scaly spots twice daily to moisturize. 12/3/15   Kay Chester DO   latanoprost (XALATAN) 0.005 % ophthalmic solution  1/15/15   Historical Provider, MD   polyethylene glycol (MIRALAX) powder Take 17 g by mouth daily as needed. 8/19/13   Dinah Guerrero MD   Acetaminophen (TYLENOL PO) Take  by mouth as needed. Rarely uses    Historical Provider, MD   aspirin 81 MG EC tablet Take 81 mg by mouth daily     Historical Provider, MD       Allergies:  Statins [statins]    Social History:   reports that she has never smoked. She has never used smokeless tobacco. She reports that she does not drink alcohol or use drugs. REVIEW OF SYSTEMS:  CONSTITUTIONAL:NEGATIVE  HEENT:NEG  Cardiovascular: No chest pain, Yes dyspnea on exertion, No palpitations. Lower extremity edema: No  RESPIRATORY: LYNNE  GASTROINTESTINAL:  positive for reflux  GENITOURINARY:  negative  INTEGUMENT:  negative  MUSCULOSKELETAL:  positive for  pain  NEUROLOGICAL:  negative    PHYSICAL EXAM:      /84   Pulse 68   Wt 224 lb (101.6 kg)   BMI 42.32 kg/m²    HEENT: PERRL, no cervical lymphadenopathy. No masses palpable. Cardiovascular:  · The apical impulse is not displaced  · Heart  Sounds:RRR, S4, NO S3/RUB  · Jugular venous pulsation Normal  · The carotid upstroke is NL  · Peripheral pulses are symmetrical and full  Respiratory: Good respiratory effort. On auscultation: clear to auscultation bilaterally  Abdomen:  · No masses or tenderness  · Bowel sounds present  Extremities:  ·  No Cyanosis or Clubbing  ·  Lower extremity edema: Yes  Skin: Warm and dry    Cardiac data:    ECHO 12/2017:  FINDINGS:  1. Global left ventricular systolic function is normal with an estimated  ejection fraction of around 55%. 2. Grade 1 diastolic dysfunction is seen. 3. Mild increased left ventricular wall thickness. 4. Mild biatrial enlargement. 5. Mildly dilated right ventricle with normal systolic function. No  significant valvular regurgitation or stenosis is seen. Estimated RVSP of  28 mmHg. 6. Pacer lead seen on the right side of the heart. 7. No pericardial effusion seen. 8. Normal aortic root dimension. Labs:     CBC: No results for input(s): WBC, HGB, HCT, PLT in the last 72 hours. BMP: No results for input(s): NA, K, CO2, BUN, CREATININE, LABGLOM, GLUCOSE in the last 72 hours. PT/INR: No results for input(s): PROTIME, INR in the last 72 hours. FASTING LIPID PANEL:  Lab Results   Component Value Date    HDL 41 12/14/2017    TRIG 203 12/14/2017     LIVER PROFILE:No results for input(s): AST, ALT, LABALBU in the last 72 hours. IMPRESSION:    1. SSS s/p PPM   2. HFpEF WITH SOME FLUID OVERLAOD  3. Mild to moderate TR  3. HTN  4. CKD  5. DM 2  6. PHT: RVSP 53 mm Hg.    Patient Active

## 2019-11-18 DIAGNOSIS — R06.02 SOB (SHORTNESS OF BREATH): ICD-10-CM

## 2019-11-18 DIAGNOSIS — R60.9 EDEMA, UNSPECIFIED TYPE: ICD-10-CM

## 2019-11-18 DIAGNOSIS — R60.0 PEDAL EDEMA: ICD-10-CM

## 2019-11-19 RX ORDER — BUMETANIDE 1 MG/1
TABLET ORAL
Qty: 180 TABLET | Refills: 1 | Status: SHIPPED | OUTPATIENT
Start: 2019-11-19 | End: 2020-05-18

## 2019-11-21 ENCOUNTER — HOSPITAL ENCOUNTER (OUTPATIENT)
Dept: LAB | Age: 84
Discharge: HOME | End: 2019-11-21
Payer: MEDICARE

## 2019-11-21 ENCOUNTER — OFFICE VISIT (OUTPATIENT)
Dept: FAMILY MEDICINE CLINIC | Age: 84
End: 2019-11-21
Payer: MEDICARE

## 2019-11-21 VITALS
WEIGHT: 192 LBS | OXYGEN SATURATION: 95 % | DIASTOLIC BLOOD PRESSURE: 74 MMHG | BODY MASS INDEX: 37.69 KG/M2 | SYSTOLIC BLOOD PRESSURE: 130 MMHG | HEIGHT: 60 IN | HEART RATE: 60 BPM

## 2019-11-21 DIAGNOSIS — I10 ESSENTIAL HYPERTENSION: ICD-10-CM

## 2019-11-21 DIAGNOSIS — M10.00 IDIOPATHIC GOUT, UNSPECIFIED CHRONICITY, UNSPECIFIED SITE: ICD-10-CM

## 2019-11-21 DIAGNOSIS — M79.671 RIGHT FOOT PAIN: ICD-10-CM

## 2019-11-21 DIAGNOSIS — F41.1 GENERALIZED ANXIETY DISORDER: Primary | ICD-10-CM

## 2019-11-21 DIAGNOSIS — N18.30 CKD (CHRONIC KIDNEY DISEASE), STAGE III (HCC): ICD-10-CM

## 2019-11-21 DIAGNOSIS — L03.115 CELLULITIS OF RIGHT LEG: ICD-10-CM

## 2019-11-21 DIAGNOSIS — R13.10 DYSPHAGIA, UNSPECIFIED TYPE: Primary | ICD-10-CM

## 2019-11-21 LAB
ABSOLUTE EOS #: 0.1 K/UL (ref 0–0.4)
ABSOLUTE IMMATURE GRANULOCYTE: NORMAL K/UL (ref 0–0.3)
ABSOLUTE LYMPH #: 1.7 K/UL (ref 1–4.8)
ABSOLUTE MONO #: 0.5 K/UL (ref 0.1–1.2)
ANION GAP SERPL CALCULATED.3IONS-SCNC: 11 MMOL/L (ref 9–17)
BASOPHILS # BLD: 1 % (ref 0–1)
BASOPHILS ABSOLUTE: 0.1 K/UL (ref 0–0.2)
BUN BLDV-MCNC: 25 MG/DL (ref 8–23)
BUN/CREAT BLD: 22 (ref 9–20)
CALCIUM IONIZED: 1.26 MMOL/L (ref 1.13–1.33)
CALCIUM SERPL-MCNC: 9.9 MG/DL (ref 8.6–10.4)
CHLORIDE BLD-SCNC: 97 MMOL/L (ref 98–107)
CO2: 31 MMOL/L (ref 20–31)
CREAT SERPL-MCNC: 1.12 MG/DL (ref 0.5–0.9)
CREATININE URINE: 79.5 MG/DL (ref 28–217)
DIFFERENTIAL TYPE: NORMAL
EOSINOPHILS RELATIVE PERCENT: 1 % (ref 1–7)
GFR AFRICAN AMERICAN: 55 ML/MIN
GFR NON-AFRICAN AMERICAN: 46 ML/MIN
GFR SERPL CREATININE-BSD FRML MDRD: ABNORMAL ML/MIN/{1.73_M2}
GFR SERPL CREATININE-BSD FRML MDRD: ABNORMAL ML/MIN/{1.73_M2}
GLUCOSE BLD-MCNC: 113 MG/DL (ref 70–99)
HCT VFR BLD CALC: 42.7 % (ref 36–46)
HEMOGLOBIN: 14.2 G/DL (ref 12–16)
IMMATURE GRANULOCYTES: NORMAL %
LYMPHOCYTES # BLD: 24 % (ref 16–46)
MCH RBC QN AUTO: 30.7 PG (ref 26–34)
MCHC RBC AUTO-ENTMCNC: 33.3 G/DL (ref 31–37)
MCV RBC AUTO: 92.2 FL (ref 80–100)
MONOCYTES # BLD: 7 % (ref 4–11)
NRBC AUTOMATED: NORMAL PER 100 WBC
PDW BLD-RTO: 14 % (ref 11–14.5)
PHOSPHORUS: 3.1 MG/DL (ref 2.6–4.5)
PLATELET # BLD: 236 K/UL (ref 140–450)
PLATELET ESTIMATE: NORMAL
PMV BLD AUTO: 7.8 FL (ref 6–12)
POTASSIUM SERPL-SCNC: 4.1 MMOL/L (ref 3.7–5.3)
PTH INTACT: 27.69 PG/ML (ref 15–65)
RBC # BLD: 4.64 M/UL (ref 4–5.2)
RBC # BLD: NORMAL 10*6/UL
SEG NEUTROPHILS: 67 % (ref 43–77)
SEGMENTED NEUTROPHILS ABSOLUTE COUNT: 5 K/UL (ref 1.8–7.7)
SODIUM BLD-SCNC: 139 MMOL/L (ref 135–144)
TOTAL PROTEIN, URINE: 13 MG/DL
URIC ACID: 8.2 MG/DL (ref 2.4–5.7)
URINE TOTAL PROTEIN CREATININE RATIO: 0.16 (ref 0–0.2)
VITAMIN D 25-HYDROXY: 58.3 NG/ML (ref 30–100)
WBC # BLD: 7.4 K/UL (ref 3.5–11)
WBC # BLD: NORMAL 10*3/UL

## 2019-11-21 PROCEDURE — 85025 COMPLETE CBC W/AUTO DIFF WBC: CPT

## 2019-11-21 PROCEDURE — 82570 ASSAY OF URINE CREATININE: CPT

## 2019-11-21 PROCEDURE — 83970 ASSAY OF PARATHORMONE: CPT

## 2019-11-21 PROCEDURE — 99213 OFFICE O/P EST LOW 20 MIN: CPT | Performed by: PHYSICIAN ASSISTANT

## 2019-11-21 PROCEDURE — 84156 ASSAY OF PROTEIN URINE: CPT

## 2019-11-21 PROCEDURE — 36415 COLL VENOUS BLD VENIPUNCTURE: CPT

## 2019-11-21 PROCEDURE — 82306 VITAMIN D 25 HYDROXY: CPT

## 2019-11-21 PROCEDURE — 84100 ASSAY OF PHOSPHORUS: CPT

## 2019-11-21 PROCEDURE — 84550 ASSAY OF BLOOD/URIC ACID: CPT

## 2019-11-21 PROCEDURE — 82330 ASSAY OF CALCIUM: CPT

## 2019-11-21 PROCEDURE — 80048 BASIC METABOLIC PNL TOTAL CA: CPT

## 2019-11-21 RX ORDER — LISINOPRIL 20 MG/1
TABLET ORAL
Qty: 90 TABLET | Refills: 1 | Status: SHIPPED | OUTPATIENT
Start: 2019-11-21 | End: 2020-05-29

## 2019-11-21 RX ORDER — SERTRALINE HYDROCHLORIDE 100 MG/1
100 TABLET, FILM COATED ORAL DAILY
Qty: 90 TABLET | Refills: 1 | Status: SHIPPED | OUTPATIENT
Start: 2019-11-21 | End: 2020-01-01 | Stop reason: SDUPTHER

## 2019-11-21 RX ORDER — METOPROLOL SUCCINATE 100 MG/1
TABLET, EXTENDED RELEASE ORAL
Qty: 90 TABLET | Refills: 2 | Status: SHIPPED | OUTPATIENT
Start: 2019-11-21 | End: 2020-08-17

## 2019-11-21 RX ORDER — CEPHALEXIN 500 MG/1
500 CAPSULE ORAL 2 TIMES DAILY
Qty: 14 CAPSULE | Refills: 0 | Status: SHIPPED | OUTPATIENT
Start: 2019-11-21 | End: 2019-11-28

## 2019-11-21 ASSESSMENT — ENCOUNTER SYMPTOMS
TROUBLE SWALLOWING: 1
COLOR CHANGE: 1
GASTROINTESTINAL NEGATIVE: 1
RESPIRATORY NEGATIVE: 1

## 2019-11-21 ASSESSMENT — PATIENT HEALTH QUESTIONNAIRE - PHQ9
SUM OF ALL RESPONSES TO PHQ9 QUESTIONS 1 & 2: 0
2. FEELING DOWN, DEPRESSED OR HOPELESS: 0
SUM OF ALL RESPONSES TO PHQ QUESTIONS 1-9: 0
1. LITTLE INTEREST OR PLEASURE IN DOING THINGS: 0
SUM OF ALL RESPONSES TO PHQ QUESTIONS 1-9: 0

## 2019-11-29 ASSESSMENT — ENCOUNTER SYMPTOMS
WHEEZING: 0
DIARRHEA: 0
SORE THROAT: 0
SHORTNESS OF BREATH: 0
NAUSEA: 0

## 2019-12-01 DIAGNOSIS — M25.571 ACUTE RIGHT ANKLE PAIN: ICD-10-CM

## 2019-12-01 DIAGNOSIS — M25.471 RIGHT ANKLE SWELLING: ICD-10-CM
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
## 2019-12-02 ENCOUNTER — HOSPITAL ENCOUNTER (OUTPATIENT)
Dept: LAB | Age: 84
Discharge: HOME | End: 2019-12-02
Payer: MEDICARE

## 2019-12-02 ENCOUNTER — HOSPITAL ENCOUNTER (OUTPATIENT)
Dept: GENERAL RADIOLOGY | Age: 84
Discharge: HOME | End: 2019-12-04
Payer: MEDICARE

## 2019-12-02 ENCOUNTER — OFFICE VISIT (OUTPATIENT)
Dept: NEPHROLOGY | Age: 84
End: 2019-12-02
Payer: MEDICARE

## 2019-12-02 VITALS
SYSTOLIC BLOOD PRESSURE: 122 MMHG | HEART RATE: 82 BPM | HEIGHT: 61 IN | DIASTOLIC BLOOD PRESSURE: 84 MMHG | OXYGEN SATURATION: 99 % | BODY MASS INDEX: 36.25 KG/M2 | WEIGHT: 192 LBS

## 2019-12-02 DIAGNOSIS — I27.20 PULMONARY HYPERTENSION (HCC): ICD-10-CM

## 2019-12-02 DIAGNOSIS — I10 ESSENTIAL HYPERTENSION: ICD-10-CM

## 2019-12-02 DIAGNOSIS — T14.8XXA FRACTURE: Primary | ICD-10-CM

## 2019-12-02 DIAGNOSIS — E11.22 TYPE 2 DIABETES MELLITUS WITH STAGE 3 CHRONIC KIDNEY DISEASE, WITHOUT LONG-TERM CURRENT USE OF INSULIN (HCC): ICD-10-CM

## 2019-12-02 DIAGNOSIS — R32 URINARY INCONTINENCE, UNSPECIFIED TYPE: ICD-10-CM

## 2019-12-02 DIAGNOSIS — R39.15 URINARY URGENCY: ICD-10-CM
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
## 2019-12-02 DIAGNOSIS — N18.30 TYPE 2 DIABETES MELLITUS WITH STAGE 3 CHRONIC KIDNEY DISEASE, WITHOUT LONG-TERM CURRENT USE OF INSULIN (HCC): ICD-10-CM

## 2019-12-02 DIAGNOSIS — R60.0 LOWER EXTREMITY EDEMA: ICD-10-CM

## 2019-12-02 DIAGNOSIS — M25.571 ACUTE RIGHT ANKLE PAIN: ICD-10-CM

## 2019-12-02 DIAGNOSIS — M10.00 IDIOPATHIC GOUT, UNSPECIFIED CHRONICITY, UNSPECIFIED SITE: Primary | ICD-10-CM

## 2019-12-02 DIAGNOSIS — M25.471 RIGHT ANKLE SWELLING: ICD-10-CM

## 2019-12-02 DIAGNOSIS — R76.8 ABNORMAL ANCA (ANTINEUTROPHIL CYTOPLASMIC ANTIBODY): ICD-10-CM

## 2019-12-02 DIAGNOSIS — N18.30 CKD (CHRONIC KIDNEY DISEASE), STAGE III (HCC): Primary | ICD-10-CM

## 2019-12-02 LAB
-: ABNORMAL
AMORPHOUS: ABNORMAL
BACTERIA: ABNORMAL
BILIRUBIN URINE: NEGATIVE
CASTS UA: ABNORMAL /LPF (ref 0–2)
COLOR: ABNORMAL
COMMENT UA: ABNORMAL
CRYSTALS, UA: ABNORMAL /HPF
EPITHELIAL CELLS UA: ABNORMAL /HPF (ref 0–5)
GLUCOSE URINE: NEGATIVE
KETONES, URINE: NEGATIVE
LEUKOCYTE ESTERASE, URINE: NEGATIVE
MUCUS: ABNORMAL
NITRITE, URINE: NEGATIVE
OTHER OBSERVATIONS UA: ABNORMAL
PH UA: 6 (ref 5–6)
PROTEIN UA: NEGATIVE
RBC UA: ABNORMAL /HPF (ref 0–4)
RENAL EPITHELIAL, UA: ABNORMAL /HPF
SPECIFIC GRAVITY UA: 1.01 (ref 1.01–1.02)
TRICHOMONAS: ABNORMAL
TURBIDITY: ABNORMAL
URINE HGB: ABNORMAL
UROBILINOGEN, URINE: NORMAL
WBC UA: ABNORMAL /HPF (ref 0–4)
YEAST: ABNORMAL

## 2019-12-02 PROCEDURE — 73610 X-RAY EXAM OF ANKLE: CPT

## 2019-12-02 PROCEDURE — 87086 URINE CULTURE/COLONY COUNT: CPT

## 2019-12-02 PROCEDURE — 81001 URINALYSIS AUTO W/SCOPE: CPT

## 2019-12-02 PROCEDURE — 99213 OFFICE O/P EST LOW 20 MIN: CPT | Performed by: INTERNAL MEDICINE

## 2019-12-03 LAB
CULTURE: NORMAL
Lab: NORMAL
SPECIMEN DESCRIPTION: NORMAL

## 2019-12-03 RX ORDER — COLCHICINE 0.6 MG/1
0.6 CAPSULE ORAL DAILY
Qty: 30 CAPSULE | Refills: 3 | Status: SHIPPED | OUTPATIENT
Start: 2019-12-03 | End: 2020-04-24 | Stop reason: SDUPTHER

## 2019-12-04 ENCOUNTER — TELEPHONE (OUTPATIENT)
Dept: NEPHROLOGY | Age: 84
End: 2019-12-04

## 2019-12-04 ENCOUNTER — OFFICE VISIT (OUTPATIENT)
Dept: PODIATRY | Age: 84
End: 2019-12-04
Payer: MEDICARE

## 2019-12-04 VITALS
WEIGHT: 192 LBS | SYSTOLIC BLOOD PRESSURE: 118 MMHG | BODY MASS INDEX: 36.25 KG/M2 | HEIGHT: 61 IN | DIASTOLIC BLOOD PRESSURE: 70 MMHG | HEART RATE: 72 BPM

## 2019-12-04 DIAGNOSIS — S92.109A CLOSED NONDISPLACED FRACTURE OF TALUS, UNSPECIFIED PORTION OF TALUS, UNSPECIFIED LATERALITY, INITIAL ENCOUNTER: Primary | ICD-10-CM

## 2019-12-04 DIAGNOSIS — E11.51 CONTROLLED TYPE 2 DM WITH PERIPHERAL CIRCULATORY DISORDER (HCC): ICD-10-CM

## 2019-12-04 PROCEDURE — 99213 OFFICE O/P EST LOW 20 MIN: CPT | Performed by: PODIATRIST

## 2019-12-11 NOTE — TELEPHONE ENCOUNTER
Urine is negative for RBC, will need to continue monitor. Urine culture was also negative does not seem to have a UTI. Thanks.

## 2019-12-12 ENCOUNTER — OFFICE VISIT (OUTPATIENT)
Dept: CARDIOLOGY | Age: 84
End: 2019-12-12
Payer: MEDICARE

## 2019-12-12 VITALS
BODY MASS INDEX: 35.5 KG/M2 | SYSTOLIC BLOOD PRESSURE: 138 MMHG | HEART RATE: 69 BPM | HEIGHT: 61 IN | WEIGHT: 188 LBS | DIASTOLIC BLOOD PRESSURE: 70 MMHG

## 2019-12-12 DIAGNOSIS — I10 ESSENTIAL HYPERTENSION: Primary | ICD-10-CM

## 2019-12-12 DIAGNOSIS — Z95.0 PACEMAKER: ICD-10-CM

## 2019-12-12 DIAGNOSIS — I49.5 SICK SINUS SYNDROME (HCC): ICD-10-CM

## 2019-12-12 PROCEDURE — 99214 OFFICE O/P EST MOD 30 MIN: CPT | Performed by: INTERNAL MEDICINE

## 2019-12-12 PROCEDURE — 93000 ELECTROCARDIOGRAM COMPLETE: CPT | Performed by: INTERNAL MEDICINE

## 2019-12-13 ENCOUNTER — INITIAL CONSULT (OUTPATIENT)
Dept: SURGERY | Age: 84
End: 2019-12-13
Payer: MEDICARE

## 2019-12-13 VITALS
SYSTOLIC BLOOD PRESSURE: 126 MMHG | RESPIRATION RATE: 18 BRPM | HEIGHT: 61 IN | HEART RATE: 68 BPM | WEIGHT: 188.05 LBS | TEMPERATURE: 97.1 F | DIASTOLIC BLOOD PRESSURE: 68 MMHG | BODY MASS INDEX: 35.5 KG/M2

## 2019-12-13 DIAGNOSIS — R13.10 DYSPHAGIA, UNSPECIFIED TYPE: Primary | ICD-10-CM

## 2019-12-13 PROCEDURE — 99213 OFFICE O/P EST LOW 20 MIN: CPT | Performed by: SURGERY

## 2019-12-24 ENCOUNTER — HOSPITAL ENCOUNTER (OUTPATIENT)
Dept: GENERAL RADIOLOGY | Age: 84
Discharge: HOME | End: 2019-12-26
Payer: MEDICARE

## 2019-12-24 DIAGNOSIS — R13.10 DYSPHAGIA, UNSPECIFIED TYPE: ICD-10-CM

## 2019-12-24 PROCEDURE — 74220 X-RAY XM ESOPHAGUS 1CNTRST: CPT

## 2019-12-24 PROCEDURE — 2500000003 HC RX 250 WO HCPCS: Performed by: SURGERY

## 2019-12-24 RX ADMIN — BARIUM SULFATE 140 ML: 980 POWDER, FOR SUSPENSION ORAL at 09:56

## 2019-12-27 ENCOUNTER — HOSPITAL ENCOUNTER (OUTPATIENT)
Age: 84
Setting detail: SPECIMEN
Discharge: HOME | End: 2019-12-27
Payer: MEDICARE

## 2019-12-27 ENCOUNTER — OFFICE VISIT (OUTPATIENT)
Dept: FAMILY MEDICINE CLINIC | Age: 84
End: 2019-12-27
Payer: MEDICARE

## 2019-12-27 VITALS
WEIGHT: 194 LBS | HEIGHT: 60 IN | DIASTOLIC BLOOD PRESSURE: 64 MMHG | SYSTOLIC BLOOD PRESSURE: 122 MMHG | HEART RATE: 67 BPM | OXYGEN SATURATION: 97 % | BODY MASS INDEX: 38.09 KG/M2

## 2019-12-27 DIAGNOSIS — L89.311 PRESSURE INJURY OF RIGHT BUTTOCK, STAGE 1: ICD-10-CM

## 2019-12-27 PROCEDURE — 87205 SMEAR GRAM STAIN: CPT

## 2019-12-27 PROCEDURE — 99213 OFFICE O/P EST LOW 20 MIN: CPT | Performed by: PHYSICIAN ASSISTANT

## 2019-12-27 PROCEDURE — 87070 CULTURE OTHR SPECIMN AEROBIC: CPT

## 2019-12-27 RX ORDER — CEPHALEXIN 500 MG/1
500 CAPSULE ORAL 2 TIMES DAILY
Qty: 14 CAPSULE | Refills: 0 | Status: SHIPPED | OUTPATIENT
Start: 2019-12-27 | End: 2020-01-03

## 2019-12-27 RX ORDER — FLUCONAZOLE 10 MG/ML
100 POWDER, FOR SUSPENSION ORAL DAILY
Qty: 30 ML | Refills: 3 | Status: SHIPPED | OUTPATIENT
Start: 2019-12-27 | End: 2019-12-30 | Stop reason: ALTCHOICE

## 2019-12-27 ASSESSMENT — PATIENT HEALTH QUESTIONNAIRE - PHQ9
SUM OF ALL RESPONSES TO PHQ9 QUESTIONS 1 & 2: 0
1. LITTLE INTEREST OR PLEASURE IN DOING THINGS: 0
2. FEELING DOWN, DEPRESSED OR HOPELESS: 0
SUM OF ALL RESPONSES TO PHQ QUESTIONS 1-9: 0
SUM OF ALL RESPONSES TO PHQ QUESTIONS 1-9: 0

## 2019-12-27 NOTE — PROGRESS NOTES
[facility]    Subjective:      Patient ID: Arianne Fonseca is a 80 y.o. y.o. female. Patient is seen to check gluteal sores. The one on right is worse. Using neosporin. It hurts and seeps. The area is raw. No fever. Sitting on air pillow this helps. Also has a foam pillow. Past Medical History:   Diagnosis Date    Abnormal ANCA (antineutrophil cytoplasmic antibody) 12/3/2018    Basal cell cancer     nose    CKD (chronic kidney disease), stage III (HCC) 12/3/2018    Dry skin dermatitis 12/3/2015    Dyspnea     chronic    Edema     Chronic    Gout     Hard of hearing     Hiatal hernia     Hypercholesterolemia     Hypertension     Hypomagnesemia 12/3/2015    Lower extremity edema 12/3/2018    Macular degeneration, dry     Obstructive sleep apnea     Open-angle glaucoma     Borderline.    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     FRACTURE SURGERY Right     wrist    HYSTERECTOMY      PACEMAKER INSERTION      sick sinus syndrome says was changed to medtronic 11/19/2018    TUBAL LIGATION  1970       Family History   Problem Relation Age of Onset    Other Mother         sepsis    Diabetes Mother     Other Father         rheumatic heart disease       Allergies   Allergen Reactions    Atorvastatin Other (See Comments)    Statins [Statins] Other (See Comments)     Muscle aches, elevated LFT's       Current Outpatient Medications   Medication Sig Dispense Refill    MITIGARE 0.6 MG capsule Take 1 capsule by mouth daily 30 capsule 3  metoprolol succinate (TOPROL XL) 100 MG extended release tablet One tab qd 90 tablet 2    lisinopril (PRINIVIL;ZESTRIL) 20 MG tablet TAKE 1 TABLET DAILY 90 tablet 1    sertraline (ZOLOFT) 100 MG tablet Take 1 tablet by mouth daily 90 tablet 1    bumetanide (BUMEX) 1 MG tablet TAKE 1 TABLET TWICE A  tablet 1    omeprazole (PRILOSEC) 20 MG delayed release capsule Take 1 capsule by mouth Daily 90 capsule 1    Magnesium Oxide POWD Take 400 mg by mouth Daily with supper 400 mg daily po nightly 100 g 2    timolol (TIMOPTIC) 0.5 % ophthalmic solution Place 1 drop into both eyes 2 times daily      Multiple Vitamins-Minerals (ICAPS AREDS 2) CHEW Take 1 tablet by mouth 2 times daily      magnesium oxide (MAG-OX) 400 MG tablet Take 1 tablet by mouth nightly 30 tablet 3    Cholecalciferol (VITAMIN D) 2000 UNITS CAPS capsule Take 1 capsule by mouth daily      polyethylene glycol (MIRALAX) powder Take 17 g by mouth daily as needed.  Acetaminophen (TYLENOL PO) Take  by mouth as needed. Rarely uses      aspirin 81 MG EC tablet Take 81 mg by mouth daily        No current facility-administered medications for this visit. Review of Systems   Psychiatric/Behavioral: Negative for sleep disturbance. The patient is not nervous/anxious. Objective:      /64   Pulse 67   Ht 5' (1.524 m)   Wt 194 lb (88 kg)   SpO2 97%   BMI 37.89 kg/m²     Physical Exam  Vitals signs and nursing note reviewed. Constitutional:       General: She is not in acute distress. Appearance: Normal appearance. She is not ill-appearing or toxic-appearing. HENT:      Head: Normocephalic and atraumatic. Right Ear: External ear normal.      Left Ear: External ear normal.      Nose: Nose normal. No rhinorrhea. Eyes:      General: No scleral icterus. Pulmonary:      Effort: Pulmonary effort is normal.   Musculoskeletal:        Legs:    Skin:     General: Skin is warm.       Findings: Erythema and lesion

## 2019-12-29 LAB
CULTURE: NO GROWTH
DIRECT EXAM: NORMAL
DIRECT EXAM: NORMAL
Lab: NORMAL
SPECIMEN DESCRIPTION: NORMAL

## 2019-12-30 ENCOUNTER — OFFICE VISIT (OUTPATIENT)
Dept: PODIATRY | Age: 84
End: 2019-12-30
Payer: MEDICARE

## 2019-12-30 ENCOUNTER — HOSPITAL ENCOUNTER (OUTPATIENT)
Dept: GENERAL RADIOLOGY | Age: 84
Discharge: HOME | End: 2020-01-01
Payer: MEDICARE

## 2019-12-30 VITALS
BODY MASS INDEX: 37.89 KG/M2 | SYSTOLIC BLOOD PRESSURE: 128 MMHG | DIASTOLIC BLOOD PRESSURE: 80 MMHG | HEIGHT: 60 IN | RESPIRATION RATE: 18 BRPM | HEART RATE: 66 BPM

## 2019-12-30 DIAGNOSIS — M79.671 FOOT PAIN, RIGHT: Primary | ICD-10-CM

## 2019-12-30 DIAGNOSIS — B35.1 DERMATOPHYTOSIS OF NAIL: ICD-10-CM

## 2019-12-30 DIAGNOSIS — E11.51 CONTROLLED TYPE 2 DM WITH PERIPHERAL CIRCULATORY DISORDER (HCC): ICD-10-CM

## 2019-12-30 PROCEDURE — 99213 OFFICE O/P EST LOW 20 MIN: CPT | Performed by: PODIATRIST

## 2019-12-30 PROCEDURE — 73630 X-RAY EXAM OF FOOT: CPT

## 2019-12-30 PROCEDURE — L3260 AMBULATORY SURGICAL BOOT EAC: HCPCS | Performed by: PODIATRIST

## 2019-12-30 PROCEDURE — 11721 DEBRIDE NAIL 6 OR MORE: CPT | Performed by: PODIATRIST

## 2019-12-31 ENCOUNTER — APPOINTMENT (OUTPATIENT)
Dept: CT IMAGING | Age: 84
End: 2019-12-31
Payer: MEDICARE

## 2019-12-31 ENCOUNTER — HOSPITAL ENCOUNTER (EMERGENCY)
Age: 84
Discharge: HOME | End: 2019-12-31
Attending: EMERGENCY MEDICINE
Payer: MEDICARE

## 2019-12-31 VITALS
BODY MASS INDEX: 38.09 KG/M2 | OXYGEN SATURATION: 99 % | RESPIRATION RATE: 18 BRPM | WEIGHT: 194 LBS | HEIGHT: 60 IN | TEMPERATURE: 97.3 F | HEART RATE: 64 BPM | DIASTOLIC BLOOD PRESSURE: 49 MMHG | SYSTOLIC BLOOD PRESSURE: 107 MMHG

## 2019-12-31 LAB
ABSOLUTE EOS #: 0.09 K/UL (ref 0–0.44)
ABSOLUTE IMMATURE GRANULOCYTE: 0.09 K/UL (ref 0–0.3)
ABSOLUTE LYMPH #: 1.87 K/UL (ref 1.1–3.7)
ABSOLUTE MONO #: 1.09 K/UL (ref 0.1–1.2)
ALBUMIN SERPL-MCNC: 3.9 G/DL (ref 3.5–5.2)
ALBUMIN/GLOBULIN RATIO: 1 (ref 1–2.5)
ALP BLD-CCNC: 91 U/L (ref 35–104)
ALT SERPL-CCNC: 8 U/L (ref 5–33)
ANION GAP SERPL CALCULATED.3IONS-SCNC: 17 MMOL/L (ref 9–17)
AST SERPL-CCNC: 18 U/L
BASOPHILS # BLD: 1 % (ref 0–2)
BASOPHILS ABSOLUTE: 0.08 K/UL (ref 0–0.2)
BILIRUB SERPL-MCNC: 0.63 MG/DL (ref 0.3–1.2)
BNP INTERPRETATION: ABNORMAL
BUN BLDV-MCNC: 26 MG/DL (ref 8–23)
BUN/CREAT BLD: 26 (ref 9–20)
CALCIUM SERPL-MCNC: 9.3 MG/DL (ref 8.6–10.4)
CHLORIDE BLD-SCNC: 96 MMOL/L (ref 98–107)
CO2: 25 MMOL/L (ref 20–31)
CREAT SERPL-MCNC: 1 MG/DL (ref 0.5–0.9)
D DIMER: 1370 NG/ML
DIFFERENTIAL TYPE: ABNORMAL
EOSINOPHILS RELATIVE PERCENT: 1 % (ref 1–4)
GFR AFRICAN AMERICAN: >60 ML/MIN
GFR NON-AFRICAN AMERICAN: 52 ML/MIN
GFR SERPL CREATININE-BSD FRML MDRD: ABNORMAL ML/MIN/{1.73_M2}
GFR SERPL CREATININE-BSD FRML MDRD: ABNORMAL ML/MIN/{1.73_M2}
GLUCOSE BLD-MCNC: 186 MG/DL (ref 70–99)
HCT VFR BLD CALC: 42.5 % (ref 36.3–47.1)
HEMOGLOBIN: 13.6 G/DL (ref 11.9–15.1)
IMMATURE GRANULOCYTES: 1 %
LACTIC ACID, SEPSIS WHOLE BLOOD: ABNORMAL MMOL/L (ref 0.5–1.9)
LACTIC ACID, SEPSIS WHOLE BLOOD: NORMAL MMOL/L (ref 0.5–1.9)
LACTIC ACID, SEPSIS: 1.4 MMOL/L (ref 0.5–1.9)
LACTIC ACID, SEPSIS: 2.3 MMOL/L (ref 0.5–1.9)
LIPASE: 15 U/L (ref 13–60)
LYMPHOCYTES # BLD: 12 % (ref 24–43)
MCH RBC QN AUTO: 30.1 PG (ref 25.2–33.5)
MCHC RBC AUTO-ENTMCNC: 32 G/DL (ref 25.2–33.5)
MCV RBC AUTO: 94 FL (ref 82.6–102.9)
MONOCYTES # BLD: 7 % (ref 3–12)
NRBC AUTOMATED: 0 PER 100 WBC
PDW BLD-RTO: 13.9 % (ref 11.8–14.4)
PLATELET # BLD: 216 K/UL (ref 138–453)
PLATELET ESTIMATE: ABNORMAL
PMV BLD AUTO: 10.1 FL (ref 8.1–13.5)
POTASSIUM SERPL-SCNC: 4.6 MMOL/L (ref 3.7–5.3)
PRO-BNP: 8521 PG/ML
RBC # BLD: 4.52 M/UL (ref 3.95–5.11)
RBC # BLD: ABNORMAL 10*6/UL
SEG NEUTROPHILS: 80 % (ref 36–65)
SEGMENTED NEUTROPHILS ABSOLUTE COUNT: 12.82 K/UL (ref 1.5–8.1)
SODIUM BLD-SCNC: 138 MMOL/L (ref 135–144)
TOTAL PROTEIN: 7.9 G/DL (ref 6.4–8.3)
TROPONIN INTERP: ABNORMAL
TROPONIN INTERP: ABNORMAL
TROPONIN T: ABNORMAL NG/ML
TROPONIN T: ABNORMAL NG/ML
TROPONIN, HIGH SENSITIVITY: 31 NG/L (ref 0–14)
TROPONIN, HIGH SENSITIVITY: 34 NG/L (ref 0–14)
WBC # BLD: 16 K/UL (ref 3.5–11.3)
WBC # BLD: ABNORMAL 10*3/UL

## 2019-12-31 PROCEDURE — 99285 EMERGENCY DEPT VISIT HI MDM: CPT

## 2019-12-31 PROCEDURE — 83690 ASSAY OF LIPASE: CPT

## 2019-12-31 PROCEDURE — 83880 ASSAY OF NATRIURETIC PEPTIDE: CPT

## 2019-12-31 PROCEDURE — 85379 FIBRIN DEGRADATION QUANT: CPT

## 2019-12-31 PROCEDURE — 93005 ELECTROCARDIOGRAM TRACING: CPT | Performed by: EMERGENCY MEDICINE

## 2019-12-31 PROCEDURE — 2709999900 CT CHEST PULMONARY EMBOLISM W CONTRAST

## 2019-12-31 PROCEDURE — 6360000004 HC RX CONTRAST MEDICATION: Performed by: EMERGENCY MEDICINE

## 2019-12-31 PROCEDURE — 80053 COMPREHEN METABOLIC PANEL: CPT

## 2019-12-31 PROCEDURE — 83605 ASSAY OF LACTIC ACID: CPT

## 2019-12-31 PROCEDURE — 84484 ASSAY OF TROPONIN QUANT: CPT

## 2019-12-31 PROCEDURE — 74176 CT ABD & PELVIS W/O CONTRAST: CPT

## 2019-12-31 PROCEDURE — 85025 COMPLETE CBC W/AUTO DIFF WBC: CPT

## 2019-12-31 PROCEDURE — 36415 COLL VENOUS BLD VENIPUNCTURE: CPT

## 2019-12-31 RX ADMIN — IOPAMIDOL 100 ML: 755 INJECTION, SOLUTION INTRAVENOUS at 09:44

## 2019-12-31 ASSESSMENT — PAIN SCALES - GENERAL: PAINLEVEL_OUTOF10: 6

## 2019-12-31 ASSESSMENT — PAIN DESCRIPTION - LOCATION: LOCATION: ABDOMEN;CHEST

## 2019-12-31 ASSESSMENT — PAIN DESCRIPTION - FREQUENCY: FREQUENCY: INTERMITTENT

## 2019-12-31 ASSESSMENT — PAIN DESCRIPTION - ORIENTATION: ORIENTATION: MID

## 2019-12-31 ASSESSMENT — PAIN DESCRIPTION - PAIN TYPE: TYPE: ACUTE PAIN

## 2019-12-31 NOTE — ED PROVIDER NOTES
(*)     All other components within normal limits   COMPREHENSIVE METABOLIC PANEL - Abnormal; Notable for the following components:    Glucose 186 (*)     BUN 26 (*)     CREATININE 1.00 (*)     Bun/Cre Ratio 26 (*)     Chloride 96 (*)     GFR Non- 52 (*)     All other components within normal limits   LACTATE, SEPSIS - Abnormal; Notable for the following components:    Lactic Acid, Sepsis 2.3 (*)     All other components within normal limits   TROPONIN - Abnormal; Notable for the following components:    Troponin, High Sensitivity 31 (*)     All other components within normal limits   D-DIMER, RAPID - Abnormal; Notable for the following components:    D-Dimer, Quant 1370 (*)     All other components within normal limits   TROPONIN - Abnormal; Notable for the following components:    Troponin, High Sensitivity 34 (*)     All other components within normal limits   BRAIN NATRIURETIC PEPTIDE - Abnormal; Notable for the following components:    Pro-BNP 8,521 (*)     All other components within normal limits   LIPASE   LACTATE, SEPSIS   URINE RT REFLEX TO CULTURE   URINE RT REFLEX TO CULTURE       RECENT VITALS:  BP: (!) 166/71, Temp: 97.3 °F (36.3 °C), Pulse: 66, Resp: 18     ED Course     The patient was given the following medications:  Orders Placed This Encounter   Medications    iopamidol (ISOVUE-370) 76 % injection 100 mL         Medical Decision Making      Patient resting comfortably sats 100% blood pressure is in the 711 systolic range at this point her symptoms are resolved and not sure if it was a hypertensive urgency and combination of anxiety I offered admission to the family they said that she is had these episodes before they feel comfortable taking her home and continue your present medications    Disposition     CLINICAL IMPRESSION:  1.  Atypical chest pain        PATIENT REFERRED TO:  Samia Espinal, [address]  [phone]    Schedule an appointment as soon as possible for a visit in 3 days        DISCHARGE MEDICATIONS:  New Prescriptions    No medications on file       DISPOSITION: Discharged in stable condition  Valentino Anton, MD  (Please note that portions of this note were completed with a voice recognition program.  Efforts were made to edit the dictations but occasionally words are mis-transcribed.)      Valentino Anton, MD  12/31/19 2517

## 2019-12-31 NOTE — ED PROVIDER NOTES
[facility] ED  [address]  [place] [address] [place]  Phone: [phone]  eMERGENCY dEPARTMENT eNCOUnter      Pt Name: Meliton Reyes  MRN: [id number]  Filigfthomas 10/27/1928  Date of evaluation: 12/31/19      CHIEF COMPLAINT     Chief Complaint   Patient presents with    Chest Pain         HISTORY OF PRESENT ILLNESS    Meliton Reyes is a 80 y.o. female who presents today with complaints of abdominal pain and chest pain. The patient states that she started to have the discomfort in her abdomen that came on gradually yesterday. She took her evening medication and then she started to feel that she had some discomfort in her chest.  Her daughter is telling me that she is tossed and turned all night for the last 8 hours. Patient in general is a poor historian she does have a pacemaker. She is also very hard of hearing. I did speak with the patient and her daughter about any advanced directives that she may have. Both the patient and daughter state that she would not want to have a major surgery. She would also not be interested in hemodialysis as a life-prolonging measure. The patient does not describe shortness of breath. She does report that she had a fracture in her right foot that happened over Thanksgiving. No known history of DVT or PE.    REVIEW OF SYSTEMS     Review of Systems   Unable to perform ROS: Age   Cardiovascular: Positive for chest pain. Gastrointestinal: Positive for abdominal pain. Musculoskeletal: Negative for back pain. Patient also appears very uncomfortable.     PAST MEDICAL HISTORY    has a past medical history of Abnormal ANCA (antineutrophil cytoplasmic antibody), Basal cell cancer, CKD (chronic kidney disease), stage III ([address] Dry skin dermatitis, Dyspnea, Edema, Gout, Hard of hearing, Hiatal hernia, Hypercholesterolemia, Hypertension, Hypomagnesemia, Lower extremity edema, Macular degeneration, dry, Obstructive sleep apnea, Open-angle glaucoma, Peripheral edema, Pulmonary hypertension ([facility] Sick sinus syndrome ([facility] Type 2 diabetes mellitus ([facility] Venous insufficiency, and Vitamin D deficiency. SURGICAL HISTORY      has a past surgical history that includes Pacemaker insertion; Hysterectomy; fracture surgery (Right); Cardiac catheterization (2010); Tubal ligation (1970); Cataract removal with implant (Bilateral); and eye surgery (03/31/2017). CURRENT MEDICATIONS       Discharge Medication List as of 12/31/2019 10:47 AM      CONTINUE these medications which have NOT CHANGED    Details   cephALEXin (KEFLEX) 500 MG capsule Take 1 capsule by mouth 2 times daily for 7 days, Disp-14 capsule, R-0Normal      MITIGARE 0.6 MG capsule Take 1 capsule by mouth daily, Disp-30 capsule, R-3, DAWNormal      metoprolol succinate (TOPROL XL) 100 MG extended release tablet One tab qd, Disp-90 tablet, R-2Normal      lisinopril (PRINIVIL;ZESTRIL) 20 MG tablet TAKE 1 TABLET DAILY, Disp-90 tablet, R-1Normal      sertraline (ZOLOFT) 100 MG tablet Take 1 tablet by mouth daily, Disp-90 tablet, R-1Normal      bumetanide (BUMEX) 1 MG tablet TAKE 1 TABLET TWICE A DAY, Disp-180 tablet, R-1Normal      omeprazole (PRILOSEC) 20 MG delayed release capsule Take 1 capsule by mouth Daily, Disp-90 capsule, R-1Normal      Magnesium Oxide POWD Take 400 mg by mouth Daily with supper 400 mg daily po nightly, Disp-100 g, R-2Normal      timolol (TIMOPTIC) 0.5 % ophthalmic solution Place 1 drop into both eyes 2 times dailyHistorical Med      Multiple Vitamins-Minerals (ICAPS AREDS 2) CHEW Take 1 tablet by mouth 2 times dailyHistorical Med      magnesium oxide (MAG-OX) 400 MG tablet Take 1 tablet by mouth nightly, Disp-30 tablet, R-3      Cholecalciferol (VITAMIN D) 2000 UNITS CAPS capsule Take 1 capsule by mouth daily      polyethylene glycol (MIRALAX) powder Take 17 g by mouth daily as needed. Acetaminophen (TYLENOL PO) Take  by mouth as needed.  Rarely uses      aspirin 81 MG EC tablet Take 81 mg by mouth for laberal WB imaging. Best possible images. Acuity: Acute Type of Exam: Initial FINDINGS: Diffuse soft tissue swelling. Diffuse osteopenia. No acute fracture. Mild widening between the 1st and 2nd metatarsal bases which is unchanged. Moderate-severe narrowing of the 1st metatarsophalangeal joint. Narrowing of the interphalangeal joint spaces. Diffuse nonspecific soft tissue swelling. Right foot degenerative changes. Slight widening between the 1st and 2nd metatarsal bases could be related to a remote Lisfranc type injury. Fl Esophagram    Result Date: 12/24/2019  EXAMINATION: DOUBLE CONTRAST ESOPHAGRAM 12/24/2019 9:18 am TECHNIQUE: Double contrast esophagram was performed with barium and air contrast. FLUOROSCOPY DOSE AND TYPE OR TIME AND EXPOSURES: 1.7 minutes. A total of 12 images were obtained. COMPARISON: None HISTORY: ORDERING SYSTEM PROVIDED HISTORY: Dysphagia, unspecified type TECHNOLOGIST PROVIDED HISTORY: dysphagia Reason for Exam: Dysphagia, history of GERD, esophageal dilation, and tertiary contractions. Acuity: Chronic Type of Exam: Subsequent/Follow-up FINDINGS: Examination is suboptimal due to patient condition. No evidence of esophageal stricture, mass or ulcer is identified. Diffuse tertiary contractions are noted throughout the esophagus. GE junction is grossly unremarkable. Patient declined supine imaging. No gastroesophageal reflux was seen during the examination. At the end of the examination, the patient aspirated the given barium with cough reflex. 1. Limited examination due to patient condition. 2. No evidence of esophageal stricture, mass or ulcer. 3. Diffuse tertiary contractions are noted throughout the esophagus with eventual aspiration of given barium with cough reflex. Findings likely relate to presbyesophagus. No gastroesophageal reflux was seen during the examination, however patient declined supine imaging.      LABS:  Results for orders placed or performed

## 2019-12-31 NOTE — ED TRIAGE NOTES
Patient presents to ED from [facility] via wheelchair with cc of mid epigastric pain that extends up into her chest.  She states that the pain started yesterday. Denies any further symptoms. She is very hard of hearing so obtaining history is difficult. Daughter at bedside.

## 2020-01-01 RX ORDER — SERTRALINE HYDROCHLORIDE 100 MG/1
100 TABLET, FILM COATED ORAL DAILY
Qty: 90 TABLET | Refills: 1 | Status: SHIPPED | OUTPATIENT
Start: 2020-01-01 | End: 2020-01-14 | Stop reason: SDUPTHER

## 2020-01-02 LAB
EKG ATRIAL RATE: 72 BPM
EKG P AXIS: 87 DEGREES
EKG P-R INTERVAL: 128 MS
EKG Q-T INTERVAL: 482 MS
EKG QRS DURATION: 184 MS
EKG QTC CALCULATION (BAZETT): 527 MS
EKG R AXIS: -57 DEGREES
EKG T AXIS: 105 DEGREES
EKG VENTRICULAR RATE: 72 BPM

## 2020-01-03 ENCOUNTER — TELEPHONE (OUTPATIENT)
Dept: FAMILY MEDICINE CLINIC | Age: 85
End: 2020-01-03

## 2020-01-03 NOTE — TELEPHONE ENCOUNTER
Spoke with Daughter Hugo Orellana and she says Juancarlos Duvall is doing well says she went to have her hair done today and out to eat for lunch. Says she went to the ED and they told her that there was nothing they could do for her that they did all the testing they could do on their end  They told her to set up an appt for testing with Gen surg for possible ulcer and sent her home. They do have an appt Tuesday with Dr Selby Notice for this.

## 2020-01-06 ENCOUNTER — TELEPHONE (OUTPATIENT)
Dept: FAMILY MEDICINE CLINIC | Age: 85
End: 2020-01-06

## 2020-01-06 NOTE — TELEPHONE ENCOUNTER
----- Message from Raine Nicolas, [place] sent at 1/2/2020  8:39 PM EST -----  Please call daughter Mildred Lino. They were not seen in ER today?   Should be seen but not in urgent care  thanks

## 2020-01-07 ENCOUNTER — INITIAL CONSULT (OUTPATIENT)
Dept: SURGERY | Age: 85
End: 2020-01-07
Payer: MEDICARE

## 2020-01-07 VITALS
SYSTOLIC BLOOD PRESSURE: 122 MMHG | HEART RATE: 64 BPM | RESPIRATION RATE: 18 BRPM | DIASTOLIC BLOOD PRESSURE: 80 MMHG | TEMPERATURE: 98.1 F

## 2020-01-07 PROCEDURE — 99213 OFFICE O/P EST LOW 20 MIN: CPT | Performed by: SURGERY

## 2020-01-07 PROCEDURE — 99214 OFFICE O/P EST MOD 30 MIN: CPT | Performed by: SURGERY

## 2020-01-07 RX ORDER — POLYETHYLENE GLYCOL 3350 17 G/17G
17 POWDER, FOR SOLUTION ORAL 2 TIMES DAILY
Qty: 510 G | Refills: 9 | Status: SHIPPED | OUTPATIENT
Start: 2020-01-07 | End: 2021-01-01

## 2020-01-07 RX ORDER — SUCRALFATE 1 G/1
1 TABLET ORAL 4 TIMES DAILY
Qty: 120 TABLET | Refills: 3 | Status: SHIPPED | OUTPATIENT
Start: 2020-01-07 | End: 2021-01-01

## 2020-01-07 NOTE — PROGRESS NOTES
Arm, Position: Sitting, Cuff Size: Large Adult)   Pulse 64   Temp 98.1 °F (36.7 °C) (Tympanic)   Resp 18     General Appearance:  awake, alert, no acute distress, well developed, well nourished   Skin:  Skin color, texture, turgor normal. No rashes or lesions. Lungs:  Normal chest expansion, unlabored breathing without accessory muscle use. No audible rales, rhonchi, or wheezing. Cardiovascular: S1S2. No evidence of JVD. No evidence of pulsatile masses in abdomen  Abdomen:  Soft, non-tender, no organomegaly, no masses. Musculoskeletal: No evidence of bony/muscular deformities, trauma, atrophy of either left/right upper/lower extremity. No evidence of digital clubbing or cyanosis. ASSESSMENT:   Diagnosis Orders   1. Hiatal hernia     2. Dysphagia, unspecified type     3. Tertiary contraction of esophagus     4. Chronic constipation         PLAN:  We had a long discussion regarding multiple issues. Likely her intermittent dysphasia and chest discomfort are secondary to the tertiary contractions of the esophagus seen on esophagram, medical treatment for this includes calcium channel blocker for Botox injection either of which I will refer to the GI service for treatment and management, the patient and daughters are not interested in these treatments at this time. I again reviewed Dr. Brody Fernandez recommendations for liquid diet as this will provide the patient with baseline nutrition as well as oral hydration, she would be able to eat normal food in addition to this if she desired, there are options for non-lactose nutritional supplements as well. With regards to her chronic constipation she can increase her MiraLAX as needed in order to have softer and more frequent bowel movements, I recommended that the MiraLAX be titrated to a soft bowel movement once every day or every other day.   Patient does have some issues with swallowing tablets and her current medications are being crushed in soft foods, she has

## 2020-01-15 RX ORDER — SERTRALINE HYDROCHLORIDE 100 MG/1
100 TABLET, FILM COATED ORAL DAILY
Qty: 3 TABLET | Refills: 0 | Status: SHIPPED | OUTPATIENT
Start: 2020-01-15 | End: 2020-06-17

## 2020-03-19 ENCOUNTER — HOSPITAL ENCOUNTER (OUTPATIENT)
Dept: CT IMAGING | Age: 85
Discharge: HOME | End: 2020-03-21
Payer: MEDICARE

## 2020-03-19 ENCOUNTER — TELEPHONE (OUTPATIENT)
Dept: FAMILY MEDICINE CLINIC | Age: 85
End: 2020-03-19

## 2020-03-19 ENCOUNTER — OFFICE VISIT (OUTPATIENT)
Dept: PRIMARY CARE CLINIC | Age: 85
End: 2020-03-19
Payer: MEDICARE

## 2020-03-19 VITALS
WEIGHT: 180 LBS | TEMPERATURE: 97.8 F | HEART RATE: 60 BPM | HEIGHT: 60 IN | SYSTOLIC BLOOD PRESSURE: 116 MMHG | BODY MASS INDEX: 35.34 KG/M2 | DIASTOLIC BLOOD PRESSURE: 80 MMHG | OXYGEN SATURATION: 97 %

## 2020-03-19 PROCEDURE — 70450 CT HEAD/BRAIN W/O DYE: CPT

## 2020-03-19 PROCEDURE — 99214 OFFICE O/P EST MOD 30 MIN: CPT | Performed by: NURSE PRACTITIONER

## 2020-03-19 ASSESSMENT — PATIENT HEALTH QUESTIONNAIRE - PHQ9
1. LITTLE INTEREST OR PLEASURE IN DOING THINGS: 0
SUM OF ALL RESPONSES TO PHQ9 QUESTIONS 1 & 2: 1
SUM OF ALL RESPONSES TO PHQ QUESTIONS 1-9: 1
SUM OF ALL RESPONSES TO PHQ QUESTIONS 1-9: 1
2. FEELING DOWN, DEPRESSED OR HOPELESS: 1

## 2020-03-19 ASSESSMENT — ENCOUNTER SYMPTOMS
RESPIRATORY NEGATIVE: 1
GASTROINTESTINAL NEGATIVE: 1

## 2020-03-19 NOTE — PATIENT INSTRUCTIONS
you can lose your balance and fall. · Talk to your doctor if you have numbness in your feet. Preventing falls at home  · Remove raised doorway thresholds, throw rugs, and clutter. Repair loose carpet or raised areas in the floor. · Move furniture and electrical cords to keep them out of walking paths. · Use nonskid floor wax, and wipe up spills right away, especially on ceramic tile floors. · If you use a walker or cane, put rubber tips on it. If you use crutches, clean the bottoms of them regularly with an abrasive pad, such as steel wool. · Keep your house well lit, especially [address], and outside walkways. Use night-lights in areas such as hallways and bathrooms. Add extra light switches or use remote switches (such as switches that go on or off when you clap your hands) to make it easier to turn lights on if you have to get up during the night. · Install sturdy handrails on stairways. · Move items in your cabinets so that the things you use a lot are on the lower shelves (about waist level). · Keep a cordless phone and a flashlight with new batteries by your bed. If possible, put a phone in each of the main rooms of your house, or carry a cell phone in case you fall and cannot reach a phone. Or, you can wear a device around your neck or wrist. You push a button that sends a signal for help. · Wear low-heeled shoes that fit well and give your feet good support. Use footwear with nonskid soles. Check the heels and soles of your shoes for wear. Repair or replace worn heels or soles. · Do not wear socks without shoes on wood floors. · Walk on the grass when the sidewalks are slippery. If you live in an area that gets snow and ice in the winter, sprinkle salt on slippery steps and sidewalks. Preventing falls in the bath  · Install grab bars and nonskid mats inside and outside your shower or tub and near the toilet and sinks. · Use shower chairs and bath benches.   · Use a hand-held shower head that will allow you to sit while showering. · Get into a tub or shower by putting the weaker leg in first. Get out of a tub or shower with your strong side first.  · Repair loose toilet seats and consider installing a raised toilet seat to make getting on and off the toilet easier. · Keep your bathroom door unlocked while you are in the shower. Where can you learn more? Go to [email or web address] and sign in to your ManyWhot account. Enter [phone] in the [facility] box to learn more about \"Preventing Falls: Care Instructions. \"     If you do not have an account, please click on the \"Sign Up Now\" link. Current as of: August 6, 2019Content Version: 12.4  © 0372-3170 Healthwise, Incorporated. Care instructions adapted under license by [facility] ([facility]). If you have questions about a medical condition or this instruction, always ask your healthcare professional. [place] [address] any warranty or liability for your use of this information. Patient Education        Learning About a Closed Head Injury  What is a closed head injury? A closed head injury happens when your head gets hit hard. The strong force of the blow causes your brain to shake in your skull. This movement can cause the brain to bruise, swell, or tear. Sometimes nerves or blood vessels also get damaged. This can cause bleeding in or around the brain. A concussion is a type of closed head injury. What are the symptoms? If you have a mild concussion, you may have a mild headache or feel \"not quite right. \" These symptoms are common. They usually go away over a few days to 4 weeks. But sometimes after a concussion, you feel like you can't function as well as before the injury. And you have new symptoms. This is called postconcussive syndrome. You may:  · Find it harder to solve problems, think, concentrate, or remember. · Have headaches.   · Have changes in your sleep patterns, such as not being able to sleep or sleeping all the time. · Have changes in your personality. · Not be interested in your usual activities. · Feel angry or anxious without a clear reason. · Lose your sense of taste or smell. · Be dizzy, lightheaded, or unsteady. It may be hard to stand or walk. How is a closed head injury treated? Any person who may have a concussion needs to see a doctor. Some people have to stay in the hospital to be watched. Others can go home safely. If you go home, follow your doctor's instructions. He or she will tell you if you need someone to watch you closely for the next 24 hours or longer. Rest is the best treatment. Get plenty of sleep at night. And try to rest during the day. · Avoid activities that are physically or mentally demanding. These include housework, exercise, and schoolwork. And don't play video games, send text messages, or use the computer. You may need to change your school or work schedule to be able to avoid these activities. · Ask your doctor when it's okay to drive, ride a bike, or operate machinery. · Take an over-the-counter pain medicine, such as acetaminophen (Tylenol), ibuprofen (Advil, Motrin), or naproxen (Aleve). Be safe with medicines. Read and follow all instructions on the label. · Check with your doctor before you use any other medicines for pain. · Do not drink alcohol or use illegal drugs. They can slow recovery. They can also increase your risk of getting a second head injury. Follow-up care is a key part of your treatment and safety. Be sure to make and go to all appointments, and call your doctor if you are having problems. It's also a good idea to know your test results and keep a list of the medicines you take. Where can you learn more? Go to https://churbanoeb.Lumatix. org and sign in to your LumaStream account. Enter [phone] in the [employer] box to learn more about \"Learning About a Closed Head Injury. \"     If you do not have an

## 2020-03-19 NOTE — PROGRESS NOTES
[facility]    Subjective:     Patient ID: Ngozi Strauss is a 80 y.o. y.o. female. HPI Patient in for office for head injury after fall. This happened today at around 10 AM.. Patient states that she fel in her home attempting to get up from her chair. She had her walker and it fell with her.  she used her life alert. He family came and found her. She hit her head. She denies any other pain or injuries. She denies LOC but states that she might of lost her dignity. She is alert and oriented X 3. Past Medical History:   Diagnosis Date    Abnormal ANCA (antineutrophil cytoplasmic antibody) 12/3/2018    Basal cell cancer     nose    CKD (chronic kidney disease), stage III (HCC) 12/3/2018    Dry skin dermatitis 12/3/2015    Dyspnea     chronic    Edema     Chronic    Gout     Hard of hearing     Hiatal hernia     Hypercholesterolemia     Hypertension     Hypomagnesemia 12/3/2015    Lower extremity edema 12/3/2018    Macular degeneration, dry     Obstructive sleep apnea     Open-angle glaucoma     Borderline.    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     FRACTURE SURGERY Right     wrist    HYSTERECTOMY      PACEMAKER INSERTION      sick sinus syndrome says was changed to medtronic 11/19/2018    TUBAL LIGATION  1970       Family History   Problem Relation Age of Onset    Other Mother         sepsis    Diabetes Mother     Other Father         rheumatic heart disease          Allergies   Allergen Reactions    Atorvastatin Other (See Comments)    Statins [Statins] Other (See Comments)     Muscle aches, elevated LFT's       Current Outpatient Medications   Medication Sig Dispense Refill    sertraline (ZOLOFT) 100 MG tablet Take 1 tablet by mouth daily 3 tablet 0    sucralfate (CARAFATE) 1 GM tablet Take 1 tablet by mouth 4 times daily 120 tablet 3    polyethylene glycol (GLYCOLAX) powder Take 17 g by mouth 2 times daily 510 g 9    MITIGARE 0.6 MG capsule Take 1 capsule by mouth daily 30 capsule 3    metoprolol succinate (TOPROL XL) 100 MG extended release tablet One tab qd 90 tablet 2    lisinopril (PRINIVIL;ZESTRIL) 20 MG tablet TAKE 1 TABLET DAILY 90 tablet 1    bumetanide (BUMEX) 1 MG tablet TAKE 1 TABLET TWICE A  tablet 1    timolol (TIMOPTIC) 0.5 % ophthalmic solution Place 1 drop into both eyes 2 times daily      Multiple Vitamins-Minerals (ICAPS AREDS 2) CHEW Take 1 tablet by mouth 2 times daily      magnesium oxide (MAG-OX) 400 MG tablet Take 1 tablet by mouth nightly 30 tablet 3    Cholecalciferol (VITAMIN D) 2000 UNITS CAPS capsule Take 1 capsule by mouth daily      Acetaminophen (TYLENOL PO) Take  by mouth as needed. Rarely uses      aspirin 81 MG EC tablet Take 81 mg by mouth daily        No current facility-administered medications for this visit. Review of Systems   Constitutional: Negative. Negative for activity change and fever. Respiratory: Negative. Cardiovascular: Negative. Gastrointestinal: Negative. Musculoskeletal: Negative. Skin: Positive for wound (back of her head, her hair is very matted and scabbed already. difficult to clean. no active bleeding noted. ). Neurological: Negative. Negative for dizziness, speech difficulty and headaches. Psychiatric/Behavioral: Negative. Negative for confusion and decreased concentration.          Objective:       /80 (Site: Right Upper Arm, Position: Sitting, Cuff Size: Medium Adult)   Pulse 60   Temp 97.8 °F (36.6 °C) (Tympanic) Ht 5' (1.524 m)   Wt 180 lb (81.6 kg)   SpO2 97%   BMI 35.15 kg/m²     Physical Exam  Vitals signs and nursing note reviewed. Constitutional:       Appearance: Normal appearance. She is well-developed. HENT:      Head: Normocephalic and atraumatic. Comments: Raised area is approx 2 inches in diameter. Nose: Nose normal.      Mouth/Throat:      Mouth: Mucous membranes are moist.   Eyes:      General: Lids are normal.      Extraocular Movements: Extraocular movements intact. Conjunctiva/sclera: Conjunctivae normal.      Pupils: Pupils are equal, round, and reactive to light. Neck:      Musculoskeletal: Normal range of motion and neck supple. Cardiovascular:      Rate and Rhythm: Normal rate and regular rhythm. Heart sounds: Normal heart sounds. Pulmonary:      Effort: Pulmonary effort is normal.      Breath sounds: Normal breath sounds. Musculoskeletal: Normal range of motion. Skin:     General: Skin is warm and dry. Neurological:      Mental Status: She is alert and oriented to person, place, and time. GCS: GCS eye subscore is 4. GCS verbal subscore is 5. GCS motor subscore is 6. Cranial Nerves: Cranial nerves are intact. Sensory: Sensation is intact. Comments: She came today in a wheelchair. Typically uses walker at home. Psychiatric:         Behavior: Behavior normal.         Thought Content: Thought content normal.         Judgment: Judgment normal.       Ct Head Wo Contrast    Result Date: 3/19/2020  EXAMINATION: CT OF THE HEAD WITHOUT CONTRAST  3/19/2020 3:14 pm TECHNIQUE: CT of the head was performed without the administration of intravenous contrast. Dose modulation, iterative reconstruction, and/or weight based adjustment of the mA/kV was utilized to reduce the radiation dose to as low as reasonably achievable.  COMPARISON: CT head 09/26/2011 HISTORY: ORDERING SYSTEM PROVIDED HISTORY: Fall, initial encounter TECHNOLOGIST PROVIDED HISTORY: fell at home at 10 am, hit her head, no loss of consciousness Reason for Exam: Hit back of head after fall Acuity: Acute Type of Exam: Initial FINDINGS: BRAIN/VENTRICLES: There is no acute intracranial hemorrhage, mass effect or midline shift. No abnormal extra-axial fluid collection. The gray-white differentiation is maintained without evidence of an acute infarct. There is prominence of the ventricles and sulci due to global parenchymal volume loss. There are nonspecific areas of hypoattenuation within the periventricular and subcortical white matter, which likely represent chronic microvascular ischemic change. Left parietal lobe confluent hypoattenuation typical of encephalomalacia as seen as sequela from stroke. ORBITS: The visualized portion of the orbits demonstrate no acute abnormality. SINUSES: The visualized paranasal sinuses and mastoid air cells demonstrate no acute abnormality. SOFT TISSUES/SKULL: No acute abnormality of the visualized skull. Small left parietal scalp contusion and hematoma. Vascular calcifications are noted reflecting calcific atherosclerosis. No acute intracranial abnormality. Small left parietal scalp contusion and hematoma. Remote sequela left parietal stroke. Senescent changes. Assessment & Plan:      1. Fall, initial encounter-new    - CT HEAD WO CONTRAST; Future    2. Injury of head, initial encounter-new      3. Contusion of scalp, initial encounter-new    Discussed CT results with patient and family today. I don't believe sutures are needed today. Continue to monitor head and keep it clean and dry. May use warm washcloths to gently clean. Advised to call if she feels like other places are starting to hurt. Today she states that she does not feel any pain anywhere else. Daughter is planning on staying with her for the next few days. Advised to watch out for headaches, LOC, confusion, nausea and vomiting.  Advised that if patient shows any of these signs she will need to go to the ER. Patient and family are agreeable. Information handouts given regarding diagnosis today. Answered all of the patient's questions. Agrees with plan of care. Follow up PRN.     MAGALY Stahl CNP   3/19/2020 3:00 PM EDT

## 2020-04-24 RX ORDER — COLCHICINE 0.6 MG/1
0.6 CAPSULE ORAL DAILY
Qty: 30 CAPSULE | Refills: 6 | Status: SHIPPED | OUTPATIENT
Start: 2020-04-24 | End: 2021-01-01

## 2020-04-27 ENCOUNTER — TELEPHONE (OUTPATIENT)
Dept: OTHER | Facility: CLINIC | Age: 85
End: 2020-04-27

## 2020-05-18 RX ORDER — BUMETANIDE 1 MG/1
TABLET ORAL
Qty: 180 TABLET | Refills: 3 | Status: SHIPPED | OUTPATIENT
Start: 2020-05-18 | End: 2020-06-08 | Stop reason: SINTOL

## 2020-05-22 ENCOUNTER — HOSPITAL ENCOUNTER (OUTPATIENT)
Dept: LAB | Age: 85
Discharge: HOME | End: 2020-05-22
Payer: MEDICARE

## 2020-05-22 ENCOUNTER — PROCEDURE VISIT (OUTPATIENT)
Dept: CARDIOLOGY | Age: 85
End: 2020-05-22
Payer: MEDICARE

## 2020-05-22 LAB
ABSOLUTE EOS #: 0.16 K/UL (ref 0–0.44)
ABSOLUTE IMMATURE GRANULOCYTE: <0.03 K/UL (ref 0–0.3)
ABSOLUTE LYMPH #: 1.75 K/UL (ref 1.1–3.7)
ABSOLUTE MONO #: 0.47 K/UL (ref 0.1–1.2)
ANION GAP SERPL CALCULATED.3IONS-SCNC: 11 MMOL/L (ref 9–17)
BASOPHILS # BLD: 1 % (ref 0–2)
BASOPHILS ABSOLUTE: 0.04 K/UL (ref 0–0.2)
BUN BLDV-MCNC: 30 MG/DL (ref 8–23)
BUN/CREAT BLD: 28 (ref 9–20)
CALCIUM IONIZED: 1.19 MMOL/L (ref 1.13–1.33)
CALCIUM SERPL-MCNC: 9 MG/DL (ref 8.6–10.4)
CHLORIDE BLD-SCNC: 96 MMOL/L (ref 98–107)
CO2: 33 MMOL/L (ref 20–31)
CREAT SERPL-MCNC: 1.07 MG/DL (ref 0.5–0.9)
CREATININE URINE: 69 MG/DL (ref 28–217)
DIFFERENTIAL TYPE: ABNORMAL
EOSINOPHILS RELATIVE PERCENT: 2 % (ref 1–4)
GFR AFRICAN AMERICAN: 58 ML/MIN
GFR NON-AFRICAN AMERICAN: 48 ML/MIN
GFR SERPL CREATININE-BSD FRML MDRD: ABNORMAL ML/MIN/{1.73_M2}
GFR SERPL CREATININE-BSD FRML MDRD: ABNORMAL ML/MIN/{1.73_M2}
GLUCOSE BLD-MCNC: 123 MG/DL (ref 70–99)
HCT VFR BLD CALC: 43.5 % (ref 36.3–47.1)
HEMOGLOBIN: 13.4 G/DL (ref 11.9–15.1)
IMMATURE GRANULOCYTES: 0 %
LYMPHOCYTES # BLD: 23 % (ref 24–43)
MCH RBC QN AUTO: 29.8 PG (ref 25.2–33.5)
MCHC RBC AUTO-ENTMCNC: 30.8 G/DL (ref 25.2–33.5)
MCV RBC AUTO: 96.7 FL (ref 82.6–102.9)
MONOCYTES # BLD: 6 % (ref 3–12)
NRBC AUTOMATED: ABNORMAL PER 100 WBC
PDW BLD-RTO: 13.9 % (ref 11.8–14.4)
PHOSPHORUS: 3.4 MG/DL (ref 2.6–4.5)
PLATELET # BLD: 255 K/UL (ref 138–453)
PLATELET ESTIMATE: ABNORMAL
PMV BLD AUTO: 10.4 FL (ref 8.1–13.5)
POTASSIUM SERPL-SCNC: 4.2 MMOL/L (ref 3.7–5.3)
PTH INTACT: 113.3 PG/ML (ref 15–65)
RBC # BLD: 4.5 M/UL (ref 3.95–5.11)
RBC # BLD: ABNORMAL 10*6/UL
SEG NEUTROPHILS: 68 % (ref 36–65)
SEGMENTED NEUTROPHILS ABSOLUTE COUNT: 5.1 K/UL (ref 1.5–8.1)
SODIUM BLD-SCNC: 140 MMOL/L (ref 135–144)
TOTAL PROTEIN, URINE: 14 MG/DL
URINE TOTAL PROTEIN CREATININE RATIO: 0.2 (ref 0–0.2)
VITAMIN D 25-HYDROXY: 57.1 NG/ML (ref 30–100)
WBC # BLD: 7.5 K/UL (ref 3.5–11.3)
WBC # BLD: ABNORMAL 10*3/UL

## 2020-05-22 PROCEDURE — 82306 VITAMIN D 25 HYDROXY: CPT

## 2020-05-22 PROCEDURE — 93288 INTERROG EVL PM/LDLS PM IP: CPT | Performed by: INTERNAL MEDICINE

## 2020-05-22 PROCEDURE — 80048 BASIC METABOLIC PNL TOTAL CA: CPT

## 2020-05-22 PROCEDURE — 83970 ASSAY OF PARATHORMONE: CPT

## 2020-05-22 PROCEDURE — 84100 ASSAY OF PHOSPHORUS: CPT

## 2020-05-22 PROCEDURE — 82330 ASSAY OF CALCIUM: CPT

## 2020-05-22 PROCEDURE — 84156 ASSAY OF PROTEIN URINE: CPT

## 2020-05-22 PROCEDURE — 82570 ASSAY OF URINE CREATININE: CPT

## 2020-05-22 PROCEDURE — 85025 COMPLETE CBC W/AUTO DIFF WBC: CPT

## 2020-05-22 PROCEDURE — 36415 COLL VENOUS BLD VENIPUNCTURE: CPT

## 2020-05-29 RX ORDER — LISINOPRIL 20 MG/1
TABLET ORAL
Qty: 90 TABLET | Refills: 3 | Status: SHIPPED | OUTPATIENT
Start: 2020-05-29 | End: 2021-01-01 | Stop reason: SDUPTHER

## 2020-06-04 ENCOUNTER — OFFICE VISIT (OUTPATIENT)
Dept: CARDIOLOGY | Age: 85
End: 2020-06-04
Payer: MEDICARE

## 2020-06-04 VITALS
HEIGHT: 60 IN | HEART RATE: 63 BPM | WEIGHT: 193 LBS | DIASTOLIC BLOOD PRESSURE: 70 MMHG | SYSTOLIC BLOOD PRESSURE: 145 MMHG | BODY MASS INDEX: 37.89 KG/M2

## 2020-06-04 PROCEDURE — 99214 OFFICE O/P EST MOD 30 MIN: CPT | Performed by: INTERNAL MEDICINE

## 2020-06-04 PROCEDURE — 93005 ELECTROCARDIOGRAM TRACING: CPT | Performed by: INTERNAL MEDICINE

## 2020-06-04 PROCEDURE — 93010 ELECTROCARDIOGRAM REPORT: CPT | Performed by: INTERNAL MEDICINE

## 2020-06-08 ENCOUNTER — OFFICE VISIT (OUTPATIENT)
Dept: NEPHROLOGY | Age: 85
End: 2020-06-08
Payer: MEDICARE

## 2020-06-08 VITALS
SYSTOLIC BLOOD PRESSURE: 138 MMHG | RESPIRATION RATE: 16 BRPM | TEMPERATURE: 98.5 F | WEIGHT: 192.9 LBS | HEART RATE: 69 BPM | HEIGHT: 60 IN | DIASTOLIC BLOOD PRESSURE: 88 MMHG | OXYGEN SATURATION: 97 % | BODY MASS INDEX: 37.87 KG/M2

## 2020-06-08 PROCEDURE — 99214 OFFICE O/P EST MOD 30 MIN: CPT

## 2020-06-08 PROCEDURE — 99213 OFFICE O/P EST LOW 20 MIN: CPT | Performed by: INTERNAL MEDICINE

## 2020-06-08 RX ORDER — FUROSEMIDE 40 MG/1
40 TABLET ORAL 2 TIMES DAILY
Qty: 190 TABLET | Refills: 3 | Status: SHIPPED | OUTPATIENT
Start: 2020-06-08 | End: 2021-01-01

## 2020-06-08 NOTE — PROGRESS NOTES
Nephrology Progress Note    Lanette YAN [address] [place] [address],[address], [place]      SUBJECTIVE      Chief Complaint   Patient presents with    Chronic Kidney Disease     6 mo f/u     6/8/2021  Patient is here for follow-up Chronic kidney disease stage III with baseline creatinine running between 1.2-1.4 milligrams per DL. Etiology likely secondary to diabetic nephropathy. Patient lives by herself but has 7 kids who take turns and be with her everyday. Patient doing well. No new issues. No fever, no chills, no CP, +ve chronic SOB, no N/V/D, no abdomen pain, no urinary complaints, +ve LE edema. She follows up with her Cardiologist.   States that Bumex makes her feel \"weird\", she is not able explain the feeling. She is not taking her 2nd dose of the day regularly. Does have +ve edema b/l. She drinks about 18-20 oz/day. Patient is currently on metoprolol  mg daily, lisinopril 20 mg daily, Bumex 1 mg twice a day, oral vitamin D 2000 units replacements and magnesium oxide 400 mg daily. Last labs 5/22/2020: BUN/Cr 30/1.07, Na 140, K 4.2, Cl 96, Bicarb 33, Ca 9.0, Phos 3.4, .3, Vit D 57.1, Hb 13.4, UPC 0.20. Blood pressure today 138/88, heart rate 69.      12/2/19:  Patient is here for follow-up Chronic kidney disease stage III with baseline creatinine running between 1.2-1.4 milligrams per DL. Etiology likely secondary to diabetic nephropathy. Patient doing well. No new issues. No CP, no SOB, no N/V/D, fawn difficulty swallowing will be getting EGD 12/13/19 and some abd pain with eating, no abdomen pain,  +ve urgency and different sensation (per patient), +ve incontinence, no other urinary complaints, trace LE edema. Patient is currently on metoprolol  mg daily, lisinopril 20 mg daily, Bumex 1 mg twice a day, oral vitamin D 2000 units replacements and magnesium oxide 400 mg daily.   Last labs 11/21/19: BUN/Cr 25/1.12, Na 139, K 4.1, Cl 97, Bicarb 31, Ca 9.9, Phos 3.1, PTH 27.69, Vit D 58.3, Hb 14.2, better with that now and edema has also improved a little  She is approaching her 89th birthday and is doing fairly well. Labs from March 2018 shows a blood area nitrogen of 29 and a creatinine of 1.4 with a sodium of 144 potassium of 4 bicarbonate of 31 calcium 9.2, vitamin D 67, , hemoglobin 13.9 and urine protein creatinine ratio was 0.3. Myeloperoxidase antibodies were repeated and it was 266-she does not have any signs or symptoms of vasculitis. She has refused kidney biopsy and rheumatology referral in the past.  She also complains of a dry cough that she's been noticing and happens only at nighttime, none during the day. His throat also feels scratchy and may be related to postnasal drip. She is also on lisinopril 20 mg oral daily for over 25 years-doubt could be related to lisinopril but if it continues then we may need to switch her to losartan and see that helps. 10/2/2017  Patient is here for follow-up of her chronic kidney disease stage III. She feels okay, energy levels are good, she does complain of some right knee pain but does not take any pain medication. She is inconsistent with her Lasix use and is only taking it once a day instead of twice a day. She has gained about 8 pounds of weight since last visit. Looks like a blood pressure medications were discontinued through her PCP except for Lasix and lisinopril due to low blood pressure episode. Blood pressures are stable at this time. Labs from September 2017 shows a blood urea nitrogen of 32 and a creatinine of 0.9, potassium of 4.6, calcium ionized 1.2, PTH was 78, vitamin D was 68, hemoglobin was 12. 9.\  MPO antibodies still 193, PR3  was normal at 8 - she has no symptoms of malaise and tiredness , rash , sinusitis , cough , fever . She does not want to be referred to rheumatology       4.3.17  Patient is here for followup. She feels well, leg edema is down with Lasix 40 mg by mouth twice a day.   She is not taking has now resolved and the last creatinine from 11/16/2016 was 1.26. She takes Lasix 40 alternating with 20 mg and Aldactone 12.5 mg by mouth daily. Per daughter her by mouth intake was poor and she was taking less than 30/40 ounces of water or liquids. Labs from 11/16/2016 shows a blood urea nitrogen of 26 and a creatinine of 1.26 and a calcium of 9.2 and a potassium of 4.4 and a bicarbonate of 28. Blood pressures are stable, her edema is well controlled. She has lost about 7 pounds of weight since last visit      9/15/2016  Patient here for followup. She feels okay. Labs labs from 9/2/16 shows creatinine of 1.19, blood urea nitrogen of 35, hemoglobin of 13, bicarbonate 29 and potassium 4.7, urine protein creatinine ratio was 0.4, UA had 0-4 RBCs  Blood pressures are stable. Leg swelling has increased and she has gained 7 pounds from last visit. She is currently taking Lasix 20 alternating with 40 mg every other day and Aldactone 12.5 by mouth daily    5/2/2016  Patient here for followup. She was last seen by me last month for her renal dysfunction. Chronic kidney disease workup with the paraproteinemia workup was all negative, k/l was 1.7 and serum immunofixation was negative. Creatinine from April 2016 was up at 1.48 and baseline normally runs around 1.2. Sodium was slightly elevated at 145, bun was 35. She could have been a little dehydrated at that time and she had upper respiratory tract infection at that time. Urine analysis that was done in the past 5-10 RBCs and she tells me she had seen urology for cystoscopy and that was negative. Urine protein creatinine ratio was about 0.2. Ultrasound of the kidneys showed 9.3-9.4 cm kidneys. She was taken off losartan 100 mg on last visit that she was taking in conjunction with lisinopril 20 mg twice a day and was put on Aldactone 12.5 mg.   When she was sick and blood pressure was slightly low in the 108 systolic but now it is in the 120s and 130s systolic. Her blood pressure seems to be a well-controlled and today it was 140/70. She complains of abdominal pain and tells me that Prilosec helps and was advised to take it on a daily basis. Her edema is controlled     Pt denies any hx of heavy or prolonged NSAID use and there is no history of OTC herbal medications . No history of dysuria or frequency. Pt denies any hx of recent UTI , incontinence or nocturia or recurrent nephrolithiasis. No unusual skin rashes . No tea coloured urine . No recent procedures involving IV contrast.     Patient Active Problem List    Diagnosis Date Noted    Pacemaker at end of battery life - Change out 11/19/18 (Dr. Grover Perea) 11/19/2018     Priority: High    Chronic diastolic congestive heart failure ([address]     CKD (chronic kidney disease), stage III ([address] 12/03/2018    Lower extremity edema 12/03/2018    Abnormal ANCA (antineutrophil cytoplasmic antibody) 12/03/2018    Pulmonary hypertension ([address] 12/03/2018    Type 2 diabetes mellitus ([address]     Controlled type 2 DM with peripheral circulatory disorder ([address] 04/15/2016    Complete heart block ([address] 04/11/2016    Palpitation 04/11/2016    Peripheral edema 12/03/2015    Hypomagnesemia 12/03/2015    Dry skin dermatitis 12/03/2015    Dermatophytosis of nail 1-5R/L 01/26/2015    Gout     Pacemaker 10/24/2013     Overview Note:     DUAL CHAMBER      Hypercholesterolemia     Obstructive sleep apnea     Hard of hearing     Venous insufficiency     Dyspnea      Overview Note:     chronic      Sick sinus syndrome (HCC)      Overview Note:     with pacemaker  placement      Vitamin D deficiency     Malignant basal cell neoplasm of skin      Overview Note:     nose  replace inactive diagnosis      Hypertension     Type 2 diabetes mellitus without complication (HCC)     Hiatal hernia          CURRENT MEDICATIONS      Current Outpatient Medications   Medication Sig Dispense Refill    lisinopril (PRINIVIL;ZESTRIL) 20 MG tablet TAKE 1 TABLET DAILY 90 tablet 3    bumetanide (BUMEX) 1 MG tablet TAKE 1 TABLET TWICE A  tablet 3    MITIGARE 0.6 MG capsule Take 1 capsule by mouth daily 30 capsule 6    sertraline (ZOLOFT) 100 MG tablet Take 1 tablet by mouth daily 3 tablet 0    polyethylene glycol (GLYCOLAX) powder Take 17 g by mouth 2 times daily 510 g 9    metoprolol succinate (TOPROL XL) 100 MG extended release tablet One tab qd 90 tablet 2    timolol (TIMOPTIC) 0.5 % ophthalmic solution Place 1 drop into both eyes 2 times daily      Multiple Vitamins-Minerals (ICAPS AREDS 2) CHEW Take 1 tablet by mouth 2 times daily      magnesium oxide (MAG-OX) 400 MG tablet Take 1 tablet by mouth nightly 30 tablet 3    Cholecalciferol (VITAMIN D) 2000 UNITS CAPS capsule Take 1 capsule by mouth daily      Acetaminophen (TYLENOL PO) Take  by mouth as needed. Rarely uses      aspirin 81 MG EC tablet Take 81 mg by mouth daily       sucralfate (CARAFATE) 1 GM tablet Take 1 tablet by mouth 4 times daily (Patient not taking: Reported on 6/8/2020) 120 tablet 3     No current facility-administered medications for this visit. REVIEW OF SYSTEMS     Constitutional: No fever, no chills, no lethargy, no weakness. HEENT:  No headache, otalgia, itchy eyes, nasal discharge or sore throat, some difficulty swallowing. Cardiac:  No chest pain, dyspnea, orthopnea or PND. Chest:   No cough, phlegm or wheezing or hemoptysis . Abdomen:  +ve abdominal pain with eating, no nausea or vomiting. Neuro:  No focal weakness, abnormal movements or seizure like activity. Skin:   No rashes, no itching. :   No hematuria, no pyuria, no dysuria, no flank pain, +ve urgency and different sensation (per patient), +ve incontinence. Extremities:    + swelling, no joint pains. ROS was otherwise negative except as mentioned in the [address]      OBJECTIVE      Vitals:    06/08/20 1323   BP: 138/88   Pulse: 69   Resp: 16   Temp: 98.5 °F (36.9 °C)   SpO2: 97% 04/14/2016     UPEP:   Lab Results   Component Value Date    TPU 9 04/14/2016    TPU 9 04/14/2016      HEPBSAG:No results found for: HEPBSAG  HEPCAB:No results found for: HEPCAB  C3:   Lab Results   Component Value Date    C3 157 04/13/2016     C4:   Lab Results   Component Value Date    C4 36 04/13/2016     MPO ANCA:   Lab Results   Component Value Date     03/27/2018    . PR3 ANCA:    Lab Results   Component Value Date    PR3 6 03/27/2018                      URINALYSIS/URINE CHEMISTRIES      URINE CREATININE:    Lab Results   Component Value Date    LABCREA 69.0 05/22/2020     URINE EOSINOPHILS : No results found for: UREO  URINE PROTEIN:    Lab Results   Component Value Date    TPU 9 04/14/2016    TPU 9 04/14/2016           RADIOLOGY    No results found for this or any previous visit. ASSESSMENT     1. Chronic kidney disease stage III with baseline creatinine running between 1.2-1.4 milligrams per DL. Most recent creatinine was 1.07 mg/dl, May 2020. Patient has gone as high as 1.6 to MG/DL in the past.  Etiology likely secondary to diabetic nephropathy. CKD workup was negative and urine protein creatinine ratio  is 0.3 , no significant microscopic hematuria. UA had 0-4 RBCs   Previously urinalysis had microscopic hematuria and she has a history of cystoscopy which was negative in the past. Most recent UA had No diabetes  Ultrasound of the kidneys showed 9.3 and 9.4 cm kidneys   2. Hypertension well-controlled  BP Readings from Last 3 Encounters:   06/08/20 138/88   06/04/20 (!) 145/70   03/19/20 116/80    :  3. Type 2 Diabetes mellitus    4. Diastolic CHF  5. Aortic valve sclerosis  6. Pulmonary hypertension with right ventricular systolic pressure being 53  7. Lower extremity edema Worse  8. Secondary hyperparathyroidism PTH was 101 and vitamin D was 67  9.   ANCA/MPO positivity without any signs or symptoms active renal vasculitis, patient does not want further workup and does not want to be referred to rheumatology. Will continue to monitor. 10.  Dry cough at nighttime could be related to postnasal drip. 11.  +ve urgency and different sensation (per patient)     PLAN     1. Based on available labs patients renal function is stable. patient's volume status is acceptable. Importance of tight blood pressure, glycemic control, lipid control was outlined to patient . 2. Change Bumex to Lasix 40 mg BID. Script provided. 3. Avoid canned foods including soups, salt restriction <2gm/day and fluid restriction no more than 50-60 ounces a day. 4. Keep water intake at around 30 oz/day. 5. BMP in 1 month. Prescription provided. 6. Patient counseled about taking her meds regularly and not stopping abruptly without medical advise. 7. Follow up labs ordered : bmp, cbc,phos, intact pth, vitaminD 25 [place]           8. Urine for random protein and creat to be done. 9. Follow up in the office in 4 months    Patient was asked to avoid NSAIDS. Please do not hesitate to call with questions. Electronically signed by Sangita Pop MD on 6/8/2020 at 1:44 PM  [facility]. This note is created with the assistance of a speech-recognition program. While intending to generate a document that actually reflects the content of the visit, no guarantees can be provided that every mistake has been identified and corrected by editing.

## 2020-06-17 RX ORDER — SERTRALINE HYDROCHLORIDE 100 MG/1
TABLET, FILM COATED ORAL
Qty: 90 TABLET | Refills: 3 | Status: SHIPPED | OUTPATIENT
Start: 2020-06-17 | End: 2021-01-01 | Stop reason: ALTCHOICE

## 2020-07-01 ENCOUNTER — OFFICE VISIT (OUTPATIENT)
Dept: FAMILY MEDICINE CLINIC | Age: 85
End: 2020-07-01
Payer: MEDICARE

## 2020-07-01 VITALS
HEART RATE: 60 BPM | HEIGHT: 60 IN | TEMPERATURE: 97.5 F | DIASTOLIC BLOOD PRESSURE: 74 MMHG | BODY MASS INDEX: 37.5 KG/M2 | SYSTOLIC BLOOD PRESSURE: 128 MMHG | WEIGHT: 191 LBS

## 2020-07-01 PROCEDURE — 99214 OFFICE O/P EST MOD 30 MIN: CPT | Performed by: PHYSICIAN ASSISTANT

## 2020-07-01 PROCEDURE — 99211 OFF/OP EST MAY X REQ PHY/QHP: CPT | Performed by: PHYSICIAN ASSISTANT

## 2020-07-01 ASSESSMENT — ENCOUNTER SYMPTOMS
WHEEZING: 0
COUGH: 1
CONSTIPATION: 0
SHORTNESS OF BREATH: 0
DIARRHEA: 0
NAUSEA: 0
VOMITING: 0

## 2020-07-01 NOTE — PROGRESS NOTES
[facility]    Subjective:      Patient ID: Craig Vizcarra is a 80 y.o. y.o. female. Patient is seen for follow up she has been doing fairly well overall. If she is struggling to sleep her daughter will have her color or facebook. She will fall asleep within 15 minutes and daughter wakens her in am.  Someone still stays with her every evening. Her moaning has not been bad for her daughter Maxim Wadsworth. One daughter thinks Clyde Vizcarra should be increased but patient does not think so. The older daughter at visit today who spends most of the time with her feels the same her mood is stable and not worse at all. Some family members have more problems when they stay with her. This daughter Maxim Wadsworth has not seen any significant change. Has vivid dreams. She is now on lasix she did not feel well with bumex anymore and nephrology changed her back. She is doing well with this change. No edema for the most part. Sees cardiology in October. Just saw nephrology as well. Past Medical History:   Diagnosis Date    Abnormal ANCA (antineutrophil cytoplasmic antibody) 12/3/2018    Basal cell cancer     nose    CKD (chronic kidney disease), stage III (HCC) 12/3/2018    Dry skin dermatitis 12/3/2015    Dyspnea     chronic    Edema     Chronic    Gout     Hard of hearing     Hiatal hernia     Hypercholesterolemia     Hypertension     Hypomagnesemia 12/3/2015    Lower extremity edema 12/3/2018    Macular degeneration, dry     Obstructive sleep apnea     Open-angle glaucoma     Borderline.     Peripheral edema 12/3/2015    Pulmonary hypertension ([address] 12/3/2018    Sick sinus syndrome (HCC)     with pacemaker  placement    Type 2 diabetes mellitus ([address]     Venous insufficiency     Vitamin D deficiency        Past Surgical History:   Procedure Laterality Date    CARDIAC CATHETERIZATION  2010    no blockage    CATARACT REMOVAL WITH IMPLANT Bilateral     cataract    EYE SURGERY  03/31/2017 Biopsy to right eye lid with surgery on 05/31/2017 for squamous cell     FRACTURE SURGERY Right     wrist    HYSTERECTOMY      PACEMAKER INSERTION      sick sinus syndrome says was changed to medtronic 11/19/2018    TUBAL LIGATION  1970       Family History   Problem Relation Age of Onset    Other Mother         sepsis    Diabetes Mother     Other Father         rheumatic heart disease       Allergies   Allergen Reactions    Atorvastatin Other (See Comments)    Statins [Statins] Other (See Comments)     Muscle aches, elevated LFT's       Current Outpatient Medications   Medication Sig Dispense Refill    sertraline (ZOLOFT) 100 MG tablet TAKE 1 TABLET DAILY 90 tablet 3    furosemide (LASIX) 40 MG tablet Take 1 tablet by mouth 2 times daily 190 tablet 3    lisinopril (PRINIVIL;ZESTRIL) 20 MG tablet TAKE 1 TABLET DAILY 90 tablet 3    MITIGARE 0.6 MG capsule Take 1 capsule by mouth daily 30 capsule 6    polyethylene glycol (GLYCOLAX) powder Take 17 g by mouth 2 times daily 510 g 9    metoprolol succinate (TOPROL XL) 100 MG extended release tablet One tab qd 90 tablet 2    timolol (TIMOPTIC) 0.5 % ophthalmic solution Place 1 drop into both eyes 2 times daily      Multiple Vitamins-Minerals (ICAPS AREDS 2) CHEW Take 1 tablet by mouth 2 times daily      magnesium oxide (MAG-OX) 400 MG tablet Take 1 tablet by mouth nightly 30 tablet 3    Cholecalciferol (VITAMIN D) 2000 UNITS CAPS capsule Take 1 capsule by mouth daily      Acetaminophen (TYLENOL PO) Take  by mouth as needed. Rarely uses      aspirin 81 MG EC tablet Take 81 mg by mouth daily       sucralfate (CARAFATE) 1 GM tablet Take 1 tablet by mouth 4 times daily (Patient not taking: Reported on 6/8/2020) 120 tablet 3     No current facility-administered medications for this visit. Review of Systems   Constitutional: Negative for appetite change, fatigue and fever. Respiratory: Positive for cough.  Negative for shortness of breath and wheezing. On and off has chest heaviness and noted last night cardiology told her not to worry. She has a nervous cough. Will force it at times. Cardiovascular: Negative for chest pain, palpitations and leg swelling. Gastrointestinal: Negative for constipation, diarrhea, nausea and vomiting. No stomach pain since saw Dr. Chastity Huynh. Not on miralax anymore. Genitourinary: Negative for difficulty urinating and dysuria. Musculoskeletal: Negative for myalgias. Skin: Negative for rash. Neurological: Positive for dizziness and light-headedness. Negative for numbness and headaches. Dizziness at times nothing terrible. Had one time she slid down her reclining chair onto the floor it took several people to get her up off the floor. Psychiatric/Behavioral: Positive for hallucinations and sleep disturbance. The patient is not nervous/anxious. Sleep varies and daughter feels anxiety is in control. Sees an imaginary Prudence and her brother Neo Duke. In the past it was her father. Past memory is good recent is difficult. Memory worse every six months. Not panicky at night. Not moaning like she was. Not tearful. Objective:      /74 (Site: Right Upper Arm, Position: Sitting, Cuff Size: Medium Adult)   Pulse 60   Temp 97.5 °F (36.4 °C)   Ht 5' (1.524 m)   Wt 191 lb (86.6 kg)   LMP  (LMP Unknown)   BMI 37.30 kg/m²     Physical Exam  Vitals signs and nursing note reviewed. Constitutional:       General: She is not in acute distress. Appearance: Normal appearance. She is well-developed. She is not ill-appearing. Comments: She is examined in the wheelchair today. HENT:      Head: Normocephalic and atraumatic. Right Ear: Tympanic membrane, ear canal and external ear normal.      Left Ear: Tympanic membrane, ear canal and external ear normal.      Ears:      Comments: She wears bilateral hearing aids. TM's are dull and retracted.       Nose: Nose normal. No rhinorrhea. Mouth/Throat:      Mouth: Mucous membranes are moist.      Pharynx: No oropharyngeal exudate or posterior oropharyngeal erythema. Comments: Uvula midline tongue symmetric. Eyes:      General: No scleral icterus. Conjunctiva/sclera: Conjunctivae normal.   Neck:      Musculoskeletal: Normal range of motion and neck supple. No neck rigidity or muscular tenderness. Thyroid: No thyroid mass, thyromegaly or thyroid tenderness. Vascular: No carotid bruit. Cardiovascular:      Rate and Rhythm: Normal rate and regular rhythm. Heart sounds: Normal heart sounds. No murmur. No gallop. Pulmonary:      Effort: Pulmonary effort is normal. No respiratory distress. Breath sounds: Normal breath sounds. No wheezing, rhonchi or rales. Abdominal:      General: Bowel sounds are normal. There is no distension. Palpations: Abdomen is soft. There is no mass. Tenderness: There is no abdominal tenderness. There is no guarding or rebound. Hernia: No hernia is present. Musculoskeletal:         General: No swelling or tenderness. Lymphadenopathy:      Cervical: No cervical adenopathy. Skin:     General: Skin is warm and dry. Findings: No erythema or rash. Neurological:      General: No focal deficit present. Mental Status: She is alert and oriented to person, place, and time. Gait: Gait abnormal.   Psychiatric:         Mood and Affect: Mood normal.         Behavior: Behavior normal.         Thought Content: Thought content normal.         Judgment: Judgment normal.      Comments: She is coherent today and lucid during visit. Daughter gives most of the history for patient but patient adds at times. She is not tearful or anxious. Wt Readings from Last 3 Encounters:   07/01/20 191 lb (86.6 kg)   06/08/20 192 lb 14.4 oz (87.5 kg)   06/04/20 193 lb (87.5 kg)       Assessment & Plan:     1. At high risk for falls      2.  Essential hypertension    - Comprehensive Metabolic Panel; Future    3. Pedal edema  improved    4. Type 2 diabetes mellitus with other circulatory complication, without long-term current use of insulin (HCC)    - Comprehensive Metabolic Panel; Future  - Hemoglobin A1C; Future  - Lipid, Fasting; Future    5. Controlled type 2 DM with peripheral circulatory disorder ([facility]      6. Morbid obesity with BMI of 40.0-44.9, adult ([facility]      7. Idiopathic gout, unspecified chronicity, unspecified site    8. Pulmonary hypertension ([facility]      9. CKD (chronic kidney disease), stage III ([facility]  Follows with nephrology  Labs pending    10. Chronic diastolic congestive heart failure (HCC)  stable    11. Dysphagia, unspecified type  Stable and fully evaluated     12. Generalized anxiety disorder  stable    13. Presbycusis of both ears      14. Lewy body dementia without behavioral disturbance [facility])  Discussed this with daughter and patient      Called daughter Attila Latham and she will think about a low dose aricept. She has not been on anything like this discussed pros and cons. It may help the hallucinations and mood. She and family will discuss and get back to me    Fall prevention  Diet as tolerated  Await labs  Daughter will be notified of all results  Follow up with specialty as scheduled  Follow up with me 4 months sooner if problems  Answered their questions      Lanette received counseling on the following healthy behaviors: nutrition and medication adherence    Patient given educational materials on Diabetes, Hyperlipidemia, Nutrition and Hypertension    I have instructed Lanette to complete a self tracking handout on Blood Sugars , Blood Pressures  and Weights and instructed them to bring it with them to her next appointment. Discussed use, benefit, and side effects of prescribed medications. Barriers to medication compliance addressed. All patient questions answered. Pt voiced understanding.        Federico Mckeon  7/8/2020

## 2020-07-07 ENCOUNTER — HOSPITAL ENCOUNTER (OUTPATIENT)
Dept: LAB | Age: 85
Discharge: HOME | End: 2020-07-07
Payer: MEDICARE

## 2020-07-07 LAB
ABSOLUTE EOS #: 0.19 K/UL (ref 0–0.44)
ABSOLUTE IMMATURE GRANULOCYTE: 0.03 K/UL (ref 0–0.3)
ABSOLUTE LYMPH #: 2.11 K/UL (ref 1.1–3.7)
ABSOLUTE MONO #: 0.63 K/UL (ref 0.1–1.2)
ALBUMIN SERPL-MCNC: 3.9 G/DL (ref 3.5–5.2)
ALBUMIN/GLOBULIN RATIO: 1.1 (ref 1–2.5)
ALP BLD-CCNC: 80 U/L (ref 35–104)
ALT SERPL-CCNC: 7 U/L (ref 5–33)
ANION GAP SERPL CALCULATED.3IONS-SCNC: 13 MMOL/L (ref 9–17)
ANION GAP SERPL CALCULATED.3IONS-SCNC: 13 MMOL/L (ref 9–17)
AST SERPL-CCNC: 12 U/L
BASOPHILS # BLD: 1 % (ref 0–2)
BASOPHILS ABSOLUTE: 0.05 K/UL (ref 0–0.2)
BILIRUB SERPL-MCNC: 0.37 MG/DL (ref 0.3–1.2)
BUN BLDV-MCNC: 55 MG/DL (ref 8–23)
BUN BLDV-MCNC: 55 MG/DL (ref 8–23)
BUN/CREAT BLD: 36 (ref 9–20)
BUN/CREAT BLD: 36 (ref 9–20)
CALCIUM IONIZED: 1.14 MMOL/L (ref 1.13–1.33)
CALCIUM SERPL-MCNC: 9.2 MG/DL (ref 8.6–10.4)
CALCIUM SERPL-MCNC: 9.2 MG/DL (ref 8.6–10.4)
CHLORIDE BLD-SCNC: 95 MMOL/L (ref 98–107)
CHLORIDE BLD-SCNC: 95 MMOL/L (ref 98–107)
CHOLESTEROL, FASTING: 255 MG/DL
CHOLESTEROL/HDL RATIO: 4.7
CO2: 32 MMOL/L (ref 20–31)
CO2: 32 MMOL/L (ref 20–31)
CREAT SERPL-MCNC: 1.51 MG/DL (ref 0.5–0.9)
CREAT SERPL-MCNC: 1.51 MG/DL (ref 0.5–0.9)
CREATININE URINE: 87 MG/DL (ref 28–217)
DIFFERENTIAL TYPE: NORMAL
EOSINOPHILS RELATIVE PERCENT: 2 % (ref 1–4)
ESTIMATED AVERAGE GLUCOSE: 126 MG/DL
GFR AFRICAN AMERICAN: 39 ML/MIN
GFR AFRICAN AMERICAN: 39 ML/MIN
GFR NON-AFRICAN AMERICAN: 32 ML/MIN
GFR NON-AFRICAN AMERICAN: 32 ML/MIN
GFR SERPL CREATININE-BSD FRML MDRD: ABNORMAL ML/MIN/{1.73_M2}
GLUCOSE BLD-MCNC: 107 MG/DL (ref 70–99)
GLUCOSE BLD-MCNC: 107 MG/DL (ref 70–99)
HBA1C MFR BLD: 6 % (ref 4.8–5.9)
HCT VFR BLD CALC: 42.8 % (ref 36.3–47.1)
HDLC SERPL-MCNC: 54 MG/DL
HEMOGLOBIN: 13.5 G/DL (ref 11.9–15.1)
IMMATURE GRANULOCYTES: 0 %
LDL CHOLESTEROL: 177 MG/DL (ref 0–130)
LYMPHOCYTES # BLD: 26 % (ref 24–43)
MCH RBC QN AUTO: 30.2 PG (ref 25.2–33.5)
MCHC RBC AUTO-ENTMCNC: 31.5 G/DL (ref 25.2–33.5)
MCV RBC AUTO: 95.7 FL (ref 82.6–102.9)
MONOCYTES # BLD: 8 % (ref 3–12)
NRBC AUTOMATED: 0 PER 100 WBC
PDW BLD-RTO: 13.6 % (ref 11.8–14.4)
PHOSPHORUS: 4.6 MG/DL (ref 2.6–4.5)
PLATELET # BLD: NORMAL K/UL (ref 138–453)
PLATELET ESTIMATE: NORMAL
PLATELET, FLUORESCENCE: 195 K/UL (ref 138–453)
PLATELET, IMMATURE FRACTION: 3.6 % (ref 1.1–10.3)
PMV BLD AUTO: NORMAL FL (ref 8.1–13.5)
POTASSIUM SERPL-SCNC: 4.3 MMOL/L (ref 3.7–5.3)
POTASSIUM SERPL-SCNC: 4.3 MMOL/L (ref 3.7–5.3)
PTH INTACT: 101.1 PG/ML (ref 15–65)
RBC # BLD: 4.47 M/UL (ref 3.95–5.11)
RBC # BLD: NORMAL 10*6/UL
SEG NEUTROPHILS: 63 % (ref 36–65)
SEGMENTED NEUTROPHILS ABSOLUTE COUNT: 5.08 K/UL (ref 1.5–8.1)
SODIUM BLD-SCNC: 140 MMOL/L (ref 135–144)
SODIUM BLD-SCNC: 140 MMOL/L (ref 135–144)
TOTAL PROTEIN, URINE: 15 MG/DL
TOTAL PROTEIN: 7.4 G/DL (ref 6.4–8.3)
TRIGLYCERIDE, FASTING: 119 MG/DL
URINE TOTAL PROTEIN CREATININE RATIO: 0.17 (ref 0–0.2)
VITAMIN D 25-HYDROXY: 56.3 NG/ML (ref 30–100)
VLDLC SERPL CALC-MCNC: ABNORMAL MG/DL (ref 1–30)
WBC # BLD: 8.1 K/UL (ref 3.5–11.3)
WBC # BLD: NORMAL 10*3/UL

## 2020-07-07 PROCEDURE — 83036 HEMOGLOBIN GLYCOSYLATED A1C: CPT

## 2020-07-07 PROCEDURE — 82570 ASSAY OF URINE CREATININE: CPT

## 2020-07-07 PROCEDURE — 85055 RETICULATED PLATELET ASSAY: CPT

## 2020-07-07 PROCEDURE — 36415 COLL VENOUS BLD VENIPUNCTURE: CPT

## 2020-07-07 PROCEDURE — 80061 LIPID PANEL: CPT

## 2020-07-07 PROCEDURE — 80053 COMPREHEN METABOLIC PANEL: CPT

## 2020-07-07 PROCEDURE — 84156 ASSAY OF PROTEIN URINE: CPT

## 2020-07-07 PROCEDURE — 80048 BASIC METABOLIC PNL TOTAL CA: CPT

## 2020-07-07 PROCEDURE — 82330 ASSAY OF CALCIUM: CPT

## 2020-07-07 PROCEDURE — 82306 VITAMIN D 25 HYDROXY: CPT

## 2020-07-07 PROCEDURE — 83970 ASSAY OF PARATHORMONE: CPT

## 2020-07-07 PROCEDURE — 84100 ASSAY OF PHOSPHORUS: CPT

## 2020-07-07 PROCEDURE — 85025 COMPLETE CBC W/AUTO DIFF WBC: CPT

## 2020-07-08 PROBLEM — E66.01 MORBID OBESITY WITH BMI OF 40.0-44.9, ADULT (HCC): Status: ACTIVE | Noted: 2020-07-08

## 2020-07-13 ENCOUNTER — TELEPHONE (OUTPATIENT)
Dept: NEPHROLOGY | Age: 85
End: 2020-07-13

## 2020-07-13 NOTE — TELEPHONE ENCOUNTER
Patient's daughter Toma Aschoff called the office this am.  Patient had recent lab work done for Dr Dino Boast. Patient's family was instructed to call Dr Court Tai office to get the lab results. Barbara Phelpsramankeren would like to speak to Dr Dino Boast today.   # [phone] or  [phone]

## 2020-07-13 NOTE — TELEPHONE ENCOUNTER
Patient's daughter Consuella Companion called the clinic and wanted to speak with me about her mother's labs. Called her back and discussed her mother's labs. Patient's baseline creatinine seems to range around 1.2 to 1.4 mg/DL but she has gone as high as 1.6 mg/DL in the past.  Her most recent creatinine done on 7/7/2020 was 1.51 mg/DL, sodium 140, potassium 4.3, chloride 95, bicarb 32, BUN 55 and calcium of 9.2. Blood glucose level 107. Patient seems to be little intravascularly depleted as per the labs. Venecia Pettit mentions that she usually arranges her mother's medication for her. I explained her to change Lasix to 40 mg in a.m. and 20 mg in the evening. And repeat BMP in 1 month. And patient needs to drink enough water around 30 ounces per day, which only does about half of that. Venecia Pettit verbalized understanding and was appreciative of the call and will work towards the changes. Justice Tran MD  [facility]     This note is created with the assistance of a speech-recognition program. While intending to generate a document that actually reflects the content of the visit, no guarantees can be provided that every mistake has been identified and corrected by editing. (1) body pink, extremities blue

## 2020-08-17 RX ORDER — METOPROLOL SUCCINATE 100 MG/1
TABLET, EXTENDED RELEASE ORAL
Qty: 90 TABLET | Refills: 3 | Status: SHIPPED | OUTPATIENT
Start: 2020-08-17 | End: 2021-01-01 | Stop reason: SDUPTHER

## 2020-09-04 ENCOUNTER — OFFICE VISIT (OUTPATIENT)
Dept: PODIATRY | Age: 85
End: 2020-09-04
Payer: MEDICARE

## 2020-09-04 VITALS
SYSTOLIC BLOOD PRESSURE: 122 MMHG | DIASTOLIC BLOOD PRESSURE: 70 MMHG | HEIGHT: 60 IN | HEART RATE: 70 BPM | BODY MASS INDEX: 37.5 KG/M2 | WEIGHT: 191 LBS

## 2020-09-04 PROCEDURE — 99213 OFFICE O/P EST LOW 20 MIN: CPT | Performed by: PODIATRIST

## 2020-09-04 PROCEDURE — 11721 DEBRIDE NAIL 6 OR MORE: CPT | Performed by: PODIATRIST

## 2020-09-04 RX ORDER — METHYLPREDNISOLONE 4 MG/1
TABLET ORAL
Qty: 1 KIT | Refills: 0 | Status: SHIPPED | OUTPATIENT
Start: 2020-09-04 | End: 2020-10-05 | Stop reason: ALTCHOICE

## 2020-09-04 NOTE — PROGRESS NOTES
Subjective:  Lael Dubin is a 80 y.o. female who presents to the office today complaining of new pain on the Right foot to ankle on inside. Nl trauma. .   Symptoms began  week(s) ago. History of injury: no.  Patient relates pain is Present. Pain is rated 4 out of 10 and is described as intermittent. Treatments prior to today's visit include: none. Pt has been walking barefoot extensively at home for awhile. Pt also presents for [facility] foot care. .  Currently denies F/C/N/V. Allergies   Allergen Reactions    Atorvastatin Other (See Comments)    Statins [Statins] Other (See Comments)     Muscle aches, elevated LFT's       Past Medical History:   Diagnosis Date    Abnormal ANCA (antineutrophil cytoplasmic antibody) 12/3/2018    Basal cell cancer     nose    CKD (chronic kidney disease), stage III ([address] 12/3/2018    Dry skin dermatitis 12/3/2015    Dyspnea     chronic    Edema     Chronic    Gout     Hard of hearing     Hiatal hernia     Hypercholesterolemia     Hypertension     Hypomagnesemia 12/3/2015    Lower extremity edema 12/3/2018    Macular degeneration, dry     Obstructive sleep apnea     Open-angle glaucoma     Borderline.  Peripheral edema 12/3/2015    Pulmonary hypertension ([address] 12/3/2018    Sick sinus syndrome (HCC)     with pacemaker  placement    Type 2 diabetes mellitus ([address]     Venous insufficiency     Vitamin D deficiency        Prior to Admission medications    Medication Sig Start Date End Date Taking? Authorizing Provider   methylPREDNISolone (MEDROL DOSEPACK) 4 MG tablet Take by mouth.  9/4/20  Yes Josh García DPM   metoprolol succinate (TOPROL XL) 100 MG extended release tablet TAKE 1 TABLET DAILY 8/17/20  Yes JARRETT Soria   sertraline (ZOLOFT) 100 MG tablet TAKE 1 TABLET DAILY 6/17/20  Yes JARRETT Soria   furosemide (LASIX) 40 MG tablet Take 1 tablet by mouth 2 times daily 6/8/20  Yes Antonina Pa MD   lisinopril (PRINIVIL;ZESTRIL) 20 MG tablet TAKE 1 TABLET DAILY 5/29/20  Yes Raven Cantu [place], PA   sucralfate (CARAFATE) 1 GM tablet Take 1 tablet by mouth 4 times daily 1/7/20  Yes Colette Hastings, DO   polyethylene glycol [facility]) powder Take 17 g by mouth 2 times daily 1/7/20  Yes Colette Hastings, DO   timolol (TIMOPTIC) 0.5 % ophthalmic solution Place 1 drop into both eyes 2 times daily   Yes Historical Provider, MD   Multiple Vitamins-Minerals (ICAPS AREDS 2) CHEW Take 1 tablet by mouth 2 times daily   Yes Historical Provider, MD   magnesium oxide (MAG-OX) 400 MG tablet Take 1 tablet by mouth nightly 12/21/15  Yes Destini Martell,    Cholecalciferol (VITAMIN D) 2000 UNITS CAPS capsule Take 1 capsule by mouth daily   Yes Historical Provider, MD   Acetaminophen (TYLENOL PO) Take  by mouth as needed.  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/2017 for squamous cell     FRACTURE SURGERY Right     wrist    HYSTERECTOMY      PACEMAKER INSERTION      sick sinus syndrome says was changed to medtronic 11/19/2018    TUBAL LIGATION  1970       Family History   Problem Relation Age of Onset    Other Mother         sepsis    Diabetes Mother     Other Father         rheumatic heart disease       Social History     Tobacco Use    Smoking status: Never Smoker    Smokeless tobacco: Never Used    Tobacco comment: Fady Freeman RRT 11/28/18   Substance Use Topics    Alcohol use: No     Alcohol/week: 0.0 standard drinks     Frequency: Never     Binge frequency: Never       ROS: All 14 ROS systems reviewed and pertinent positives noted above, all others negative. Lower Extremity Physical Examination:     Vitals:   Vitals:    09/04/20 0916   BP: 122/70   Pulse: 70     General: AAO x 3 in NAD. Vascular: DP and PT pulses palpable 2/4, bilateral.  CFT <3 seconds, bilateral.  Hair growth absent to the level of the digits, bilateral.  Edema present, bilateral.  Varicosities present, bilateral. Erythema absent, bilateral. Distal Rubor absent bilateral.  Temperature decreased bilateral. Hyperpigmentation present bilateral. Atrophic skin yes. Neurological: Sensation intact to light touch to level of digits, bilateral.  Protective sensation intact 10/10 sites via 5.07/10g [place] Monofilament, bilateral.  negative Tinel's, bilateral.  negative Valleix sign, bilateral.  Vibratory intact bilateral.  Reflexes Decreased bilateral.  Paresthesias negative. Dysthesias negative. Sharp/dull intact bilateral.    Musculoskeletal: Muscle strength 5/5, Bilateral.  Pain with strength testing is absent. Pain present upon palpation of plantar fascial band central arch and pT tendon from insertion up to medial ankle, Right. decreased medial longitudinal arch, Bilateral.  Ankle ROM decreased,Bilateral.  1st MPJ ROM within normal limits, Bilateral.  Dorsally contracted digits present digits , Bilateral.     Integument:   Open lesion absent, bilateral.  Interdigital maceration absent to web spaces bilateral.   Nails left 1, 2, 5 and right 1, 2, 5 thickened, dystrophic and crumbly, discolored with subungual debris. Fissures absent, bilateral. Hyperkeratotic tissue is absent. Asessment: Patient is a 80 y.o. female with:    Diagnosis Orders   1. Posterior tibial tendinitis of right leg   DIABETES FOOT EXAM   2. Controlled type 2 DM with peripheral circulatory disorder (HCC)   DIABETES FOOT EXAM    NE DEBRIDEMENT OF NAILS, 6 OR MORE   3.  Dermatophytosis of nail 1-5R/L   DIABETES FOOT EXAM    NE DEBRIDEMENT OF NAILS, 6 OR MORE

## 2020-09-04 NOTE — PROGRESS NOTES
Foot Care Worksheet  PCP: Deonte Potts, PA/C  Last visit: 07/1/2020      Nail description:  Thick , Yellow , Crumbly , Marked limitation of ambulation     Pain resulting from thickened and dystrophy of nail plate No    Nails involved  Right   1, 2, 5  (T5-T9)  Left     1, 2, 5  (TA-T4)    Q7 1 Class A Finding - Non traumatic amputation of foot No    Q8 2 Class B Findings - Absent DP pulse No, Absent PT pulse No, Advanced tropic changes (3 required) Yes    Decrease hair growth Yes, Nail changes/thickening Yes, Pigmented changes/discoloration Yes, Skin texture (thin, shiny) Yes, Skin color (rubor/redness) No    Q9 1 Class B and 2 Class C Findings  Claudication No, Temperature change Yes, Paresthesia No, Burning No, Edema Yes

## 2020-09-29 ENCOUNTER — HOSPITAL ENCOUNTER (OUTPATIENT)
Dept: LAB | Age: 85
Discharge: HOME | End: 2020-09-29
Payer: MEDICARE

## 2020-09-29 LAB
ABSOLUTE EOS #: 0.39 K/UL (ref 0–0.44)
ABSOLUTE IMMATURE GRANULOCYTE: 0.03 K/UL (ref 0–0.3)
ABSOLUTE LYMPH #: 1.85 K/UL (ref 1.1–3.7)
ABSOLUTE MONO #: 0.65 K/UL (ref 0.1–1.2)
ANION GAP SERPL CALCULATED.3IONS-SCNC: 11 MMOL/L (ref 9–17)
ANION GAP SERPL CALCULATED.3IONS-SCNC: 11 MMOL/L (ref 9–17)
BASOPHILS # BLD: 1 % (ref 0–2)
BASOPHILS ABSOLUTE: 0.05 K/UL (ref 0–0.2)
BUN BLDV-MCNC: 57 MG/DL (ref 8–23)
BUN BLDV-MCNC: 57 MG/DL (ref 8–23)
BUN/CREAT BLD: 34 (ref 9–20)
BUN/CREAT BLD: 34 (ref 9–20)
CALCIUM IONIZED: 1.22 MMOL/L (ref 1.13–1.33)
CALCIUM SERPL-MCNC: 9.4 MG/DL (ref 8.6–10.4)
CALCIUM SERPL-MCNC: 9.4 MG/DL (ref 8.6–10.4)
CHLORIDE BLD-SCNC: 97 MMOL/L (ref 98–107)
CHLORIDE BLD-SCNC: 97 MMOL/L (ref 98–107)
CO2: 31 MMOL/L (ref 20–31)
CO2: 31 MMOL/L (ref 20–31)
CREAT SERPL-MCNC: 1.67 MG/DL (ref 0.5–0.9)
CREAT SERPL-MCNC: 1.67 MG/DL (ref 0.5–0.9)
CREATININE URINE: 40.9 MG/DL (ref 28–217)
DIFFERENTIAL TYPE: ABNORMAL
EOSINOPHILS RELATIVE PERCENT: 5 % (ref 1–4)
GFR AFRICAN AMERICAN: 35 ML/MIN
GFR AFRICAN AMERICAN: 35 ML/MIN
GFR NON-AFRICAN AMERICAN: 29 ML/MIN
GFR NON-AFRICAN AMERICAN: 29 ML/MIN
GFR SERPL CREATININE-BSD FRML MDRD: ABNORMAL ML/MIN/{1.73_M2}
GLUCOSE BLD-MCNC: 164 MG/DL (ref 70–99)
GLUCOSE BLD-MCNC: 164 MG/DL (ref 70–99)
HCT VFR BLD CALC: 41.7 % (ref 36.3–47.1)
HEMOGLOBIN: 13.1 G/DL (ref 11.9–15.1)
IMMATURE GRANULOCYTES: 0 %
LYMPHOCYTES # BLD: 24 % (ref 24–43)
MCH RBC QN AUTO: 30.7 PG (ref 25.2–33.5)
MCHC RBC AUTO-ENTMCNC: 31.4 G/DL (ref 25.2–33.5)
MCV RBC AUTO: 97.7 FL (ref 82.6–102.9)
MONOCYTES # BLD: 9 % (ref 3–12)
NRBC AUTOMATED: 0 PER 100 WBC
PDW BLD-RTO: 14.1 % (ref 11.8–14.4)
PHOSPHORUS: 4.2 MG/DL (ref 2.6–4.5)
PLATELET # BLD: 195 K/UL (ref 138–453)
PLATELET ESTIMATE: ABNORMAL
PMV BLD AUTO: 10 FL (ref 8.1–13.5)
POTASSIUM SERPL-SCNC: 4.6 MMOL/L (ref 3.7–5.3)
POTASSIUM SERPL-SCNC: 4.6 MMOL/L (ref 3.7–5.3)
PTH INTACT: 60.13 PG/ML (ref 15–65)
RBC # BLD: 4.27 M/UL (ref 3.95–5.11)
RBC # BLD: ABNORMAL 10*6/UL
SEG NEUTROPHILS: 61 % (ref 36–65)
SEGMENTED NEUTROPHILS ABSOLUTE COUNT: 4.71 K/UL (ref 1.5–8.1)
SODIUM BLD-SCNC: 139 MMOL/L (ref 135–144)
SODIUM BLD-SCNC: 139 MMOL/L (ref 135–144)
TOTAL PROTEIN, URINE: 9 MG/DL
URINE TOTAL PROTEIN CREATININE RATIO: 0.22 (ref 0–0.2)
VITAMIN D 25-HYDROXY: 53.1 NG/ML (ref 30–100)
WBC # BLD: 7.7 K/UL (ref 3.5–11.3)
WBC # BLD: ABNORMAL 10*3/UL

## 2020-09-29 PROCEDURE — 83970 ASSAY OF PARATHORMONE: CPT

## 2020-09-29 PROCEDURE — 82330 ASSAY OF CALCIUM: CPT

## 2020-09-29 PROCEDURE — 82306 VITAMIN D 25 HYDROXY: CPT

## 2020-09-29 PROCEDURE — 36415 COLL VENOUS BLD VENIPUNCTURE: CPT

## 2020-09-29 PROCEDURE — 85025 COMPLETE CBC W/AUTO DIFF WBC: CPT

## 2020-09-29 PROCEDURE — 82570 ASSAY OF URINE CREATININE: CPT

## 2020-09-29 PROCEDURE — 80048 BASIC METABOLIC PNL TOTAL CA: CPT

## 2020-09-29 PROCEDURE — 84156 ASSAY OF PROTEIN URINE: CPT

## 2020-09-29 PROCEDURE — 84100 ASSAY OF PHOSPHORUS: CPT

## 2020-10-05 ENCOUNTER — OFFICE VISIT (OUTPATIENT)
Dept: NEPHROLOGY | Age: 85
End: 2020-10-05
Payer: MEDICARE

## 2020-10-05 VITALS
HEART RATE: 72 BPM | DIASTOLIC BLOOD PRESSURE: 78 MMHG | SYSTOLIC BLOOD PRESSURE: 124 MMHG | WEIGHT: 192 LBS | BODY MASS INDEX: 37.69 KG/M2 | HEIGHT: 60 IN

## 2020-10-05 PROCEDURE — 99213 OFFICE O/P EST LOW 20 MIN: CPT | Performed by: INTERNAL MEDICINE

## 2020-10-05 PROCEDURE — 99214 OFFICE O/P EST MOD 30 MIN: CPT

## 2020-10-05 NOTE — PROGRESS NOTES
Nephrology Progress Note    [facility]AN [facility], [place]      SUBJECTIVE      Chief Complaint   Patient presents with    Chronic Kidney Disease     4mth stage 3     10/5/2020:  Patient is here for follow-up Chronic kidney disease stage 4 with baseline creatinine seems to be running around 1.5-1.6 milligrams per DL. Etiology likely secondary to diabetic nephropathy. Patient lives by herself but has 7 kids who take turns and be with her everyday. Patient doing well. No new issues. No fever, no chills, no CP, no SOB, no N/V/D, no abdomen pain, no urinary complaints, stable chronic LE edema. Patient is currently on metoprolol  mg daily, lisinopril 20 mg daily, Lasix 40 mg BID, oral vitamin D 2000 units replacements and magnesium oxide 400 mg daily. Last labs 9/29/2020: BUN/Cr 57/1.67, Na 139, K 4.6, Cl 97, Bicarb 31, Ca 9.4, Phos 4.2, PTH 60.13, Vit D 53.1, Hb 13.1, UPC 0.22. Blood pressure today 124/78, heart rate 72.        6/8/2020:  Patient is here for follow-up Chronic kidney disease stage III with baseline creatinine running between 1.2-1.4 milligrams per DL. Etiology likely secondary to diabetic nephropathy. Patient lives by herself but has 7 kids who take turns and be with her everyday. Patient doing well. No new issues. No fever, no chills, no CP, +ve chronic SOB, no N/V/D, no abdomen pain, no urinary complaints, +ve LE edema. She follows up with her Cardiologist.   States that Bumex makes her feel \"weird\", she is not able explain the feeling. She is not taking her 2nd dose of the day regularly. Does have +ve edema b/l. She drinks about 18-20 oz/day. Patient is currently on metoprolol  mg daily, lisinopril 20 mg daily, Bumex 1 mg twice a day, oral vitamin D 2000 units replacements and magnesium oxide 400 mg daily. Last labs 5/22/2020: BUN/Cr 30/1.07, Na 140, K 4.2, Cl 96, Bicarb 33, Ca 9.0, Phos 3.4, .3, Vit D 57.1, Hb 13.4, UPC 0.20.   Blood pressure today 138/88, heart rate 69.      12/2/19:  Patient is here for follow-up Chronic kidney disease stage III with baseline creatinine running between 1.2-1.4 milligrams per DL. Etiology likely secondary to diabetic nephropathy. Patient doing well. No new issues. No CP, no SOB, no N/V/D, fawn difficulty swallowing will be getting EGD 12/13/19 and some abd pain with eating, no abdomen pain,  +ve urgency and different sensation (per patient), +ve incontinence, no other urinary complaints, trace LE edema. Patient is currently on metoprolol  mg daily, lisinopril 20 mg daily, Bumex 1 mg twice a day, oral vitamin D 2000 units replacements and magnesium oxide 400 mg daily. Last labs 11/21/19: BUN/Cr 25/1.12, Na 139, K 4.1, Cl 97, Bicarb 31, Ca 9.9, Phos 3.1, PTH 27.69, Vit D 58.3, Hb 14.2, UPC 0.16. Should today 122/84, heart rate 82.    6/3/19:  Patient is here for follow-up Chronic kidney disease stage III with baseline creatinine running between 1.2-1.4 milligrams per DL. Etiology likely secondary to diabetic nephropathy. Patient has gone as high as 1.62 MG/DL in the past.  Patient doing well. No new issues. No CP, no SOB, no N/V/D, no abdomen pain, no urinary complaints, some LE edema but much stable. Last labs 5/23/19: BUN/Cr 42/1.52, Na 142, K 4.5, Cl 97, Bicarb 32, Ca 9.3, Phos 3.4, , Vit D 68.1, Hb 14.8, UPC 0.16, Uric Acid 10.7. BP today 148/82, HR 72  She drinks about 4-5 cups of water day. 12/3/18:  Patient is here for follow-up Chronic kidney disease stage III with baseline creatinine running between 1.2-1.4 milligrams per DL. Etiology likely secondary to diabetic nephropathy. Patient doing okay. No new issues reported. Was just admitted to [facility] 12/28/18-12/1/18 due to fluid overload and CHF got diuretics and now much better and stable. No SOB now. She walked down to her clinic appointment from the parking lot without issues with a cane.  She went into CHF as she stopped taking her Diuretics for 3 days, as she went for wedding and did not want to go to the bathroom multiple times. Last labs 12/1/18: BUN/Cr 23/0.87, Na 143, K 3.7, Cl 99, Bicarb 31, Ca 8.7, Hb 11.8  BP today 140/68, HR 64    4/2/2018  Patient is here for follow-up of her chronic kidney disease stage III. She was supposed to be on Lasix 40 mg oral twice a day but looks like her edema was not getting any better and therefore Bumex was added. Currently she is taking Bumex 1 mg oral daily, edema is a little better but continues to have edema. She just came back from [place] a few weeks ago and last year the same thing happened when she was there about a year ago and was admitted with CHF exacerbation to the hospital.  She was not watching his salt or fluid intake very carefully. She is doing better with that now and edema has also improved a little  She is approaching her 89th birthday and is doing fairly well. Labs from March 2018 shows a blood area nitrogen of 29 and a creatinine of 1.4 with a sodium of 144 potassium of 4 bicarbonate of 31 calcium 9.2, vitamin D 67, , hemoglobin 13.9 and urine protein creatinine ratio was 0.3. Myeloperoxidase antibodies were repeated and it was 266-she does not have any signs or symptoms of vasculitis. She has refused kidney biopsy and rheumatology referral in the past.  She also complains of a dry cough that she's been noticing and happens only at nighttime, none during the day. His throat also feels scratchy and may be related to postnasal drip. She is also on lisinopril 20 mg oral daily for over 25 years-doubt could be related to lisinopril but if it continues then we may need to switch her to losartan and see that helps. 10/2/2017  Patient is here for follow-up of her chronic kidney disease stage III. She feels okay, energy levels are good, she does complain of some right knee pain but does not take any pain medication.   She is inconsistent with her Lasix use and is only taking it once a day instead of twice a day. She has gained about 8 pounds of weight since last visit. Looks like a blood pressure medications were discontinued through her PCP except for Lasix and lisinopril due to low blood pressure episode. Blood pressures are stable at this time. Labs from September 2017 shows a blood urea nitrogen of 32 and a creatinine of 0.9, potassium of 4.6, calcium ionized 1.2, PTH was 78, vitamin D was 68, hemoglobin was 12. 9.\  MPO antibodies still 193, PR3  was normal at 8 - she has no symptoms of malaise and tiredness , rash , sinusitis , cough , fever . She does not want to be referred to rheumatology       4.3.17  Patient is here for followup. She feels well, leg edema is down with Lasix 40 mg by mouth twice a day. She is not taking Aldactone currently. Pt's repeat ANCA testing is positive for MPO Level was 200 and PR3 was negative at 1. She does not have any microscopic hematuria, proteinuria is 0.2grams and creatinine is 1 which is her baseline . No signs and symptoms for vasculitis, no cutaneous lesions, sinusitis. Likely she has ANCA/MPO positivity sec to allopurinol use without any active renal vasculitis although could only be confirmed on kidney biopsy. This is discussed with patient and her daughter when her results showed up. No need for kidney biopsy and patient also does not want it. Most recent labs from 3/28/2017 shows a blood urea nitrogen of 21 and cr of 1.08, sodium 142, potassium of 4.8, hemoglobin of 12.9, calcium of 9.2, PTH of 70, vitamin D of 66, urine protein creatinine 0.2, urinalysis had no RBCs. 3/6/17  Pt is here for follow up. She just came back from [place] yesterday. She was sick over there with bronchitis, hypotension, edema. Her creatinine had gone up to 2.45 from a baseline of 1.4.   She was treated with antibiotics, some of her medications were changed to lisinopril dose was decreased and Lasix dose was increased because of edema.her Aldactone was also stopped jeweling that time due to hyperkalemia. She was also seen by a nephrologist over there. They did some more workup and her  MPO/ANCA was found to be mildly positive. Urine analysis was negative for any RBCs. She did not have any signs symptoms of vasculitis and her creatinine improved to 1.4. She was told to followup with me once she got back from [place]. Repeat labs from today shows a creatinine of 1.39, urinalysis had 0-4 RBCs. Potassium level was 4. 5. She has no rash no signs or symptoms of vasculitis. I have repeated her ANCA and anti-GBM and results of which are pending      12/8/2016  Patient is here for followup. She feels okay. In November she developed acute kidney injury and creatinine went up to 1.6 likely prerenal but that has now resolved and the last creatinine from 11/16/2016 was 1.26. She takes Lasix 40 alternating with 20 mg and Aldactone 12.5 mg by mouth daily. Per daughter her by mouth intake was poor and she was taking less than 30/40 ounces of water or liquids. Labs from 11/16/2016 shows a blood urea nitrogen of 26 and a creatinine of 1.26 and a calcium of 9.2 and a potassium of 4.4 and a bicarbonate of 28. Blood pressures are stable, her edema is well controlled. She has lost about 7 pounds of weight since last visit      9/15/2016  Patient here for followup. She feels okay. Labs labs from 9/2/16 shows creatinine of 1.19, blood urea nitrogen of 35, hemoglobin of 13, bicarbonate 29 and potassium 4.7, urine protein creatinine ratio was 0.4, UA had 0-4 RBCs  Blood pressures are stable. Leg swelling has increased and she has gained 7 pounds from last visit. She is currently taking Lasix 20 alternating with 40 mg every other day and Aldactone 12.5 by mouth daily    5/2/2016  Patient here for followup. She was last seen by me last month for her renal dysfunction.   Chronic kidney disease workup with the paraproteinemia workup was all negative, k/l was 1.7 and serum immunofixation was negative. Creatinine from April 2016 was up at 1.48 and baseline normally runs around 1.2. Sodium was slightly elevated at 145, bun was 35. She could have been a little dehydrated at that time and she had upper respiratory tract infection at that time. Urine analysis that was done in the past 5-10 RBCs and she tells me she had seen urology for cystoscopy and that was negative. Urine protein creatinine ratio was about 0.2. Ultrasound of the kidneys showed 9.3-9.4 cm kidneys. She was taken off losartan 100 mg on last visit that she was taking in conjunction with lisinopril 20 mg twice a day and was put on Aldactone 12.5 mg. When she was sick and blood pressure was slightly low in the 403 systolic but now it is in the 503H and 229F systolic. Her blood pressure seems to be a well-controlled and today it was 140/70. She complains of abdominal pain and tells me that Prilosec helps and was advised to take it on a daily basis. Her edema is controlled     Pt denies any hx of heavy or prolonged NSAID use and there is no history of OTC herbal medications . No history of dysuria or frequency. Pt denies any hx of recent UTI , incontinence or nocturia or recurrent nephrolithiasis. No unusual skin rashes . No tea coloured urine . No recent procedures involving IV contrast.     Patient Active Problem List    Diagnosis Date Noted    Pacemaker at end of battery life - Change out 11/19/18 (Dr. Angelica Guzman) 11/19/2018     Priority: High    Morbid obesity with BMI of 40.0-44.9, adult ([address] 07/08/2020    Chronic diastolic congestive heart failure ([address]     CKD (chronic kidney disease), stage III 12/03/2018    Lower extremity edema 12/03/2018    Abnormal ANCA (antineutrophil cytoplasmic antibody) 12/03/2018    Pulmonary hypertension ([address] 12/03/2018    Type 2 diabetes mellitus ([address]     Controlled type 2 DM with peripheral circulatory disorder ([address] 04/15/2016    Complete heart block weakness. HEENT:  No headache, otalgia, itchy eyes, nasal discharge or sore throat, some difficulty swallowing. Cardiac:  No chest pain, dyspnea, orthopnea or PND. Chest:   No cough, phlegm or wheezing or hemoptysis . Abdomen:  +ve abdominal pain with eating, no nausea or vomiting. Neuro:  No focal weakness, abnormal movements or seizure like activity. Skin:   No rashes, no itching. :   No hematuria, no pyuria, no dysuria, no flank pain, +ve urgency and different sensation (per patient), +ve incontinence. Extremities:    + swelling, no joint pains. ROS was otherwise negative except as mentioned in the [address] OBJECTIVE      Vitals:    10/05/20 1516   BP: 124/78   Pulse: 72     Wt Readings from Last 2 Encounters:   10/05/20 192 lb (87.1 kg)   09/04/20 191 lb (86.6 kg)     Body mass index is 37.5 kg/m².      PHYSICAL EXAM      GENERAL APPEARANCE:Awake, alert, in no acute distress  SKIN: warm and dry, no rash or erythema  EYES: conjunctivae normal and sclera anicteric  ENT: no thrush no pharyngeal congestion   NECK:  supple  PULMONARY: clear to auscultation and no wheezing noted   CADRDIOVASCULAR: :Normal S1 & S2,  no murmur   ABDOMEN: soft nontender, bowel sounds, present, no organomegaly,  no ascites   EXTREMITIES: 1-2+ edema, mostly in ankle and feet area  NEURO: alert and oriented, no deficits    LABS    CBC:   Lab Results   Component Value Date    WBC 7.7 09/29/2020    HGB 13.1 09/29/2020    HCT 41.7 09/29/2020    MCV 97.7 09/29/2020    RDW 14.1 09/29/2020     09/29/2020    MPV 10.0 09/29/2020      BMP:   Lab Results   Component Value Date     09/29/2020    K 4.6 09/29/2020    CL 97 09/29/2020    CO2 31 09/29/2020    BUN 57 09/29/2020    CREATININE 1.67 09/29/2020    GLUCOSE 164 09/29/2020    CALCIUM 9.4 09/29/2020      PHOSPHORUS:    Lab Results   Component Value Date    PHOS 4.2 09/29/2020     MAGNESIUM:   Lab Results   Component Value Date    MG 2.3 03/28/2017     ALBUMIN:   Lab Results   Component Value Date    LABALBU 3.9 07/07/2020     PTH: No components found for: PTHINTACT  VIT D3,25 HYDROXY: No results found for: VITD3     IRON:  No results found for: IRON  IRON SATURATION:  No results found for: LABIRON  TIBC:  No results found for: TIBC  FERRITIN:  No results found for: FERRITIN          TAMERA: No results found for: TAMERA    SPEP:   Lab Results   Component Value Date    PROT 7.4 07/07/2020    ALBCAL 4.0 04/13/2016    ALBPCT 55 04/13/2016    LABALPH 0.2 04/13/2016    LABALPH 0.9 04/13/2016    A1PCT 3 04/13/2016    A2PCT 12 04/13/2016    LABBETA 1.1 04/13/2016    BETAPCT 15 04/13/2016    GAMGLOB 1.1 04/13/2016    GGPCT 15 04/13/2016    PATH ELECTRONICALLY SIGNED. Deng Perez M.D. 04/14/2016     UPEP:   Lab Results   Component Value Date    TPU 9 04/14/2016    TPU 9 04/14/2016      HEPBSAG:No results found for: HEPBSAG  HEPCAB:No results found for: HEPCAB  C3:   Lab Results   Component Value Date    C3 157 04/13/2016     C4:   Lab Results   Component Value Date    C4 36 04/13/2016     MPO ANCA:   Lab Results   Component Value Date     03/27/2018    . PR3 ANCA:    Lab Results   Component Value Date    PR3 6 03/27/2018                      URINALYSIS/URINE CHEMISTRIES      URINE CREATININE:    Lab Results   Component Value Date    LABCREA 40.9 09/29/2020     URINE EOSINOPHILS : No results found for: UREO  URINE PROTEIN:    Lab Results   Component Value Date    TPU 9 04/14/2016    TPU 9 04/14/2016           RADIOLOGY    No results found for this or any previous visit. ASSESSMENT     1. Chronic kidney disease stage III with baseline creatinine now seems to be running around 1.5-1.6 milligrams per DL. Most recent creatinine was 1.67 mg/dl, May 2020. Etiology likely secondary to diabetic nephropathy. CKD workup was negative and urine protein creatinine ratio  is 0.3 , no significant microscopic hematuria.   UA had 0-4 RBCs   Previously urinalysis had microscopic hematuria and she has a history of cystoscopy which was negative in the past. Most recent UA had No diabetes  Ultrasound of the kidneys showed 9.3 and 9.4 cm kidneys   2. Hypertension well-controlled  BP Readings from Last 3 Encounters:   10/05/20 124/78   09/04/20 122/70   07/01/20 128/74    :  3. Type 2 Diabetes mellitus    4. Diastolic CHF  5. Aortic valve sclerosis  6. Pulmonary hypertension with right ventricular systolic pressure being 53  7. Lower extremity edema Worse  8. Secondary hyperparathyroidism PTH was 101 and vitamin D was 67  9. ANCA/MPO positivity without any signs or symptoms active renal vasculitis, patient does not want further workup and does not want to be referred to rheumatology. Will continue to monitor. 10.  Dry cough at nighttime could be related to postnasal drip. 11.  +ve urgency and different sensation (per patient)     PLAN     1. Based on available labs patients renal function is stable. Patient's volume status is acceptable. Importance of tight blood pressure, glycemic control, lipid control was outlined to patient . 2. Cont Lasix 40 mg BID and metoprolol  mg daily, lisinopril 20 mg daily. 3. Avoid canned foods including soups, salt restriction <2gm/day and fluid restriction no more than 50-60 ounces a day. 4. Keep water intake at around 30 oz/day. 5. BMP in 2 month. Prescription provided. 6. Patient counseled about taking her meds regularly and not stopping abruptly without medical advise. 7. Follow up labs ordered : bmp, cbc,phos, intact pth, vitaminD 25 [place]           8. Urine for random protein and creat to be done. 9. Follow up in the office in 4 months    Patient was asked to avoid NSAIDS. Please do not hesitate to call with questions. Electronically signed by Erik Pimentel MD on 10/5/2020 at 3:36 PM  [facility].      This note is created with the assistance of a speech-recognition program. While intending to generate a document that actually reflects the content of the visit, no guarantees can be provided that every mistake has been identified and corrected by editing.

## 2020-10-14 ENCOUNTER — HOSPITAL ENCOUNTER (OUTPATIENT)
Dept: LAB | Age: 85
Discharge: HOME | End: 2020-10-14
Payer: MEDICARE

## 2020-10-14 ENCOUNTER — OFFICE VISIT (OUTPATIENT)
Dept: PRIMARY CARE CLINIC | Age: 85
End: 2020-10-14
Payer: MEDICARE

## 2020-10-14 VITALS
WEIGHT: 192 LBS | SYSTOLIC BLOOD PRESSURE: 126 MMHG | DIASTOLIC BLOOD PRESSURE: 80 MMHG | HEIGHT: 61 IN | OXYGEN SATURATION: 95 % | HEART RATE: 68 BPM | TEMPERATURE: 97.2 F | BODY MASS INDEX: 36.25 KG/M2 | RESPIRATION RATE: 18 BRPM

## 2020-10-14 LAB — SEDIMENTATION RATE, ERYTHROCYTE: 38 MM (ref 0–30)

## 2020-10-14 PROCEDURE — 36415 COLL VENOUS BLD VENIPUNCTURE: CPT

## 2020-10-14 PROCEDURE — 99212 OFFICE O/P EST SF 10 MIN: CPT | Performed by: FAMILY MEDICINE

## 2020-10-14 PROCEDURE — 99214 OFFICE O/P EST MOD 30 MIN: CPT | Performed by: FAMILY MEDICINE

## 2020-10-14 PROCEDURE — 85651 RBC SED RATE NONAUTOMATED: CPT

## 2020-10-14 RX ORDER — PREDNISONE 20 MG/1
40 TABLET ORAL DAILY
Qty: 10 TABLET | Refills: 0 | Status: SHIPPED | OUTPATIENT
Start: 2020-10-14 | End: 2020-10-19

## 2020-10-14 ASSESSMENT — PATIENT HEALTH QUESTIONNAIRE - PHQ9
1. LITTLE INTEREST OR PLEASURE IN DOING THINGS: 1
SUM OF ALL RESPONSES TO PHQ QUESTIONS 1-9: 2
SUM OF ALL RESPONSES TO PHQ QUESTIONS 1-9: 2
SUM OF ALL RESPONSES TO PHQ9 QUESTIONS 1 & 2: 2
2. FEELING DOWN, DEPRESSED OR HOPELESS: 1

## 2020-10-14 ASSESSMENT — ENCOUNTER SYMPTOMS
RHINORRHEA: 0
SORE THROAT: 1
RESPIRATORY NEGATIVE: 1
ALLERGIC/IMMUNOLOGIC NEGATIVE: 1
EYES NEGATIVE: 1
GASTROINTESTINAL NEGATIVE: 1

## 2020-10-14 NOTE — PROGRESS NOTES
10/14/2020     Lanette Cardona (:  10/27/1928) is a 80 y.o. female, here for evaluation of the following medical concerns:    HPI   Acute urgent care visit for right ear pain developing in the last few days. Some sore throat with swallowing. Pain into the head, headache endorsed. Some short lived dizziness at present. She has had vertigo in the past by description. Hearing aides in place. She reports additional issues with sleep/insomnia. Past Medical History:   Diagnosis Date    Abnormal ANCA (antineutrophil cytoplasmic antibody) 12/3/2018    Basal cell cancer     nose    CKD (chronic kidney disease), stage III 12/3/2018    Dry skin dermatitis 12/3/2015    Dyspnea     chronic    Edema     Chronic    Gout     Hard of hearing     Hiatal hernia     Hypercholesterolemia     Hypertension     Hypomagnesemia 12/3/2015    Lower extremity edema 12/3/2018    Macular degeneration, dry     Obstructive sleep apnea     Open-angle glaucoma     Borderline.  Peripheral edema 12/3/2015    Pulmonary hypertension ([address] 12/3/2018    Sick sinus syndrome (HCC)     with pacemaker  placement    Type 2 diabetes mellitus ([address]     Venous insufficiency     Vitamin D deficiency      Past Surgical History:   Procedure Laterality Date    CARDIAC CATHETERIZATION      no blockage    CATARACT REMOVAL WITH IMPLANT Bilateral     cataract    EYE SURGERY  2017    Biopsy to right eye lid with surgery on 2017 for squamous cell     FRACTURE SURGERY Right     wrist    HYSTERECTOMY      PACEMAKER INSERTION      sick sinus syndrome says was changed to medtronic 2018    TUBAL LIGATION  1970         Review of Systems   Constitutional: Negative. Negative for fever. HENT: Positive for ear pain (right ear) and sore throat. Negative for congestion, postnasal drip, rhinorrhea and sneezing. Eyes: Negative. Negative for visual disturbance. Respiratory: Negative.     Cardiovascular: Negative. Gastrointestinal: Negative. Endocrine: Negative. Genitourinary: Negative. Musculoskeletal: Positive for gait problem. Skin: Negative. Allergic/Immunologic: Negative. Neurological: Positive for dizziness (vertigo suspected). Hematological: Negative. Psychiatric/Behavioral: Negative. Prior to Visit Medications    Medication Sig Taking? Authorizing Provider   metoprolol succinate (TOPROL XL) 100 MG extended release tablet TAKE 1 TABLET DAILY Yes JARRETT Villalba   sertraline (ZOLOFT) 100 MG tablet TAKE 1 TABLET DAILY Yes JARRETT Villalba   furosemide (LASIX) 40 MG tablet Take 1 tablet by mouth 2 times daily Yes aFdi Husain MD   lisinopril (PRINIVIL;ZESTRIL) 20 MG tablet TAKE 1 TABLET DAILY Yes JARRETT Villalba   sucralfate (CARAFATE) 1 GM tablet Take 1 tablet by mouth 4 times daily Yes Timothy Palmer DO   polyethylene glycol (GLYCOLAX) powder Take 17 g by mouth 2 times daily Yes Timothy Palmer DO   timolol (TIMOPTIC) 0.5 % ophthalmic solution Place 1 drop into both eyes 2 times daily Yes Historical Provider, MD   Multiple Vitamins-Minerals (ICAPS AREDS 2) CHEW Take 1 tablet by mouth 2 times daily Yes Historical Provider, MD   magnesium oxide (MAG-OX) 400 MG tablet Take 1 tablet by mouth nightly Yes Jessenia Ledesma DO   Cholecalciferol (VITAMIN D) 2000 UNITS CAPS capsule Take 1 capsule by mouth daily Yes Historical Provider, MD   Acetaminophen (TYLENOL PO) Take  by mouth as needed.  Rarely uses Yes Historical Provider, MD   aspirin 81 MG EC tablet Take 81 mg by mouth daily  Yes Historical Provider, MD   MITIGARE 0.6 MG capsule Take 1 capsule by mouth daily  JARRETT Villalba        Social History     Tobacco Use    Smoking status: Never Smoker    Smokeless tobacco: Never Used    Tobacco comment: Zaki Mancia RRT 11/28/18   Substance Use Topics    Alcohol use: No     Alcohol/week: 0.0 standard drinks     Frequency: Never     Binge frequency: Never Vitals:    10/14/20 1021   BP: 126/80   Pulse: 68   Resp: 18   Temp: 97.2 °F (36.2 °C)   TempSrc: Temporal   SpO2: 95%   Weight: 192 lb (87.1 kg)   Height: 5' 1\" (1.549 m)     Estimated body mass index is 36.28 kg/m² as calculated from the following:    Height as of this encounter: 5' 1\" (1.549 m). Weight as of this encounter: 192 lb (87.1 kg). Physical Exam  Constitutional:       General: She is not in acute distress. Appearance: Normal appearance. She is not toxic-appearing. HENT:      Head: Normocephalic. Right Ear: Tympanic membrane normal. Decreased hearing noted. Tenderness (some tmj area tenderness) present. No drainage or swelling. Left Ear: Tympanic membrane normal. Decreased hearing noted. Nose: Nose normal. No congestion. Eyes:      Extraocular Movements: Extraocular movements intact. Pupils: Pupils are equal, round, and reactive to light. Neck:      Musculoskeletal: Normal range of motion. Cardiovascular:      Rate and Rhythm: Normal rate. Pulses: Normal pulses. Pulmonary:      Effort: Pulmonary effort is normal. No respiratory distress. Skin:     General: Skin is warm. Findings: No rash. Neurological:      General: No focal deficit present. Mental Status: She is alert. Psychiatric:         Mood and Affect: Mood normal.         Behavior: Behavior normal.         Thought Content: Thought content normal.         Judgment: Judgment normal.       /80   Pulse 68   Temp 97.2 °F (36.2 °C) (Temporal)   Resp 18   Ht 5' 1\" (1.549 m)   Wt 192 lb (87.1 kg)   LMP  (LMP Unknown)   SpO2 95%   BMI 36.28 kg/m²     ASSESSMENT/PLAN:  Encounter Diagnoses   Name Primary?  Otalgia of right ear Yes    Insomnia, unspecified type     Vertigo      Ear grossly normal.   She has some right temporal tenderness on palpation , considering temporal arteritis . She endorses some pain in the right eye, with background of glaucoma. Checking esr. Starting prednisone 40mg/day x 5 days pending lab    Plan melatonin trial at home for sleep. Vertigo. Discussed epply/bernardino     Addendum: esr with mild elevation at 38, not likely consistent with giant cell arteritis   An electronic signature was used to authenticate this note.     --Evelyn Adams MD on 10/14/2020 at 10:55 AM

## 2020-11-05 ENCOUNTER — OFFICE VISIT (OUTPATIENT)
Dept: FAMILY MEDICINE CLINIC | Age: 85
End: 2020-11-05
Payer: MEDICARE

## 2020-11-05 VITALS
RESPIRATION RATE: 14 BRPM | BODY MASS INDEX: 38.28 KG/M2 | HEART RATE: 64 BPM | HEIGHT: 60 IN | WEIGHT: 195 LBS | DIASTOLIC BLOOD PRESSURE: 64 MMHG | SYSTOLIC BLOOD PRESSURE: 132 MMHG | OXYGEN SATURATION: 98 %

## 2020-11-05 PROCEDURE — G0439 PPPS, SUBSEQ VISIT: HCPCS | Performed by: PHYSICIAN ASSISTANT

## 2020-11-05 ASSESSMENT — PATIENT HEALTH QUESTIONNAIRE - PHQ9
2. FEELING DOWN, DEPRESSED OR HOPELESS: 0
SUM OF ALL RESPONSES TO PHQ QUESTIONS 1-9: 0
1. LITTLE INTEREST OR PLEASURE IN DOING THINGS: 0
SUM OF ALL RESPONSES TO PHQ9 QUESTIONS 1 & 2: 0

## 2020-11-05 ASSESSMENT — LIFESTYLE VARIABLES: HOW OFTEN DO YOU HAVE A DRINK CONTAINING ALCOHOL: 0

## 2020-11-05 NOTE — PATIENT INSTRUCTIONS
Personalized Preventive Plan for [address] [place] [address] - 11/5/2020  Medicare offers a range of preventive health benefits. Some of the tests and screenings are paid in full while other may be subject to a deductible, co-insurance, and/or copay. Some of these benefits include a comprehensive review of your medical history including lifestyle, illnesses that may run in your family, and various assessments and screenings as appropriate. After reviewing your medical record and screening and assessments performed today your provider may have ordered immunizations, labs, imaging, and/or referrals for you. A list of these orders (if applicable) as well as your Preventive Care list are included within your After Visit Summary for your review. Other Preventive Recommendations:    · A preventive eye exam performed by an eye specialist is recommended every 1-2 years to screen for glaucoma; cataracts, macular degeneration, and other eye disorders. · A preventive dental visit is recommended every 6 months. · Try to get at least 150 minutes of exercise per week or 10,000 steps per day on a pedometer . · Order or download the FREE \"Exercise & Physical Activity: Your Everyday Guide\" from [employer]. Call 3-867.202.9574 or search [employer] online. · You need 3632-2207 mg of calcium and 6692-9740 IU of vitamin D per day. It is possible to meet your calcium requirement with diet alone, but a vitamin D supplement is usually necessary to meet this goal.  · When exposed to the sun, use a sunscreen that protects against both UVA and UVB radiation with an SPF of 30 or greater. Reapply every 2 to 3 hours or after sweating, drying off with a towel, or swimming. · Always wear a seat belt when traveling in a car. Always wear a helmet when riding a bicycle or motorcycle.

## 2020-11-05 NOTE — PROGRESS NOTES
Medicare Annual Wellness Visit  Name: Ky Smith Date: 2020   MRN: [id number] Sex: Female   Age: 80 y.o. Ethnicity: Non-/Non    : 10/27/1928 Race: White      Lanette Cardona is here for Violet Grey for behavioral, psychosocial and functional/safety risks, and cognitive dysfunction are all negative except as indicated below. These results, as well as other patient data from the [address] form, are documented in Flowsheets linked to this Encounter. Allergies   Allergen Reactions    Atorvastatin Other (See Comments)    Statins [Statins] Other (See Comments)     Muscle aches, elevated LFT's    Penicillin G Rash         Prior to Visit Medications    Medication Sig Taking? Authorizing Provider   metoprolol succinate (TOPROL XL) 100 MG extended release tablet TAKE 1 TABLET DAILY Yes JARRETT Alvarado   sertraline (ZOLOFT) 100 MG tablet TAKE 1 TABLET DAILY Yes JARRETT Alvarado   furosemide (LASIX) 40 MG tablet Take 1 tablet by mouth 2 times daily Yes Didier Dumont MD   lisinopril (PRINIVIL;ZESTRIL) 20 MG tablet TAKE 1 TABLET DAILY Yes JARRETT Alvarado   sucralfate (CARAFATE) 1 GM tablet Take 1 tablet by mouth 4 times daily Yes Joann Gil DO   polyethylene glycol (GLYCOLAX) powder Take 17 g by mouth 2 times daily Yes Joann Gil DO   timolol (TIMOPTIC) 0.5 % ophthalmic solution Place 1 drop into both eyes 2 times daily Yes Historical Provider, MD   Multiple Vitamins-Minerals (ICAPS AREDS 2) CHEW Take 1 tablet by mouth 2 times daily Yes Historical Provider, MD   magnesium oxide (MAG-OX) 400 MG tablet Take 1 tablet by mouth nightly Yes Alondra Garcia DO   Cholecalciferol (VITAMIN D) 2000 UNITS CAPS capsule Take 1 capsule by mouth daily Yes Historical Provider, MD   Acetaminophen (TYLENOL PO) Take  by mouth as needed.  Rarely uses Yes Historical Provider, MD   aspirin 81 MG EC tablet Take 81 mg by mouth daily  Yes Historical Provider, MD   MITIGARE 0.6 MG capsule Take 1 capsule by mouth daily  Federico Lopez         Past Medical History:   Diagnosis Date    Abnormal ANCA (antineutrophil cytoplasmic antibody) 12/3/2018    Basal cell cancer     nose    CKD (chronic kidney disease), stage III 12/3/2018    Dry skin dermatitis 12/3/2015    Dyspnea     chronic    Edema     Chronic    Gout     Hard of hearing     Hiatal hernia     Hypercholesterolemia     Hypertension     Hypomagnesemia 12/3/2015    Lower extremity edema 12/3/2018    Macular degeneration, dry     Obstructive sleep apnea     Open-angle glaucoma     Borderline.    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     FRACTURE SURGERY Right     wrist    HYSTERECTOMY      PACEMAKER INSERTION      sick sinus syndrome says was changed to medtronic 11/19/2018    TUBAL LIGATION  1970         Family History   Problem Relation Age of Onset    Other Mother         sepsis    Diabetes Mother     Other Father         rheumatic heart disease       CareTeam (Including outside providers/suppliers regularly involved in providing care):   Patient Care Team:  JARRETT Lopez as PCP - General (Family Medicine)  JARRETT Lopez as PCP - [facility] Empaneled Provider    Wt Readings from Last 3 Encounters:   11/05/20 195 lb (88.5 kg)   10/14/20 192 lb (87.1 kg)   10/05/20 192 lb (87.1 kg)     Vitals:    11/05/20 1134   BP: 132/64   Pulse: 64   Resp: 14   SpO2: 98%   Weight: 195 lb (88.5 kg)   Height: 5' (1.524 m)     Body mass index is 38.08 kg/m². Based upon direct observation of the patient, evaluation of cognition reveals global memory impairment noted. General Appearance: alert and oriented to person, place and time, well developed and well- nourished, in no acute distress  Skin: warm and dry, no rash or erythema  Head: normocephalic and atraumatic  Eyes: pupils equal, round, and reactive to light, extraocular eye movements intact, conjunctivae normal  ENT: tympanic membrane, external ear and ear canal normal bilaterally, nose without deformity, nasal mucosa and turbinates normal without polyps  Neck: supple and non-tender without mass, no thyromegaly or thyroid nodules, no cervical lymphadenopathy  Pulmonary/Chest: clear to auscultation bilaterally- no wheezes, rales or rhonchi, normal air movement, no respiratory distress  Abdomen: soft, non-tender, non-distended, normal bowel sounds, no masses or organomegaly    Patient's complete Health Risk Assessment and screening values have been reviewed and are found in Flowsheets. The following problems were reviewed today and where indicated follow up appointments were made and/or referrals ordered. Positive Risk Factor Screenings with Interventions:     Fall Risk:  2 or more falls in past year?: (!) yes  Fall with injury in past year?: no  Fall Risk Interventions:    · Home safety tips provided    General Health and ACP:  General  In general, how would you say your health is?: Fair  In the past 7 days, have you experienced any of the following?  New or Increased Pain, New or Increased Fatigue, Loneliness, Social Isolation, Stress or Anger?: None of These  Do you get the social and emotional support that you need?: Yes  Do you have a Living Will?: Yes  Advance Directives     Power of  Living Will ACP-Advance Directive ACP-Power of     Not on File Not on File Filed [address] [place] [address] [place] Risk Interventions:  · Fatigue: patient declines any further evaluation/treatment for this issue    Health Habits/Nutrition:  Health Habits/Nutrition  Do you exercise for at least 20 minutes 2-3 times per week?: (!) No  Have you lost any weight without trying in the past 3 months?: No  Do you eat fewer than 2 meals per day?: (!) Yes  Have you seen a dentist within the past year?: Yes  Body mass index: (!) 38.08  Health Habits/Nutrition Interventions:  · Inadequate physical activity:  patient is not ready to increase his/her physical activity level at this time    Hearing/Vision:  No exam data present  Hearing/Vision  Do you or your family notice any trouble with your hearing?: (!) Yes  Do you have difficulty driving, watching TV, or doing any of your daily activities because of your eyesight?: (!) Yes  Have you had an eye exam within the past year?: Yes  Hearing/Vision Interventions:  · NA    ADL:  ADLs  In the past 7 days, did you need help from others to perform any of the following everyday activities? Eating, dressing, grooming, bathing, toileting, or walking/balance?: (!) Bathing  In the past 7 days, did you need help from others to take care of any of the following?  Laundry, housekeeping, banking/finances, shopping, telephone use, food preparation, transportation, or taking medications?: (!) Laundry, Housekeeping, Banking/Finances, Shopping, Telephone Use, Food Preparation, Transportation, Taking Medications  ADL Interventions:  · Patient declines any further evaluation/treatment for this issue    Personalized Preventive Plan   Current Health Maintenance Status  Immunization History   Administered Date(s) Administered    Influenza Virus Vaccine 09/16/2010, 10/20/2011, 11/02/2012    Influenza, High Dose (Fluzone 65 yrs and older) 10/16/2013, 10/31/2014, 10/12/2015, 10/12/2016, 09/14/2017, 10/17/2018    Influenza, Triv, inactivated, subunit, adjuvanted, IM (Fluad 65 yrs and older) 10/31/2019    Pneumococcal Conjugate 13-valent ([id number]) 12/03/2015    Pneumococcal Polysaccharide ([id number]) 2011    Tdap (Boostrix, Adacel) 10/29/2015    Zoster Live (Zostavax) 10/12/2015    Zoster Recombinant (Shingrix) 2018, 2019        Health Maintenance   Topic Date Due    Annual Wellness Visit (AWV)  2019    Flu vaccine (1) 2020    Potassium monitoring  2021    Creatinine monitoring  2021    DTaP/Tdap/Td vaccine (2 - Td) 10/29/2025    Shingles Vaccine  Completed    Pneumococcal 65+ years Vaccine  Completed    Hepatitis A vaccine  Aged Out    Hib vaccine  Aged Out    Meningococcal (ACWY) vaccine  Aged Out     Recommendations for [employer] Due: see orders and patient instructions/AVS.  .  Subjective:      Patient ID: Dov Saucedo is a 80 y.o. female. Patient is seen for Medicare wellness and to follow-up on her normal health conditions. Today they are somewhat in a hurry they have a  to get to. She has been complaining of right foot pain. There is been no injury that daughter is aware of. No bruising has her normal swelling. No redness or signs of infection daughter states. Daughter is wondering if we could do a referral for home health and more OT PT she is getting more and more deconditioned very difficult to ambulate and does not want to get up and move around like she used to. Daughter has no problems with patient but the rest of the family members struggle with the patient. No recent illness. They do want a flu shot today. Review of Systems   Constitutional: Positive for activity change and fatigue. Negative for appetite change, chills and fever. Sleeping a lot. HENT: Negative. Respiratory: Negative for cough, chest tightness, shortness of breath and wheezing. Cardiovascular: Positive for leg swelling. Negative for chest pain and palpitations. No increase in pedal edema. Gastrointestinal: Negative for diarrhea, nausea and vomiting. Genitourinary: Negative for difficulty urinating and dysuria. Musculoskeletal: Positive for arthralgias and gait problem. Negative for myalgias. Skin: Negative for rash. Neurological: Positive for tremors and weakness. Negative for dizziness, speech difficulty, light-headedness, numbness and headaches. Psychiatric/Behavioral: Positive for confusion. Negative for agitation, behavioral problems and sleep disturbance. The patient is not nervous/anxious. Mood has been good. Objective:   Physical Exam  Vitals signs and nursing note reviewed. Constitutional:       General: She is not in acute distress. Appearance: She is well-developed. Comments: Patient is evaluated in her wheelchair. Looks very tired today. HENT:      Head: Normocephalic and atraumatic. Right Ear: Ear canal and external ear normal.      Left Ear: Ear canal and external ear normal.      Ears:      Comments: Has hearing aids. TMs are dull. Nose: No congestion or rhinorrhea. Mouth/Throat:      Mouth: Mucous membranes are moist.      Pharynx: No oropharyngeal exudate or posterior oropharyngeal erythema. Comments: Uvula midline tongue symmetric. Eyes:      General: No scleral icterus. Conjunctiva/sclera: Conjunctivae normal.   Neck:      Musculoskeletal: Normal range of motion and neck supple. No neck rigidity or muscular tenderness. Thyroid: No thyroid mass, thyromegaly or thyroid tenderness. Vascular: No carotid bruit. Cardiovascular:      Rate and Rhythm: Normal rate and regular rhythm. Heart sounds: Murmur present. No gallop. Comments: 3/6 systolic murmur noted right sternal border second intercostal space and along the left lateral sternal border to the axilla. Pulmonary:      Effort: Pulmonary effort is normal. No respiratory distress. Breath sounds: Normal breath sounds. No wheezing, rhonchi or rales. Comments: She has significant kyphosis. Abdominal:      General: Bowel sounds are normal. There is no distension. Palpations: Abdomen is soft. There is no mass. Tenderness: There is no abdominal tenderness. There is no guarding or rebound. Hernia: No hernia is present. Musculoskeletal:         General: Swelling and tenderness present. No deformity or signs of injury. Right lower leg: No edema. Left lower leg: No edema. Comments: She has very dry skin. Mild swelling into the right foot no redness or signs of infection no warmth. No pain in the ankle. Pain in general over the metatarsals. No pain in the toes of the MTP areas. Slight pain with dorsiflexion plantarflexion noted   Lymphadenopathy:      Cervical: No cervical adenopathy. Skin:     General: Skin is warm and dry. Findings: No bruising, erythema or rash. Neurological:      General: No focal deficit present. Mental Status: She is alert and oriented to person, place, and time. Motor: Weakness present. Coordination: Coordination abnormal.      Gait: Gait abnormal.      Comments: Poor historian. Daughter gives most of history. Patient does give some. Psychiatric:         Mood and Affect: Mood normal.         Behavior: Behavior normal.         Assessment:       Diagnosis Orders   1. Routine general medical examination at a health care facility     2. Need for vaccination  INFLUENZA, QUADV, ADJUVANTED, 65 YRS =, IM, PF, PREFILL SYR, 0.5ML (FLUAD)   3. Idiopathic gout, unspecified chronicity, unspecified site  Uric Acid   4. Chronic gout of right foot, unspecified cause  Sedimentation Rate   5. Chronic foot pain, right  XR FOOT RIGHT (MIN 3 VIEWS)    Sedimentation Rate   6. Abnormal laboratory test  Sedimentation Rate   7. Stage 3b chronic kidney disease     8. Chronic diastolic congestive heart failure ([address]     9. Morbid obesity with BMI of 40.0-44.9, adult ([address]     10. Pulmonary hypertension ([address]     11. Essential hypertension     12.  Type 2 diabetes mellitus without complication, without long-term current use of insulin ([address]     13. Seasonal allergic rhinitis due to pollen     14. At high risk for falls             Plan:      [facility]ill mitigare 90 day [facility]  Will order in home PT OT for strengthening  sHe will be notified of x-ray results and lab results  They cannot complete today to have a  to go to daughter will bring her back in the next few days  Answered daughter's questions  Fall prevention  Good nutrition hydration  Heat to the foot  Tylenol as needed  Flu shot updated today while in office  Return 1 month sooner if problems        JARRETT Holland  Recommended screening schedule for the next 5-10 years is provided to the patient in written form: see Patient Elissa Nur was seen today for medicare awv. Diagnoses and all orders for this visit:    Need for vaccination  -     INFLUENZA, QUADV, ADJUVANTED, 65 YRS =, IM, PF, PREFILL SYR, 0.5ML (FLUAD); Future    Routine general medical examination at a health care facility    Idiopathic gout, unspecified chronicity, unspecified site  -     Uric Acid; Future    Chronic gout of right foot, unspecified cause  -     Sedimentation Rate; Future    Chronic foot pain, right  -     XR FOOT RIGHT (MIN 3 VIEWS); Future  -     Sedimentation Rate; Future    Abnormal laboratory test  -     Sedimentation Rate;  Future

## 2020-11-17 RX ORDER — COLCHICINE 0.6 MG/1
0.6 TABLET ORAL DAILY
Qty: 90 TABLET | Refills: 1 | Status: SHIPPED | OUTPATIENT
Start: 2020-11-17 | End: 2021-01-01 | Stop reason: ALTCHOICE

## 2020-11-17 ASSESSMENT — ENCOUNTER SYMPTOMS
SHORTNESS OF BREATH: 0
VOMITING: 0
DIARRHEA: 0
CHEST TIGHTNESS: 0
NAUSEA: 0
WHEEZING: 0
COUGH: 0

## 2020-11-19 ENCOUNTER — OFFICE VISIT (OUTPATIENT)
Dept: PODIATRY | Age: 85
End: 2020-11-19
Payer: MEDICARE

## 2020-11-19 ENCOUNTER — IMMUNIZATION (OUTPATIENT)
Dept: LAB | Age: 85
End: 2020-11-19
Payer: MEDICARE

## 2020-11-19 ENCOUNTER — HOSPITAL ENCOUNTER (OUTPATIENT)
Dept: GENERAL RADIOLOGY | Age: 85
Discharge: HOME | End: 2020-11-21
Payer: MEDICARE

## 2020-11-19 ENCOUNTER — OFFICE VISIT (OUTPATIENT)
Dept: CARDIOLOGY | Age: 85
End: 2020-11-19
Payer: MEDICARE

## 2020-11-19 ENCOUNTER — HOSPITAL ENCOUNTER (OUTPATIENT)
Dept: LAB | Age: 85
Discharge: HOME | End: 2020-11-19
Payer: MEDICARE

## 2020-11-19 ENCOUNTER — PROCEDURE VISIT (OUTPATIENT)
Dept: CARDIOLOGY | Age: 85
End: 2020-11-19
Payer: MEDICARE

## 2020-11-19 ENCOUNTER — TELEPHONE (OUTPATIENT)
Dept: FAMILY MEDICINE CLINIC | Age: 85
End: 2020-11-19

## 2020-11-19 VITALS
HEART RATE: 70 BPM | BODY MASS INDEX: 38.28 KG/M2 | DIASTOLIC BLOOD PRESSURE: 78 MMHG | SYSTOLIC BLOOD PRESSURE: 122 MMHG | WEIGHT: 195 LBS | HEIGHT: 60 IN

## 2020-11-19 VITALS
HEIGHT: 60 IN | WEIGHT: 195 LBS | DIASTOLIC BLOOD PRESSURE: 77 MMHG | HEART RATE: 61 BPM | SYSTOLIC BLOOD PRESSURE: 166 MMHG | BODY MASS INDEX: 38.28 KG/M2

## 2020-11-19 LAB
SEDIMENTATION RATE, ERYTHROCYTE: 47 MM (ref 0–30)
URIC ACID: 10 MG/DL (ref 2.4–5.7)

## 2020-11-19 PROCEDURE — 93005 ELECTROCARDIOGRAM TRACING: CPT | Performed by: INTERNAL MEDICINE

## 2020-11-19 PROCEDURE — 93010 ELECTROCARDIOGRAM REPORT: CPT | Performed by: INTERNAL MEDICINE

## 2020-11-19 PROCEDURE — 11721 DEBRIDE NAIL 6 OR MORE: CPT | Performed by: PODIATRIST

## 2020-11-19 PROCEDURE — 99212 OFFICE O/P EST SF 10 MIN: CPT | Performed by: PODIATRIST

## 2020-11-19 PROCEDURE — 73630 X-RAY EXAM OF FOOT: CPT

## 2020-11-19 PROCEDURE — 93288 INTERROG EVL PM/LDLS PM IP: CPT | Performed by: INTERNAL MEDICINE

## 2020-11-19 PROCEDURE — 90694 VACC AIIV4 NO PRSRV 0.5ML IM: CPT

## 2020-11-19 PROCEDURE — 36415 COLL VENOUS BLD VENIPUNCTURE: CPT

## 2020-11-19 PROCEDURE — 84550 ASSAY OF BLOOD/URIC ACID: CPT

## 2020-11-19 PROCEDURE — 85651 RBC SED RATE NONAUTOMATED: CPT

## 2020-11-19 PROCEDURE — 99214 OFFICE O/P EST MOD 30 MIN: CPT | Performed by: INTERNAL MEDICINE

## 2020-11-19 PROCEDURE — 99214 OFFICE O/P EST MOD 30 MIN: CPT

## 2020-11-19 NOTE — PROGRESS NOTES
Foot Care Worksheet  PCP: JARRETT Macias/C  Last visit: 11/5/2020      Nail description:  Thick , Yellow , Crumbly , Marked limitation of ambulation     Pain resulting from thickened and dystrophy of nail plate No    Nails involved  Right   1, 2, 5  (T5-T9)  Left     1, 2, 5  (TA-T4)    Q7 1 Class A Finding - Non traumatic amputation of foot No    Q8 2 Class B Findings - Absent DP pulse No, Absent PT pulse No, Advanced tropic changes (3 required) Yes    Decrease hair growth Yes, Nail changes/thickening Yes, Pigmented changes/discoloration Yes, Skin texture (thin, shiny) Yes, Skin color (rubor/redness) No    Q9 1 Class B and 2 Class C Findings  Claudication No, Temperature change Yes, Paresthesia No, Burning No, Edema Yes

## 2020-11-19 NOTE — PROGRESS NOTES
5/29/20  Yes JARRETT Hoang   sucralfate (CARAFATE) 1 GM tablet Take 1 tablet by mouth 4 times daily 1/7/20  Yes Bhanu De Jesus DO   polyethylene glycol [facility]) powder Take 17 g by mouth 2 times daily 1/7/20  Yes Bhanu De Jesus DO   timolol (TIMOPTIC) 0.5 % ophthalmic solution Place 1 drop into both eyes 2 times daily   Yes Historical Provider, MD   Multiple Vitamins-Minerals (ICAPS AREDS 2) CHEW Take 1 tablet by mouth 2 times daily   Yes Historical Provider, MD   magnesium oxide (MAG-OX) 400 MG tablet Take 1 tablet by mouth nightly 12/21/15  Yes Valentín Parra DO   Cholecalciferol (VITAMIN D) 2000 UNITS CAPS capsule Take 1 capsule by mouth daily   Yes Historical Provider, MD   Acetaminophen (TYLENOL PO) Take  by mouth as needed. Rarely uses   Yes Historical Provider, MD   aspirin 81 MG EC tablet Take 81 mg by mouth daily    Yes Historical Provider, MD   MITIGARE 0.6 MG capsule Take 1 capsule by mouth daily 4/24/20 7/23/20  JARRETT Hoang       Past Surgical History:   Procedure Laterality Date    CARDIAC CATHETERIZATION  2010    no blockage    CATARACT REMOVAL WITH IMPLANT Bilateral     cataract    EYE SURGERY  03/31/2017    Biopsy to right eye lid with surgery on 05/31/2017 for squamous cell     FRACTURE SURGERY Right     wrist    HYSTERECTOMY      PACEMAKER INSERTION      sick sinus syndrome says was changed to medtronic 11/19/2018    TUBAL LIGATION  1970       Family History   Problem Relation Age of Onset    Other Mother         sepsis    Diabetes Mother     Other Father         rheumatic heart disease       Social History     Tobacco Use    Smoking status: Never Smoker    Smokeless tobacco: Never Used    Tobacco comment: Akash Mathis RRT 11/28/18   Substance Use Topics    Alcohol use: No     Alcohol/week: 0.0 standard drinks     Frequency: Never     Binge frequency: Never       ROS: All 14 ROS systems reviewed and pertinent positives noted above, all others negative.     Lower Extremity Physical Examination:     Vitals:   Vitals:    11/19/20 1035   BP: 122/78   Pulse: 70     General: AAO x 3 in NAD. Vascular: DP and PT pulses palpable 2/4, bilateral.  CFT <3 seconds, bilateral.  Hair growth absent to the level of the digits, bilateral.  Edema present, bilateral.  Varicosities present, bilateral. Erythema absent, bilateral. Distal Rubor absent bilateral.  Temperature decreased bilateral. Hyperpigmentation present bilateral. Atrophic skin yes. Neurological: Sensation intact to light touch to level of digits, bilateral.  Protective sensation intact 10/10 sites via 5.07/10g [place] Monofilament, bilateral.  negative Tinel's, bilateral.  negative Valleix sign, bilateral.  Vibratory intact bilateral.  Reflexes Decreased bilateral.  Paresthesias negative. Dysthesias negative. Sharp/dull intact bilateral.    Musculoskeletal: Muscle strength 5/5, Bilateral.  Pain with strength testing is absent. Pain present upon palpation of area of hematoma L leg. decreased medial longitudinal arch, Bilateral.  Ankle ROM decreased,Bilateral.  1st MPJ ROM within normal limits, Bilateral.  Dorsally contracted digits present digits , Bilateral.  No palpable cyst or mass left leg. Integument:   Open lesion absent, bilateral.  Hematoma anterior tibial crest proximal one third area left leg. No signs of skin breakdown no signs of localized infection. Only mild edema and bruising seen. Interdigital maceration absent to web spaces bilateral.   Nails left 1, 2, 5 and right 1, 2, 5 thickened, dystrophic and crumbly, discolored with subungual debris. Fissures absent, bilateral. Hyperkeratotic tissue is absent. Asessment: Patient is a 80 y.o. female with:    Diagnosis Orders   1. Controlled type 2 DM with peripheral circulatory disorder (HCC)   DIABETES FOOT EXAM    WI DEBRIDEMENT OF NAILS, 6 OR MORE   2.  Dermatophytosis of nail 1-5R/L   DIABETES FOOT EXAM    WI DEBRIDEMENT OF NAILS, 6

## 2020-11-19 NOTE — TELEPHONE ENCOUNTER
[facility] and gave them increase dose order for her mitigare. Dhruv Casas was worried they would run out of the medication.

## 2020-11-19 NOTE — PROGRESS NOTES
[address] [facility] [address]  Dept: [phone]  Loc: [phone]    Subjective: The patient is a 80y.o. year old, , female is in the office for a follow up with a history of sick sinus syndrome history of pacemaker patient is follow-up pacemaker interrogation  Patient is on the wheelchair according to family she does not do anything at home mostly sitting. Although patient denies any chest pain pressure tightness    Past Medical History:   has a past medical history of Abnormal ANCA (antineutrophil cytoplasmic antibody), Basal cell cancer, CKD (chronic kidney disease), stage III, Dry skin dermatitis, Dyspnea, Edema, Gout, Hard of hearing, Hiatal hernia, Hypercholesterolemia, Hypertension, Hypomagnesemia, Lower extremity edema, Macular degeneration, dry, Obstructive sleep apnea, Open-angle glaucoma, Peripheral edema, Pulmonary hypertension ([address] Sick sinus syndrome (HCC), Type 2 diabetes mellitus ([address] Venous insufficiency, and Vitamin D deficiency. Past Surgical History:   has a past surgical history that includes Pacemaker insertion; Hysterectomy; fracture surgery (Right); Cardiac catheterization (2010); Tubal ligation (1970); Cataract removal with implant (Bilateral); and eye surgery (03/31/2017). Home Medications:  Prior to Admission medications    Medication Sig Start Date End Date Taking?  Authorizing Provider   colchicine (COLCRYS) 0.6 MG tablet Take 1 tablet by mouth daily 11/17/20  Yes JARRETT Rodriguez   metoprolol succinate (TOPROL XL) 100 MG extended release tablet TAKE 1 TABLET DAILY 8/17/20  Yes JARRETT Damon   sertraline (ZOLOFT) 100 MG tablet TAKE 1 TABLET DAILY 6/17/20  Yes JARRETT Damon   furosemide (LASIX) 40 MG tablet Take 1 tablet by mouth 2 times daily 6/8/20  Yes Mali Jessica MD   lisinopril (PRINIVIL;ZESTRIL) 20 MG tablet TAKE 1 TABLET bleeding, blood clots or swollen lymph nodes. · Allergic/Immunologic: No nasal congestion or hives. Physical Exam:  BP (!) 166/77   Pulse 61   Ht 5' (1.524 m)   Wt 195 lb (88.5 kg)   LMP  (LMP Unknown)   BMI 38.08 kg/m²     Constitutional and General Appearance: alert, cooperative, no distress and appears stated age  [de-identified]: PERRL, no cervical lymphadenopathy. No masses palpable. Normal oral mucosa  Respiratory:  · Normal excursion and expansion without use of accessory muscles  · Resp Auscultation: Good respiratory effort. No for increased work of breathing. On auscultation: clear to auscultation bilaterally  Cardiovascular:  · The apical impulse is not displaced  · Heart tones are crisp and normal. regular S1 and S2.  · Jugular venous pulsation Normal  · The carotid upstroke is normal in amplitude and contour without delay or bruit  · Peripheral pulses are symmetrical and full   Abdomen:   · No masses or tenderness  · Bowel sounds present  Extremities:  ·  No Cyanosis or Clubbing  ·  Lower extremity edema: No  ·  Skin: Warm and dry    Cardiac Data:  EKG:     Labs:     CBC: No results for input(s): WBC, HGB, HCT, PLT in the last 72 hours. BMP: No results for input(s): NA, K, CO2, BUN, CREATININE, LABGLOM, GLUCOSE in the last 72 hours. PT/INR: No results for input(s): PROTIME, INR in the last 72 hours. FASTING LIPID PANEL:  Lab Results   Component Value Date    CHOL 240 03/19/2019    HDL 54 07/07/2020    LDLCHOLESTEROL 177 07/07/2020    TRIG 127 03/19/2019    CHOLHDLRATIO 4.7 07/07/2020     LIVER PROFILE:No results for input(s): AST, ALT, LABALBU in the last 72 hours.       IMPRESSION:    Patient Active Problem List   Diagnosis    Hypertension    Hiatal hernia    Hypercholesterolemia    Obstructive sleep apnea    Hard of hearing    Venous insufficiency    Dyspnea    Sick sinus syndrome ([address]    Vitamin D deficiency    Malignant basal cell neoplasm of skin    Pacemaker    Gout    Dermatophytosis of nail 1-5R/L    Peripheral edema    Hypomagnesemia    Dry skin dermatitis    Complete heart block (HCC)    Controlled type 2 DM with peripheral circulatory disorder [facility]    Pacemaker at end of battery life - Change out 11/19/18 (Dr. Prema Mckinley)    Type 2 diabetes mellitus ([facility]    CKD (chronic kidney disease), stage III    Lower extremity edema    Abnormal ANCA (antineutrophil cytoplasmic antibody)    Pulmonary hypertension (HCC)    Chronic diastolic congestive heart failure (HCC)    Morbid obesity with BMI of 40.0-44.9, adult ([facility])       RECOMMENDATIONS:  Sick sinus syndrome status post pacemaker, interrogation okay  Talk in detail to the patient and the family patient need to walk a little bit start for 5 minutes and increased use walker at home. HYPERTENSION-patient has checked blood pressure in primary physician office this morning was normal is slightly high now advised to check blood pressure at home keep a record if consistently above 140 and 85 let us know     HYPERLIPIDEMIA- ON STATIN, NEEDS TO WATCH LIPID PROFILE AND LFT'S      DISCUSSED IN DETAILS ABOUT RISK MODIFICATION    RETURN VISIT IN 6 MONTHS, IF ANY SYMPTOM CHANGE PATIENT ADVISED TO GO TO THE EMERGENCY ROOM.           Varney Gitelman, [address] Cardiology Consult           [phone]

## 2020-12-09 ENCOUNTER — OFFICE VISIT (OUTPATIENT)
Dept: FAMILY MEDICINE CLINIC | Age: 85
End: 2020-12-09
Payer: MEDICARE

## 2020-12-09 VITALS
OXYGEN SATURATION: 97 % | DIASTOLIC BLOOD PRESSURE: 62 MMHG | HEART RATE: 93 BPM | RESPIRATION RATE: 14 BRPM | HEIGHT: 60 IN | BODY MASS INDEX: 38.28 KG/M2 | WEIGHT: 195 LBS | SYSTOLIC BLOOD PRESSURE: 116 MMHG

## 2020-12-09 PROCEDURE — 99214 OFFICE O/P EST MOD 30 MIN: CPT | Performed by: PHYSICIAN ASSISTANT

## 2020-12-09 PROCEDURE — 99211 OFF/OP EST MAY X REQ PHY/QHP: CPT

## 2020-12-09 RX ORDER — NYSTATIN 100000 [USP'U]/G
POWDER TOPICAL
Qty: 100 G | Refills: 3 | Status: SHIPPED | OUTPATIENT
Start: 2020-12-09 | End: 2021-01-01

## 2020-12-09 ASSESSMENT — ENCOUNTER SYMPTOMS: DIARRHEA: 1

## 2020-12-09 ASSESSMENT — PATIENT HEALTH QUESTIONNAIRE - PHQ9
SUM OF ALL RESPONSES TO PHQ QUESTIONS 1-9: 0
SUM OF ALL RESPONSES TO PHQ QUESTIONS 1-9: 0
2. FEELING DOWN, DEPRESSED OR HOPELESS: 0
SUM OF ALL RESPONSES TO PHQ QUESTIONS 1-9: 0
1. LITTLE INTEREST OR PLEASURE IN DOING THINGS: 0
SUM OF ALL RESPONSES TO PHQ9 QUESTIONS 1 & 2: 0

## 2020-12-09 NOTE — PROGRESS NOTES
[facility]    Subjective:      Patient ID: Rc Edward is a 80 y.o. y.o. female. Patient is seen today with daughter. At this time following up on pedal edema and other health issues. No recent illness. They have several questions. She still has slight erythema under the breasts and under the lower abdominal panniculus. Wondering what to do. They do have some Lotrisone at home. She will be traveling to [place] for several months to stay with her daughter. They figured since she is quarantining here she could go there and they do not take her anywhere to minimize exposure to COVID-19. Past Medical History:   Diagnosis Date    Abnormal ANCA (antineutrophil cytoplasmic antibody) 12/3/2018    Basal cell cancer     nose    CKD (chronic kidney disease), stage III 12/3/2018    Dry skin dermatitis 12/3/2015    Dyspnea     chronic    Edema     Chronic    Gout     Hard of hearing     Hiatal hernia     Hypercholesterolemia     Hypertension     Hypomagnesemia 12/3/2015    Lower extremity edema 12/3/2018    Macular degeneration, dry     Obstructive sleep apnea     Open-angle glaucoma     Borderline.    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     FRACTURE SURGERY Right     wrist    HYSTERECTOMY      PACEMAKER INSERTION      sick sinus syndrome says was changed to medtronic 11/19/2018    TUBAL LIGATION  1970       Family History   Problem Relation Age of Onset    Other Mother         sepsis    Diabetes Mother    [place] Self Other Father rheumatic heart disease       Allergies   Allergen Reactions    Atorvastatin Other (See Comments)    Statins [Statins] Other (See Comments)     Muscle aches, elevated LFT's    Penicillin G Rash       Current Outpatient Medications   Medication Sig Dispense Refill    nystatin (MYCOSTATIN) 361655 UNIT/GM powder Apply 3 times daily. 100 g 3    colchicine (COLCRYS) 0.6 MG tablet Take 1 tablet by mouth daily 90 tablet 1    metoprolol succinate (TOPROL XL) 100 MG extended release tablet TAKE 1 TABLET DAILY 90 tablet 3    sertraline (ZOLOFT) 100 MG tablet TAKE 1 TABLET DAILY 90 tablet 3    furosemide (LASIX) 40 MG tablet Take 1 tablet by mouth 2 times daily 190 tablet 3    lisinopril (PRINIVIL;ZESTRIL) 20 MG tablet TAKE 1 TABLET DAILY 90 tablet 3    sucralfate (CARAFATE) 1 GM tablet Take 1 tablet by mouth 4 times daily 120 tablet 3    polyethylene glycol (GLYCOLAX) powder Take 17 g by mouth 2 times daily 510 g 9    timolol (TIMOPTIC) 0.5 % ophthalmic solution Place 1 drop into both eyes 2 times daily      Multiple Vitamins-Minerals (ICAPS AREDS 2) CHEW Take 1 tablet by mouth 2 times daily      magnesium oxide (MAG-OX) 400 MG tablet Take 1 tablet by mouth nightly 30 tablet 3    Cholecalciferol (VITAMIN D) 2000 UNITS CAPS capsule Take 1 capsule by mouth daily      Acetaminophen (TYLENOL PO) Take  by mouth as needed. Rarely uses      aspirin 81 MG EC tablet Take 81 mg by mouth daily       MITIGARE 0.6 MG capsule Take 1 capsule by mouth daily 30 capsule 6     No current facility-administered medications for this visit. Review of Systems   Constitutional: Positive for activity change. Negative for appetite change, chills, diaphoresis, fatigue and fever. Naps quite a bit during the day. HENT: Negative for congestion. Eyes: Negative for visual disturbance. Respiratory: Negative for cough, chest tightness, shortness of breath and wheezing. Cardiovascular: Positive for leg swelling. Negative for chest pain and palpitations. Gastrointestinal: Positive for diarrhea. Negative for nausea and vomiting. Genitourinary: Negative for difficulty urinating. Musculoskeletal: Positive for gait problem and joint swelling. Negative for myalgias. Skin: Negative for rash. Neurological: Negative for seizures, speech difficulty, light-headedness, numbness and headaches. Hematological: Does not bruise/bleed easily. Psychiatric/Behavioral: Negative for agitation, behavioral problems and sleep disturbance. The patient is not nervous/anxious. Objective:      /62   Pulse 93   Resp 14   Ht 5' (1.524 m)   Wt 195 lb (88.5 kg)   LMP  (LMP Unknown)   SpO2 97%   BMI 38.08 kg/m²     Physical Exam  Vitals signs and nursing note reviewed. Constitutional:       General: She is not in acute distress. Appearance: Normal appearance. She is well-developed. Comments: Is seen and evaluated in a wheelchair. Kyphosis noted. HENT:      Head: Normocephalic and atraumatic. Right Ear: Ear canal and external ear normal.      Left Ear: Ear canal and external ear normal.      Ears:      Comments: Has hearing aids. TMs are dull. Nose: Nose normal. No congestion or rhinorrhea. Mouth/Throat:      Mouth: Mucous membranes are moist.      Pharynx: No oropharyngeal exudate or posterior oropharyngeal erythema. Comments: Uvula midline tongue symmetric. Eyes:      General: No scleral icterus. Conjunctiva/sclera: Conjunctivae normal.   Neck:      Musculoskeletal: Normal range of motion and neck supple. No neck rigidity or muscular tenderness. Thyroid: No thyroid mass, thyromegaly or thyroid tenderness. Vascular: No carotid bruit. Cardiovascular:      Rate and Rhythm: Normal rate and regular rhythm. Heart sounds: Murmur present. No gallop. Pulmonary:      Effort: Pulmonary effort is normal. No respiratory distress. Breath sounds: Normal breath sounds.  No wheezing, rhonchi or rales. Abdominal:      General: Bowel sounds are normal. There is no distension. Palpations: Abdomen is soft. There is no mass. Tenderness: There is no abdominal tenderness. There is no guarding or rebound. Hernia: No hernia is present. Musculoskeletal:         General: Swelling present. No tenderness. Right lower leg: No edema. Left lower leg: No edema. Comments: Mild swelling in the foot and ankle right. No warmth or redness noted. No bruising. No significant pedal edema in the pretibial areas. Stable. Lymphadenopathy:      Cervical: No cervical adenopathy. Skin:     General: Skin is warm and dry. Findings: No rash. Neurological:      Mental Status: She is alert and oriented to person, place, and time.        Hospital Outpatient Visit on 11/19/2020   Component Date Value Ref Range Status    Sed Rate 11/19/2020 47* 0 - 30 mm Final    Uric Acid 11/19/2020 10.0* 2.4 - 5.7 mg/dL Final     Lab Results   Component Value Date    TSH 1.64 10/31/2019     Lab Results   Component Value Date     09/29/2020    K 4.6 09/29/2020    CL 97 (L) 09/29/2020    CO2 31 09/29/2020    BUN 57 (H) 09/29/2020    CREATININE 1.67 (H) 09/29/2020    GLUCOSE 164 (H) 09/29/2020    CALCIUM 9.4 09/29/2020    PROT 7.4 07/07/2020    LABALBU 3.9 07/07/2020    BILITOT 0.37 07/07/2020    ALKPHOS 80 07/07/2020    AST 12 07/07/2020    ALT 7 07/07/2020    LABGLOM 29 (L) 09/29/2020    GFRAA 35 (L) 09/29/2020       Lab Results   Component Value Date    CHOL 240 (H) 03/19/2019    CHOL 235 (H) 11/15/2018    CHOL 273 (H) 06/13/2018     Lab Results   Component Value Date    TRIG 127 03/19/2019    TRIG 162 (H) 11/15/2018    TRIG 188 (H) 06/13/2018     Lab Results   Component Value Date    HDL 54 07/07/2020    HDL 45 03/19/2019    HDL 45 11/15/2018     Lab Results   Component Value Date    LDLCHOLESTEROL 177 (H) 07/07/2020    LDLCHOLESTEROL 170 (H) 03/19/2019    LDLCHOLESTEROL 158 (H) 11/15/2018     Lab Results   Component Value Date    VLDL NOT REPORTED 07/07/2020    VLDL NOT REPORTED 03/19/2019    VLDL NOT REPORTED 11/15/2018     Lab Results   Component Value Date    CHOLHDLRATIO 4.7 07/07/2020    CHOLHDLRATIO 5.3 (H) 03/19/2019    CHOLHDLRATIO 5.2 (H) 11/15/2018         Assessment & Plan:     1. Chronic gout of right foot, unspecified cause    - Uric Acid; Future    2. Idiopathic gout, unspecified chronicity, unspecified site    - Uric Acid; Future    3. Intertrigo    - nystatin (MYCOSTATIN) 732860 UNIT/GM powder; Apply 3 times daily. Dispense: 100 g; Refill: 3    4. Pulmonary hypertension ([address]      5. Chronic foot pain, right      6. Controlled type 2 DM with peripheral circulatory disorder ([address]      7. Chronic diastolic congestive heart failure ([address]      8. Essential hypertension      9. Stage 3b chronic kidney disease      10. ÓSCAR on CPAP        Stay on mitgare bid until mid Feb. evaluate then  They have specialists that they see in [place] if needed  Fall prevention  Good nutrition hydration  Answered her questions  Discussed how to use nystatin and Lotrisone keep areas dry clean  Elevate legs  Try to stay active  Good nutrition hydration  Still has mild swelling in the right foot no significant erythema or warmth. Pedal edema noted in right lower extremity and foot.   Daughter was not overly concerned about the foot this is been a chronic issue will monitor  Viry Jaimes, [id number]  12/20/2020 4:03 PM EST    (Pleasenote that portions of this note were completed with a voice recognition program.Efforts were made to edit the dictations but occasionally words are mis-transcribed.)

## 2020-12-20 ASSESSMENT — ENCOUNTER SYMPTOMS
WHEEZING: 0
CHEST TIGHTNESS: 0
SHORTNESS OF BREATH: 0
VOMITING: 0
COUGH: 0
NAUSEA: 0

## 2020-12-23 ENCOUNTER — HOSPITAL ENCOUNTER (OUTPATIENT)
Age: 85
Setting detail: SPECIMEN
Discharge: HOME | End: 2020-12-23
Payer: MEDICARE

## 2020-12-23 ENCOUNTER — HOSPITAL ENCOUNTER (OUTPATIENT)
Dept: LAB | Age: 85
Discharge: HOME | End: 2020-12-23
Payer: MEDICARE

## 2020-12-23 LAB
ABSOLUTE EOS #: 0.19 K/UL (ref 0–0.44)
ABSOLUTE IMMATURE GRANULOCYTE: 0.04 K/UL (ref 0–0.3)
ABSOLUTE LYMPH #: 1.77 K/UL (ref 1.1–3.7)
ABSOLUTE MONO #: 0.53 K/UL (ref 0.1–1.2)
ANION GAP SERPL CALCULATED.3IONS-SCNC: 11 MMOL/L (ref 9–17)
BASOPHILS # BLD: 1 % (ref 0–2)
BASOPHILS ABSOLUTE: 0.06 K/UL (ref 0–0.2)
BUN BLDV-MCNC: 38 MG/DL (ref 8–23)
BUN/CREAT BLD: 23 (ref 9–20)
CALCIUM IONIZED: ABNORMAL MMOL/L (ref 1.13–1.33)
CALCIUM SERPL-MCNC: 9.4 MG/DL (ref 8.6–10.4)
CHLORIDE BLD-SCNC: 99 MMOL/L (ref 98–107)
CO2: 32 MMOL/L (ref 20–31)
CREAT SERPL-MCNC: 1.62 MG/DL (ref 0.5–0.9)
CREATININE URINE: 49 MG/DL (ref 28–217)
DIFFERENTIAL TYPE: ABNORMAL
EOSINOPHILS RELATIVE PERCENT: 3 % (ref 1–4)
GFR AFRICAN AMERICAN: 36 ML/MIN
GFR NON-AFRICAN AMERICAN: 30 ML/MIN
GFR SERPL CREATININE-BSD FRML MDRD: ABNORMAL ML/MIN/{1.73_M2}
GFR SERPL CREATININE-BSD FRML MDRD: ABNORMAL ML/MIN/{1.73_M2}
GLUCOSE BLD-MCNC: 155 MG/DL (ref 70–99)
HCT VFR BLD CALC: 43.7 % (ref 36.3–47.1)
HEMOGLOBIN: 13.3 G/DL (ref 11.9–15.1)
IMMATURE GRANULOCYTES: 1 %
LYMPHOCYTES # BLD: 24 % (ref 24–43)
MCH RBC QN AUTO: 30.7 PG (ref 25.2–33.5)
MCHC RBC AUTO-ENTMCNC: 30.4 G/DL (ref 25.2–33.5)
MCV RBC AUTO: 100.9 FL (ref 82.6–102.9)
MONOCYTES # BLD: 7 % (ref 3–12)
NRBC AUTOMATED: 0 PER 100 WBC
PDW BLD-RTO: 12.9 % (ref 11.8–14.4)
PHOSPHORUS: 3.7 MG/DL (ref 2.6–4.5)
PLATELET # BLD: 213 K/UL (ref 138–453)
PLATELET ESTIMATE: ABNORMAL
PMV BLD AUTO: 10.5 FL (ref 8.1–13.5)
POTASSIUM SERPL-SCNC: 3.9 MMOL/L (ref 3.7–5.3)
PTH INTACT: 87.07 PG/ML (ref 15–65)
RBC # BLD: 4.33 M/UL (ref 3.95–5.11)
RBC # BLD: ABNORMAL 10*6/UL
SEG NEUTROPHILS: 64 % (ref 36–65)
SEGMENTED NEUTROPHILS ABSOLUTE COUNT: 4.85 K/UL (ref 1.5–8.1)
SODIUM BLD-SCNC: 142 MMOL/L (ref 135–144)
TOTAL PROTEIN, URINE: 6 MG/DL
URINE TOTAL PROTEIN CREATININE RATIO: 0.12 (ref 0–0.2)
VITAMIN D 25-HYDROXY: 55.6 NG/ML (ref 30–100)
WBC # BLD: 7.4 K/UL (ref 3.5–11.3)
WBC # BLD: ABNORMAL 10*3/UL

## 2020-12-23 PROCEDURE — 84156 ASSAY OF PROTEIN URINE: CPT

## 2020-12-23 PROCEDURE — 36415 COLL VENOUS BLD VENIPUNCTURE: CPT

## 2020-12-23 PROCEDURE — 83970 ASSAY OF PARATHORMONE: CPT

## 2020-12-23 PROCEDURE — 84100 ASSAY OF PHOSPHORUS: CPT

## 2020-12-23 PROCEDURE — 82306 VITAMIN D 25 HYDROXY: CPT

## 2020-12-23 PROCEDURE — 80048 BASIC METABOLIC PNL TOTAL CA: CPT

## 2020-12-23 PROCEDURE — 82570 ASSAY OF URINE CREATININE: CPT

## 2020-12-23 PROCEDURE — 82330 ASSAY OF CALCIUM: CPT

## 2020-12-23 PROCEDURE — 85025 COMPLETE CBC W/AUTO DIFF WBC: CPT

## 2021-01-01 ENCOUNTER — HOSPITAL ENCOUNTER (OUTPATIENT)
Age: 86
Setting detail: SPECIMEN
Discharge: HOME | End: 2021-12-09
Payer: MEDICARE

## 2021-01-01 ENCOUNTER — OFFICE VISIT (OUTPATIENT)
Dept: INTERNAL MEDICINE | Age: 86
End: 2021-01-01
Payer: MEDICARE

## 2021-01-01 ENCOUNTER — TELEPHONE (OUTPATIENT)
Dept: INTERNAL MEDICINE | Age: 86
End: 2021-01-01
Payer: MEDICARE

## 2021-01-01 ENCOUNTER — TELEPHONE (OUTPATIENT)
Dept: CARDIOLOGY | Age: 86
End: 2021-01-01

## 2021-01-01 ENCOUNTER — OFFICE VISIT (OUTPATIENT)
Dept: NEPHROLOGY | Age: 86
End: 2021-01-01
Payer: MEDICARE

## 2021-01-01 ENCOUNTER — TELEPHONE (OUTPATIENT)
Dept: INTERNAL MEDICINE | Age: 86
End: 2021-01-01

## 2021-01-01 ENCOUNTER — OFFICE VISIT (OUTPATIENT)
Dept: PODIATRY | Age: 86
End: 2021-01-01
Payer: MEDICARE

## 2021-01-01 ENCOUNTER — HOSPITAL ENCOUNTER (OUTPATIENT)
Dept: GENERAL RADIOLOGY | Age: 86
Discharge: HOME | End: 2021-03-25
Payer: MEDICARE

## 2021-01-01 ENCOUNTER — OFFICE VISIT (OUTPATIENT)
Dept: PRIMARY CARE CLINIC | Age: 86
End: 2021-01-01
Payer: MEDICARE

## 2021-01-01 ENCOUNTER — HOSPITAL ENCOUNTER (OUTPATIENT)
Dept: LAB | Age: 86
Discharge: HOME | End: 2021-07-16
Payer: MEDICARE

## 2021-01-01 ENCOUNTER — HOSPITAL ENCOUNTER (OUTPATIENT)
Dept: NON INVASIVE DIAGNOSTICS | Age: 86
Discharge: HOME | End: 2021-11-03
Payer: MEDICARE

## 2021-01-01 ENCOUNTER — HOSPITAL ENCOUNTER (OUTPATIENT)
Dept: GENERAL RADIOLOGY | Age: 86
Discharge: HOME | End: 2021-07-15
Payer: MEDICARE

## 2021-01-01 ENCOUNTER — HOSPITAL ENCOUNTER (OUTPATIENT)
Dept: LAB | Age: 86
Discharge: HOME | End: 2021-11-03
Payer: MEDICARE

## 2021-01-01 ENCOUNTER — HOSPITAL ENCOUNTER (OUTPATIENT)
Age: 86
Setting detail: SPECIMEN
Discharge: HOME | End: 2021-03-31
Payer: MEDICARE

## 2021-01-01 ENCOUNTER — HOSPITAL ENCOUNTER (OUTPATIENT)
Dept: GENERAL RADIOLOGY | Age: 86
Discharge: HOME | End: 2021-09-09
Payer: MEDICARE

## 2021-01-01 ENCOUNTER — OFFICE VISIT (OUTPATIENT)
Dept: CARDIOLOGY | Age: 86
End: 2021-01-01
Payer: MEDICARE

## 2021-01-01 ENCOUNTER — NURSE TRIAGE (OUTPATIENT)
Dept: OTHER | Facility: CLINIC | Age: 86
End: 2021-01-01

## 2021-01-01 ENCOUNTER — PROCEDURE VISIT (OUTPATIENT)
Dept: CARDIOLOGY | Age: 86
End: 2021-01-01
Payer: MEDICARE

## 2021-01-01 ENCOUNTER — HOSPITAL ENCOUNTER (OUTPATIENT)
Dept: LAB | Age: 86
Discharge: HOME | End: 2021-12-09
Payer: MEDICARE

## 2021-01-01 ENCOUNTER — HOSPITAL ENCOUNTER (OUTPATIENT)
Dept: GENERAL RADIOLOGY | Age: 86
Discharge: HOME | End: 2021-10-23
Payer: MEDICARE

## 2021-01-01 ENCOUNTER — NURSE ONLY (OUTPATIENT)
Dept: LAB | Age: 86
End: 2021-01-01
Payer: MEDICARE

## 2021-01-01 ENCOUNTER — HOSPITAL ENCOUNTER (OUTPATIENT)
Dept: LAB | Age: 86
Discharge: HOME | End: 2021-03-23
Payer: MEDICARE

## 2021-01-01 VITALS
HEART RATE: 71 BPM | SYSTOLIC BLOOD PRESSURE: 98 MMHG | DIASTOLIC BLOOD PRESSURE: 54 MMHG | RESPIRATION RATE: 14 BRPM | TEMPERATURE: 97.6 F | OXYGEN SATURATION: 94 %

## 2021-01-01 VITALS
HEIGHT: 60 IN | WEIGHT: 165 LBS | SYSTOLIC BLOOD PRESSURE: 133 MMHG | BODY MASS INDEX: 32.39 KG/M2 | DIASTOLIC BLOOD PRESSURE: 62 MMHG | HEART RATE: 62 BPM

## 2021-01-01 VITALS
SYSTOLIC BLOOD PRESSURE: 124 MMHG | DIASTOLIC BLOOD PRESSURE: 80 MMHG | TEMPERATURE: 98.4 F | OXYGEN SATURATION: 95 % | HEART RATE: 60 BPM | HEIGHT: 60 IN | WEIGHT: 190 LBS | RESPIRATION RATE: 16 BRPM | BODY MASS INDEX: 37.3 KG/M2

## 2021-01-01 VITALS
WEIGHT: 161 LBS | BODY MASS INDEX: 31.61 KG/M2 | DIASTOLIC BLOOD PRESSURE: 80 MMHG | TEMPERATURE: 97 F | SYSTOLIC BLOOD PRESSURE: 124 MMHG | OXYGEN SATURATION: 98 % | HEIGHT: 60 IN | HEART RATE: 63 BPM

## 2021-01-01 VITALS
HEIGHT: 60 IN | HEART RATE: 60 BPM | DIASTOLIC BLOOD PRESSURE: 90 MMHG | BODY MASS INDEX: 32.43 KG/M2 | RESPIRATION RATE: 16 BRPM | WEIGHT: 165.2 LBS | SYSTOLIC BLOOD PRESSURE: 135 MMHG

## 2021-01-01 VITALS
SYSTOLIC BLOOD PRESSURE: 110 MMHG | DIASTOLIC BLOOD PRESSURE: 60 MMHG | BODY MASS INDEX: 35.34 KG/M2 | HEIGHT: 60 IN | HEART RATE: 70 BPM | WEIGHT: 180 LBS

## 2021-01-01 VITALS
HEART RATE: 68 BPM | WEIGHT: 190.2 LBS | DIASTOLIC BLOOD PRESSURE: 80 MMHG | BODY MASS INDEX: 37.34 KG/M2 | SYSTOLIC BLOOD PRESSURE: 116 MMHG | HEIGHT: 60 IN

## 2021-01-01 VITALS
BODY MASS INDEX: 35.34 KG/M2 | HEIGHT: 60 IN | WEIGHT: 180 LBS | HEART RATE: 66 BPM | SYSTOLIC BLOOD PRESSURE: 132 MMHG | DIASTOLIC BLOOD PRESSURE: 74 MMHG

## 2021-01-01 VITALS
DIASTOLIC BLOOD PRESSURE: 70 MMHG | SYSTOLIC BLOOD PRESSURE: 98 MMHG | BODY MASS INDEX: 35.15 KG/M2 | HEART RATE: 60 BPM | HEIGHT: 60 IN

## 2021-01-01 VITALS
HEART RATE: 60 BPM | HEIGHT: 60 IN | WEIGHT: 180 LBS | BODY MASS INDEX: 35.34 KG/M2 | DIASTOLIC BLOOD PRESSURE: 66 MMHG | SYSTOLIC BLOOD PRESSURE: 135 MMHG

## 2021-01-01 VITALS
WEIGHT: 165 LBS | SYSTOLIC BLOOD PRESSURE: 136 MMHG | BODY MASS INDEX: 32.39 KG/M2 | DIASTOLIC BLOOD PRESSURE: 88 MMHG | HEIGHT: 60 IN | HEART RATE: 65 BPM

## 2021-01-01 VITALS
HEIGHT: 60 IN | BODY MASS INDEX: 31.61 KG/M2 | SYSTOLIC BLOOD PRESSURE: 132 MMHG | DIASTOLIC BLOOD PRESSURE: 78 MMHG | WEIGHT: 161 LBS | HEART RATE: 68 BPM

## 2021-01-01 VITALS
HEART RATE: 64 BPM | HEIGHT: 60 IN | DIASTOLIC BLOOD PRESSURE: 68 MMHG | SYSTOLIC BLOOD PRESSURE: 98 MMHG | BODY MASS INDEX: 35.34 KG/M2 | WEIGHT: 180 LBS

## 2021-01-01 VITALS
BODY MASS INDEX: 37.11 KG/M2 | DIASTOLIC BLOOD PRESSURE: 64 MMHG | HEIGHT: 60 IN | WEIGHT: 189 LBS | HEART RATE: 70 BPM | SYSTOLIC BLOOD PRESSURE: 128 MMHG

## 2021-01-01 VITALS — DIASTOLIC BLOOD PRESSURE: 50 MMHG | SYSTOLIC BLOOD PRESSURE: 101 MMHG | HEART RATE: 67 BPM

## 2021-01-01 VITALS — HEART RATE: 60 BPM | SYSTOLIC BLOOD PRESSURE: 118 MMHG | DIASTOLIC BLOOD PRESSURE: 68 MMHG

## 2021-01-01 VITALS
SYSTOLIC BLOOD PRESSURE: 132 MMHG | DIASTOLIC BLOOD PRESSURE: 84 MMHG | WEIGHT: 185 LBS | BODY MASS INDEX: 36.32 KG/M2 | HEIGHT: 60 IN | HEART RATE: 72 BPM

## 2021-01-01 VITALS — RESPIRATION RATE: 20 BRPM | DIASTOLIC BLOOD PRESSURE: 60 MMHG | SYSTOLIC BLOOD PRESSURE: 102 MMHG | HEART RATE: 66 BPM

## 2021-01-01 VITALS — DIASTOLIC BLOOD PRESSURE: 70 MMHG | RESPIRATION RATE: 20 BRPM | SYSTOLIC BLOOD PRESSURE: 126 MMHG | HEART RATE: 76 BPM

## 2021-01-01 DIAGNOSIS — I10 ESSENTIAL HYPERTENSION: ICD-10-CM

## 2021-01-01 DIAGNOSIS — I27.20 PULMONARY HTN (HCC): ICD-10-CM

## 2021-01-01 DIAGNOSIS — E55.9 VITAMIN D DEFICIENCY: ICD-10-CM

## 2021-01-01 DIAGNOSIS — M25.532 LEFT WRIST PAIN: ICD-10-CM

## 2021-01-01 DIAGNOSIS — I44.2 ATRIOVENTRICULAR BLOCK, COMPLETE (HCC): ICD-10-CM

## 2021-01-01 DIAGNOSIS — F32.A ANXIETY AND DEPRESSION: Primary | ICD-10-CM

## 2021-01-01 DIAGNOSIS — F02.80 LEWY BODY DEMENTIA WITHOUT BEHAVIORAL DISTURBANCE (HCC): ICD-10-CM

## 2021-01-01 DIAGNOSIS — R32 URINARY INCONTINENCE, UNSPECIFIED TYPE: ICD-10-CM

## 2021-01-01 DIAGNOSIS — I44.2 COMPLETE HEART BLOCK (HCC): Primary | ICD-10-CM

## 2021-01-01 DIAGNOSIS — N18.4 CHRONIC KIDNEY DISEASE, STAGE IV (SEVERE) (HCC): ICD-10-CM

## 2021-01-01 DIAGNOSIS — K76.6 PORTOPULMONARY HYPERTENSION (HCC): ICD-10-CM

## 2021-01-01 DIAGNOSIS — I50.32 CHRONIC DIASTOLIC HEART FAILURE (HCC): ICD-10-CM

## 2021-01-01 DIAGNOSIS — I49.5 SICK SINUS SYNDROME (HCC): ICD-10-CM

## 2021-01-01 DIAGNOSIS — N18.32 TYPE 2 DIABETES MELLITUS WITH STAGE 3B CHRONIC KIDNEY DISEASE, WITHOUT LONG-TERM CURRENT USE OF INSULIN (HCC): ICD-10-CM

## 2021-01-01 DIAGNOSIS — E11.22 TYPE 2 DIABETES MELLITUS WITH DIABETIC CHRONIC KIDNEY DISEASE, UNSPECIFIED CKD STAGE, UNSPECIFIED WHETHER LONG TERM INSULIN USE (HCC): ICD-10-CM

## 2021-01-01 DIAGNOSIS — Z45.010 PACEMAKER AT END OF BATTERY LIFE: ICD-10-CM

## 2021-01-01 DIAGNOSIS — F33.1 MAJOR DEPRESSIVE DISORDER, RECURRENT, MODERATE (HCC): ICD-10-CM

## 2021-01-01 DIAGNOSIS — I13.0 HYPERTENSIVE HEART AND RENAL DISEASE WITH CONGESTIVE HEART FAILURE (HCC): Primary | ICD-10-CM

## 2021-01-01 DIAGNOSIS — L02.92 BOIL: Primary | ICD-10-CM

## 2021-01-01 DIAGNOSIS — E11.51 CONTROLLED TYPE 2 DM WITH PERIPHERAL CIRCULATORY DISORDER (HCC): ICD-10-CM

## 2021-01-01 DIAGNOSIS — G89.29 OTHER CHRONIC PAIN: ICD-10-CM

## 2021-01-01 DIAGNOSIS — R76.8 P-ANCA AND MPO ANTIBODIES POSITIVE: ICD-10-CM

## 2021-01-01 DIAGNOSIS — Z95.0 PACEMAKER: Primary | ICD-10-CM

## 2021-01-01 DIAGNOSIS — G47.33 OBSTRUCTIVE SLEEP APNEA: ICD-10-CM

## 2021-01-01 DIAGNOSIS — I50.32 CHRONIC DIASTOLIC CONGESTIVE HEART FAILURE (HCC): ICD-10-CM

## 2021-01-01 DIAGNOSIS — B35.1 DERMATOPHYTOSIS OF NAIL: ICD-10-CM

## 2021-01-01 DIAGNOSIS — E78.00 PURE HYPERCHOLESTEROLEMIA: ICD-10-CM

## 2021-01-01 DIAGNOSIS — N18.4 CKD (CHRONIC KIDNEY DISEASE) STAGE 4, GFR 15-29 ML/MIN (HCC): ICD-10-CM

## 2021-01-01 DIAGNOSIS — I10 ESSENTIAL HYPERTENSION: Primary | ICD-10-CM

## 2021-01-01 DIAGNOSIS — N18.30 CKD (CHRONIC KIDNEY DISEASE), STAGE III (HCC): ICD-10-CM

## 2021-01-01 DIAGNOSIS — R10.816 EPIGASTRIC ABDOMINAL TENDERNESS WITHOUT REBOUND TENDERNESS: ICD-10-CM

## 2021-01-01 DIAGNOSIS — E11.51 TYPE II DIABETES MELLITUS WITH PERIPHERAL CIRCULATORY DISORDER (HCC): ICD-10-CM

## 2021-01-01 DIAGNOSIS — E83.42 HYPOMAGNESEMIA: ICD-10-CM

## 2021-01-01 DIAGNOSIS — N18.32 STAGE 3B CHRONIC KIDNEY DISEASE (HCC): Primary | ICD-10-CM

## 2021-01-01 DIAGNOSIS — E11.51 DIABETES MELLITUS WITH PERIPHERAL ANGIOPATHY (HCC): ICD-10-CM

## 2021-01-01 DIAGNOSIS — G47.33 OBSTRUCTIVE SLEEP APNEA (ADULT) (PEDIATRIC): ICD-10-CM

## 2021-01-01 DIAGNOSIS — R13.19 ESOPHAGEAL DYSPHAGIA: ICD-10-CM

## 2021-01-01 DIAGNOSIS — F41.9 ANXIETY AND DEPRESSION: Primary | ICD-10-CM

## 2021-01-01 DIAGNOSIS — H91.90 HEARING LOSS, UNSPECIFIED HEARING LOSS TYPE, UNSPECIFIED LATERALITY: ICD-10-CM

## 2021-01-01 DIAGNOSIS — I44.2 COMPLETE HEART BLOCK (HCC): ICD-10-CM

## 2021-01-01 DIAGNOSIS — M79.18 MUSCULOSKELETAL PAIN: ICD-10-CM

## 2021-01-01 DIAGNOSIS — M79.671 FOOT PAIN, RIGHT: ICD-10-CM

## 2021-01-01 DIAGNOSIS — Z95.0 S/P PLACEMENT OF CARDIAC PACEMAKER: ICD-10-CM

## 2021-01-01 DIAGNOSIS — I27.20 PORTOPULMONARY HYPERTENSION (HCC): ICD-10-CM

## 2021-01-01 DIAGNOSIS — N18.31 TYPE 2 DIABETES MELLITUS WITH STAGE 3A CHRONIC KIDNEY DISEASE, WITHOUT LONG-TERM CURRENT USE OF INSULIN (HCC): ICD-10-CM

## 2021-01-01 DIAGNOSIS — F32.A DEPRESSION, UNSPECIFIED DEPRESSION TYPE: ICD-10-CM

## 2021-01-01 DIAGNOSIS — M89.8X2 PAIN OF LEFT HUMERUS: ICD-10-CM

## 2021-01-01 DIAGNOSIS — R60.0 LOWER EXTREMITY EDEMA: ICD-10-CM

## 2021-01-01 DIAGNOSIS — Z95.0 PACEMAKER: ICD-10-CM

## 2021-01-01 DIAGNOSIS — G31.83 LEWY BODY DEMENTIA WITHOUT BEHAVIORAL DISTURBANCE (HCC): ICD-10-CM

## 2021-01-01 DIAGNOSIS — R68.2 DRY MOUTH: ICD-10-CM

## 2021-01-01 DIAGNOSIS — N18.32 STAGE 3B CHRONIC KIDNEY DISEASE (HCC): ICD-10-CM

## 2021-01-01 DIAGNOSIS — R60.9 PERIPHERAL EDEMA: ICD-10-CM

## 2021-01-01 DIAGNOSIS — J96.90 RESPIRATORY FAILURE, UNSPECIFIED CHRONICITY, UNSPECIFIED WHETHER WITH HYPOXIA OR HYPERCAPNIA (HCC): ICD-10-CM

## 2021-01-01 DIAGNOSIS — L89.301 PRESSURE INJURY OF BUTTOCK, STAGE 1, UNSPECIFIED LATERALITY: ICD-10-CM

## 2021-01-01 DIAGNOSIS — L98.9 SKIN LESION: ICD-10-CM

## 2021-01-01 DIAGNOSIS — M79.18 CHRONIC MUSCULOSKELETAL PAIN: ICD-10-CM

## 2021-01-01 DIAGNOSIS — F32.A ANXIETY AND DEPRESSION: ICD-10-CM

## 2021-01-01 DIAGNOSIS — Z01.30 BLOOD PRESSURE CHECK: Primary | ICD-10-CM

## 2021-01-01 DIAGNOSIS — G89.4 CHRONIC PAIN SYNDROME: ICD-10-CM

## 2021-01-01 DIAGNOSIS — I13.0 HYPERTENSIVE HEART DISEASE WITH CONGESTIVE HEART FAILURE AND CHRONIC KIDNEY DISEASE, UNSPECIFIED CKD STAGE, UNSPECIFIED HEART FAILURE TYPE (HCC): Primary | ICD-10-CM

## 2021-01-01 DIAGNOSIS — F41.1 GENERALIZED ANXIETY DISORDER: ICD-10-CM

## 2021-01-01 DIAGNOSIS — I27.20 PULMONARY HYPERTENSION (HCC): ICD-10-CM

## 2021-01-01 DIAGNOSIS — K44.9 HIATAL HERNIA: ICD-10-CM

## 2021-01-01 DIAGNOSIS — R06.09 DOE (DYSPNEA ON EXERTION): ICD-10-CM

## 2021-01-01 DIAGNOSIS — I50.32 CHRONIC DIASTOLIC CONGESTIVE HEART FAILURE (HCC): Primary | ICD-10-CM

## 2021-01-01 DIAGNOSIS — F33.0 MILD EPISODE OF RECURRENT MAJOR DEPRESSIVE DISORDER (HCC): ICD-10-CM

## 2021-01-01 DIAGNOSIS — E11.51 CONTROLLED TYPE 2 DM WITH PERIPHERAL CIRCULATORY DISORDER (HCC): Primary | ICD-10-CM

## 2021-01-01 DIAGNOSIS — M79.10 MYALGIA: ICD-10-CM

## 2021-01-01 DIAGNOSIS — N18.4 CHRONIC RENAL DISEASE, STAGE IV (HCC): ICD-10-CM

## 2021-01-01 DIAGNOSIS — E78.00 HYPERCHOLESTEROLEMIA: ICD-10-CM

## 2021-01-01 DIAGNOSIS — F33.0 MAJOR DEPRESSIVE DISORDER, RECURRENT, MILD (HCC): ICD-10-CM

## 2021-01-01 DIAGNOSIS — N94.89 LABIAL SWELLING: Primary | ICD-10-CM

## 2021-01-01 DIAGNOSIS — M19.079 INFLAMMATION OF FOOT JOINT: ICD-10-CM

## 2021-01-01 DIAGNOSIS — R19.7 DIARRHEA, UNSPECIFIED TYPE: ICD-10-CM

## 2021-01-01 DIAGNOSIS — E11.22 TYPE 2 DIABETES MELLITUS WITH STAGE 3B CHRONIC KIDNEY DISEASE, WITHOUT LONG-TERM CURRENT USE OF INSULIN (HCC): ICD-10-CM

## 2021-01-01 DIAGNOSIS — E11.22 TYPE 2 DIABETES MELLITUS WITH STAGE 3A CHRONIC KIDNEY DISEASE, WITHOUT LONG-TERM CURRENT USE OF INSULIN (HCC): ICD-10-CM

## 2021-01-01 DIAGNOSIS — M19.079 INFLAMMATION OF FOOT JOINT: Primary | ICD-10-CM

## 2021-01-01 DIAGNOSIS — G47.33 OBSTRUCTIVE SLEEP APNEA SYNDROME: ICD-10-CM

## 2021-01-01 DIAGNOSIS — F41.9 ANXIETY AND DEPRESSION: ICD-10-CM

## 2021-01-01 DIAGNOSIS — E66.01 MORBID OBESITY WITH BMI OF 40.0-44.9, ADULT (HCC): ICD-10-CM

## 2021-01-01 DIAGNOSIS — G89.29 CHRONIC MUSCULOSKELETAL PAIN: ICD-10-CM

## 2021-01-01 LAB
ABSOLUTE EOS #: 0.04 K/UL (ref 0–0.44)
ABSOLUTE EOS #: 0.18 K/UL (ref 0–0.44)
ABSOLUTE EOS #: 0.21 K/UL (ref 0–0.44)
ABSOLUTE EOS #: 0.29 K/UL (ref 0–0.44)
ABSOLUTE IMMATURE GRANULOCYTE: 0.03 K/UL (ref 0–0.3)
ABSOLUTE IMMATURE GRANULOCYTE: 0.03 K/UL (ref 0–0.3)
ABSOLUTE IMMATURE GRANULOCYTE: 0.04 K/UL (ref 0–0.3)
ABSOLUTE IMMATURE GRANULOCYTE: 0.05 K/UL (ref 0–0.3)
ABSOLUTE LYMPH #: 1.47 K/UL (ref 1.1–3.7)
ABSOLUTE LYMPH #: 1.65 K/UL (ref 1.1–3.7)
ABSOLUTE LYMPH #: 1.84 K/UL (ref 1.1–3.7)
ABSOLUTE LYMPH #: 1.98 K/UL (ref 1.1–3.7)
ABSOLUTE MONO #: 0.61 K/UL (ref 0.1–1.2)
ABSOLUTE MONO #: 0.62 K/UL (ref 0.1–1.2)
ABSOLUTE MONO #: 0.65 K/UL (ref 0.1–1.2)
ABSOLUTE MONO #: 0.75 K/UL (ref 0.1–1.2)
ALBUMIN SERPL-MCNC: 3.9 G/DL (ref 3.5–5.2)
ALBUMIN/GLOBULIN RATIO: 1.4 (ref 1–2.5)
ALP BLD-CCNC: 96 U/L (ref 35–104)
ALT SERPL-CCNC: 14 U/L (ref 5–33)
ANION GAP SERPL CALCULATED.3IONS-SCNC: 11 MMOL/L (ref 9–17)
ANION GAP SERPL CALCULATED.3IONS-SCNC: 12 MMOL/L (ref 9–17)
ANION GAP SERPL CALCULATED.3IONS-SCNC: 15 MMOL/L (ref 9–17)
ANION GAP SERPL CALCULATED.3IONS-SCNC: 15 MMOL/L (ref 9–17)
ANION GAP SERPL CALCULATED.3IONS-SCNC: 8 MMOL/L (ref 9–17)
ANION GAP SERPL CALCULATED.3IONS-SCNC: 9 MMOL/L (ref 9–17)
ANION GAP SERPL CALCULATED.3IONS-SCNC: 9 MMOL/L (ref 9–17)
AST SERPL-CCNC: 19 U/L
BASOPHILS # BLD: 0 % (ref 0–2)
BASOPHILS # BLD: 1 % (ref 0–2)
BASOPHILS ABSOLUTE: 0.04 K/UL (ref 0–0.2)
BASOPHILS ABSOLUTE: 0.05 K/UL (ref 0–0.2)
BASOPHILS ABSOLUTE: 0.06 K/UL (ref 0–0.2)
BASOPHILS ABSOLUTE: 0.07 K/UL (ref 0–0.2)
BILIRUB SERPL-MCNC: 0.43 MG/DL (ref 0.3–1.2)
BUN BLDV-MCNC: 32 MG/DL (ref 8–23)
BUN BLDV-MCNC: 33 MG/DL (ref 8–23)
BUN BLDV-MCNC: 33 MG/DL (ref 8–23)
BUN BLDV-MCNC: 37 MG/DL (ref 8–23)
BUN BLDV-MCNC: 40 MG/DL (ref 8–23)
BUN BLDV-MCNC: 68 MG/DL (ref 8–23)
BUN BLDV-MCNC: 68 MG/DL (ref 8–23)
BUN/CREAT BLD: 26 (ref 9–20)
BUN/CREAT BLD: 28 (ref 9–20)
BUN/CREAT BLD: 28 (ref 9–20)
BUN/CREAT BLD: 31 (ref 9–20)
BUN/CREAT BLD: 32 (ref 9–20)
CALCIUM IONIZED: 1.11 MMOL/L (ref 1.13–1.33)
CALCIUM IONIZED: 1.15 MMOL/L (ref 1.13–1.33)
CALCIUM IONIZED: 1.19 MMOL/L (ref 1.13–1.33)
CALCIUM SERPL-MCNC: 8.9 MG/DL (ref 8.6–10.4)
CALCIUM SERPL-MCNC: 8.9 MG/DL (ref 8.6–10.4)
CALCIUM SERPL-MCNC: 9.1 MG/DL (ref 8.6–10.4)
CALCIUM SERPL-MCNC: 9.1 MG/DL (ref 8.6–10.4)
CALCIUM SERPL-MCNC: 9.3 MG/DL (ref 8.6–10.4)
CALCIUM SERPL-MCNC: 9.6 MG/DL (ref 8.6–10.4)
CALCIUM SERPL-MCNC: 9.6 MG/DL (ref 8.6–10.4)
CHLORIDE BLD-SCNC: 100 MMOL/L (ref 98–107)
CHLORIDE BLD-SCNC: 101 MMOL/L (ref 98–107)
CHLORIDE BLD-SCNC: 102 MMOL/L (ref 98–107)
CHLORIDE BLD-SCNC: 102 MMOL/L (ref 98–107)
CHOLESTEROL/HDL RATIO: 5
CHOLESTEROL: 205 MG/DL
CO2: 20 MMOL/L (ref 20–31)
CO2: 21 MMOL/L (ref 20–31)
CO2: 28 MMOL/L (ref 20–31)
CO2: 30 MMOL/L (ref 20–31)
CO2: 32 MMOL/L (ref 20–31)
CREAT SERPL-MCNC: 1.17 MG/DL (ref 0.5–0.9)
CREAT SERPL-MCNC: 1.19 MG/DL (ref 0.5–0.9)
CREAT SERPL-MCNC: 1.19 MG/DL (ref 0.5–0.9)
CREAT SERPL-MCNC: 1.25 MG/DL (ref 0.5–0.9)
CREAT SERPL-MCNC: 1.26 MG/DL (ref 0.5–0.9)
CREAT SERPL-MCNC: 2.14 MG/DL (ref 0.5–0.9)
CREAT SERPL-MCNC: 2.2 MG/DL (ref 0.5–0.9)
CREATININE URINE: 26.7 MG/DL (ref 28–217)
CREATININE URINE: 41.7 MG/DL (ref 28–217)
CREATININE URINE: 89.2 MG/DL (ref 28–217)
DIFFERENTIAL TYPE: ABNORMAL
EOSINOPHILS RELATIVE PERCENT: 0 % (ref 1–4)
EOSINOPHILS RELATIVE PERCENT: 3 % (ref 1–4)
EOSINOPHILS RELATIVE PERCENT: 3 % (ref 1–4)
EOSINOPHILS RELATIVE PERCENT: 4 % (ref 1–4)
ESTIMATED AVERAGE GLUCOSE: 114 MG/DL
ESTIMATED AVERAGE GLUCOSE: 134 MG/DL
GFR AFRICAN AMERICAN: 25 ML/MIN
GFR AFRICAN AMERICAN: 26 ML/MIN
GFR AFRICAN AMERICAN: 48 ML/MIN
GFR AFRICAN AMERICAN: 48 ML/MIN
GFR AFRICAN AMERICAN: 51 ML/MIN
GFR AFRICAN AMERICAN: 51 ML/MIN
GFR AFRICAN AMERICAN: 52 ML/MIN
GFR NON-AFRICAN AMERICAN: 21 ML/MIN
GFR NON-AFRICAN AMERICAN: 22 ML/MIN
GFR NON-AFRICAN AMERICAN: 40 ML/MIN
GFR NON-AFRICAN AMERICAN: 40 ML/MIN
GFR NON-AFRICAN AMERICAN: 42 ML/MIN
GFR NON-AFRICAN AMERICAN: 42 ML/MIN
GFR NON-AFRICAN AMERICAN: 43 ML/MIN
GFR SERPL CREATININE-BSD FRML MDRD: ABNORMAL ML/MIN/{1.73_M2}
GLUCOSE BLD-MCNC: 107 MG/DL (ref 70–99)
GLUCOSE BLD-MCNC: 107 MG/DL (ref 70–99)
GLUCOSE BLD-MCNC: 111 MG/DL (ref 70–99)
GLUCOSE BLD-MCNC: 113 MG/DL (ref 70–99)
GLUCOSE BLD-MCNC: 131 MG/DL (ref 70–99)
GLUCOSE BLD-MCNC: 131 MG/DL (ref 70–99)
GLUCOSE BLD-MCNC: 205 MG/DL (ref 70–99)
HBA1C MFR BLD: 5.6 % (ref 4–6)
HBA1C MFR BLD: 6.3 % (ref 4–6)
HCT VFR BLD CALC: 40 % (ref 36.3–47.1)
HCT VFR BLD CALC: 40.5 % (ref 36.3–47.1)
HCT VFR BLD CALC: 41.3 % (ref 36.3–47.1)
HCT VFR BLD CALC: 41.9 % (ref 36.3–47.1)
HDLC SERPL-MCNC: 41 MG/DL
HEMOGLOBIN: 12.8 G/DL (ref 11.9–15.1)
HEMOGLOBIN: 12.8 G/DL (ref 11.9–15.1)
HEMOGLOBIN: 13 G/DL (ref 11.9–15.1)
HEMOGLOBIN: 13.2 G/DL (ref 11.9–15.1)
IMMATURE GRANULOCYTES: 0 %
IMMATURE GRANULOCYTES: 1 %
LDL CHOLESTEROL: 136 MG/DL (ref 0–130)
LV EF: 55 %
LVEF MODALITY: NORMAL
LYMPHOCYTES # BLD: 15 % (ref 24–43)
LYMPHOCYTES # BLD: 22 % (ref 24–43)
LYMPHOCYTES # BLD: 23 % (ref 24–43)
LYMPHOCYTES # BLD: 25 % (ref 24–43)
MAGNESIUM: 2.1 MG/DL (ref 1.6–2.6)
MCH RBC QN AUTO: 30.3 PG (ref 25.2–33.5)
MCH RBC QN AUTO: 30.3 PG (ref 25.2–33.5)
MCH RBC QN AUTO: 30.4 PG (ref 25.2–33.5)
MCH RBC QN AUTO: 30.7 PG (ref 25.2–33.5)
MCHC RBC AUTO-ENTMCNC: 31 G/DL (ref 25.2–33.5)
MCHC RBC AUTO-ENTMCNC: 31.6 G/DL (ref 25.2–33.5)
MCHC RBC AUTO-ENTMCNC: 32 G/DL (ref 25.2–33.5)
MCHC RBC AUTO-ENTMCNC: 32 G/DL (ref 25.2–33.5)
MCV RBC AUTO: 94.8 FL (ref 82.6–102.9)
MCV RBC AUTO: 95.7 FL (ref 82.6–102.9)
MCV RBC AUTO: 96 FL (ref 82.6–102.9)
MCV RBC AUTO: 97.9 FL (ref 82.6–102.9)
MONOCYTES # BLD: 10 % (ref 3–12)
MONOCYTES # BLD: 6 % (ref 3–12)
MONOCYTES # BLD: 7 % (ref 3–12)
MONOCYTES # BLD: 9 % (ref 3–12)
NRBC AUTOMATED: 0 PER 100 WBC
PDW BLD-RTO: 14 % (ref 11.8–14.4)
PDW BLD-RTO: 14.2 % (ref 11.8–14.4)
PDW BLD-RTO: 15 % (ref 11.8–14.4)
PDW BLD-RTO: 15.8 % (ref 11.8–14.4)
PHOSPHORUS: 3 MG/DL (ref 2.6–4.5)
PHOSPHORUS: 3.5 MG/DL (ref 2.6–4.5)
PHOSPHORUS: 5.3 MG/DL (ref 2.6–4.5)
PLATELET # BLD: 189 K/UL (ref 138–453)
PLATELET # BLD: 191 K/UL (ref 138–453)
PLATELET # BLD: 199 K/UL (ref 138–453)
PLATELET # BLD: 217 K/UL (ref 138–453)
PLATELET ESTIMATE: ABNORMAL
PMV BLD AUTO: 10.3 FL (ref 8.1–13.5)
PMV BLD AUTO: 10.6 FL (ref 8.1–13.5)
PMV BLD AUTO: 10.6 FL (ref 8.1–13.5)
PMV BLD AUTO: 11.1 FL (ref 8.1–13.5)
POTASSIUM SERPL-SCNC: 4 MMOL/L (ref 3.7–5.3)
POTASSIUM SERPL-SCNC: 4 MMOL/L (ref 3.7–5.3)
POTASSIUM SERPL-SCNC: 4.2 MMOL/L (ref 3.7–5.3)
POTASSIUM SERPL-SCNC: 4.3 MMOL/L (ref 3.7–5.3)
POTASSIUM SERPL-SCNC: 4.3 MMOL/L (ref 3.7–5.3)
PTH INTACT: 45.02 PG/ML (ref 15–65)
PTH INTACT: 83.57 PG/ML (ref 15–65)
PTH INTACT: 89.11 PG/ML (ref 15–65)
RBC # BLD: 4.22 M/UL (ref 3.95–5.11)
RBC # BLD: 4.23 M/UL (ref 3.95–5.11)
RBC # BLD: 4.28 M/UL (ref 3.95–5.11)
RBC # BLD: 4.3 M/UL (ref 3.95–5.11)
RBC # BLD: ABNORMAL 10*6/UL
SEG NEUTROPHILS: 60 % (ref 36–65)
SEG NEUTROPHILS: 64 % (ref 36–65)
SEG NEUTROPHILS: 65 % (ref 36–65)
SEG NEUTROPHILS: 78 % (ref 36–65)
SEGMENTED NEUTROPHILS ABSOLUTE COUNT: 3.97 K/UL (ref 1.5–8.1)
SEGMENTED NEUTROPHILS ABSOLUTE COUNT: 5.41 K/UL (ref 1.5–8.1)
SEGMENTED NEUTROPHILS ABSOLUTE COUNT: 5.58 K/UL (ref 1.5–8.1)
SEGMENTED NEUTROPHILS ABSOLUTE COUNT: 7.83 K/UL (ref 1.5–8.1)
SODIUM BLD-SCNC: 137 MMOL/L (ref 135–144)
SODIUM BLD-SCNC: 138 MMOL/L (ref 135–144)
SODIUM BLD-SCNC: 139 MMOL/L (ref 135–144)
SODIUM BLD-SCNC: 139 MMOL/L (ref 135–144)
SODIUM BLD-SCNC: 140 MMOL/L (ref 135–144)
SODIUM BLD-SCNC: 140 MMOL/L (ref 135–144)
SODIUM BLD-SCNC: 142 MMOL/L (ref 135–144)
TOTAL PROTEIN, URINE: 28 MG/DL
TOTAL PROTEIN, URINE: 8 MG/DL
TOTAL PROTEIN, URINE: <4 MG/DL
TOTAL PROTEIN: 6.6 G/DL (ref 6.4–8.3)
TRIGL SERPL-MCNC: 142 MG/DL
URINE TOTAL PROTEIN CREATININE RATIO: 0.19 (ref 0–0.2)
URINE TOTAL PROTEIN CREATININE RATIO: 0.31 (ref 0–0.2)
URINE TOTAL PROTEIN CREATININE RATIO: ABNORMAL (ref 0–0.2)
VITAMIN D 25-HYDROXY: 43.8 NG/ML (ref 30–100)
VITAMIN D 25-HYDROXY: 51.1 NG/ML (ref 30–100)
VITAMIN D 25-HYDROXY: 54.6 NG/ML (ref 30–100)
VLDLC SERPL CALC-MCNC: ABNORMAL MG/DL (ref 1–30)
WBC # BLD: 10.1 K/UL (ref 3.5–11.3)
WBC # BLD: 6.6 K/UL (ref 3.5–11.3)
WBC # BLD: 8.4 K/UL (ref 3.5–11.3)
WBC # BLD: 8.5 K/UL (ref 3.5–11.3)
WBC # BLD: ABNORMAL 10*3/UL

## 2021-01-01 PROCEDURE — 93306 TTE W/DOPPLER COMPLETE: CPT

## 2021-01-01 PROCEDURE — 99213 OFFICE O/P EST LOW 20 MIN: CPT | Performed by: INTERNAL MEDICINE

## 2021-01-01 PROCEDURE — 36415 COLL VENOUS BLD VENIPUNCTURE: CPT

## 2021-01-01 PROCEDURE — G0179 MD RECERTIFICATION HHA PT: HCPCS | Performed by: NURSE PRACTITIONER

## 2021-01-01 PROCEDURE — 84156 ASSAY OF PROTEIN URINE: CPT

## 2021-01-01 PROCEDURE — 93005 ELECTROCARDIOGRAM TRACING: CPT | Performed by: INTERNAL MEDICINE

## 2021-01-01 PROCEDURE — 82570 ASSAY OF URINE CREATININE: CPT

## 2021-01-01 PROCEDURE — 74220 X-RAY XM ESOPHAGUS 1CNTRST: CPT

## 2021-01-01 PROCEDURE — 90471 IMMUNIZATION ADMIN: CPT | Performed by: NURSE PRACTITIONER

## 2021-01-01 PROCEDURE — 82306 VITAMIN D 25 HYDROXY: CPT

## 2021-01-01 PROCEDURE — 99214 OFFICE O/P EST MOD 30 MIN: CPT | Performed by: NURSE PRACTITIONER

## 2021-01-01 PROCEDURE — 99205 OFFICE O/P NEW HI 60 MIN: CPT | Performed by: NURSE PRACTITIONER

## 2021-01-01 PROCEDURE — 11721 DEBRIDE NAIL 6 OR MORE: CPT | Performed by: PODIATRIST

## 2021-01-01 PROCEDURE — 83036 HEMOGLOBIN GLYCOSYLATED A1C: CPT

## 2021-01-01 PROCEDURE — 83970 ASSAY OF PARATHORMONE: CPT

## 2021-01-01 PROCEDURE — 99214 OFFICE O/P EST MOD 30 MIN: CPT | Performed by: INTERNAL MEDICINE

## 2021-01-01 PROCEDURE — 93010 ELECTROCARDIOGRAM REPORT: CPT | Performed by: INTERNAL MEDICINE

## 2021-01-01 PROCEDURE — 93288 INTERROG EVL PM/LDLS PM IP: CPT | Performed by: INTERNAL MEDICINE

## 2021-01-01 PROCEDURE — 73630 X-RAY EXAM OF FOOT: CPT

## 2021-01-01 PROCEDURE — 82330 ASSAY OF CALCIUM: CPT

## 2021-01-01 PROCEDURE — 85025 COMPLETE CBC W/AUTO DIFF WBC: CPT

## 2021-01-01 PROCEDURE — 99999 PR OFFICE/OUTPT VISIT,PROCEDURE ONLY: CPT | Performed by: PODIATRIST

## 2021-01-01 PROCEDURE — 99213 OFFICE O/P EST LOW 20 MIN: CPT | Performed by: PODIATRIST

## 2021-01-01 PROCEDURE — 80048 BASIC METABOLIC PNL TOTAL CA: CPT

## 2021-01-01 PROCEDURE — PBSHW INFLUENZA, QUADV, ADJUVANTED, 65 YRS +, IM, PF, PREFILL SYR, 0.5ML (FLUAD): Performed by: NURSE PRACTITIONER

## 2021-01-01 PROCEDURE — 73060 X-RAY EXAM OF HUMERUS: CPT

## 2021-01-01 PROCEDURE — 99204 OFFICE O/P NEW MOD 45 MIN: CPT | Performed by: NURSE PRACTITIONER

## 2021-01-01 PROCEDURE — 99213 OFFICE O/P EST LOW 20 MIN: CPT | Performed by: PHYSICIAN ASSISTANT

## 2021-01-01 PROCEDURE — 84100 ASSAY OF PHOSPHORUS: CPT

## 2021-01-01 PROCEDURE — 99212 OFFICE O/P EST SF 10 MIN: CPT | Performed by: INTERNAL MEDICINE

## 2021-01-01 PROCEDURE — 80061 LIPID PANEL: CPT

## 2021-01-01 PROCEDURE — 80053 COMPREHEN METABOLIC PANEL: CPT

## 2021-01-01 PROCEDURE — 99213 OFFICE O/P EST LOW 20 MIN: CPT | Performed by: FAMILY MEDICINE

## 2021-01-01 PROCEDURE — 99212 OFFICE O/P EST SF 10 MIN: CPT | Performed by: PHYSICIAN ASSISTANT

## 2021-01-01 PROCEDURE — G0180 MD CERTIFICATION HHA PATIENT: HCPCS | Performed by: NURSE PRACTITIONER

## 2021-01-01 PROCEDURE — 71046 X-RAY EXAM CHEST 2 VIEWS: CPT

## 2021-01-01 PROCEDURE — 99214 OFFICE O/P EST MOD 30 MIN: CPT | Performed by: FAMILY MEDICINE

## 2021-01-01 PROCEDURE — 83735 ASSAY OF MAGNESIUM: CPT

## 2021-01-01 PROCEDURE — 2500000003 HC RX 250 WO HCPCS: Performed by: INTERNAL MEDICINE

## 2021-01-01 RX ORDER — FUROSEMIDE 40 MG/1
TABLET ORAL
Qty: 190 TABLET | Refills: 3 | Status: SHIPPED | OUTPATIENT
Start: 2021-01-01 | End: 2021-01-01 | Stop reason: DRUGHIGH

## 2021-01-01 RX ORDER — CHOLESTYRAMINE LIGHT 4 G/5.7G
POWDER, FOR SUSPENSION ORAL
COMMUNITY
Start: 2021-01-01

## 2021-01-01 RX ORDER — CLOTRIMAZOLE AND BETAMETHASONE DIPROPIONATE 10; .64 MG/G; MG/G
CREAM TOPICAL
Qty: 15 G | Refills: 0 | Status: SHIPPED | OUTPATIENT
Start: 2021-01-01 | End: 2021-01-01 | Stop reason: ALTCHOICE

## 2021-01-01 RX ORDER — SERTRALINE HYDROCHLORIDE 100 MG/1
100 TABLET, FILM COATED ORAL DAILY
Qty: 90 TABLET | Refills: 3 | Status: SHIPPED | OUTPATIENT
Start: 2021-01-01

## 2021-01-01 RX ORDER — PAROXETINE HYDROCHLORIDE 20 MG/1
20 TABLET, FILM COATED ORAL DAILY
Qty: 14 TABLET | Refills: 0 | Status: SHIPPED | OUTPATIENT
Start: 2021-01-01 | End: 2021-01-01 | Stop reason: DRUGHIGH

## 2021-01-01 RX ORDER — LISINOPRIL 20 MG/1
TABLET ORAL
Qty: 90 TABLET | Refills: 3 | Status: SHIPPED | OUTPATIENT
Start: 2021-01-01 | End: 2021-01-01 | Stop reason: SDUPTHER

## 2021-01-01 RX ORDER — CHOLESTYRAMINE LIGHT 4 G/5.7G
1 POWDER, FOR SUSPENSION ORAL
COMMUNITY
Start: 2021-01-01 | End: 2021-01-01

## 2021-01-01 RX ORDER — DULOXETIN HYDROCHLORIDE 20 MG/1
20 CAPSULE, DELAYED RELEASE ORAL DAILY
Qty: 30 CAPSULE | Refills: 3 | Status: SHIPPED | OUTPATIENT
Start: 2021-01-01 | End: 2021-01-01 | Stop reason: ALTCHOICE

## 2021-01-01 RX ORDER — FUROSEMIDE 40 MG/1
40 TABLET ORAL 2 TIMES DAILY
Qty: 14 TABLET | Refills: 0 | Status: SHIPPED | OUTPATIENT
Start: 2021-01-01 | End: 2021-01-01 | Stop reason: DRUGHIGH

## 2021-01-01 RX ORDER — OMEPRAZOLE 20 MG/1
20 CAPSULE, DELAYED RELEASE ORAL DAILY
COMMUNITY
Start: 2021-01-01 | End: 2021-01-01 | Stop reason: SDUPTHER

## 2021-01-01 RX ORDER — BUMETANIDE 1 MG/1
1 TABLET ORAL 2 TIMES DAILY
Qty: 180 TABLET | Refills: 3 | Status: SHIPPED | OUTPATIENT
Start: 2021-01-01

## 2021-01-01 RX ORDER — CHOLESTYRAMINE 4 G/9G
1 POWDER, FOR SUSPENSION ORAL DAILY
COMMUNITY

## 2021-01-01 RX ORDER — PANTOPRAZOLE SODIUM 40 MG/1
40 TABLET, DELAYED RELEASE ORAL DAILY
Qty: 30 TABLET | Refills: 0 | Status: SHIPPED | OUTPATIENT
Start: 2021-01-01 | End: 2021-01-01

## 2021-01-01 RX ORDER — SERTRALINE HYDROCHLORIDE 100 MG/1
100 TABLET, FILM COATED ORAL DAILY
Qty: 90 TABLET | Refills: 1
Start: 2021-01-01 | End: 2021-01-01 | Stop reason: SDUPTHER

## 2021-01-01 RX ORDER — FUROSEMIDE 40 MG/1
TABLET ORAL
Qty: 135 TABLET | Refills: 3 | Status: SHIPPED | OUTPATIENT
Start: 2021-01-01 | End: 2021-01-01

## 2021-01-01 RX ORDER — CHOLESTYRAMINE LIGHT 4 G/5.7G
POWDER, FOR SUSPENSION ORAL
COMMUNITY
Start: 2021-01-01 | End: 2021-01-01

## 2021-01-01 RX ORDER — COLCHICINE 0.6 MG/1
0.6 CAPSULE ORAL 2 TIMES DAILY
COMMUNITY
End: 2021-01-01 | Stop reason: SDUPTHER

## 2021-01-01 RX ORDER — PAROXETINE HYDROCHLORIDE 20 MG/1
20 TABLET, FILM COATED ORAL DAILY
Qty: 30 TABLET | Refills: 0 | Status: SHIPPED | OUTPATIENT
Start: 2021-01-01 | End: 2021-01-01 | Stop reason: SDUPTHER

## 2021-01-01 RX ORDER — FUROSEMIDE 40 MG/1
TABLET ORAL
COMMUNITY
End: 2021-01-01 | Stop reason: SDUPTHER

## 2021-01-01 RX ORDER — FUROSEMIDE 40 MG/1
TABLET ORAL
Qty: 15 TABLET | Refills: 0 | Status: SHIPPED | OUTPATIENT
Start: 2021-01-01 | End: 2021-01-01 | Stop reason: DRUGHIGH

## 2021-01-01 RX ORDER — METOPROLOL SUCCINATE 100 MG/1
TABLET, EXTENDED RELEASE ORAL
Qty: 90 TABLET | Refills: 3 | Status: SHIPPED | OUTPATIENT
Start: 2021-01-01

## 2021-01-01 RX ORDER — BUMETANIDE 1 MG/1
1 TABLET ORAL 2 TIMES DAILY
Qty: 30 TABLET | Refills: 3 | Status: SHIPPED | OUTPATIENT
Start: 2021-01-01 | End: 2021-01-01 | Stop reason: SDUPTHER

## 2021-01-01 RX ORDER — SUCRALFATE 1 G/1
TABLET ORAL
COMMUNITY
Start: 2021-01-01 | End: 2021-01-01

## 2021-01-01 RX ORDER — CEPHALEXIN 250 MG/5ML
250 POWDER, FOR SUSPENSION ORAL 3 TIMES DAILY
Qty: 150 ML | Refills: 0 | Status: SHIPPED | OUTPATIENT
Start: 2021-01-01 | End: 2021-01-01

## 2021-01-01 RX ORDER — OMEPRAZOLE 20 MG/1
20 CAPSULE, DELAYED RELEASE ORAL DAILY
Qty: 30 CAPSULE | Refills: 3 | Status: SHIPPED | OUTPATIENT
Start: 2021-01-01

## 2021-01-01 RX ORDER — FUROSEMIDE 40 MG/1
40 TABLET ORAL 2 TIMES DAILY
COMMUNITY
End: 2021-01-01 | Stop reason: ALTCHOICE

## 2021-01-01 RX ORDER — BUMETANIDE 1 MG/1
1 TABLET ORAL 2 TIMES DAILY
Qty: 60 TABLET | Refills: 3 | Status: SHIPPED | OUTPATIENT
Start: 2021-01-01 | End: 2021-01-01 | Stop reason: SDUPTHER

## 2021-01-01 RX ORDER — COLCHICINE 0.6 MG/1
0.6 CAPSULE ORAL 2 TIMES DAILY
Qty: 180 CAPSULE | Refills: 3 | Status: SHIPPED | OUTPATIENT
Start: 2021-01-01

## 2021-01-01 RX ORDER — OMEPRAZOLE 20 MG/1
20 CAPSULE, DELAYED RELEASE ORAL
Qty: 30 CAPSULE | Refills: 0 | Status: SHIPPED | OUTPATIENT
Start: 2021-01-01 | End: 2021-01-01

## 2021-01-01 RX ORDER — LISINOPRIL 20 MG/1
TABLET ORAL
Qty: 90 TABLET | Refills: 3 | Status: SHIPPED | OUTPATIENT
Start: 2021-01-01

## 2021-01-01 RX ORDER — PAROXETINE HYDROCHLORIDE 20 MG/1
20 TABLET, FILM COATED ORAL DAILY
Qty: 14 TABLET | Refills: 0 | Status: SHIPPED | OUTPATIENT
Start: 2021-01-01 | End: 2021-01-01 | Stop reason: SDUPTHER

## 2021-01-01 RX ORDER — METHYLPREDNISOLONE 4 MG/1
TABLET ORAL
Qty: 1 KIT | Refills: 0 | Status: SHIPPED | OUTPATIENT
Start: 2021-01-01 | End: 2021-01-01

## 2021-01-01 RX ORDER — PAROXETINE HYDROCHLORIDE 20 MG/1
20 TABLET, FILM COATED ORAL DAILY
Qty: 90 TABLET | Refills: 3 | Status: SHIPPED | OUTPATIENT
Start: 2021-01-01 | End: 2021-01-01

## 2021-01-01 RX ORDER — FUROSEMIDE 40 MG/1
40 TABLET ORAL 2 TIMES DAILY
Qty: 180 TABLET | Refills: 3 | Status: SHIPPED | OUTPATIENT
Start: 2021-01-01 | End: 2021-01-01 | Stop reason: SDUPTHER

## 2021-01-01 RX ORDER — CLOTRIMAZOLE AND BETAMETHASONE DIPROPIONATE 10; .64 MG/G; MG/G
CREAM TOPICAL
Qty: 45 G | Refills: 1 | Status: SHIPPED | OUTPATIENT
Start: 2021-01-01

## 2021-01-01 RX ADMIN — BARIUM SULFATE 140 ML: 980 POWDER, FOR SUSPENSION ORAL at 09:36

## 2021-01-01 ASSESSMENT — ENCOUNTER SYMPTOMS
SHORTNESS OF BREATH: 1
BLOOD IN STOOL: 0
RESPIRATORY NEGATIVE: 1
TROUBLE SWALLOWING: 0
DIARRHEA: 0
WHEEZING: 0
TROUBLE SWALLOWING: 0
CHEST TIGHTNESS: 0
COUGH: 0
CHEST TIGHTNESS: 0
COUGH: 0
VOMITING: 0
BLOOD IN STOOL: 0
WHEEZING: 0
GASTROINTESTINAL NEGATIVE: 1
CONSTIPATION: 0
TROUBLE SWALLOWING: 0
RHINORRHEA: 0
ABDOMINAL PAIN: 0
TROUBLE SWALLOWING: 0
SINUS PRESSURE: 0
CHEST TIGHTNESS: 0
SINUS PRESSURE: 0
COLOR CHANGE: 0
NAUSEA: 0
SINUS PRESSURE: 0
WHEEZING: 0
COLOR CHANGE: 0
FACIAL SWELLING: 0
ALLERGIC/IMMUNOLOGIC NEGATIVE: 1
FACIAL SWELLING: 0
VOMITING: 0
ABDOMINAL DISTENTION: 0
ABDOMINAL PAIN: 0
COLOR CHANGE: 0
ABDOMINAL PAIN: 1
SORE THROAT: 0
FACIAL SWELLING: 0
COLOR CHANGE: 0
COUGH: 0
TROUBLE SWALLOWING: 0
ABDOMINAL PAIN: 0
EYE PAIN: 0
SHORTNESS OF BREATH: 0
DIARRHEA: 0
EYE PAIN: 0
SORE THROAT: 0
CHEST TIGHTNESS: 0
FACIAL SWELLING: 0
DIARRHEA: 0
BLOOD IN STOOL: 0
ABDOMINAL PAIN: 1
CONSTIPATION: 0
FACIAL SWELLING: 0
VOMITING: 0
SORE THROAT: 0
COLOR CHANGE: 0
RHINORRHEA: 0
CONSTIPATION: 0
RESPIRATORY NEGATIVE: 1
BLOOD IN STOOL: 0
SHORTNESS OF BREATH: 0
SHORTNESS OF BREATH: 0
COUGH: 0
BLOOD IN STOOL: 0
RHINORRHEA: 0
RHINORRHEA: 0
NAUSEA: 0
SHORTNESS OF BREATH: 0
CHEST TIGHTNESS: 0
NAUSEA: 0
WHEEZING: 0
EYES NEGATIVE: 1
BACK PAIN: 1
SORE THROAT: 0
EYE PAIN: 0
VOMITING: 0
SINUS PRESSURE: 0
WHEEZING: 0
DIARRHEA: 0
EYE PAIN: 0
RHINORRHEA: 0
NAUSEA: 0
VOMITING: 0
CONSTIPATION: 0
COUGH: 0
EYE PAIN: 0
NAUSEA: 0
BACK PAIN: 1
CONSTIPATION: 0
DIARRHEA: 0
GASTROINTESTINAL NEGATIVE: 1
SORE THROAT: 0
SINUS PRESSURE: 0

## 2021-01-01 ASSESSMENT — PATIENT HEALTH QUESTIONNAIRE - PHQ9
SUM OF ALL RESPONSES TO PHQ9 QUESTIONS 1 & 2: 0
SUM OF ALL RESPONSES TO PHQ QUESTIONS 1-9: 0
2. FEELING DOWN, DEPRESSED OR HOPELESS: 0
2. FEELING DOWN, DEPRESSED OR HOPELESS: 0
SUM OF ALL RESPONSES TO PHQ9 QUESTIONS 1 & 2: 0
1. LITTLE INTEREST OR PLEASURE IN DOING THINGS: 0
SUM OF ALL RESPONSES TO PHQ QUESTIONS 1-9: 0
1. LITTLE INTEREST OR PLEASURE IN DOING THINGS: 0

## 2021-01-06 ENCOUNTER — TELEPHONE (OUTPATIENT)
Dept: NEPHROLOGY | Age: 86
End: 2021-01-06

## 2021-01-06 NOTE — TELEPHONE ENCOUNTER
Patient's daughter Audrey Ott called the office. Daughter is asking for Dr Quan Real lab results from December.   Please call the daughter back @ # [phone]

## 2021-01-11 NOTE — TELEPHONE ENCOUNTER
Writer r/c to pts daughter Leonardo Munoz and LM on VM letting her know that Dr Kan Black said that pts labs were stable and that they looked pretty good. Instructed pts daughter to call back if she had any other questions.

## 2021-02-23 NOTE — PROGRESS NOTES
Subjective:  Patient presents to [facility] today for routine diabetic foot care. Patient denies any new problems with their feet. Patient's diabetic control has been not changed. Allergies   Allergen Reactions    Atorvastatin Other (See Comments)    Statins [Statins] Other (See Comments)     Muscle aches, elevated LFT's    Penicillin G Rash       Past Medical History:   Diagnosis Date    Abnormal ANCA (antineutrophil cytoplasmic antibody) 12/3/2018    Basal cell cancer     nose    CKD (chronic kidney disease), stage III 12/3/2018    Dry skin dermatitis 12/3/2015    Dyspnea     chronic    Edema     Chronic    Gout     Hard of hearing     Hiatal hernia     Hypercholesterolemia     Hypertension     Hypomagnesemia 12/3/2015    Lower extremity edema 12/3/2018    Macular degeneration, dry     Obstructive sleep apnea     Open-angle glaucoma     Borderline.  Peripheral edema 12/3/2015    Pulmonary hypertension ([facility] 12/3/2018    Sick sinus syndrome (HCC)     with pacemaker  placement    Type 2 diabetes mellitus ([facility]     Venous insufficiency     Vitamin D deficiency        Prior to Admission medications    Medication Sig Start Date End Date Taking? Authorizing Provider   nystatin (MYCOSTATIN) 402179 UNIT/GM powder Apply 3 times daily.  12/9/20  Yes JARRETT Dorsey   colchicine (COLCRYS) 0.6 MG tablet Take 1 tablet by mouth daily 11/17/20  [place]   metoprolol succinate (TOPROL XL) 100 MG extended release tablet TAKE 1 TABLET DAILY 8/17/20  Yes JARRETT Dorsey   sertraline (ZOLOFT) 100 MG tablet TAKE 1 TABLET DAILY 6/17/20  Yes JARRETT Dorsey   furosemide (LASIX) 40 MG tablet Take 1 tablet by mouth 2 times daily 6/8/20  Yes Jose Elias Husain MD   lisinopril (PRINIVIL;ZESTRIL) 20 MG tablet TAKE 1 TABLET DAILY 5/29/20  Yes JARRETT Dorsey   sucralfate (CARAFATE) 1 GM tablet Take 1 tablet by mouth 4 times daily 1/7/20  Yes Quincy Reynolds DO   polyethylene glycol (GLYCOLAX) powder Take 17 g by mouth 2 times daily 1/7/20  Yes Angelia Reyes, DO   timolol (TIMOPTIC) 0.5 % ophthalmic solution Place 1 drop into both eyes 2 times daily   Yes Historical Provider, MD   Multiple Vitamins-Minerals (ICAPS AREDS 2) CHEW Take 1 tablet by mouth 2 times daily   Yes Historical Provider, MD   magnesium oxide (MAG-OX) 400 MG tablet Take 1 tablet by mouth nightly 12/21/15  Yes Marylen Hillock,    Cholecalciferol (VITAMIN D) 2000 UNITS CAPS capsule Take 1 capsule by mouth daily   Yes Historical Provider, MD   Acetaminophen (TYLENOL PO) Take  by mouth as needed. Rarely uses   Yes Historical Provider, MD   aspirin 81 MG EC tablet Take 81 mg by mouth daily    Yes Historical Provider, MD   MITIGARE 0.6 MG capsule Take 1 capsule by mouth daily 4/24/20 7/23/20  JARRETT Chand       Social History     Tobacco Use    Smoking status: Never Smoker    Smokeless tobacco: Never Used    Tobacco comment: Anchor Teofilo RRT 11/28/18   Substance Use Topics    Alcohol use: No     Alcohol/week: 0.0 standard drinks     Frequency: Never     Binge frequency: Never     Review of Systems: All 12 systems reviewed and pertinent positives noted above. Objective:  Vascular: DP and PT pulses palpable 2/4, bilateral.  CFT <3 seconds, bilateral.  Hair growth present to the level of the digits, bilateral.  Edema present, bilateral.  Varicosities present, bilateral. Erythema absent, bilateral. Distal Rubor absent bilateral.  Temperature within normal limits bilateral. Hyperpigmentation present bilateral. Atrophic skin yes. Neurological: Sensation intact to light touch to level of digits, bilateral.  Protective sensation intact 10/10 sites via 5.07/10g [place] Monofilament, bilateral.  negative Tinel's, bilateral.  negative Valleix sign, bilateral.  Vibratory intact bilateral.  Reflexes Decreased bilateral.  Paresthesias negative. Dysthesias negative.   Sharp/dull intact bilateral.    Integument:  Open lesion absent, Bilateral.  Interdigital maceration absent to web spaces,absent Bilateral.  Nails left 1, 2, 5 and right 1, 2, 5 thickened, dystrophic and crumbly, discolored with subungual debris. Fissures absent, Bilateral. Hyperkeratotic tissue is absent. Assessment:    Diagnosis Orders   1. Controlled type 2 DM with peripheral circulatory disorder ([facility]     2. Dermatophytosis of nail 1-5R/L         Plan:  Diabetic foot education and exam.  All Nails as mentioned above debrided in length and thickness. Patient advised about OTC treatments for nails and callous. Patient will follow up in 10 weeks for routine foot care or PRN if any further problems arise.

## 2021-02-23 NOTE — PROGRESS NOTES
Foot Care Worksheet  PCP: JARRETT Marcos/JAYLIN  Last visit: 12/9/2020      Nail description:  Thick , Yellow , Crumbly , Marked limitation of ambulation     Pain resulting from thickened and dystrophy of nail plate No    Nails involved  Right   1, 2, 5  (T5-T9)  Left     1, 2, 5  (TA-T4)    Q7 1 Class A Finding - Non traumatic amputation of foot No    Q8 2 Class B Findings - Absent DP pulse No, Absent PT pulse No, Advanced tropic changes (3 required) Yes    Decrease hair growth Yes, Nail changes/thickening Yes, Pigmented changes/discoloration Yes, Skin texture (thin, shiny) Yes, Skin color (rubor/redness) No    Q9 1 Class B and 2 Class C Findings  Claudication No, Temperature change Yes, Paresthesia No, Burning No, Edema Yes

## 2021-03-23 PROBLEM — G31.83 LEWY BODY DEMENTIA WITHOUT BEHAVIORAL DISTURBANCE (HCC): Status: ACTIVE | Noted: 2021-01-01

## 2021-03-23 PROBLEM — N18.4 CKD (CHRONIC KIDNEY DISEASE) STAGE 4, GFR 15-29 ML/MIN (HCC): Status: ACTIVE | Noted: 2021-01-01

## 2021-03-23 PROBLEM — F02.80 LEWY BODY DEMENTIA WITHOUT BEHAVIORAL DISTURBANCE (HCC): Status: ACTIVE | Noted: 2021-01-01

## 2021-03-23 PROBLEM — F02.80 LEWY BODY DEMENTIA WITHOUT BEHAVIORAL DISTURBANCE (HCC): Status: RESOLVED | Noted: 2021-01-01 | Resolved: 2021-01-01

## 2021-03-23 PROBLEM — G31.83 LEWY BODY DEMENTIA WITHOUT BEHAVIORAL DISTURBANCE (HCC): Status: RESOLVED | Noted: 2021-01-01 | Resolved: 2021-01-01

## 2021-03-23 NOTE — PROGRESS NOTES
03/23/21  Lanette Cardona  10/27/1928      Chief Complaint:   1. Chronic diastolic congestive heart failure ([address]    2. Pulmonary hypertension ([address]    3. Type 2 diabetes mellitus with stage 3a chronic kidney disease, without long-term current use of insulin ([address]    4. Controlled type 2 DM with peripheral circulatory disorder ([address]    5. Complete heart block ([address]    6. Sick sinus syndrome ([address]    7. Pacemaker at end of battery life - Change out 11/19/18 (Dr. Jean Paul Bledsoe)    8. Peripheral edema    9. Hypomagnesemia    10. Hypercholesterolemia    11. Obstructive sleep apnea    12. Hearing loss, unspecified hearing loss type, unspecified laterality    13. Vitamin D deficiency    14. Essential hypertension    15. Lewy body dementia without behavioral disturbance ([address]    16. CKD (chronic kidney disease) stage 4, GFR 15-29 ml/min ([facility])    17. Morbid obesity with BMI of 40.0-44.9, adult ([address]    18. Pain of left humerus    19. Depression, unspecified depression type    20. Pressure injury of buttock, stage 1, unspecified laterality    21. Chronic musculoskeletal pain        HPI:  77-year-old patient in with daughter new to establish to internal medicine. Patient has following with cardiology. History of CHF. Continues on medications as directed. Has chronic swelling to lower extremities. Has previously declined support hose. Daughter states she sits in her chair most of the day with her legs down. She denies any orthopnea. No chest discomfort. No shortness of breath. History of diabetes. Diet controlled. Very little activity. Daughter states she barely stands up to walk to the bathroom. States she finds it comfortable just sitting in her chair. She does have 24-hour care in the home setting. Patient with a history of complete heart block/sick sinus syndrome with a pacemaker battery change in 18. She denies any lightheadedness dizziness. No chest discomfort.   She continues on her supplemental vitamin D for deficiency. Denies any unwanted side effects. Blood pressure has been stable. Again denies any lightheadedness dizziness chest discomfort headaches. Review of chart showed Lewy body dementia. Daughter is unaware of this diagnosis. States she is sharp as attack. She continues to follow with nephrology for chronic kidney disease. Avoids NSAIDs. Again weight is an issue however patient with limited activity. Does watch what she eats. Of most concern to daughter is generalized aches and pains along with her mood. States she has been on the Zoloft for many years and feels it is not working for her mother anymore. She denies any depressive thoughts. No thoughts of harming herself or harming others. States her mind just will not shut off. Does have episodes of anxiousness. Daughter is also wanting me to evaluate a prior pressure ulcer which she states has healed but patient intermittently complains of. Daughter states that she did have a fall striking her left upper extremity. Has had persistent tenderness. Is noted she has decreased range of motion to bilateral shoulders. Daughter states this has been ongoing for many many years. Patient has been refusing to do any exercises or stretches in the home setting. Daughter inquiring if physical therapy could help with her shoulder pain.       Allergies   Allergen Reactions    Atorvastatin Other (See Comments)    Statins [Statins] Other (See Comments)     Muscle aches, elevated LFT's    Penicillin G Rash       Past Medical History:   Diagnosis Date    Abnormal ANCA (antineutrophil cytoplasmic antibody) 12/03/2018    Basal cell cancer     nose    CKD (chronic kidney disease), stage III 12/03/2018    Depression     Dry skin dermatitis 12/03/2015    Dyspnea     chronic    Edema     Chronic    Gout     Hard of hearing     Hiatal hernia     Hypercholesterolemia     Hypertension     Hypomagnesemia 12/03/2015    Incontinence     Lower extremity edema 12/03/2018    Macular degeneration, dry     Obstructive sleep apnea     Open-angle glaucoma     Borderline. 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/2017 for squamous cell     FRACTURE SURGERY Right     wrist    HYSTERECTOMY      PACEMAKER INSERTION      sick sinus syndrome says was changed to [employer] 11/19/2018   [address]       Current Outpatient Medications on File Prior to Visit   Medication Sig Dispense Refill    colchicine (MITIGARE) 0.6 MG capsule Take 0.6 mg by mouth 2 times daily REYNALDO      Sodium Chloride, Hypertonic, (SODIUM CHLORIDE OP) Apply to eye as needed (dry eyes)      metoprolol succinate (TOPROL XL) 100 MG extended release tablet TAKE 1 TABLET DAILY 90 tablet 3    furosemide (LASIX) 40 MG tablet Take 1 tablet by mouth 2 times daily 190 tablet 3    lisinopril (PRINIVIL;ZESTRIL) 20 MG tablet TAKE 1 TABLET DAILY 90 tablet 3    timolol (TIMOPTIC) 0.5 % ophthalmic solution Place 1 drop into both eyes 2 times daily      Multiple Vitamins-Minerals (ICAPS AREDS 2) CHEW Take 1 tablet by mouth 2 times daily      magnesium oxide (MAG-OX) 400 MG tablet Take 1 tablet by mouth nightly 30 tablet 3    Cholecalciferol (VITAMIN D) 2000 UNITS CAPS capsule Take 1 capsule by mouth daily      Acetaminophen (TYLENOL PO) Take  by mouth as needed. Rarely uses      aspirin 81 MG EC tablet Take 81 mg by mouth daily        No current facility-administered medications on file prior to visit. Social History     Socioeconomic History    Marital status:   Spouse name: Not on file    Number of children: Not on file    Years of education: Not on file    Highest education level: Not on file   Occupational History    Occupation: retired    Social Needs    Financial resource strain: Not on file    Food insecurity     Worry: Not on file     Inability: Not on file   [employer] needs     Medical: Not on file     Non-medical: Not on file   Tobacco Use    Smoking status: Never Smoker    Smokeless tobacco: Never Used    Tobacco comment: Sy Hernandez RRT 11/28/18   Substance and Sexual Activity    Alcohol use: No     Alcohol/week: 0.0 standard drinks     Frequency: Never     Binge frequency: Never    Drug use: No    Sexual activity: Not Currently     Partners: Male   Lifestyle    Physical activity     Days per week: Not on file     Minutes per session: Not on file    Stress: Not on file   Relationships    Social connections     Talks on phone: Not on file     Gets together: Not on file     Attends Yarsani service: Not on file     Active member of club or organization: Not on file     Attends meetings of clubs or organizations: Not on file     Relationship status: Not on file    Intimate partner violence     Fear of current or ex partner: Not on file     Emotionally abused: Not on file     Physically abused: Not on file     Forced sexual activity: Not on file   Other Topics Concern    Not on file   Social History Narrative    Not on file       Review of Systems   Constitutional: Positive for activity change. Negative for appetite change, chills, fatigue, fever and unexpected weight change. HENT: Negative for congestion, dental problem, ear discharge, ear pain, facial swelling, hearing loss, postnasal drip, rhinorrhea, sinus pressure, sore throat and trouble swallowing. Eyes: Negative for pain and visual disturbance. Respiratory: Negative for cough, chest tightness, shortness of breath (chronic LYNNE ) and wheezing.     Cardiovascular: Positive for leg swelling (chronic ). Negative for chest pain and palpitations. Gastrointestinal: Negative for abdominal pain, blood in stool, constipation, diarrhea, nausea and vomiting. Endocrine: Negative for cold intolerance, heat intolerance and polyuria. Genitourinary: Negative for difficulty urinating, dysuria, flank pain and frequency. Musculoskeletal: Positive for arthralgias, back pain, gait problem and myalgias. Negative for neck pain and neck stiffness. Skin: Negative for color change, rash and wound. Neurological: Positive for weakness. Negative for dizziness, tremors, seizures, light-headedness, numbness and headaches. Psychiatric/Behavioral: Negative for confusion and hallucinations. The patient is not nervous/anxious. Physical Exam  Vitals signs and nursing note reviewed. Constitutional:       General: She is not in acute distress. Appearance: Normal appearance. She is well-developed. She is not diaphoretic. HENT:      Head: Normocephalic and atraumatic. Right Ear: External ear normal.      Left Ear: External ear normal.   Eyes:      General:         Right eye: No discharge. Left eye: No discharge. Neck:      Trachea: No tracheal deviation. Cardiovascular:      Rate and Rhythm: Normal rate and regular rhythm. Heart sounds: Normal heart sounds. No murmur. No friction rub. No gallop. Pulmonary:      Effort: Pulmonary effort is normal. No respiratory distress. Breath sounds: Normal breath sounds. No stridor. No wheezing, rhonchi or rales. Chest:      Chest wall: No tenderness. Abdominal:      General: Bowel sounds are normal. There is no distension. Palpations: Abdomen is soft. Tenderness: There is no abdominal tenderness. Musculoskeletal:         General: No swelling. Right lower leg: Edema present. Left lower leg: Edema (bilat pedal edema +3 bilat lower extremities +2 nonpitting ) present.    Skin:     General: Skin is warm and dry. Capillary Refill: Capillary refill takes less than 2 seconds. Coloration: Skin is not jaundiced or pale. Findings: No rash. Neurological:      General: No focal deficit present. Mental Status: She is alert and oriented to person, place, and time. Cranial Nerves: No cranial nerve deficit. Sensory: No sensory deficit. Motor: Weakness present. Coordination: Coordination normal.      Gait: Gait abnormal.   Psychiatric:         Mood and Affect: Mood normal.         Behavior: Behavior normal.         Thought Content: Thought content normal.         Judgment: Judgment normal.       Vitals:    03/23/21 1429   BP: 124/80   Site: Right Upper Arm   Position: Sitting   Cuff Size: Large Adult   Pulse: 60   Resp: 16   Temp: 98.4 °F (36.9 °C)   TempSrc: Temporal   SpO2: 95%   Weight: 190 lb (86.2 kg)   Height: 5' (1.524 m)        Assessment:  1. Chronic diastolic congestive heart failure (HCC)  No signs of fluid overload at present except for persistent swelling to lower extremities probable venous insufficiency. Continues on Toprol, Lasix, lisinopril. Ongoing follow-up with cardiology in place    2. Pulmonary hypertension ([facility]  Continues to follow with cardiology    3. Type 2 diabetes mellitus with stage 3a chronic kidney disease, without long-term current use of insulin (HCC)  Most recent hemoglobin A1c 6. Continue to work on diet remain active, serial lab follow-ups    4. Controlled type 2 DM with peripheral circulatory disorder ([address]  As noted above. Suggest ace wraps to lower extremities to assist in decreasing swelling  - Lipid Panel; Future  - Comprehensive Metabolic Panel; Future  - CBC Auto Differential; Future  - Hemoglobin A1C; Future    5. Complete heart block ([address]  6. Sick sinus syndrome ([address]  7. Pacemaker at end of battery life - Change out 11/19/18 (Dr. Lennox Gouge)  Continue to follow with cardiology along with pacemaker checks    8.  Peripheral edema Ace wraps to lower extremities, 2 g sodium diet, elevate legs throughout the day. Lasix as directed    9. Hypomagnesemia  Continue on supplemental magnesium. Serial lab follow-up  - Magnesium; Future    10. Hypercholesterolemia  Intolerant to statins. Significant myalgias. Serial lab follow-ups. Work on diet remain active    11. Obstructive sleep apnea  Uses CPAP nightly. Continue the same    12. Hearing loss, unspecified hearing loss type, unspecified laterality  Bilateral hearing aids    13. Vitamin D deficiency  Continues on supplemental vitamin D, serial lab follow-ups    14. Essential hypertension  Stable on current antihypertensive regimen, continue to monitor    15. Lewy body dementia without behavioral disturbance (HCC)  We will remove from problem list.  Daughter and patient have never been told this diagnosis. Patient is very educated with no memory deficit. 16. CKD (chronic kidney disease) stage 4, GFR 15-29 ml/min [facility])  Patient and daughter declined this diagnosis. Do not see where this was put into the chart. Patient is very spry. No signs of dementia present  - CBC Auto Differential; Future    17. Morbid obesity with BMI of 40.0-44.9, adult ([address]  Work on diet, attempt to increase activity    18. Pain of left humerus  - XR HUMERUS LEFT (MIN 2 VIEWS); Future    19. Depression, unspecified depression type  We will change her Zoloft over to Cymbalta. Follow-up in 3 to 4 weeks to see how her mood is doing along with her musculoskeletal pain    20. Pressure injury of buttock, stage 1, unspecified laterality  This is blanchable. Suggest pressure reducing mattress and avoiding persistent direct pressure to her tailbone    21. Chronic musculoskeletal pain  Cymbalta as directed. We will get her set up with home health PT OT to help with left humerus pain, gait stability due to recent falls, generalized shoulder pain/persistent      Plan:  As noted above. Follow up for routine visit.   Call sooner with concerns prior.     Electronically signed by MAGALY Warner CNP on 3/23/2021 at 3:34 PM

## 2021-04-02 PROBLEM — H35.3131 EARLY DRY STAGE NONEXUDATIVE AGE-RELATED MACULAR DEGENERATION OF BOTH EYES: Status: ACTIVE | Noted: 2019-04-16

## 2021-04-02 PROBLEM — H18.519 ENDOTHELIAL CORNEAL DYSTROPHY: Status: ACTIVE | Noted: 2019-04-16

## 2021-04-02 PROBLEM — B00.52 HERPES SIMPLEX KERATITIS OF RIGHT EYE: Status: ACTIVE | Noted: 2019-07-29

## 2021-04-05 NOTE — PROGRESS NOTES
Nephrology Progress Note    Lanette Soria Said, APRN - CNP      SUBJECTIVE      Chief Complaint   Patient presents with    Chronic Kidney Disease     stage four, 4 month check     4/5/2021:  Patient is here for follow-up Chronic kidney disease stage 3-4 with baseline creatinine seems to be running around 1.5-1.6 milligrams per DL. Etiology likely secondary to diabetic nephropathy. Patient lives by herself but has 7 kids who take turns and be with her everyday. Patient doing well. No new issues. No fever, no chills, no CP, no SOB, no N/V/D, no abdomen pain, no urinary complaints, +ve stable LE edema- uses compression stocking vs ACE bandage. She is currently on metoprolol  mg daily, lisinopril 20 mg daily, Lasix 40 mg BID, oral vitamin D 2000 units replacements and magnesium oxide 400 mg daily. Started new on Cymbalta. Last labs 3/31/21: BUN/Cr 40/1.26, Na 142, K 4.0, Cl 101, Bicarb 30, Ca 9.6, Mg 2.1, Hb 13.2.  3/23/21: Phos 3.5, PTH 89.11, Vit D 51.1,  UPC <0.14. Blood pressure today 116/80, heart rate 68.    10/5/2020:  Patient is here for follow-up Chronic kidney disease stage 4 with baseline creatinine seems to be running around 1.5-1.6 milligrams per DL. Etiology likely secondary to diabetic nephropathy. Patient lives by herself but has 7 kids who take turns and be with her everyday. Patient doing well. No new issues. No fever, no chills, no CP, no SOB, no N/V/D, no abdomen pain, no urinary complaints, stable chronic LE edema. Patient is currently on metoprolol  mg daily, lisinopril 20 mg daily, Lasix 40 mg BID, oral vitamin D 2000 units replacements and magnesium oxide 400 mg daily. Last labs 9/29/2020: BUN/Cr 57/1.67, Na 139, K 4.6, Cl 97, Bicarb 31, Ca 9.4, Phos 4.2, PTH 60.13, Vit D 53.1, Hb 13.1, UPC 0.22.   Blood pressure today 124/78, heart rate 72.        6/8/2020:  Patient is here for follow-up Chronic kidney disease stage III with baseline creatinine running between 1.2-1.4 milligrams per DL. Etiology likely secondary to diabetic nephropathy. Patient lives by herself but has 7 kids who take turns and be with her everyday. Patient doing well. No new issues. No fever, no chills, no CP, +ve chronic SOB, no N/V/D, no abdomen pain, no urinary complaints, +ve LE edema. She follows up with her Cardiologist.   States that Bumex makes her feel \"weird\", she is not able explain the feeling. She is not taking her 2nd dose of the day regularly. Does have +ve edema b/l. She drinks about 18-20 oz/day. Patient is currently on metoprolol  mg daily, lisinopril 20 mg daily, Bumex 1 mg twice a day, oral vitamin D 2000 units replacements and magnesium oxide 400 mg daily. Last labs 5/22/2020: BUN/Cr 30/1.07, Na 140, K 4.2, Cl 96, Bicarb 33, Ca 9.0, Phos 3.4, .3, Vit D 57.1, Hb 13.4, UPC 0.20. Blood pressure today 138/88, heart rate 69.      12/2/19:  Patient is here for follow-up Chronic kidney disease stage III with baseline creatinine running between 1.2-1.4 milligrams per DL. Etiology likely secondary to diabetic nephropathy. Patient doing well. No new issues. No CP, no SOB, no N/V/D, fawn difficulty swallowing will be getting EGD 12/13/19 and some abd pain with eating, no abdomen pain,  +ve urgency and different sensation (per patient), +ve incontinence, no other urinary complaints, trace LE edema. Patient is currently on metoprolol  mg daily, lisinopril 20 mg daily, Bumex 1 mg twice a day, oral vitamin D 2000 units replacements and magnesium oxide 400 mg daily. Last labs 11/21/19: BUN/Cr 25/1.12, Na 139, K 4.1, Cl 97, Bicarb 31, Ca 9.9, Phos 3.1, PTH 27.69, Vit D 58.3, Hb 14.2, UPC 0.16. Should today 122/84, heart rate 82.    6/3/19:  Patient is here for follow-up Chronic kidney disease stage III with baseline creatinine running between 1.2-1.4 milligrams per DL. Etiology likely secondary to diabetic nephropathy.   Patient has gone as high as 1.62 MG/DL in the past.  Patient doing well. No new issues. No CP, no SOB, no N/V/D, no abdomen pain, no urinary complaints, some LE edema but much stable. Last labs 5/23/19: BUN/Cr 42/1.52, Na 142, K 4.5, Cl 97, Bicarb 32, Ca 9.3, Phos 3.4, , Vit D 68.1, Hb 14.8, UPC 0.16, Uric Acid 10.7. BP today 148/82, HR 72  She drinks about 4-5 cups of water day. 12/3/18:  Patient is here for follow-up Chronic kidney disease stage III with baseline creatinine running between 1.2-1.4 milligrams per DL. Etiology likely secondary to diabetic nephropathy. Patient doing okay. No new issues reported. Was just admitted to [facility] 12/28/18-12/1/18 due to fluid overload and CHF got diuretics and now much better and stable. No SOB now. She walked down to her clinic appointment from the parking lot without issues with a cane. She went into CHF as she stopped taking her Diuretics for 3 days, as she went for wedding and did not want to go to the bathroom multiple times. Last labs 12/1/18: BUN/Cr 23/0.87, Na 143, K 3.7, Cl 99, Bicarb 31, Ca 8.7, Hb 11.8  BP today 140/68, HR 64    4/2/2018  Patient is here for follow-up of her chronic kidney disease stage III. She was supposed to be on Lasix 40 mg oral twice a day but looks like her edema was not getting any better and therefore Bumex was added. Currently she is taking Bumex 1 mg oral daily, edema is a little better but continues to have edema. She just came back from [place] a few weeks ago and last year the same thing happened when she was there about a year ago and was admitted with CHF exacerbation to the hospital.  She was not watching his salt or fluid intake very carefully. She is doing better with that now and edema has also improved a little  She is approaching her 89th birthday and is doing fairly well.   Labs from March 2018 shows a blood area nitrogen of 29 and a creatinine of 1.4 with a sodium of 144 potassium of 4 bicarbonate of 31 calcium 9.2, vitamin D sinusitis. Likely she has ANCA/MPO positivity sec to allopurinol use without any active renal vasculitis although could only be confirmed on kidney biopsy. This is discussed with patient and her daughter when her results showed up. No need for kidney biopsy and patient also does not want it. Most recent labs from 3/28/2017 shows a blood urea nitrogen of 21 and cr of 1.08, sodium 142, potassium of 4.8, hemoglobin of 12.9, calcium of 9.2, PTH of 70, vitamin D of 66, urine protein creatinine 0.2, urinalysis had no RBCs. 3/6/17  Pt is here for follow up. She just came back from [place] yesterday. She was sick over there with bronchitis, hypotension, edema. Her creatinine had gone up to 2.45 from a baseline of 1.4. She was treated with antibiotics, some of her medications were changed to lisinopril dose was decreased and Lasix dose was increased because of edema. her Aldactone was also stopped jeweling that time due to hyperkalemia. She was also seen by a nephrologist over there. They did some more workup and her  MPO/ANCA was found to be mildly positive. Urine analysis was negative for any RBCs. She did not have any signs symptoms of vasculitis and her creatinine improved to 1.4. She was told to followup with me once she got back from [place]. Repeat labs from today shows a creatinine of 1.39, urinalysis had 0-4 RBCs. Potassium level was 4. 5. She has no rash no signs or symptoms of vasculitis. I have repeated her ANCA and anti-GBM and results of which are pending      12/8/2016  Patient is here for followup. She feels okay. In November she developed acute kidney injury and creatinine went up to 1.6 likely prerenal but that has now resolved and the last creatinine from 11/16/2016 was 1.26. She takes Lasix 40 alternating with 20 mg and Aldactone 12.5 mg by mouth daily. Per daughter her by mouth intake was poor and she was taking less than 30/40 ounces of water or liquids.   Labs from 11/16/2016 shows a blood urea nitrogen of 26 and a creatinine of 1.26 and a calcium of 9.2 and a potassium of 4.4 and a bicarbonate of 28. Blood pressures are stable, her edema is well controlled. She has lost about 7 pounds of weight since last visit      9/15/2016  Patient here for followup. She feels okay. Labs labs from 9/2/16 shows creatinine of 1.19, blood urea nitrogen of 35, hemoglobin of 13, bicarbonate 29 and potassium 4.7, urine protein creatinine ratio was 0.4, UA had 0-4 RBCs  Blood pressures are stable. Leg swelling has increased and she has gained 7 pounds from last visit. She is currently taking Lasix 20 alternating with 40 mg every other day and Aldactone 12.5 by mouth daily    5/2/2016  Patient here for followup. She was last seen by me last month for her renal dysfunction. Chronic kidney disease workup with the paraproteinemia workup was all negative, k/l was 1.7 and serum immunofixation was negative. Creatinine from April 2016 was up at 1.48 and baseline normally runs around 1.2. Sodium was slightly elevated at 145, bun was 35. She could have been a little dehydrated at that time and she had upper respiratory tract infection at that time. Urine analysis that was done in the past 5-10 RBCs and she tells me she had seen urology for cystoscopy and that was negative. Urine protein creatinine ratio was about 0.2. Ultrasound of the kidneys showed 9.3-9.4 cm kidneys. She was taken off losartan 100 mg on last visit that she was taking in conjunction with lisinopril 20 mg twice a day and was put on Aldactone 12.5 mg. When she was sick and blood pressure was slightly low in the 577 systolic but now it is in the 066Q and 283S systolic. Her blood pressure seems to be a well-controlled and today it was 140/70. She complains of abdominal pain and tells me that Prilosec helps and was advised to take it on a daily basis.   Her edema is controlled     Pt denies any hx of heavy or prolonged NSAID use and there is no history of OTC herbal medications . No history of dysuria or frequency. Pt denies any hx of recent UTI , incontinence or nocturia or recurrent nephrolithiasis. No unusual skin rashes . No tea coloured urine . No recent procedures involving IV contrast.     Patient Active Problem List    Diagnosis Date Noted    Pacemaker at end of battery life - Change out 11/19/18 (Dr. Efrain Sanchez) 11/19/2018     Priority: High    CKD (chronic kidney disease) stage 4, GFR 15-29 ml/min ([address] 03/23/2021    Morbid obesity with BMI of 40.0-44.9, adult ([address] 07/08/2020    Herpes simplex keratitis of right eye 07/29/2019    Endothelial corneal dystrophy 04/16/2019    Early dry stage nonexudative age-related macular degeneration of both eyes 04/16/2019    Chronic diastolic congestive heart failure ([address]     CKD (chronic kidney disease), stage III 12/03/2018    Lower extremity edema 12/03/2018    Abnormal ANCA (antineutrophil cytoplasmic antibody) 12/03/2018    Pulmonary hypertension ([address] 12/03/2018    Type 2 diabetes mellitus ([address]     Primary open angle glaucoma of both eyes, moderate stage 11/14/2016    Posterior vitreous detachment of both eyes 11/14/2016    Macular degeneration, bilateral 11/14/2016    Controlled type 2 DM with peripheral circulatory disorder ([address] 04/15/2016    Complete heart block ([address] 04/11/2016    Peripheral edema 12/03/2015    Hypomagnesemia 12/03/2015    Dry skin dermatitis 12/03/2015    Dermatophytosis of nail 1-5R/L 01/26/2015    Gout     Pacemaker 10/24/2013     Overview Note:     DUAL CHAMBER      Hypercholesterolemia     Obstructive sleep apnea     Hard of hearing     Venous insufficiency     Dyspnea      Overview Note:     chronic      Sick sinus syndrome (HCC)      Overview Note:     with pacemaker  placement      Vitamin D deficiency     Malignant basal cell neoplasm of skin      Overview Note:     nose  replace inactive diagnosis      Hypertension     Hiatal hernia CURRENT MEDICATIONS      Current Outpatient Medications   Medication Sig Dispense Refill    colchicine (MITIGARE) 0.6 MG capsule Take 0.6 mg by mouth 2 times daily REYNALDO      Sodium Chloride, Hypertonic, (SODIUM CHLORIDE OP) Apply to eye as needed (dry eyes)      DULoxetine (CYMBALTA) 20 MG extended release capsule Take 1 capsule by mouth daily 30 capsule 3    metoprolol succinate (TOPROL XL) 100 MG extended release tablet TAKE 1 TABLET DAILY 90 tablet 3    furosemide (LASIX) 40 MG tablet Take 1 tablet by mouth 2 times daily 190 tablet 3    lisinopril (PRINIVIL;ZESTRIL) 20 MG tablet TAKE 1 TABLET DAILY 90 tablet 3    timolol (TIMOPTIC) 0.5 % ophthalmic solution Place 1 drop into both eyes 2 times daily      Multiple Vitamins-Minerals (ICAPS AREDS 2) CHEW Take 1 tablet by mouth 2 times daily      magnesium oxide (MAG-OX) 400 MG tablet Take 1 tablet by mouth nightly 30 tablet 3    Cholecalciferol (VITAMIN D) 2000 UNITS CAPS capsule Take 1 capsule by mouth daily      Acetaminophen (TYLENOL PO) Take  by mouth as needed. Rarely uses      aspirin 81 MG EC tablet Take 81 mg by mouth daily        No current facility-administered medications for this visit. REVIEW OF SYSTEMS     Constitutional: No fever, no chills, no lethargy, no weakness. HEENT:  No headache, otalgia, itchy eyes, nasal discharge or sore throat, some difficulty swallowing. Cardiac:  No chest pain, dyspnea, orthopnea or PND. Chest:   No cough, phlegm or wheezing or hemoptysis . Abdomen:  No abdominal pain, some diarrhea off/on, no nausea or vomiting. Neuro:  No focal weakness, abnormal movements or seizure like activity. Skin:   No rashes, no itching. :   No hematuria, no pyuria, no dysuria, no flank pain, +ve urgency and different sensation (per patient), +ve incontinence. Extremities:    + swelling, no joint pains. ROS was otherwise negative except as mentioned in the [address]      OBJECTIVE      Vitals:    04/05/21 1325 BP: 116/80   Pulse: 68     Wt Readings from Last 2 Encounters:   04/05/21 190 lb 3.2 oz (86.3 kg)   03/23/21 190 lb (86.2 kg)     Body mass index is 37.15 kg/m².      PHYSICAL EXAM      GENERAL APPEARANCE:Awake, alert, in no acute distress  SKIN: warm and dry, no rash or erythema  EYES: conjunctivae normal and sclera anicteric  ENT: no thrush no pharyngeal congestion   NECK: supple  PULMONARY: clear to auscultation and no wheezing noted   CADRDIOVASCULAR: :Normal S1 & S2,  no murmur   ABDOMEN: soft nontender, bowel sounds, present, no organomegaly,  no ascites   EXTREMITIES: 1-2+ edema, mostly in ankle and feet area  NEURO: alert and oriented, no deficits    LABS    CBC:   Lab Results   Component Value Date    WBC 8.5 03/31/2021    HGB 13.2 03/31/2021    HCT 41.3 03/31/2021    MCV 96.0 03/31/2021    RDW 14.2 03/31/2021     03/31/2021    MPV 10.6 03/31/2021      BMP:   Lab Results   Component Value Date     03/31/2021    K 4.0 03/31/2021     03/31/2021    CO2 30 03/31/2021    BUN 40 03/31/2021    CREATININE 1.26 03/31/2021    GLUCOSE 111 03/31/2021    CALCIUM 9.6 03/31/2021      PHOSPHORUS:    Lab Results   Component Value Date    PHOS 3.5 03/23/2021     MAGNESIUM:   Lab Results   Component Value Date    MG 2.1 03/31/2021     ALBUMIN:   Lab Results   Component Value Date    LABALBU 3.9 03/31/2021     PTH: No components found for: PTHINTACT  VIT D3,25 HYDROXY: No results found for: VITD3     IRON:  No results found for: IRON  IRON SATURATION:  No results found for: LABIRON  TIBC:  No results found for: TIBC  FERRITIN:  No results found for: FERRITIN          TAMERA: No results found for: TAMERA    SPEP:   Lab Results   Component Value Date    PROT 6.6 03/31/2021    ALBCAL 4.0 04/13/2016    ALBPCT 55 04/13/2016    LABALPH 0.2 04/13/2016    LABALPH 0.9 04/13/2016    A1PCT 3 04/13/2016    A2PCT 12 04/13/2016    LABBETA 1.1 04/13/2016    BETAPCT 15 04/13/2016    GAMGLOB 1.1 04/13/2016    GGPCT 15 04/13/2016 PATH ELECTRONICALLY SIGNED. Vladimir Headley M.D. 04/14/2016     UPEP:   Lab Results   Component Value Date    TPU 9 04/14/2016    TPU 9 04/14/2016      HEPBSAG:No results found for: HEPBSAG  HEPCAB:No results found for: HEPCAB  C3:   Lab Results   Component Value Date    C3 157 04/13/2016     C4:   Lab Results   Component Value Date    C4 36 04/13/2016     MPO ANCA:   Lab Results   Component Value Date     03/27/2018    . PR3 ANCA:    Lab Results   Component Value Date    PR3 6 03/27/2018                      URINALYSIS/URINE CHEMISTRIES      URINE CREATININE:    Lab Results   Component Value Date    LABCREA 26.7 03/23/2021     URINE EOSINOPHILS : No results found for: UREO  URINE PROTEIN:    Lab Results   Component Value Date    TPU 9 04/14/2016    TPU 9 04/14/2016           RADIOLOGY    No results found for this or any previous visit. ASSESSMENT     1. Chronic kidney disease stage III with baseline creatinine now seems to be running around 1.5-1.6 milligrams per DL. Most recent creatinine was 1.26 mg/dl, March 2021. Etiology likely secondary to diabetic nephropathy. CKD workup was negative and urine protein creatinine ratio  is 0.3 , no significant microscopic hematuria. UA had 0-4 RBCs   Previously urinalysis had microscopic hematuria and she has a history of cystoscopy which was negative in the past. Most recent UA had No diabetes  Ultrasound of the kidneys showed 9.3 and 9.4 cm kidneys   2. Hypertension well-controlled  BP Readings from Last 3 Encounters:   04/05/21 116/80   03/23/21 124/80   02/23/21 128/64    :  3. Type 2 Diabetes mellitus    4. Diastolic CHF  5. Aortic valve sclerosis  6. Pulmonary hypertension with right ventricular systolic pressure being 53  7. Lower extremity edema Worse  8. Secondary hyperparathyroidism PTH was 101 and vitamin D was 67  9.   ANCA/MPO positivity without any signs or symptoms active renal vasculitis, patient does not want further workup and does not want to be referred to rheumatology. Will continue to monitor. 10.  Dry cough at nighttime could be related to postnasal drip. 11.  +ve urgency and different sensation (per patient)     PLAN     1. Based on available labs patients renal function is stable. Patient's volume status is acceptable. Importance of tight blood pressure, glycemic control, lipid control was outlined to patient . 2. Cont Lasix 40 mg BID and metoprolol  mg daily, lisinopril 20 mg daily. 3. Avoid canned foods including soups, salt restriction <2gm/day and fluid restriction no more than 50-60 ounces a day. 4. Keep water intake at around 30 oz/day. 5. Patient counseled about taking her meds regularly and not stopping abruptly without medical advise. 6. Follow up labs ordered : bmp, cbc, phos, intact pth, vitaminD 25 [place]           7. Urine for random protein and creat to be done. 8. Follow up in the office in 4 months    Patient was asked to avoid NSAIDS. Please do not hesitate to call with questions. Electronically signed by Daisy Patel MD on 4/5/2021 at 1:39 PM  [facility]. This note is created with the assistance of a speech-recognition program. While intending to generate a document that actually reflects the content of the visit, no guarantees can be provided that every mistake has been identified and corrected by editing.

## 2021-04-13 NOTE — TELEPHONE ENCOUNTER
Fax came through for refill of Mitigare caps 0.6mg.   Refills 3  Take 1 capsule BID    Med pended.     Last Appt:  3/23/2021  Next Appt:   4/20/2021  Med verified in [facility]

## 2021-04-20 NOTE — PROGRESS NOTES
04/20/21  Lanette Cardona  10/27/1928      Chief Complaint:   1. Anxiety and depression    2. Epigastric abdominal tenderness without rebound tenderness    3. Dry mouth    4. Other chronic pain    5. Diarrhea, unspecified type        HPI:  80-year-old patient in for follow-up of anxiety and depression. Patient Zoloft was no longer working for her per her daughter we chose Cymbalta to assist in chronic musculoskeletal pain along with her mood. Daughter feels that her mood is much better she is sleeping better at night unsure that it is really helped with her pain. She has developed a dry mouth being on the Cymbalta. Daughter states she is drinking water nonstop. This makes her less hungry and has not been eating as much. She also elaborates that she was having some epigastric tenderness and difficulty with certain foods. Previously had what appears to be an EGD completed down in [place]. No records available. Daughter with her today is unsure regarding this test states her other sister took her to it. More concerned this daughter is persistent diarrhea. States her stools are loose 1-2 a day. Currently on 1 fiber pill a day trying to bulk them up.   Multiple episodes of incontinence since she cannot get to the bathroom soon enough      Allergies   Allergen Reactions    Atorvastatin Other (See Comments)    Statins [Statins] Other (See Comments)     Muscle aches, elevated LFT's    Penicillin G Rash       Past Medical History:   Diagnosis Date    Abnormal ANCA (antineutrophil cytoplasmic antibody) 12/03/2018    Basal cell cancer     nose    CKD (chronic kidney disease), stage III 12/03/2018    Depression     Dry skin dermatitis 12/03/2015    Dyspnea     chronic    Edema     Chronic    Gout     Hard of hearing     Hiatal hernia     Hypercholesterolemia     Hypertension     Hypomagnesemia 12/03/2015    Incontinence     Lower extremity edema 12/03/2018    Macular degeneration, dry     Obstructive sleep apnea     Open-angle glaucoma     Borderline.  Peripheral edema 12/03/2015    Pulmonary hypertension ([facility] [address] 12/03/2018    Sick sinus syndrome (HCC)     with pacemaker  placement    Type 2 diabetes mellitus ([facility] [address]     Venous insufficiency     Vitamin D deficiency        Past Surgical History:   Procedure Laterality Date    CARDIAC CATHETERIZATION  2010    no blockage    CATARACT REMOVAL WITH IMPLANT Bilateral     cataract    EYE SURGERY  03/31/2017    Biopsy to right eye lid with surgery on 05/31/2017 for squamous cell     FRACTURE SURGERY Right     wrist    HYSTERECTOMY      PACEMAKER INSERTION      sick sinus syndrome says was changed to medtronic 11/19/2018   [address]       Current Outpatient Medications on File Prior to Visit   Medication Sig Dispense Refill    colchicine (MITIGARE) 0.6 MG capsule Take 1 capsule by mouth 2 times daily REYNALDO 180 capsule 3    Sodium Chloride, Hypertonic, (SODIUM CHLORIDE OP) Place into both eyes as needed (dry eyes)       metoprolol succinate (TOPROL XL) 100 MG extended release tablet TAKE 1 TABLET DAILY 90 tablet 3    furosemide (LASIX) 40 MG tablet Take 1 tablet by mouth 2 times daily 190 tablet 3    lisinopril (PRINIVIL;ZESTRIL) 20 MG tablet TAKE 1 TABLET DAILY 90 tablet 3    timolol (TIMOPTIC) 0.5 % ophthalmic solution Place 1 drop into both eyes 2 times daily      Multiple Vitamins-Minerals (ICAPS AREDS 2) CHEW Take 1 tablet by mouth 2 times daily      magnesium oxide (MAG-OX) 400 MG tablet Take 1 tablet by mouth nightly 30 tablet 3    Cholecalciferol (VITAMIN D) 2000 UNITS CAPS capsule Take 1 capsule by mouth daily      Acetaminophen (TYLENOL PO) Take 500 mg by mouth every 8 hours as needed (pain) Rarely uses      aspirin 81 MG EC tablet Take 81 mg by mouth daily        No current facility-administered medications on file prior to visit. Social History     Socioeconomic History    Marital status:       Spouse name: Not on file    Number of children: Not on file    Years of education: Not on file    Highest education level: Not on file   Occupational History    Occupation: retired    Social Needs    Financial resource strain: Not on file    Food insecurity     Worry: Not on file     Inability: Not on file   [employer] needs     Medical: Not on file     Non-medical: Not on file   Tobacco Use    Smoking status: Never Smoker    Smokeless tobacco: Never Used    Tobacco comment: [facility] 11/28/18   Substance and Sexual Activity    Alcohol use: No     Alcohol/week: 0.0 standard drinks     Frequency: Never     Binge frequency: Never    Drug use: No    Sexual activity: Not Currently     Partners: Male   Lifestyle    Physical activity     Days per week: Not on file     Minutes per session: Not on file    Stress: Not on file   Relationships    Social connections     Talks on phone: Not on file     Gets together: Not on file     Attends [employer] service: Not on file     Active member of club or organization: Not on file     Attends meetings of clubs or organizations: Not on file     Relationship status: Not on file    Intimate partner violence     Fear of current or ex partner: Not on file     Emotionally abused: Not on file     Physically abused: Not on file     Forced sexual activity: Not on file   Other Topics Concern    Not on file   Social History Narrative    Not on file       Review of Systems   Constitutional: Negative for activity change, appetite change, chills, fatigue, fever and unexpected weight change. HENT: Negative for congestion, dental problem, ear discharge, ear pain, facial swelling, hearing loss, postnasal drip, rhinorrhea, sinus pressure, sore throat and trouble swallowing. Eyes: Negative for pain and visual disturbance. Respiratory: Negative for cough, chest tightness, shortness of breath and wheezing.     Cardiovascular: Negative for chest pain, palpitations and leg swelling. Gastrointestinal: Negative for abdominal distention, abdominal pain (epigastric discomfort ), blood in stool, constipation, diarrhea, nausea and vomiting. Endocrine: Negative for cold intolerance, heat intolerance and polyuria. Genitourinary: Negative for difficulty urinating. Musculoskeletal: Negative for arthralgias, gait problem, myalgias, neck pain and neck stiffness. Skin: Negative for color change, rash and wound. Neurological: Positive for weakness. Negative for dizziness, tremors, seizures, light-headedness, numbness and headaches. Psychiatric/Behavioral: Positive for dysphoric mood. Negative for confusion, hallucinations, self-injury and sleep disturbance. The patient is nervous/anxious. The patient is not hyperactive. Physical Exam  Vitals signs and nursing note reviewed. Constitutional:       General: She is not in acute distress. Appearance: Normal appearance. She is well-developed. She is not diaphoretic. HENT:      Head: Normocephalic and atraumatic. Right Ear: External ear normal.      Left Ear: External ear normal.   Eyes:      General:         Right eye: No discharge. Left eye: No discharge. Neck:      Trachea: No tracheal deviation. Cardiovascular:      Rate and Rhythm: Normal rate and regular rhythm. Heart sounds: Normal heart sounds. No murmur. No friction rub. No gallop. Pulmonary:      Effort: Pulmonary effort is normal. No respiratory distress. Breath sounds: Normal breath sounds. No stridor. No wheezing, rhonchi or rales. Chest:      Chest wall: No tenderness. Abdominal:      General: Bowel sounds are normal. There is no distension. Palpations: Abdomen is soft. Tenderness: There is no abdominal tenderness. Comments: Obese, mild epigastric tenderness   Musculoskeletal:         General: No swelling. Right lower leg: Edema (Chronic pedal edema) present. Left lower leg: Edema present.    Skin: General: Skin is warm and dry. Capillary Refill: Capillary refill takes less than 2 seconds. Coloration: Skin is not jaundiced or pale. Findings: No rash. Neurological:      General: No focal deficit present. Mental Status: She is alert and oriented to person, place, and time. Cranial Nerves: No cranial nerve deficit. Sensory: No sensory deficit. Coordination: Coordination normal.      Comments: hard of hearing significantly   Psychiatric:         Attention and Perception: Attention normal.         Mood and Affect: Mood is depressed. Affect is tearful. Speech: Speech normal.         Behavior: Behavior normal.         Thought Content: Thought content normal. Thought content does not include homicidal or suicidal ideation. Thought content does not include homicidal or suicidal plan. Cognition and Memory: Cognition normal.         Judgment: Judgment normal.       Vitals:    04/20/21 1236   BP: 132/84   Site: Left Upper Arm   Position: Sitting   Cuff Size: Large Adult   Pulse: 72   Weight: 185 lb (83.9 kg)   Height: 5' (1.524 m)       Assessment:  1. Anxiety and depression  Stop the Cymbalta, start Paxil. Follow-up in 3 to 4 weeks    2. Epigastric abdominal tenderness without rebound tenderness  Add omeprazole daily for the next 2 weeks. [place] diet follow-up: 2 weeks    3. Dry mouth  Probable related to the Cymbalta due to just occurring after medication no change. Stop Cymbalta    4. Other chronic pain  Did not really see a benefit from the addition of Cymbalta. States it is more pain if she is up walking which she normally do not walk far    5. Diarrhea, unspecified type  Ongoing. Patient has never had a colonoscopy. States she will never have a colonoscopy. States previously issues with constipation and had to be on MiraLAX daily.   We will have her decrease her magnesium to 200 mg daily increase fiber tablets to 2-3 times a day and call with an update in

## 2021-05-04 NOTE — PROGRESS NOTES
Foot Care Worksheet  PCP: MAGALY Barba - CNP/C  Last visit: 4/20/21      Nail description:  Thick , Yellow , Crumbly , Marked limitation of ambulation     Pain resulting from thickened and dystrophy of nail plate No    Nails involved  Right   1, 2, 5  (T5-T9)  Left     1, 2, 5  (TA-T4)    Q7 1 Class A Finding - Non traumatic amputation of foot No    Q8 2 Class B Findings - Absent DP pulse No, Absent PT pulse No, Advanced tropic changes (3 required) Yes    Decrease hair growth Yes, Nail changes/thickening Yes, Pigmented changes/discoloration Yes, Skin texture (thin, shiny) Yes, Skin color (rubor/redness) No    Q9 1 Class B and 2 Class C Findings  Claudication No, Temperature change Yes, Paresthesia No, Burning No, Edema Yes

## 2021-05-04 NOTE — PROGRESS NOTES
Subjective:  Patient presents to [facility] today for routine diabetic foot care. Patient denies any new problems with their feet. Patient's diabetic control has been not changed. Allergies   Allergen Reactions    Atorvastatin Other (See Comments)    Statins [Statins] Other (See Comments)     Muscle aches, elevated LFT's    Penicillin G Rash       Past Medical History:   Diagnosis Date    Abnormal ANCA (antineutrophil cytoplasmic antibody) 12/03/2018    Basal cell cancer     nose    CKD (chronic kidney disease), stage III 12/03/2018    Depression     Dry skin dermatitis 12/03/2015    Dyspnea     chronic    Edema     Chronic    Gout     Hard of hearing     Hiatal hernia     Hypercholesterolemia     Hypertension     Hypomagnesemia 12/03/2015    Incontinence     Lower extremity edema 12/03/2018    Macular degeneration, dry     Obstructive sleep apnea     Open-angle glaucoma     Borderline.  Peripheral edema 12/03/2015    Pulmonary hypertension ([facility] [address] 12/03/2018    Sick sinus syndrome (HCC)     with pacemaker  placement    Type 2 diabetes mellitus ([facility] [address]     Venous insufficiency     Vitamin D deficiency        Prior to Admission medications    Medication Sig Start Date End Date Taking?  Authorizing Provider   furosemide (LASIX) 40 MG tablet TAKE 1 TABLET TWICE A DAY 5/3/21  Yes Bart Fisher MD   PARoxetine (PAXIL) 20 MG tablet Take 1 tablet by mouth daily 4/20/21  Yes Magda , APRN - CNP   omeprazole (PRILOSEC) 20 MG delayed release capsule Take 1 capsule by mouth every morning (before breakfast) 4/20/21  Yes Magda , APRN - CNP   colchicine (MITIGARE) 0.6 MG capsule Take 1 capsule by mouth 2 times daily REYNALDO 4/14/21  Yes Magda , APRN - CNP   Sodium Chloride, Hypertonic, (SODIUM CHLORIDE OP) Place into both eyes as needed (dry eyes)    Yes Historical Provider, MD   metoprolol succinate (TOPROL XL) 100 MG extended release tablet TAKE 1 TABLET absent, Bilateral.  Interdigital maceration absent to web spaces,absent Bilateral.  Nails left 1, 2, 5 and right 1, 2, 5 thickened, dystrophic and crumbly, discolored with subungual debris. Fissures absent, Bilateral. Hyperkeratotic tissue is absent. Assessment:    Diagnosis Orders   1. Controlled type 2 DM with peripheral circulatory disorder ([address]     2. Dermatophytosis of nail 1-5R/L         Plan:  Diabetic foot education and exam.  All Nails as mentioned above debrided in length and thickness. Patient advised about OTC treatments for nails and callous. Patient will follow up in 10 weeks for routine foot care or PRN if any further problems arise.

## 2021-05-19 NOTE — TELEPHONE ENCOUNTER
Patient is out of [facility] tomorrow. She would like a short term script sent to the clinic pharmacy and a long term [facility]. Script pended for [employer].     Last Appt:  5/18/2021  Next Appt:   7/20/2021  Med verified in [address] [place] [address] Rd

## 2021-05-19 NOTE — PROGRESS NOTES
05/18/21  Lanette Cardona  10/27/1928      Chief Complaint:   1. Anxiety and depression    2. Epigastric abdominal tenderness without rebound tenderness    3. Dry mouth    4. Chronic kidney disease, stage IV (severe) ([address]    5. Hiatal hernia    6. Lower extremity edema        HPI:  80-year-old female in with her daughter for a 1 month follow-up of her anxiety and depression. Daughter states her mood is significantly improved on Paxil. Patient states she is doing much better with her mood. Of most concern now is that she has a chronic dry mouth and abdominal tenderness. Daughter states it happens more towards the evening. Somebody is there 24 hours a day with her mother. States there is some nights she just sits there and moans and says it is her belly. Has been eating less. Does have a known history with review of chart of a hiatal hernia. Has previously been seen by the general surgeons with suggestions of referral over to gastroenterology. Her chronic kidney disease has remained stable. Edema to lower extremities much improved. She denies any orthopnea. No shortness of breath with rest.  States if she walks too far she does get a little winded. This has been years of that. Daughter states it is because she is deconditioned.       Allergies   Allergen Reactions    Atorvastatin Other (See Comments)    Statins [Statins] Other (See Comments)     Muscle aches, elevated LFT's    Penicillin G Rash       Past Medical History:   Diagnosis Date    Abnormal ANCA (antineutrophil cytoplasmic antibody) 12/03/2018    Basal cell cancer     nose    CKD (chronic kidney disease), stage III 12/03/2018    Depression     Dry skin dermatitis 12/03/2015    Dyspnea     chronic    Edema     Chronic    Gout     Hard of hearing     Hiatal hernia     Hypercholesterolemia     Hypertension     Hypomagnesemia 12/03/2015    Incontinence     Lower extremity edema 12/03/2018    Macular degeneration, dry     Obstructive sleep apnea     Open-angle glaucoma     Borderline.     Peripheral edema 12/03/2015    Pulmonary hypertension ([facility] [address] 12/03/2018    Sick sinus syndrome (HCC)     with pacemaker  placement    Type 2 diabetes mellitus ([facility] [address]     Venous insufficiency     Vitamin D deficiency        Past Surgical History:   Procedure Laterality Date    CARDIAC CATHETERIZATION  2010    no blockage    CATARACT REMOVAL WITH IMPLANT Bilateral     cataract    EYE SURGERY  03/31/2017    Biopsy to right eye lid with surgery on 05/31/2017 for squamous cell     FRACTURE SURGERY Right     wrist    HYSTERECTOMY      PACEMAKER INSERTION      sick sinus syndrome says was changed to medtronic 11/19/2018   [address]       Current Outpatient Medications on File Prior to Visit   Medication Sig Dispense Refill    furosemide (LASIX) 40 MG tablet TAKE 1 TABLET TWICE A  tablet 3    PARoxetine (PAXIL) 20 MG tablet Take 1 tablet by mouth daily 30 tablet 0    omeprazole (PRILOSEC) 20 MG delayed release capsule Take 1 capsule by mouth every morning (before breakfast) 30 capsule 0    colchicine (MITIGARE) 0.6 MG capsule Take 1 capsule by mouth 2 times daily REYNALDO 180 capsule 3    Sodium Chloride, Hypertonic, (SODIUM CHLORIDE OP) Place into both eyes as needed (dry eyes)       metoprolol succinate (TOPROL XL) 100 MG extended release tablet TAKE 1 TABLET DAILY 90 tablet 3    lisinopril (PRINIVIL;ZESTRIL) 20 MG tablet TAKE 1 TABLET DAILY 90 tablet 3    timolol (TIMOPTIC) 0.5 % ophthalmic solution Place 1 drop into both eyes 2 times daily      Multiple Vitamins-Minerals (ICAPS AREDS 2) CHEW Take 1 tablet by mouth 2 times daily      magnesium oxide (MAG-OX) 400 MG tablet Take 1 tablet by mouth nightly 30 tablet 3    Cholecalciferol (VITAMIN D) 2000 UNITS CAPS capsule Take 1 capsule by mouth daily      Acetaminophen (TYLENOL PO) Take 500 mg by mouth every 8 hours as needed (pain) Rarely uses      aspirin 81 MG EC tablet Take 81 mg by mouth daily        No current facility-administered medications on file prior to visit. Social History     Socioeconomic History    Marital status:      Spouse name: Not on file    Number of children: Not on file    Years of education: Not on file    Highest education level: Not on file   Occupational History    Occupation: retired    Tobacco Use    Smoking status: Never Smoker    Smokeless tobacco: Never Used    Tobacco comment: Toni Nieto RRT 11/28/18   Vaping Use    Vaping Use: Never used   Substance and Sexual Activity    Alcohol use: No     Alcohol/week: 0.0 standard drinks    Drug use: No    Sexual activity: Not Currently     Partners: Male   Other Topics Concern    Not on file   Social History Narrative    Not on file     Social Determinants of Health     Financial Resource Strain:     Difficulty of Paying Living Expenses:    Food Insecurity:     Worried About Running Out of Food in the Last Year:     [address] in the Last Year:    Transportation Needs:     Lack of Transportation (Medical):  Lack of Transportation (Non-Medical):    Physical Activity:     Days of Exercise per Week:     Minutes of Exercise per Session:    Stress:     Feeling of Stress :    Social Connections:     Frequency of Communication with Friends and Family:     Frequency of Social Gatherings with Friends and Family:     Attends [employer] Services:     Active Member of Clubs or Organizations:     Attends Club or Organization Meetings:     Marital Status:    Intimate Partner Violence:     Fear of Current or Ex-Partner:     Emotionally Abused:     Physically Abused:     Sexually Abused:        Review of Systems   Constitutional: Negative for activity change, appetite change, chills, fatigue, fever and unexpected weight change.    HENT: Negative for congestion, dental problem, ear discharge, ear pain, facial swelling, hearing loss, postnasal drip, rhinorrhea, sinus pressure, sore throat and trouble swallowing. Eyes: Negative for pain and visual disturbance. Respiratory: Negative for cough, chest tightness, shortness of breath and wheezing. Cardiovascular: Negative for chest pain, palpitations and leg swelling. Gastrointestinal: Positive for abdominal pain. Negative for blood in stool, constipation, diarrhea, nausea and vomiting. Endocrine: Negative for cold intolerance, heat intolerance and polyuria. Genitourinary: Negative for difficulty urinating. Musculoskeletal: Negative for arthralgias, gait problem, myalgias, neck pain and neck stiffness. Skin: Negative for color change, rash and wound. Neurological: Negative for dizziness, tremors, seizures, weakness, light-headedness, numbness and headaches. Psychiatric/Behavioral: Negative for confusion and hallucinations. The patient is not nervous/anxious. Physical Exam  Vitals and nursing note reviewed. Constitutional:       General: She is not in acute distress. Appearance: Normal appearance. She is well-developed. She is not diaphoretic. HENT:      Head: Normocephalic and atraumatic. Right Ear: External ear normal.      Left Ear: External ear normal.   Eyes:      General:         Right eye: No discharge. Left eye: No discharge. Neck:      Trachea: No tracheal deviation. Cardiovascular:      Rate and Rhythm: Normal rate and regular rhythm. Heart sounds: Normal heart sounds. No murmur heard. No friction rub. No gallop. Pulmonary:      Effort: Pulmonary effort is normal. No respiratory distress. Breath sounds: Normal breath sounds. No stridor. No wheezing, rhonchi or rales. Chest:      Chest wall: No tenderness. Abdominal:      General: Bowel sounds are normal. There is no distension. Palpations: Abdomen is soft. There is no mass. Tenderness: There is abdominal tenderness (Mild epigastric tenderness). There is no guarding. Hernia: No hernia is present. Musculoskeletal:         General: No swelling. Right lower leg: Edema present. Left lower leg: Edema (+1 pedal edema bilateral much improved from previous assessment) present. Skin:     General: Skin is warm and dry. Capillary Refill: Capillary refill takes less than 2 seconds. Coloration: Skin is not jaundiced or pale. Findings: No rash. Neurological:      General: No focal deficit present. Mental Status: She is alert and oriented to person, place, and time. Cranial Nerves: No cranial nerve deficit. Sensory: No sensory deficit. Coordination: Coordination normal.   Psychiatric:         Mood and Affect: Mood normal.         Behavior: Behavior normal.         Thought Content: Thought content normal.         Judgment: Judgment normal.       Vitals:    05/18/21 1220   BP: 110/60   Site: Left Upper Arm   Position: Sitting   Cuff Size: Large Adult   Pulse: 70   Weight: 180 lb (81.6 kg)   Height: 5' (1.524 m)       Assessment:  1. Anxiety and depression  Stable on Paxil, continue the same    2. Epigastric abdominal tenderness without rebound tenderness  We will get her set up with gastroenterology  - Amb External Referral To Gastroenterology    3. Dry mouth  We will back off of her Lasix a little bit. 40 in the morning and 20 in the afternoon. Serial lab follow-up. Biotene    4. Chronic kidney disease, stage IV (severe) (HCC)  Avoid nephrotoxic drugs, serial lab follow-ups  - Basic Metabolic Panel; Future    5. Hiatal hernia  - Amb External Referral To Gastroenterology    6. Lower extremity edema  Improved, monitor closely with the reduction of the Lasix patient's last creatinine 1.26, BUN 40, ratio 32      Plan:  As noted above. Follow up for routine visit. Call sooner with concerns prior.     Electronically signed by MAGALY Miller CNP on 5/18/2021 at 9:09 PM

## 2021-05-27 NOTE — PROGRESS NOTES
Today's Date: 5/27/2021  Patient Name: Kateryna Trejo  Patient's age: 80 y. o., 10/27/1928       HPI:   The patient is a 80 y.o.  female is in the office for f/u. No CP, dizziness, light headedness and mostly sedentary and walks short distances, has baseline SOB on ambulation with no recent worsening. Follows with nephrology for diuretics management. Last cr was at baseline. No recent hospitalizations. Past Medical History:   has a past medical history of Abnormal ANCA (antineutrophil cytoplasmic antibody), Basal cell cancer, CKD (chronic kidney disease), stage III, Depression, Dry skin dermatitis, Dyspnea, Edema, Gout, Hard of hearing, Hiatal hernia, Hypercholesterolemia, Hypertension, Hypomagnesemia, Incontinence, Lower extremity edema, Macular degeneration, dry, Obstructive sleep apnea, Open-angle glaucoma, Peripheral edema, Pulmonary hypertension ([address] Sick sinus syndrome (HCC), Type 2 diabetes mellitus ([address] Venous insufficiency, and Vitamin D deficiency. Past Surgical History:   has a past surgical history that includes Pacemaker insertion; Hysterectomy; fracture surgery (Right); Cardiac catheterization (2010); Tubal ligation (1970); Cataract removal with implant (Bilateral); and eye surgery (03/31/2017). Home Medications:    Prior to Admission medications    Medication Sig Start Date End Date Taking?  Authorizing Provider   PARoxetine (PAXIL) 20 MG tablet Take 1 tablet by mouth daily 5/19/21   MAGALY Durant CNP   furosemide (LASIX) 40 MG tablet TAKE 1 TABLET TWICE A DAY 5/3/21   Casandra Kaur MD   omeprazole (PRILOSEC) 20 MG delayed release capsule Take 1 capsule by mouth every morning (before breakfast) 4/20/21   MAGALY Durant CNP   colchicine (MITIGARE) 0.6 MG capsule Take 1 capsule by mouth 2 times daily REYNALDO 4/14/21   MAGALY Durant CNP   Sodium Chloride, Hypertonic, (SODIUM CHLORIDE OP) Place into both eyes as needed (dry eyes)     Historical Provider, MD   metoprolol succinate (TOPROL XL) 100 MG extended release tablet TAKE 1 TABLET DAILY 8/17/20   JARRETT Kitchen   lisinopril (PRINIVIL;ZESTRIL) 20 MG tablet TAKE 1 TABLET DAILY 5/29/20   JARERTT Kitchen   timolol (TIMOPTIC) 0.5 % ophthalmic solution Place 1 drop into both eyes 2 times daily    Historical Provider, MD   Multiple Vitamins-Minerals (ICAPS AREDS 2) CHEW Take 1 tablet by mouth 2 times daily    Historical Provider, MD   magnesium oxide (MAG-OX) 400 MG tablet Take 1 tablet by mouth nightly 12/21/15   Zo Burgos DO   Cholecalciferol (VITAMIN D) 2000 UNITS CAPS capsule Take 1 capsule by mouth daily    Historical Provider, MD   Acetaminophen (TYLENOL PO) Take 500 mg by mouth every 8 hours as needed (pain) Rarely uses    Historical Provider, MD   aspirin 81 MG EC tablet Take 81 mg by mouth daily     Historical Provider, MD       Allergies:  Atorvastatin, Statins [statins], and Penicillin g    Social History:   reports that she has never smoked. She has never used smokeless tobacco. She reports that she does not drink alcohol and does not use drugs. REVIEW OF SYSTEMS:  CONSTITUTIONAL:NEGATIVE  HEENT:NEG  Cardiovascular: No chest pain, Yes dyspnea on exertion, No palpitations. Lower extremity edema: Yes  RESPIRATORY: LYNNE  GASTROINTESTINAL:  negative  GENITOURINARY:  negative  INTEGUMENT:  negative  MUSCULOSKELETAL:  positive for  pain  NEUROLOGICAL:  negative    PHYSICAL EXAM:      LMP  (LMP Unknown)    HEENT: PERRL, no cervical lymphadenopathy. No masses palpable. Cardiovascular:  · The apical impulse is not displaced  · Heart  Sounds:RRR, NO S3  · Jugular venous pulsation Normal  · The carotid upstroke is NL  · Peripheral pulses are symmetrical and full  Respiratory: Good respiratory effort.  On auscultation: clear to auscultation bilaterally  Abdomen:  · No masses or tenderness  · Bowel sounds present  Extremities:  ·  No Cyanosis or Clubbing  · Lower extremity edema: trace bilateral   Skin: Warm and dry    Cardiac data:    ECG 5/27/21: AV paced rhythm     Echo 11/18:  Technically difficult study. Left ventricle is normal in size Global left ventricular systolic function  is normal Estimated ejection fraction is 55 % . Mild to moderate left ventricular hypertrophy. Grade II (moderate) left ventricular diastolic dysfunction. Bi-atrial enlargement. Right ventricular dilatation with reduced systolic function. Aortic valve sclerosis without stenosis. Mitral annular calcification is seen. Trivial mitral regurgitation. Mild tricuspid regurgitation. Estimated right ventricular systolic pressure is 59 mmHg. Moderate to severe pulmonary hypertension. IVC Increased diameter and impaired or no inspiratory variation indicating  elevated RA filling pressure (i.e. CVP) . Labs:     FASTING LIPID PANEL:  Lab Results   Component Value Date    HDL 41 03/31/2021    TRIG 142 03/31/2021       IMPRESSION:      SSS s/p PPM, Successful Generator Change Out 11/19/2018: last interrogation 05/27/21: functioning normally, battery longevity of 8.5 yrs   Chronic HFpEF, compensated   Mild to moderate TR  HTN: borderline controlled. CKD: being followed by nephrology. DM 2  PH: RVSP 53 mm Hg. RECOMMENDATIONS:    1-Continue current medical treatment with ASA, BB, ACEi, and Diuretics. 2-Regular pacemaker interrogation every 6 months. 3-Caloric restriction and moderate physical activity. 4-F/U in 6 months.       Electronically signed by Tressa Mcmillan MD on 5/27/2021 at 1:35 PM      [facility]  [phone]

## 2021-06-11 NOTE — PROGRESS NOTES
Patient was brought in today for a blood pressure check accompanied by her daughter. Patient is in a wheelchair. Patient has been taking all meds including blood pressure meds per daughter. Patient took blood pressure at home and wanted a recheck with a manual cuff. Patient reports occasional dizziness. Patient walks short distances at home to bathroom only. Patient reports she does not feel good and sometimes feels the way she did before she got her pacemaker which causes concern to her. Patient wanted PCP/cardio to be aware.      BP  98/54     Site  Left Upper Arm    Position Sitting    Cuff Size  Medium Adult    Pulse  71    Resp  14    Temp  97.6 F (36.4 C)    Temp src Tympanic    SpO2  94%

## 2021-07-13 NOTE — PROGRESS NOTES
Foot Care Worksheet  PCP: MAGALY Molina - CNP/C  Last visit: 5/18/21      Nail description:  Thick , Yellow , Crumbly , Marked limitation of ambulation     Pain resulting from thickened and dystrophy of nail plate No    Nails involved  Right   1, 2, 5  (T5-T9)  Left     1, 2, 5  (TA-T4)    Q7 1 Class A Finding - Non traumatic amputation of foot No    Q8 2 Class B Findings - Absent DP pulse No, Absent PT pulse No, Advanced tropic changes (3 required) Yes    Decrease hair growth Yes, Nail changes/thickening Yes, Pigmented changes/discoloration Yes, Skin texture (thin, shiny) Yes, Skin color (rubor/redness) No    Q9 1 Class B and 2 Class C Findings  Claudication No, Temperature change Yes, Paresthesia No, Burning No, Edema Yes

## 2021-07-13 NOTE — PROGRESS NOTES
Subjective:  Patient presents to [facility] today for new problem of R foot pain. Present for 3 weeks. Sore when active. No tx tried. Pt also presents for  diabetic foot care. Patient's diabetic control has been not changed. Allergies   Allergen Reactions    Atorvastatin Other (See Comments)    Statins [Statins] Other (See Comments)     Muscle aches, elevated LFT's    Penicillin G Rash       Past Medical History:   Diagnosis Date    Abnormal ANCA (antineutrophil cytoplasmic antibody) 12/03/2018    Basal cell cancer     nose    CKD (chronic kidney disease), stage III ([facility] 12/03/2018    Depression     Dry skin dermatitis 12/03/2015    Dyspnea     chronic    Edema     Chronic    Gout     Hard of hearing     Hiatal hernia     Hypercholesterolemia     Hypertension     Hypomagnesemia 12/03/2015    Incontinence     Lower extremity edema 12/03/2018    Macular degeneration, dry     Obstructive sleep apnea     Open-angle glaucoma     Borderline.  Peripheral edema 12/03/2015    Pulmonary hypertension ([facility] 12/03/2018    Sick sinus syndrome (HCC)     with pacemaker  placement    Type 2 diabetes mellitus ([facility]     Venous insufficiency     Vitamin D deficiency        Prior to Admission medications    Medication Sig Start Date End Date Taking? Authorizing Provider   methylPREDNISolone (MEDROL DOSEPACK) 4 MG tablet Take by mouth. 7/13/21  Yes Rip Mayen DPM   furosemide (LASIX) 40 MG tablet 40mg in the morning and 20mg in the afternoon.    Yes Historical Provider, MD   PARoxetine (PAXIL) 20 MG tablet Take 1 tablet by mouth daily 5/19/21  Yes MAGALY Urias CNP   colchicine (MITIGARE) 0.6 MG capsule Take 1 capsule by mouth 2 times daily REYNALDO 4/14/21  Yes MAGALY Urias CNP   Sodium Chloride, Hypertonic, (SODIUM CHLORIDE OP) Place 1 drop into both eyes as needed (dry eyes)    Yes Historical Provider, MD   metoprolol succinate (TOPROL XL) 100 MG extended

## 2021-07-20 NOTE — PROGRESS NOTES
07/20/21  Lanette YAN [facility]  10/27/1928      Chief Complaint:   1. 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 Chronic diastolic heart failure ([address]    3. Type 2 diabetes mellitus with diabetic chronic kidney disease, unspecified CKD stage, unspecified whether long term insulin use ([address]    4. Chronic kidney disease, stage IV (severe) ([address]    5. Vitamin D deficiency    6. Pure hypercholesterolemia    7. Pulmonary HTN ([address]    8. Sick sinus syndrome ([address]    9. Atrioventricular block, complete ([address]    10. S/P placement of cardiac pacemaker    11. Epigastric abdominal tenderness without rebound tenderness    12. Chronic pain syndrome    13. Anxiety and depression    14. Hiatal hernia    15. Lower extremity edema        HPI:  80year-old patient in with her daughter for 2-month follow-up. Blood pressure is stable in office denies any lightheadedness dizziness. No chest discomfort. Swelling to lower extremities significantly improved. Daughter agrees. As for her diabetes this has been diet controlled. Watches her diet limited activity due to age and arthritis. Continues on supplemental vitamin D for deficiency. Denies any unwarranted side effects. History of hypercholesteremia due to significant myalgias patient declines any statin drugs. Permanent pacemaker in place due to history of sick sinus syndrome and AV block. Patient does have follow-ups with cardiology and pacemaker checks. Mood has been stable on the Paxil. Of most concern is ongoing issues with epigastric tenderness. Daughter with her today states she went to see gastroenterology, Dr. Elmo Bolanos and he put her on Reglan. States is did not work at all for her so they stopped it. They do not have a follow-up currently with gastroenterology. They were offered of EGD which patient and her other daughter declined due to they thought she would be \"completely out for the surgery\".   Daughter here today states when she was in [place] with her she underwent an EGD without difficulty and they were able to stretch her esophagus which pain was significantly improved. Allergies   Allergen Reactions    Atorvastatin Other (See Comments)    Statins [Statins] Other (See Comments)     Muscle aches, elevated LFT's    Penicillin G Rash       Past Medical History:   Diagnosis Date    Abnormal ANCA (antineutrophil cytoplasmic antibody) 12/03/2018    Basal cell cancer     nose    CKD (chronic kidney disease), stage III ([address] 12/03/2018    Depression     Dry skin dermatitis 12/03/2015    Dyspnea     chronic    Edema     Chronic    Gout     Hard of hearing     Hiatal hernia     Hypercholesterolemia     Hypertension     Hypomagnesemia 12/03/2015    Incontinence     Lower extremity edema 12/03/2018    Macular degeneration, dry     Obstructive sleep apnea     Open-angle glaucoma     Borderline.  Peripheral edema 12/03/2015    Pulmonary hypertension ([address] 12/03/2018    Sick sinus syndrome (HCC)     with pacemaker  placement    Type 2 diabetes mellitus [facility]     Venous insufficiency     Vitamin D deficiency        Past Surgical History:   Procedure Laterality Date    CARDIAC CATHETERIZATION  2010    no blockage    CATARACT REMOVAL WITH IMPLANT Bilateral     cataract    EYE SURGERY  03/31/2017    Biopsy to right eye lid with surgery on 05/31/2017 for squamous cell     FRACTURE SURGERY Right     wrist    HYSTERECTOMY      PACEMAKER INSERTION      sick sinus syndrome says was changed to medtronic 11/19/2018    TUBAL LIGATION  1970       Current Outpatient Medications on File Prior to Visit   Medication Sig Dispense Refill    furosemide (LASIX) 40 MG tablet 40mg in the morning and 20mg in the afternoon.       PARoxetine (PAXIL) 20 MG tablet Take 1 tablet by mouth daily 14 tablet 0    colchicine (MITIGARE) 0.6 MG capsule Take 1 capsule by mouth 2 times daily REYNALDO 180 capsule 3    Sodium Chloride, Hypertonic, (SODIUM CHLORIDE OP) Place 1 drop into both eyes as needed (dry eyes)       metoprolol succinate (TOPROL XL) 100 MG extended release tablet TAKE 1 TABLET DAILY 90 tablet 3    lisinopril (PRINIVIL;ZESTRIL) 20 MG tablet TAKE 1 TABLET DAILY 90 tablet 3    timolol (TIMOPTIC) 0.5 % ophthalmic solution Place 1 drop into both eyes 2 times daily      Multiple Vitamins-Minerals (ICAPS AREDS 2) CHEW Take 1 tablet by mouth 2 times daily      magnesium oxide (MAG-OX) 400 MG tablet Take 1 tablet by mouth nightly 30 tablet 3    Cholecalciferol (VITAMIN D) 2000 UNITS CAPS capsule Take 1 capsule by mouth daily      Acetaminophen (TYLENOL PO) Take 500 mg by mouth every 8 hours as needed (pain) Rarely uses      aspirin 81 MG EC tablet Take 81 mg by mouth daily       methylPREDNISolone (MEDROL DOSEPACK) 4 MG tablet Take by mouth. (Patient not taking: Reported on 7/20/2021) 1 kit 0     No current facility-administered medications on file prior to visit. Social History     Socioeconomic History    Marital status:      Spouse name: Not on file    Number of children: Not on file    Years of education: Not on file    Highest education level: Not on file   Occupational History    Occupation: retired    Tobacco Use    Smoking status: Never Smoker    Smokeless tobacco: Never Used    Tobacco comment: Zaheer Beltrán RRT 11/28/18   Vaping Use    Vaping Use: Never used   Substance and Sexual Activity    Alcohol use: No     Alcohol/week: 0.0 standard drinks    Drug use: No    Sexual activity: Not Currently     Partners: Male   Other Topics Concern    Not on file   Social History Narrative    Not on file     Social Determinants of Health     Financial Resource Strain:     Difficulty of Paying Living Expenses:    Food Insecurity:     Worried About Running Out of Food in the Last Year:     [address] in the Last Year:    Transportation Needs:     Lack of Transportation (Medical):      Lack of General: She is not in acute distress. Appearance: Normal appearance. She is well-developed. She is not diaphoretic. HENT:      Head: Normocephalic and atraumatic. Right Ear: External ear normal.      Left Ear: External ear normal.   Eyes:      General:         Right eye: No discharge. Left eye: No discharge. Neck:      Trachea: No tracheal deviation. Cardiovascular:      Rate and Rhythm: Normal rate and regular rhythm. Heart sounds: Normal heart sounds. No murmur heard. No friction rub. No gallop. Pulmonary:      Effort: Pulmonary effort is normal. No respiratory distress. Breath sounds: Normal breath sounds. No stridor. No wheezing, rhonchi or rales. Chest:      Chest wall: No tenderness. Abdominal:      General: Bowel sounds are normal. There is no distension. Palpations: Abdomen is soft. There is no mass. Tenderness: There is abdominal tenderness. Hernia: No hernia is present. Comments: Obese   Musculoskeletal:         General: No swelling. Right lower leg: Edema present. Left lower leg: Edema (+1 pretibial edema, +2 pedal edema) present. Skin:     General: Skin is warm and dry. Capillary Refill: Capillary refill takes less than 2 seconds. Coloration: Skin is not jaundiced or pale. Findings: No rash. Neurological:      General: No focal deficit present. Mental Status: She is alert and oriented to person, place, and time. Cranial Nerves: No cranial nerve deficit. Sensory: No sensory deficit. Coordination: Coordination normal.   Psychiatric:         Mood and Affect: Mood normal.         Behavior: Behavior normal.         Thought Content: Thought content normal.         Judgment: Judgment normal.       Vitals:    07/20/21 1103   BP: 118/68   Site: Right Upper Arm   Position: Sitting   Cuff Size: Large Adult   Pulse: 60       Assessment:  1.  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 Chronic diastolic heart failure (HCC)  Stable, no signs of fluid overload at present    3. Type 2 diabetes mellitus with diabetic chronic kidney disease, unspecified CKD stage, unspecified whether long term insulin use (HCC)  Stable diet controlled    4. Chronic kidney disease, stage IV (severe) ([facility])  Serial lab follow-ups avoid nephrotoxic drugs, follows with nephrology    5. Vitamin D deficiency  Stable on supplemental vitamin D    6. Pure hypercholesterolemia  Declines statin drugs due to significant myalgias    7. Pulmonary HTN ([address]  8. Sick sinus syndrome ([address]  9. Atrioventricular block, complete ([address]  10. S/P placement of cardiac pacemaker  Stable, follows with cardiology    11. Epigastric abdominal tenderness without rebound tenderness  We will start her on Protonix daily, get her set back up with gastroenterology possible upper endoscopy at their discretion    12. Chronic pain syndrome  Stable at present    13. Anxiety and depression  Much improved on Paxil    14. Hiatal hernia  Get set back up with gastroenterology    15. Lower extremity edema  Stable on decreased dose of Lasix      Plan:  As noted above. Patient also recently seen Dr. Fely Quintanilla, podiatry for foot pain. Has a Medrol Dosepak here. Patient's daughter is inquiring about starting it. Wanted to make sure we were aware of it before she started the medication  Follow up for routine visit. Call sooner with concerns prior.     Electronically signed by MAGALY Dela Cruz CNP on 7/20/2021 at 12:32 PM

## 2021-08-02 NOTE — PROGRESS NOTES
Nephrology Progress Note    Lanette FARRAH Loomis Juan Ramonzamzam Soria Said, APRN - CNP      SUBJECTIVE      Chief Complaint   Patient presents with    Chronic Kidney Disease     stage 3b, four month check     8/2/2021:  Patient is here for follow-up Chronic kidney disease stage 3-4 with baseline creatinine seems to be running around 1.5-1.6 milligrams per DL. Etiology likely secondary to diabetic nephropathy. Patient lives by herself but has 7 kids who take turns and be with her everyday. Patient doing well. No new issues. No fever, no chills, no CP, no SOB, no N/V, +ve diarrhea, +ve abdomen pain, +ve poor appetite, no urinary complaints, some stable LE edema. She is currently on metoprolol  mg daily, lisinopril 20 mg daily, Lasix 40 mg BID, oral vitamin D 2000 units replacements and magnesium oxide 400 mg daily. Started on Paxil now. Last labs 7/16/2021: BUN/Cr 33/1.19, Na 139, K 4.2, Cl 100, Bicarb 30, Ca 9.1, Phos 3.0, PTH 45.02, Vit D 54.6, Hb 12.8, UPC 0.31. Blood pressure today 132/74, heart rate 66.    4/5/2021:  Patient is here for follow-up Chronic kidney disease stage 3-4 with baseline creatinine seems to be running around 1.5-1.6 milligrams per DL. Etiology likely secondary to diabetic nephropathy. Patient lives by herself but has 7 kids who take turns and be with her everyday. Patient doing well. No new issues. No fever, no chills, no CP, no SOB, no N/V/D, no abdomen pain, no urinary complaints, +ve stable LE edema- uses compression stocking vs ACE bandage. She is currently on metoprolol  mg daily, lisinopril 20 mg daily, Lasix 40 mg BID, oral vitamin D 2000 units replacements and magnesium oxide 400 mg daily. Started new on Cymbalta. Last labs 3/31/21: BUN/Cr 40/1.26, Na 142, K 4.0, Cl 101, Bicarb 30, Ca 9.6, Mg 2.1, Hb 13.2.  3/23/21: Phos 3.5, PTH 89.11, Vit D 51.1,  UPC <0.14.   Blood pressure today 116/80, heart rate 68.    10/5/2020:  Patient is here for follow-up Chronic kidney disease stage 4 with baseline creatinine seems to be running around 1.5-1.6 milligrams per DL. Etiology likely secondary to diabetic nephropathy. Patient lives by herself but has 7 kids who take turns and be with her everyday. Patient doing well. No new issues. No fever, no chills, no CP, no SOB, no N/V/D, no abdomen pain, no urinary complaints, stable chronic LE edema. Patient is currently on metoprolol  mg daily, lisinopril 20 mg daily, Lasix 40 mg BID, oral vitamin D 2000 units replacements and magnesium oxide 400 mg daily. Last labs 9/29/2020: BUN/Cr 57/1.67, Na 139, K 4.6, Cl 97, Bicarb 31, Ca 9.4, Phos 4.2, PTH 60.13, Vit D 53.1, Hb 13.1, UPC 0.22. Blood pressure today 124/78, heart rate 72.        6/8/2020:  Patient is here for follow-up Chronic kidney disease stage III with baseline creatinine running between 1.2-1.4 milligrams per DL. Etiology likely secondary to diabetic nephropathy. Patient lives by herself but has 7 kids who take turns and be with her everyday. Patient doing well. No new issues. No fever, no chills, no CP, +ve chronic SOB, no N/V/D, no abdomen pain, no urinary complaints, +ve LE edema. She follows up with her Cardiologist.   States that Bumex makes her feel \"weird\", she is not able explain the feeling. She is not taking her 2nd dose of the day regularly. Does have +ve edema b/l. She drinks about 18-20 oz/day. Patient is currently on metoprolol  mg daily, lisinopril 20 mg daily, Bumex 1 mg twice a day, oral vitamin D 2000 units replacements and magnesium oxide 400 mg daily. Last labs 5/22/2020: BUN/Cr 30/1.07, Na 140, K 4.2, Cl 96, Bicarb 33, Ca 9.0, Phos 3.4, .3, Vit D 57.1, Hb 13.4, UPC 0.20. Blood pressure today 138/88, heart rate 69.      12/2/19:  Patient is here for follow-up Chronic kidney disease stage III with baseline creatinine running between 1.2-1.4 milligrams per DL. Etiology likely secondary to diabetic nephropathy.   Patient doing well. No new issues. No CP, no SOB, no N/V/D, fawn difficulty swallowing will be getting EGD 12/13/19 and some abd pain with eating, no abdomen pain,  +ve urgency and different sensation (per patient), +ve incontinence, no other urinary complaints, trace LE edema. Patient is currently on metoprolol  mg daily, lisinopril 20 mg daily, Bumex 1 mg twice a day, oral vitamin D 2000 units replacements and magnesium oxide 400 mg daily. Last labs 11/21/19: BUN/Cr 25/1.12, Na 139, K 4.1, Cl 97, Bicarb 31, Ca 9.9, Phos 3.1, PTH 27.69, Vit D 58.3, Hb 14.2, UPC 0.16. Should today 122/84, heart rate 82.    6/3/19:  Patient is here for follow-up Chronic kidney disease stage III with baseline creatinine running between 1.2-1.4 milligrams per DL. Etiology likely secondary to diabetic nephropathy. Patient has gone as high as 1.62 MG/DL in the past.  Patient doing well. No new issues. No CP, no SOB, no N/V/D, no abdomen pain, no urinary complaints, some LE edema but much stable. Last labs 5/23/19: BUN/Cr 42/1.52, Na 142, K 4.5, Cl 97, Bicarb 32, Ca 9.3, Phos 3.4, , Vit D 68.1, Hb 14.8, UPC 0.16, Uric Acid 10.7. BP today 148/82, HR 72  She drinks about 4-5 cups of water day. 12/3/18:  Patient is here for follow-up Chronic kidney disease stage III with baseline creatinine running between 1.2-1.4 milligrams per DL. Etiology likely secondary to diabetic nephropathy. Patient doing okay. No new issues reported. Was just admitted to [facility] 12/28/18-12/1/18 due to fluid overload and CHF got diuretics and now much better and stable. No SOB now. She walked down to her clinic appointment from the parking lot without issues with a cane. She went into CHF as she stopped taking her Diuretics for 3 days, as she went for wedding and did not want to go to the bathroom multiple times.    Last labs 12/1/18: BUN/Cr 23/0.87, Na 143, K 3.7, Cl 99, Bicarb 31, Ca 8.7, Hb 11.8  BP today 140/68, HR 64    4/2/2018  Patient is here for follow-up of her chronic kidney disease stage III. She was supposed to be on Lasix 40 mg oral twice a day but looks like her edema was not getting any better and therefore Bumex was added. Currently she is taking Bumex 1 mg oral daily, edema is a little better but continues to have edema. She just came back from [place] a few weeks ago and last year the same thing happened when she was there about a year ago and was admitted with CHF exacerbation to the hospital.  She was not watching his salt or fluid intake very carefully. She is doing better with that now and edema has also improved a little  She is approaching her 89th birthday and is doing fairly well. Labs from March 2018 shows a blood area nitrogen of 29 and a creatinine of 1.4 with a sodium of 144 potassium of 4 bicarbonate of 31 calcium 9.2, vitamin D 67, , hemoglobin 13.9 and urine protein creatinine ratio was 0.3. Myeloperoxidase antibodies were repeated and it was 266-she does not have any signs or symptoms of vasculitis. She has refused kidney biopsy and rheumatology referral in the past.  She also complains of a dry cough that she's been noticing and happens only at nighttime, none during the day. His throat also feels scratchy and may be related to postnasal drip. She is also on lisinopril 20 mg oral daily for over 25 years-doubt could be related to lisinopril but if it continues then we may need to switch her to losartan and see that helps. 10/2/2017  Patient is here for follow-up of her chronic kidney disease stage III. She feels okay, energy levels are good, she does complain of some right knee pain but does not take any pain medication. She is inconsistent with her Lasix use and is only taking it once a day instead of twice a day. She has gained about 8 pounds of weight since last visit.   Looks like a blood pressure medications were discontinued through her PCP except for Lasix and lisinopril due to low blood pressure episode. Blood pressures are stable at this time. Labs from September 2017 shows a blood urea nitrogen of 32 and a creatinine of 0.9, potassium of 4.6, calcium ionized 1.2, PTH was 78, vitamin D was 68, hemoglobin was 12. 9.\  MPO antibodies still 193, PR3  was normal at 8 - she has no symptoms of malaise and tiredness , rash , sinusitis , cough , fever . She does not want to be referred to rheumatology       4.3.17  Patient is here for followup. She feels well, leg edema is down with Lasix 40 mg by mouth twice a day. She is not taking Aldactone currently. Pt's repeat ANCA testing is positive for MPO Level was 200 and PR3 was negative at 1. She does not have any microscopic hematuria, proteinuria is 0.2grams and creatinine is 1 which is her baseline . No signs and symptoms for vasculitis, no cutaneous lesions, sinusitis. Likely she has ANCA/MPO positivity sec to allopurinol use without any active renal vasculitis although could only be confirmed on kidney biopsy. This is discussed with patient and her daughter when her results showed up. No need for kidney biopsy and patient also does not want it. Most recent labs from 3/28/2017 shows a blood urea nitrogen of 21 and cr of 1.08, sodium 142, potassium of 4.8, hemoglobin of 12.9, calcium of 9.2, PTH of 70, vitamin D of 66, urine protein creatinine 0.2, urinalysis had no RBCs. 3/6/17  Pt is here for follow up. She just came back from [place] yesterday. She was sick over there with bronchitis, hypotension, edema. Her creatinine had gone up to 2.45 from a baseline of 1.4. She was treated with antibiotics, some of her medications were changed to lisinopril dose was decreased and Lasix dose was increased because of edema. her Aldactone was also stopped jeweling that time due to hyperkalemia. She was also seen by a nephrologist over there. They did some more workup and her  MPO/ANCA was found to be mildly positive.   Urine analysis was negative for any RBCs. She did not have any signs symptoms of vasculitis and her creatinine improved to 1.4. She was told to followup with me once she got back from [place]. Repeat labs from today shows a creatinine of 1.39, urinalysis had 0-4 RBCs. Potassium level was 4. 5. She has no rash no signs or symptoms of vasculitis. I have repeated her ANCA and anti-GBM and results of which are pending      12/8/2016  Patient is here for followup. She feels okay. In November she developed acute kidney injury and creatinine went up to 1.6 likely prerenal but that has now resolved and the last creatinine from 11/16/2016 was 1.26. She takes Lasix 40 alternating with 20 mg and Aldactone 12.5 mg by mouth daily. Per daughter her by mouth intake was poor and she was taking less than 30/40 ounces of water or liquids. Labs from 11/16/2016 shows a blood urea nitrogen of 26 and a creatinine of 1.26 and a calcium of 9.2 and a potassium of 4.4 and a bicarbonate of 28. Blood pressures are stable, her edema is well controlled. She has lost about 7 pounds of weight since last visit      9/15/2016  Patient here for followup. She feels okay. Labs labs from 9/2/16 shows creatinine of 1.19, blood urea nitrogen of 35, hemoglobin of 13, bicarbonate 29 and potassium 4.7, urine protein creatinine ratio was 0.4, UA had 0-4 RBCs  Blood pressures are stable. Leg swelling has increased and she has gained 7 pounds from last visit. She is currently taking Lasix 20 alternating with 40 mg every other day and Aldactone 12.5 by mouth daily    5/2/2016  Patient here for followup. She was last seen by me last month for her renal dysfunction. Chronic kidney disease workup with the paraproteinemia workup was all negative, k/l was 1.7 and serum immunofixation was negative. Creatinine from April 2016 was up at 1.48 and baseline normally runs around 1.2. Sodium was slightly elevated at 145, bun was 35.   She could have been a little dehydrated at that time and she had upper respiratory tract infection at that time. Urine analysis that was done in the past 5-10 RBCs and she tells me she had seen urology for cystoscopy and that was negative. Urine protein creatinine ratio was about 0.2. Ultrasound of the kidneys showed 9.3-9.4 cm kidneys. She was taken off losartan 100 mg on last visit that she was taking in conjunction with lisinopril 20 mg twice a day and was put on Aldactone 12.5 mg. When she was sick and blood pressure was slightly low in the 578 systolic but now it is in the 118P and 551U systolic. Her blood pressure seems to be a well-controlled and today it was 140/70. She complains of abdominal pain and tells me that Prilosec helps and was advised to take it on a daily basis. Her edema is controlled     Pt denies any hx of heavy or prolonged NSAID use and there is no history of OTC herbal medications . No history of dysuria or frequency. Pt denies any hx of recent UTI , incontinence or nocturia or recurrent nephrolithiasis. No unusual skin rashes . No tea coloured urine . No recent procedures involving IV contrast.     Patient Active Problem List    Diagnosis Date Noted    Pacemaker at end of battery life - Change out 11/19/18 (Dr. 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-related macular degeneration of both eyes 04/16/2019    Chronic diastolic congestive heart failure (HCC)     CKD (chronic kidney disease), stage III ([address] 12/03/2018    Lower extremity edema 12/03/2018    Abnormal ANCA (antineutrophil cytoplasmic antibody) 12/03/2018    Pulmonary hypertension ([address] 12/03/2018    Type 2 diabetes mellitus ([address]     Primary open angle glaucoma of both eyes, moderate stage 11/14/2016  Posterior vitreous detachment of both eyes 11/14/2016    Macular degeneration, bilateral 11/14/2016    Controlled type 2 DM with peripheral circulatory disorder ([address] 04/15/2016    Complete heart block (HCC) 04/11/2016    Peripheral edema 12/03/2015    Hypomagnesemia 12/03/2015    Dry skin dermatitis 12/03/2015    Dermatophytosis of nail 1-5R/L 01/26/2015    Gout     Pacemaker 10/24/2013     Overview Note:     DUAL CHAMBER      Hypercholesterolemia     Obstructive sleep apnea     Hard of hearing     Venous insufficiency     Dyspnea      Overview Note:     chronic      Sick sinus syndrome (HCC)      Overview Note:     with pacemaker  placement      Vitamin D deficiency     Malignant basal cell neoplasm of skin      Overview Note:     nose  replace inactive diagnosis      Hypertension     Hiatal hernia          CURRENT MEDICATIONS      Current Outpatient Medications   Medication Sig Dispense Refill    cholestyramine light 4 g packet TAKE 1 PACKET BY MOUTH TWICE DAILY WITH BREAKFAST AND SUPPER FOR 14 DAYS. TAKE ONE HOUR AFTER OTHER MEDICATIONS AND AT LEAST FOUR HOURS PRIOR.  pantoprazole (PROTONIX) 40 MG tablet Take 1 tablet by mouth daily 30 tablet 0    furosemide (LASIX) 40 MG tablet 40mg in the morning and 20mg in the afternoon.       PARoxetine (PAXIL) 20 MG tablet Take 1 tablet by mouth daily 14 tablet 0    colchicine (MITIGARE) 0.6 MG capsule Take 1 capsule by mouth 2 times daily REYNALDO 180 capsule 3    Sodium Chloride, Hypertonic, (SODIUM CHLORIDE OP) Place 1 drop into both eyes as needed (dry eyes)       metoprolol succinate (TOPROL XL) 100 MG extended release tablet TAKE 1 TABLET DAILY 90 tablet 3    lisinopril (PRINIVIL;ZESTRIL) 20 MG tablet TAKE 1 TABLET DAILY 90 tablet 3    timolol (TIMOPTIC) 0.5 % ophthalmic solution Place 1 drop into both eyes 2 times daily      Multiple Vitamins-Minerals (ICAPS AREDS 2) CHEW Take 1 tablet by mouth 2 times daily      magnesium oxide (MAG-OX) 400 MG tablet Take 1 tablet by mouth nightly 30 tablet 3    Cholecalciferol (VITAMIN D) 2000 UNITS CAPS capsule Take 1 capsule by mouth daily      Acetaminophen (TYLENOL PO) Take 500 mg by mouth every 8 hours as needed (pain) Rarely uses      aspirin 81 MG EC tablet Take 81 mg by mouth daily        No current facility-administered medications for this visit. REVIEW OF SYSTEMS     Constitutional: No fever, no chills, no lethargy, no weakness. HEENT:  No headache, otalgia, itchy eyes, nasal discharge or sore throat, some difficulty swallowing. Cardiac:  No chest pain, dyspnea, orthopnea or PND. Chest:   No cough, phlegm or wheezing or hemoptysis . Abdomen:  +ve abdominal pain, +ve diarrhea, no nausea or vomiting. +ve poor appetite. Neuro:  No focal weakness, abnormal movements or seizure like activity. Skin:   No rashes, no itching. :   No hematuria, no pyuria, no dysuria, no flank pain, +ve urgency and different sensation (per patient), +ve incontinence. Extremities:    + swelling, no joint pains. ROS was otherwise negative except as mentioned in the [address] OBJECTIVE      Vitals:    08/02/21 1603   BP: 132/74   Pulse: 66     Wt Readings from Last 2 Encounters:   08/02/21 180 lb (81.6 kg)   05/27/21 180 lb (81.6 kg)     Body mass index is 35.15 kg/m².      PHYSICAL EXAM      GENERAL APPEARANCE: awake, alert, in no acute distress  SKIN: warm and dry, no rash or erythema  EYES: conjunctivae normal and sclera anicteric  ENT: no thrush no pharyngeal congestion   NECK: supple  PULMONARY: clear to auscultation and no wheezing noted   CADRDIOVASCULAR: normal S1 & S2,  no murmur   ABDOMEN: soft nontender, bowel sounds, present, no organomegaly,  no ascites   EXTREMITIES: 1+ edema, mostly in ankle and feet area- stable  NEURO: alert and oriented, no deficits    LABS    CBC:   Lab Results   Component Value Date    WBC 6.6 07/16/2021    HGB 12.8 07/16/2021    HCT 40.5 07/16/2021    MCV 95.7 07/16/2021    RDW 15.0 07/16/2021     07/16/2021    MPV 10.6 07/16/2021      BMP:   Lab Results   Component Value Date     07/16/2021     07/16/2021    K 4.2 07/16/2021    K 4.2 07/16/2021     07/16/2021     07/16/2021    CO2 30 07/16/2021    CO2 30 07/16/2021    BUN 33 07/16/2021    BUN 33 07/16/2021    CREATININE 1.19 07/16/2021    CREATININE 1.19 07/16/2021    GLUCOSE 107 07/16/2021    GLUCOSE 107 07/16/2021    CALCIUM 9.1 07/16/2021    CALCIUM 9.1 07/16/2021      PHOSPHORUS:    Lab Results   Component Value Date    PHOS 3.0 07/16/2021     MAGNESIUM:   Lab Results   Component Value Date    MG 2.1 03/31/2021     ALBUMIN:   Lab Results   Component Value Date    LABALBU 3.9 03/31/2021     PTH: No components found for: PTHINTACT  VIT D3,25 HYDROXY: No results found for: VITD3     IRON:  No results found for: IRON  IRON SATURATION:  No results found for: LABIRON  TIBC:  No results found for: TIBC  FERRITIN:  No results found for: FERRITIN          TAMERA: No results found for: TAMERA    SPEP:   Lab Results   Component Value Date    PROT 6.6 03/31/2021    ALBCAL 4.0 04/13/2016    ALBPCT 55 04/13/2016    LABALPH 0.2 04/13/2016    LABALPH 0.9 04/13/2016    A1PCT 3 04/13/2016    A2PCT 12 04/13/2016    LABBETA 1.1 04/13/2016    BETAPCT 15 04/13/2016    GAMGLOB 1.1 04/13/2016    GGPCT 15 04/13/2016    PATH ELECTRONICALLY SIGNED. Leilani Kehr, M.D. 04/14/2016     UPEP:   Lab Results   Component Value Date    TPU 9 04/14/2016    TPU 9 04/14/2016      HEPBSAG:No results found for: HEPBSAG  HEPCAB:No results found for: HEPCAB  C3:   Lab Results   Component Value Date    C3 157 04/13/2016     C4:   Lab Results   Component Value Date    C4 36 04/13/2016     MPO ANCA:   Lab Results   Component Value Date     03/27/2018    .   PR3 ANCA:    Lab Results   Component Value Date    PR3 6 03/27/2018                      URINALYSIS/URINE CHEMISTRIES      URINE CREATININE:    Lab Results   Component Value Date    LABCREA 89.2 07/16/2021     URINE EOSINOPHILS : No results found for: UREO  URINE PROTEIN:    Lab Results   Component Value Date    TPU 9 04/14/2016    TPU 9 04/14/2016           RADIOLOGY    No results found for this or any previous visit. ASSESSMENT     1. Chronic kidney disease stage III with baseline creatinine now seems to be running around 1.5-1.6 milligrams per DL. Most recent creatinine was 1.19 mg/dl, July 2021. Etiology likely secondary to diabetic nephropathy. CKD workup was negative and urine protein creatinine ratio  is 0.3 , no significant microscopic hematuria. UA had 0-4 RBCs   Previously urinalysis had microscopic hematuria and she has a history of cystoscopy which was negative in the past. Most recent UA had No diabetes  Ultrasound of the kidneys showed 9.3 and 9.4 cm kidneys   2. Hypertension well-controlled  BP Readings from Last 3 Encounters:   08/02/21 132/74   07/20/21 118/68   07/13/21 98/70    :  3. Type 2 Diabetes mellitus    4. Diastolic CHF  5. Aortic valve sclerosis  6. Pulmonary hypertension with right ventricular systolic pressure being 53  7. Lower extremity edema Worse  8. Secondary hyperparathyroidism PTH was 101 and vitamin D was 67  9. ANCA/MPO positivity without any signs or symptoms active renal vasculitis, patient does not want further workup and does not want to be referred to rheumatology. Will continue to monitor. 10.  Dry cough at nighttime could be related to postnasal drip. 11.  Diarrhea - Follows up with GI and will likely be needing to get the EGD and Colonoscopy. PLAN     1. Based on available labs patients renal function is stable. Patient's volume status is acceptable. Importance of tight blood pressure, glycemic control, lipid control was outlined to patient . 2. Cont Lasix 40 mg BID and metoprolol  mg daily, lisinopril 20 mg daily.     3. Avoid canned foods including soups, salt restriction <2gm/day and fluid restriction no more than 50-60 ounces a day. 4. Keep water intake at around 30 oz/day. 5. Patient counseled about taking her meds regularly and not stopping abruptly without medical advise. 6. Agree with GI follow up. 7. Follow up labs ordered : bmp, cbc, phos, intact pth, vitaminD 25 [place]           8. Urine for random protein and creat to be done. 9. Follow up in the office in 5 months    Patient was asked to avoid NSAIDS. Please do not hesitate to call with questions. Electronically signed by Shukri Garcia MD on 8/2/2021 at 4:25 PM  [facility]. This note is created with the assistance of a speech-recognition program. While intending to generate a document that actually reflects the content of the visit, no guarantees can be provided that every mistake has been identified and corrected by editing.

## 2021-09-07 NOTE — LETTER
[address] Internal [facility] A department of [facility], [place], operated by [facility]  Phone: [phone]  Fax: [phone]    MAGALY Zaragoza CNP        September 14, 2021    [address]  [address] [facility] [address] [place] [address]       We have been unable to reach you by telephone, so we are sending this notice regarding recent test results received by our office. It is recommended that Lanette follow up with Dr. Blossom Porras, Gastroenterologist regarding her recent testing. It was confirmed with Dr. Adelina Monsalve office that a follow up appointment has not been scheduled. Please call their office at your earliest opportunity at ph. [phone].       Sincerely,        MAGALY Zaragoza CNP

## 2021-09-13 NOTE — TELEPHONE ENCOUNTER
Left 3rd message for patient's daughter, Joe Cee. Spoke with Dr. Jacobsen Ranks office and patient doesn't have a follow up appt currently scheduled.

## 2021-09-27 NOTE — TELEPHONE ENCOUNTER
Patient caregiver called in requesting a refill on furosemide and [facility]      Medication pended if agreeable    Last Appt:  7/20/2021  Next Appt:   10/21/2021  Med verified in Epic

## 2021-09-29 NOTE — PROGRESS NOTES
Foot Care Worksheet  PCP: MAGALY Mcclendon - CNP/C  Last visit: 07 / 20 / 2021      Nail description:  Thick , Yellow , Crumbly , Marked limitation of ambulation     Pain resulting from thickened and dystrophy of nail plate No    Nails involved  Right   1, 2, 5  (T5-T9)  Left     1, 2, 5  (TA-T4)    Q7 1 Class A Finding - Non traumatic amputation of foot No    Q8 2 Class B Findings - Absent DP pulse No, Absent PT pulse No, Advanced tropic changes (3 required) Yes    Decrease hair growth Yes, Nail changes/thickening Yes, Pigmented changes/discoloration Yes, Skin texture (thin, shiny) Yes, Skin color (rubor/redness) No    Q9 1 Class B and 2 Class C Findings  Claudication No, Temperature change Yes, Paresthesia No, Burning No, Edema Yes

## 2021-09-29 NOTE — PROGRESS NOTES
Subjective:  Patient presents to [facility] today for routine diabetic foot care. Patient denies any new problems with their feet. Patient's diabetic control has been not changed. Allergies   Allergen Reactions    Atorvastatin Other (See Comments)    Statins [Statins] Other (See Comments)     Muscle aches, elevated LFT's    Penicillin G Rash       Past Medical History:   Diagnosis Date    Abnormal ANCA (antineutrophil cytoplasmic antibody) 12/03/2018    Basal cell cancer     nose    CKD (chronic kidney disease), stage III ([facility] 12/03/2018    Depression     Dry skin dermatitis 12/03/2015    Dyspnea     chronic    Edema     Chronic    Gout     Hard of hearing     Hiatal hernia     Hypercholesterolemia     Hypertension     Hypomagnesemia 12/03/2015    Incontinence     Lower extremity edema 12/03/2018    Macular degeneration, dry     Obstructive sleep apnea     Open-angle glaucoma     Borderline.  Peripheral edema 12/03/2015    Pulmonary hypertension ([facility] 12/03/2018    Sick sinus syndrome (HCC)     with pacemaker  placement    Type 2 diabetes mellitus ([facility]     Venous insufficiency     Vitamin D deficiency        Prior to Admission medications    Medication Sig Start Date End Date Taking? Authorizing Provider   cholestyramine light (PREVALITE) 4 GM/DOSE powder Take 1 packet by mouth 9/29/21 10/29/21 Yes Historical Provider, MD   Peppermint Oil (IBGARD PO) Take by mouth   Yes Historical Provider, MD   metoprolol succinate (TOPROL XL) 100 MG extended release tablet TAKE 1 TABLET DAILY 9/27/21  Yes MAGALY Bernal CNP   furosemide (LASIX) 40 MG tablet 40mg in the morning and 20mg in the afternoon.  9/27/21  Yes MAGALY Bernal CNP   Handicap Placard MISC by Does not apply route Expires: 8/23/2023 8/23/21  Yes MAGALY Bernal CNP   lisinopril (PRINIVIL;ZESTRIL) 20 MG tablet 1 po daily 8/6/21  Yes Jennifer Heredia MD   PARoxetine (PAXIL) 20 MG tablet Take 1 tablet by mouth daily 8/6/21  Yes Elieser Dee MD   pantoprazole (PROTONIX) 40 MG tablet Take 1 tablet by mouth daily 7/20/21  Yes MAGALY Partida CNP   colchicine (MITIGARE) 0.6 MG capsule Take 1 capsule by mouth 2 times daily REYNALDO 4/14/21  Yes MAGALY Partida CNP   Sodium Chloride, Hypertonic, (SODIUM CHLORIDE OP) Place 1 drop into both eyes as needed (dry eyes)    Yes Historical Provider, MD   timolol (TIMOPTIC) 0.5 % ophthalmic solution Place 1 drop into both eyes 2 times daily   Yes Historical Provider, MD   Multiple Vitamins-Minerals (ICAPS AREDS 2) CHEW Take 1 tablet by mouth 2 times daily   Yes Historical Provider, MD   magnesium oxide (MAG-OX) 400 MG tablet Take 1 tablet by mouth nightly 12/21/15  Yes Lavonne Potts DO   Cholecalciferol (VITAMIN D) 2000 UNITS CAPS capsule Take 1 capsule by mouth daily   Yes Historical Provider, MD   Acetaminophen (TYLENOL PO) Take 500 mg by mouth every 8 hours as needed (pain) Rarely uses   Yes Historical Provider, MD   aspirin 81 MG EC tablet Take 81 mg by mouth daily    Yes Historical Provider, MD       Social History     Tobacco Use    Smoking status: Never Smoker    Smokeless tobacco: Never Used    Tobacco comment: LYLA Hernandez RRT 11/28/18   Substance Use Topics    Alcohol use: No     Alcohol/week: 0.0 standard drinks     Review of Systems: All 12 systems reviewed and pertinent positives noted above. Objective:  Vascular: DP and PT pulses palpable 2/4, bilateral.  CFT <3 seconds, bilateral.  Hair growth present to the level of the digits, bilateral.  Edema present, bilateral.  Varicosities present, bilateral. Erythema absent, bilateral. Distal Rubor absent bilateral.  Temperature within normal limits bilateral. Hyperpigmentation present bilateral. Atrophic skin yes.     Neurological: Sensation intact to light touch to level of digits, bilateral.  Protective sensation intact 10/10 sites via 5.07/10g Satsuma-Shlomo Monofilament, bilateral.  negative Tinel's, bilateral.  negative Valleix sign, bilateral.  Vibratory intact bilateral.  Reflexes Decreased bilateral.  Paresthesias negative. Dysthesias negative. Sharp/dull intact bilateral.    Integument:  Open lesion absent, Bilateral.  Interdigital maceration absent to web spaces,absent Bilateral.  Nails left 1, 2, 5 and right 1, 2, 5 thickened, dystrophic and crumbly, discolored with subungual debris. Fissures absent, Bilateral. Hyperkeratotic tissue is absent. Assessment:    Diagnosis Orders   1. Controlled type 2 DM with peripheral circulatory disorder ([facility]     2. Dermatophytosis of nail 1-5R/L         Plan:  Diabetic foot education and exam.  All Nails as mentioned above debrided in length and thickness. Patient advised about OTC treatments for nails and callous. Patient will follow up in 10 weeks for routine foot care or PRN if any further problems arise.

## 2021-10-21 PROBLEM — F33.0 MAJOR DEPRESSIVE DISORDER, RECURRENT, MILD (HCC): Status: ACTIVE | Noted: 2021-01-01

## 2021-10-21 PROBLEM — F33.9 MAJOR DEPRESSIVE DISORDER, RECURRENT, UNSPECIFIED (HCC): Status: ACTIVE | Noted: 2021-01-01

## 2021-10-21 PROBLEM — F33.1 MAJOR DEPRESSIVE DISORDER, RECURRENT, MODERATE (HCC): Status: ACTIVE | Noted: 2021-01-01

## 2021-10-21 NOTE — PROGRESS NOTES
Have you had an allergic reaction to the flu (influenza) shot? no  Are you allergic to eggs or any component of the flu vaccine? no  Do you have a history of Guillain-[place] Syndrome (GBS), which is paralysis after receiving the flu vaccine? no  Are you feeling well today? Yes - okay to give per Nette Mcdaniels  Flu vaccine given as ordered. Patient tolerated it well. No questions re: VIS information.

## 2021-10-21 NOTE — PROGRESS NOTES
10/21/21  Lanette Cardona  10/27/1928      Chief Complaint:   1. 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 Chronic diastolic heart failure ([address]    3. Type 2 diabetes mellitus with diabetic chronic kidney disease, unspecified CKD stage, unspecified whether long term insulin use ([address]    4. Chronic renal disease, stage IV ([address]    5. Type II diabetes mellitus with peripheral circulatory disorder (HCC)    6. Pure hypercholesterolemia    7. Vitamin D deficiency    8. Hypomagnesemia    9. Pulmonary HTN ([address]    10. Atrioventricular block, complete ([address]    11. Sick sinus syndrome (HCC)    12. Epigastric abdominal tenderness without rebound tenderness    13. Major depressive disorder, recurrent, mild    14. Major depressive disorder, recurrent, moderate    15. Mild episode of recurrent major depressive disorder ([address]    16. Essential hypertension    17. Generalized anxiety disorder    18. LYNNE (dyspnea on exertion)    19. Left wrist pain    20. Skin lesion        HPI:  20-year-old patient in for 3-month follow-up of above chronic health conditions. Patient with multiple concerns. First has had intermittent chest discomfort since earlier this week. States comes and goes. Denies any at present. No chest heaviness. Is set up for pacemaker check tomorrow. States they did call cardiology and she has not a cardiology appointment on Thursday. Nothing seems to make the pain come on. Daughter questioning if could be related to anxiety. She has also had some worsening shortness of breath. That is worse with movement. Daughter states it takes her some time to get places and then she has to rest intermittently. Denies any shortness of breath at rest.  She has been having ongoing issues with food getting stuck. Follows with Dr. Efren George. Is noted that she has a history of a hernia.   Also swallow study showed slight 2010    no blockage    CATARACT REMOVAL WITH IMPLANT Bilateral     cataract    EYE SURGERY  03/31/2017    Biopsy to right eye lid with surgery on 05/31/2017 for squamous cell     FRACTURE SURGERY Right     wrist    HYSTERECTOMY      PACEMAKER INSERTION      sick sinus syndrome says was changed to [employer] 11/19/2018   [address]       Current Outpatient Medications on File Prior to Visit   Medication Sig Dispense Refill    cholestyramine light (PREVALITE) 4 GM/DOSE powder Take 1 packet by mouth      Peppermint Oil (IBGARD PO) Take by mouth      metoprolol succinate (TOPROL XL) 100 MG extended release tablet TAKE 1 TABLET DAILY 90 tablet 3    Handicap Placard MISC by Does not apply route Expires: 8/23/2023 1 each 0    colchicine (MITIGARE) 0.6 MG capsule Take 1 capsule by mouth 2 times daily REYNALDO 180 capsule 3    Sodium Chloride, Hypertonic, (SODIUM CHLORIDE OP) Place 1 drop into both eyes as needed (dry eyes)       timolol (TIMOPTIC) 0.5 % ophthalmic solution Place 1 drop into both eyes 2 times daily      Multiple Vitamins-Minerals (ICAPS AREDS 2) CHEW Take 1 tablet by mouth 2 times daily      magnesium oxide (MAG-OX) 400 MG tablet Take 1 tablet by mouth nightly 30 tablet 3    Cholecalciferol (VITAMIN D) 2000 UNITS CAPS capsule Take 1 capsule by mouth daily      Acetaminophen (TYLENOL PO) Take 500 mg by mouth every 8 hours as needed (pain) Rarely uses      aspirin 81 MG EC tablet Take 81 mg by mouth daily       pantoprazole (PROTONIX) 40 MG tablet Take 1 tablet by mouth daily (Patient not taking: Reported on 10/21/2021) 30 tablet 0     No current facility-administered medications on file prior to visit. Social History     Socioeconomic History    Marital status:       Spouse name: Not on file    Number of children: Not on file    Years of education: Not on file    Highest education level: Not on file   Occupational History    Occupation: retired  Tobacco Use    Smoking status: Never Smoker    Smokeless tobacco: Never Used    Tobacco comment: LYLA Manriquez RRT 11/28/18   Vaping Use    Vaping Use: Never used   Substance and Sexual Activity    Alcohol use: No     Alcohol/week: 0.0 standard drinks    Drug use: No    Sexual activity: Not Currently     Partners: Male   Other Topics Concern    Not on file   Social History Narrative    Not on file     Social Determinants of Health     Financial Resource Strain:     Difficulty of Paying Living Expenses:    Food Insecurity:     Worried About Running Out of Food in the Last Year:     Ran Out of Food in the Last Year:    Transportation Needs:     Lack of Transportation (Medical):  Lack of Transportation (Non-Medical):    Physical Activity:     Days of Exercise per Week:     Minutes of Exercise per Session:    Stress:     Feeling of Stress :    Social Connections:     Frequency of Communication with Friends and Family:     Frequency of Social Gatherings with Friends and Family:     Attends [employer] Services:     Active Member of Clubs or Organizations:     Attends Club or Organization Meetings:     Marital Status:    Intimate Partner Violence:     Fear of Current or Ex-Partner:     Emotionally Abused:     Physically Abused:     Sexually Abused:        Review of Systems   Constitutional: Positive for activity change and fatigue. Negative for appetite change, chills, fever and unexpected weight change. HENT: Negative for congestion, dental problem, ear discharge, ear pain, facial swelling, hearing loss, postnasal drip, rhinorrhea, sinus pressure, sore throat and trouble swallowing. Eyes: Negative for pain and visual disturbance. Respiratory: Positive for shortness of breath. Negative for cough, chest tightness (Intermittent episodes) and wheezing. Cardiovascular: Positive for leg swelling (Leg swelling improved however continues with pedal edema).  Negative for chest pain and leg: Edema present. Left lower leg: Edema (+1 bilateral lower extremity edema. +3 pedal edema bilateral) present. Skin:     General: Skin is warm and dry. Capillary Refill: Capillary refill takes less than 2 seconds. Coloration: Skin is not jaundiced or pale. Findings: No rash. Neurological:      General: No focal deficit present. Mental Status: She is alert and oriented to person, place, and time. Cranial Nerves: No cranial nerve deficit. Sensory: No sensory deficit. Motor: Weakness present. Coordination: Coordination normal.      Gait: Gait abnormal.      Comments: Significantly hard of hearing   Psychiatric:         Mood and Affect: Mood normal.         Behavior: Behavior normal.         Thought Content: Thought content normal.         Judgment: Judgment normal.       Vitals:    10/21/21 1436   BP: (!) 135/90   Site: Right Upper Arm   Position: Sitting   Cuff Size: Medium Adult   Pulse: 60   Resp: 16   Weight: 165 lb 3.2 oz (74.9 kg)   Height: 5' (1.524 m)       Assessment:  1. Hypertensive heart disease with congestive heart failure and chronic kidney disease, unspecified CKD stage, unspecified heart failure type ([facility]  Continue on current antihypertensive regimen of Lasix lisinopril. Continue the same    2. Chronic diastolic heart failure (HCC)  Patient with faint Rales ongoing swelling to lower extremity. Chest x-ray today. Probable short-term increase of Lasix    3. Type 2 diabetes mellitus with diabetic chronic kidney disease, unspecified CKD stage, unspecified whether long term insulin use ([facility]  Work on diet, remain active, diet controlled. Serial lab follow-ups ordered    4. Chronic renal disease, stage IV (HCC)  Continues to follow with Dr. Dina Salas    5. Type II diabetes mellitus with peripheral circulatory disorder (HCC)  As noted above    6. Pure hypercholesterolemia  Declined statin drug    7.  Vitamin D deficiency  Stable on supplemental vitamin D    8. Hypomagnesemia  Stable on supplemental magnesium    9. Pulmonary HTN ([facility]  10. Atrioventricular block, complete ([facility] [facility]  11. Sick sinus syndrome [facility])  Pacemaker check tomorrow. Cardiac appointment on Thursday. If develops persistent chest discomfort straight to the ER    12. Epigastric abdominal tenderness without rebound tenderness  Following with Dr. Boni Barrera, set up for an EGD on the 10.     13. Major depressive disorder, recurrent, mild  Changed back to Zoloft    14. Major depressive disorder, recurrent, moderate  As noted above    15. Mild episode of recurrent major depressive disorder ([facility]  As noted above    16. Essential hypertension  As noted in #1  - lisinopril (PRINIVIL;ZESTRIL) 20 MG tablet; 1 po daily  Dispense: 90 tablet; Refill: 3    17. Generalized anxiety disorder  Changed back to Zoloft  - sertraline (ZOLOFT) 100 MG tablet; Take 1 tablet by mouth daily  Dispense: 90 tablet; Refill: 1    18. LYNNE (dyspnea on exertion)  - XR CHEST STANDARD (2 VW); Future    19. Left wrist pain  - XR WRIST LEFT (MIN 3 VIEWS); Future    20. Skin lesion  Multiple flesh tone/brown lesions to bilateral forearms most concerning dark irregular border lesion to right hand dorsal  - EFRA - Nilay Zuleta MD, Dermatology, [place]      Plan:  As noted above. Follow up for routine visit. Call sooner with concerns prior.     Electronically signed by MAGALY Anthony CNP on 10/21/2021 at 4:14 PM Family

## 2021-10-22 NOTE — TELEPHONE ENCOUNTER
Patients daughter stopped in and per previous telephone encounter patient needs to increase lasix for next two -three day and patient has a [facility] but they are out and need today.      They are requesting a short supply to [facility]     14 days pended if agreeable    Last Appt:  10/21/2021  Next Appt:   1/24/2022  Med verified in [address] [place] [address] Rd

## 2021-10-28 NOTE — PROGRESS NOTES
Today's Date: 10/28/2021  Patient Name: Wm Boyle  Patient's age: 80 y. o., 10/27/1928       HPI:   The patient is a 80 y.o.  female is in the office for f/u and preop risk stratification. She is scheduled to have EGD with esophageal dilatation for achalasia. Since last few months she has been complaining of substernal burning and pressure likely related to hiatal hernia/GERD. The symptoms are mainly related to eating. No radiation of the pain. Lasts few minutes. It is associated with shortness of breath. Overall patient is wheelchair-bound and only walks short distances. Since last month she also reports progressively increasing dyspnea and decline in functional abilities. No orthopnea. Lower extremity and foot edema has been increasing. She has been taking 40 mg of Lasix 3 times daily now with no adequate diuresis. Also had a chest x-ray last week which showed bilateral interstitial infiltrates and atelectasis. Past Medical History:   has a past medical history of Abnormal ANCA (antineutrophil cytoplasmic antibody), Basal cell cancer, CKD (chronic kidney disease), stage III ([address] Depression, Dry skin dermatitis, Dyspnea, Edema, Gout, Hard of hearing, Hiatal hernia, Hypercholesterolemia, Hypertension, Hypomagnesemia, Incontinence, Lower extremity edema, Macular degeneration, dry, Obstructive sleep apnea, Open-angle glaucoma, Peripheral edema, Pulmonary hypertension ([address] Sick sinus syndrome (HCC), Type 2 diabetes mellitus ([address] Venous insufficiency, and Vitamin D deficiency. Past Surgical History:   has a past surgical history that includes Pacemaker insertion; Hysterectomy; fracture surgery (Right); Cardiac catheterization (2010); Tubal ligation (1970); Cataract removal with implant (Bilateral); and eye surgery (03/31/2017). Home Medications:    Prior to Admission medications    Medication Sig Start Date End Date Taking?  Authorizing Provider   furosemide (LASIX) 40 MG tablet Take 40 mg by mouth 2 times daily   Yes Historical Provider, MD   lisinopril (PRINIVIL;ZESTRIL) 20 MG tablet 1 po daily 10/21/21  Yes MAGALY Álvarez CNP   sertraline (ZOLOFT) 100 MG tablet Take 1 tablet by mouth daily 10/21/21  Yes MAGALY Álvarez CNP   cholestyramine light (PREVALITE) 4 GM/DOSE powder Take 1 packet by mouth 9/29/21 10/29/21 Yes Historical Provider, MD   Peppermint Oil (IBGARD PO) Take by mouth   Yes Historical Provider, MD   metoprolol succinate (TOPROL XL) 100 MG extended release tablet TAKE 1 TABLET DAILY 9/27/21  Yes MAGALY Álvarez CNP   colchicine (MITIGARE) 0.6 MG capsule Take 1 capsule by mouth 2 times daily REYNALDO 4/14/21  Yes MAGALY Álvarez CNP   Sodium Chloride, Hypertonic, (SODIUM CHLORIDE OP) Place 1 drop into both eyes as needed (dry eyes)    Yes Historical Provider, MD   timolol (TIMOPTIC) 0.5 % ophthalmic solution Place 1 drop into both eyes 2 times daily   Yes Historical Provider, MD   Multiple Vitamins-Minerals (ICAPS AREDS 2) CHEW Take 1 tablet by mouth 2 times daily   Yes Historical Provider, MD   magnesium oxide (MAG-OX) 400 MG tablet Take 1 tablet by mouth nightly 12/21/15  Yes Lavon Dash,    Cholecalciferol (VITAMIN D) 2000 UNITS CAPS capsule Take 1 capsule by mouth daily   Yes Historical Provider, MD   Acetaminophen (TYLENOL PO) Take 500 mg by mouth every 8 hours as needed (pain) Rarely uses   Yes Historical Provider, MD   aspirin 81 MG EC tablet Take 81 mg by mouth daily    Yes Historical Provider, MD   Handicap Placard [address] [facility] by Does not apply route Expires: 8/23/2023 8/23/21   MAGALY Álvarez CNP       Allergies:  Atorvastatin, Statins [statins], and Penicillin g    Social History:   reports that she has never smoked. She has never used smokeless tobacco. She reports that she does not drink alcohol and does not use drugs.      REVIEW OF SYSTEMS:  CONSTITUTIONAL:NEGATIVE  HEENT:NEG  Cardiovascular: No chest pain, Yes dyspnea on exertion, No palpitations. Lower extremity edema: Yes  RESPIRATORY: LYNNE  GASTROINTESTINAL:  negative  GENITOURINARY:  negative  INTEGUMENT:  negative  MUSCULOSKELETAL:  positive for  pain  NEUROLOGICAL:  negative    PHYSICAL EXAM:      /88 (Site: Right Upper Arm, Position: Sitting, Cuff Size: Medium Adult)   Pulse 76   Ht 5' (1.524 m)   Wt 165 lb (74.8 kg)   LMP  (LMP Unknown)   BMI 32.22 kg/m²    HEENT: PERRL, no cervical lymphadenopathy. No masses palpable. Cardiovascular:  · The apical impulse is not displaced  · Heart  Sounds:RRR, NO S3  · Jugular venous pulsation Normal  · The carotid upstroke is NL  · Peripheral pulses are symmetrical and full  Respiratory: Good respiratory effort. On auscultation: reduced air entry bilaterally at bases with insp rales. Abdomen:  · No masses or tenderness  · Bowel sounds present  Extremities:  ·  No Cyanosis or Clubbing  ·  Lower extremity edema: 1+ bilateral ankle edema. Skin: Warm and dry    Cardiac data:      ECG 10/26/21`: AV paced rhythm     Echo 11/18:  Technically difficult study. Left ventricle is normal in size Global left ventricular systolic function  is normal Estimated ejection fraction is 55 % . Mild to moderate left ventricular hypertrophy. Grade II (moderate) left ventricular diastolic dysfunction. Bi-atrial enlargement. Right ventricular dilatation with reduced systolic function. Aortic valve sclerosis without stenosis. Mitral annular calcification is seen. Trivial mitral regurgitation. Mild tricuspid regurgitation. Estimated right ventricular systolic pressure is 59 mmHg. Moderate to severe pulmonary hypertension. IVC Increased diameter and impaired or no inspiratory variation indicating  elevated RA filling pressure (i.e. CVP) .     Labs:     FASTING LIPID PANEL:  Lab Results   Component Value Date    HDL 41 03/31/2021    TRIG 142 03/31/2021 IMPRESSION:      SSS s/p PPM, Generator Change Out 11/19/2018 (pacer dependent)  Acute on Chronic HFpEF  Hypertension   Mild to moderate TR  HTN: borderline controlled. CKD: being followed by nephrology. DM 2  PH: RVSP 53 mm Hg. RECOMMENDATIONS:    1-D/c lasix and start Bumex 1 mg BID. Check BMP in one week  2-Repeat Echocardiogram to reassess LV function   3-The pacemaker interrogation from 10/22/2021 reviewed, appropriately functioning device with no arrhythmias. Battery longevity of 8 years. Continue routine interrogation every 6 months. 4-Further risk stratification based on echo results. The patient and her daughters would only like conservative management at this time.  They opted not to proceed with any ischemia work up including a stress test.   5-Repeat evaluation in one week       Electronically signed by Yulia Best MD on 10/28/2021 at [place] [address] [facility]  [phone]

## 2021-10-29 NOTE — TELEPHONE ENCOUNTER
Needs new bumex. Her bumex at home was filled May 2020. She will use the old to start first dose this morning. Please send rx to [facility].

## 2021-10-29 NOTE — TELEPHONE ENCOUNTER
Pt was in office yesterday and her daughter is asking Luis Manuel Lynne give her a call.     [phone]

## 2021-11-03 NOTE — TELEPHONE ENCOUNTER
Received call from daughter Jean Acosta [phone])- you recommended a cream at her last visit (10/21/21). She thought she had some at home, but does not. Please send a script to [facility]. ZULLY says Lotrisone cream bid.   RX pended

## 2021-11-04 NOTE — PROGRESS NOTES
Today's Date: 11/4/2021  Patient Name: Urban Malone  Patient's age: 80 y. o., 10/27/1928       HPI:   The patient is a 80 y.o.  female is in the office for 1 week follow-up. Last visit she was noted to be in acute on chronic HFpEF. Lasix was switched to Bumex. She has responded well and her edema has significantly improved. She reports that her dyspnea has improved as well. No shortness of breath at rest.  Mostly she is wheelchair-bound. No chest pain. Repeat BMP shows stable creatinine of 1.2 with normal electrolytes. She is scheduled to have EGD next week with esophageal dilatation. Past Medical History:   has a past medical history of Abnormal ANCA (antineutrophil cytoplasmic antibody), Basal cell cancer, CKD (chronic kidney disease), stage III ([address] Depression, Dry skin dermatitis, Dyspnea, Edema, Gout, Hard of hearing, Hiatal hernia, Hypercholesterolemia, Hypertension, Hypomagnesemia, Incontinence, Lower extremity edema, Macular degeneration, dry, Obstructive sleep apnea, Open-angle glaucoma, Peripheral edema, Pulmonary hypertension ([address] Sick sinus syndrome (HCC), Type 2 diabetes mellitus ([address] Venous insufficiency, and Vitamin D deficiency. Past Surgical History:   has a past surgical history that includes Pacemaker insertion; Hysterectomy; fracture surgery (Right); Cardiac catheterization (2010); Tubal ligation (1970); Cataract removal with implant (Bilateral); and eye surgery (03/31/2017). Home Medications:    Prior to Admission medications    Medication Sig Start Date End Date Taking? Authorizing Provider   cholestyramine (QUESTRAN) 4 g packet Take 1 packet by mouth daily Extra dose prn diarrhea   Yes Historical Provider, MD   clotrimazole-betamethasone (LOTRISONE) 1-0.05 % cream Apply topically 2 times daily.  11/3/21  Yes MAGALY Gomez - CNP   bumetanide (BUMEX) 1 MG tablet Take 1 tablet by mouth 2 times daily 10/29/21  Yes Tyrel Dhaliwal MD   lisinopril (PRINIVIL;ZESTRIL) 20 MG tablet 1 po daily 10/21/21  Yes MAGALY Judd CNP   sertraline (ZOLOFT) 100 MG tablet Take 1 tablet by mouth daily 10/21/21  Yes MAGALY Judd CNP   Peppermint Oil (IBGARD PO) Take by mouth   Yes Historical Provider, MD   metoprolol succinate (TOPROL XL) 100 MG extended release tablet TAKE 1 TABLET DAILY 9/27/21  Yes MAGALY Judd CNP   colchicine (MITIGARE) 0.6 MG capsule Take 1 capsule by mouth 2 times daily REYNALDO 4/14/21  Yes MAGALY Judd CNP   Sodium Chloride, Hypertonic, (SODIUM CHLORIDE OP) Place 1 drop into both eyes as needed (dry eyes)    Yes Historical Provider, MD   timolol (TIMOPTIC) 0.5 % ophthalmic solution Place 1 drop into both eyes 2 times daily   Yes Historical Provider, MD   Multiple Vitamins-Minerals (ICAPS AREDS 2) CHEW Take 1 tablet by mouth 2 times daily   Yes Historical Provider, MD   magnesium oxide (MAG-OX) 400 MG tablet Take 1 tablet by mouth nightly 12/21/15  Yes Claudia Curtis DO   Cholecalciferol (VITAMIN D) 2000 UNITS CAPS capsule Take 1 capsule by mouth daily   Yes Historical Provider, MD   Acetaminophen (TYLENOL PO) Take 500 mg by mouth every 8 hours as needed (pain) Rarely uses   Yes Historical Provider, MD   aspirin 81 MG EC tablet Take 81 mg by mouth daily    Yes Historical Provider, MD   Handicap Placard [facility] by Does not apply route Expires: 8/23/2023 8/23/21   MAGALY Judd CNP       Allergies:  Atorvastatin, Statins [statins], and Penicillin g    Social History:   reports that she has never smoked. She has never used smokeless tobacco. She reports that she does not drink alcohol and does not use drugs. REVIEW OF SYSTEMS:  CONSTITUTIONAL:NEGATIVE  HEENT:NEG  Cardiovascular: No chest pain, Yes dyspnea on exertion, No palpitations.  Lower extremity edema: Yes  RESPIRATORY: LYNNE  GASTROINTESTINAL:  negative  GENITOURINARY:  negative  INTEGUMENT: negative  MUSCULOSKELETAL:  positive for  pain  NEUROLOGICAL:  negative    PHYSICAL EXAM:      /62 (Site: Right Upper Arm, Position: Sitting, Cuff Size: Medium Adult) Comment: dinamap  Pulse 62   Ht 5' (1.524 m)   Wt 165 lb (74.8 kg)   LMP  (LMP Unknown)   BMI 32.22 kg/m²    HEENT: PERRL, no cervical lymphadenopathy. No masses palpable. Cardiovascular:  · The apical impulse is not displaced  · Heart  Sounds:RRR, NO S3  · Jugular venous pulsation Normal  · The carotid upstroke is NL  · Peripheral pulses are symmetrical and full  Respiratory: Good respiratory effort. On auscultation: reduced air entry bilaterally at bases with insp rales. Abdomen:  · No masses or tenderness  · Bowel sounds present  Extremities:  ·  No Cyanosis or Clubbing  ·  Lower extremity edema: 1+ bilateral ankle edema. Skin: Warm and dry    Cardiac data:      ECG 10/26/21: AV paced rhythm       TTE 11/3/2021  Normal left ventricular diameter. Mild left ventricular hypertrophy. Left ventricular systolic function is normal.  Left ventricular ejection fraction 55 %. Unable to evaluate diastolic function. Left atrium is mildly dilated. Aortic valve leaflet calcification. Visually at least mild stenosis. Mild mitral valve thickening with annular calcification. Mild mitral regurgitation. Normal tricuspid valve leaflets. Mild tricuspid regurgitation. Estimated right ventricular systolic pressure is 71 mmHg. Severe pulmonary hypertension. No significant pericardial effusion is seen. Labs:     FASTING LIPID PANEL:  Lab Results   Component Value Date    HDL 41 03/31/2021    TRIG 142 03/31/2021       IMPRESSION:    Preop risk stratification prior to EGD   SSS s/p PPM, Generator Change Out 11/19/2018 (pacer dependent)  Chronic HFpEF  Pulmonary hypertension, severe   Mild MR and TR  HTN: well controlled. CKD: being followed by nephrology.   DM2      RECOMMENDATIONS:  1-Continue bumex 1 mg bid   2-Repeat Echocardiogram reviewed 3-The pacemaker interrogation from 10/22/2021 reviewed, appropriately functioning device with no arrhythmias. Battery longevity of 8 years. Continue routine interrogation every 6 months. 4-Patient will be moderate risk for MACE for EGD and this was discussed with paients family. The patient and her daughters would only like conservative management at this time.  They opted not to proceed with any ischemia work up including a stress test.   5-Repeat evaluation in 4-6 weeks       Electronically signed by Vibha Farfan MD on 11/4/2021 at [facility]  [phone]

## 2021-11-04 NOTE — PROGRESS NOTES
Cardiology Consultation  [facility] [address], [address] [place]  [phone]      2021      [facility]  10/27/1928      To Whom It May Concern,      Patient is Intermediate Cardiac Risk for planned EGD. Special instructions:  Patient is taking  ASA   and can be held for 5 days prior to procedure and resumed as soon as surgical bleeding risk has resolved. Please continue other meds.         Thank you,    Chhaya Meraz MD    [facility]

## 2021-11-08 NOTE — TELEPHONE ENCOUNTER
Verbal recommendation given from Dr Terrance Gomez. Hold magnet over device during EGD. I spoke to daughter Kalyani Monroe who was concerned about this. She stated they did not turn off device last EGD 3 years ago.  She will speak to other family and Dr Olivier Juarez and let us know their decision Will let Dr Terrance Gomez know of answer

## 2021-11-08 NOTE — TELEPHONE ENCOUNTER
Per Jonathon Joaquin,   Surgical nurse and anaesthesiologist stated they would not turn pacer off (magnet) unless it was an AICD. Patient will be sedated for 20 minutes for procedure. Surgeon/surgical team is aware she is pacing at 100%They said if cardiologist wanted something different to contact their office.

## 2021-11-08 NOTE — TELEPHONE ENCOUNTER
Spoke to Otelic RN who stated they would not need to use magnet during procedure. Relayed Dr Leia Bowden recommendation and to use magnet word for word. She stated procedure would only take 20 minutes. But, she would relay message to anaesthesiologist and surgeon to watch rhythm. If they have any questions they will call this office prior to procedure. As of now they plan to continue EGD on Wednesday and not use magnet during procedure.

## 2021-11-08 NOTE — TELEPHONE ENCOUNTER
Family and surgeon aware patient is pacer dependant. I will have their office contact us and relay this message and document answer.

## 2021-11-08 NOTE — TELEPHONE ENCOUNTER
The magnet is placed not to turn off the device. The magnet is used to stop sensing to prevent oversensing during the procedure for a pacemaker. The magnet is used to prevent shocks in ICD. Its different mechanism. If the surgeon and anesthesiologist is comfortable watching the rhythm carefully its ok with me. She is PACER DEPENDANT.

## 2021-11-26 NOTE — PROGRESS NOTES
Subjective:      Patient ID: Moisés Sims is a 80 y.o. female. Rash  This is a new problem. The current episode started 1 to 4 weeks ago (x 8 days). The affected locations include the groin. The rash is characterized by pain. She was exposed to nothing. Past treatments include nothing. Review of Systems   Constitutional: Negative. HENT: Negative. Respiratory: Negative. Cardiovascular: Negative. Gastrointestinal: Negative. Skin: Positive for rash. Objective:   Physical Exam  Constitutional:       Appearance: She is obese. HENT:      Head: Normocephalic. Cardiovascular:      Rate and Rhythm: Normal rate. Pulmonary:      Effort: Pulmonary effort is normal.      Breath sounds: Normal breath sounds. Genitourinary:         Comments: Abscess with spontaneous drainage, poor hygiene  Skin:     General: Skin is warm. Neurological:      General: No focal deficit present. Mental Status: She is alert and oriented to person, place, and time. Psychiatric:         Mood and Affect: Mood normal.         Assessment:      1. Boil          Plan:      Spontaneous drainage note on examination. Keflex suspension. Warm compresses discussed. Good hygiene. Follow-up PRN.         JARRETT Crowder

## 2021-12-09 NOTE — PROGRESS NOTES
Foot Care Worksheet  PCP: MAGALY Luna - CNP/C  Last visit: 10 / 21 / 2021      Nail description:  Thick , Yellow , Crumbly , Marked limitation of ambulation     Pain resulting from thickened and dystrophy of nail plate No    Nails involved  Right   1, 2, 5  (T5-T9)  Left     1, 2, 5  (TA-T4)    Q7 1 Class A Finding - Non traumatic amputation of foot No    Q8 2 Class B Findings - Absent DP pulse No, Absent PT pulse No, Advanced tropic changes (3 required) Yes    Decrease hair growth Yes, Nail changes/thickening Yes, Pigmented changes/discoloration Yes, Skin texture (thin, shiny) Yes, Skin color (rubor/redness) No    Q9 1 Class B and 2 Class C Findings  Claudication No, Temperature change Yes, Paresthesia No, Burning No, Edema Yes

## 2021-12-09 NOTE — PROGRESS NOTES
Subjective:  Patient presents to [facility] today for routine diabetic foot care. Patient denies any new problems with their feet. Patient's diabetic control has been not changed. Allergies   Allergen Reactions    Atorvastatin Other (See Comments)    Statins [Statins] Other (See Comments)     Muscle aches, elevated LFT's    Penicillin G Rash       Past Medical History:   Diagnosis Date    Abnormal ANCA (antineutrophil cytoplasmic antibody) 12/03/2018    Basal cell cancer     nose    CKD (chronic kidney disease), stage III ([address] 12/03/2018    Depression     Dry skin dermatitis 12/03/2015    Dyspnea     chronic    Edema     Chronic    Gout     Hard of hearing     Hiatal hernia     Hypercholesterolemia     Hypertension     Hypomagnesemia 12/03/2015    Incontinence     Lower extremity edema 12/03/2018    Macular degeneration, dry     Obstructive sleep apnea     Open-angle glaucoma     Borderline.  Peripheral edema 12/03/2015    Pulmonary hypertension ([address] 12/03/2018    Sick sinus syndrome (HCC)     with pacemaker  placement    Type 2 diabetes mellitus ([address]     Venous insufficiency     Vitamin D deficiency        Prior to Admission medications    Medication Sig Start Date End Date Taking? Authorizing Provider   omeprazole (PRILOSEC) 20 MG delayed release capsule Take 1 capsule by mouth Daily 12/9/21  Yes Mu Bashir MD   cholestyramine light 4 g packet MIX 4 GRAMS WITH WATER AND DRINK TWICE DAILY WITH BREAKFAST AND SUPPER.  TAKE 1 HOUR AFTER OTHER MEDICATIONS AND AT LEAST 4 HOURS PRIOR 11/5/21  Yes Historical Provider, MD   sertraline (ZOLOFT) 100 MG tablet Take 1 tablet by mouth daily 11/15/21  Yes MAGALY Patrick CNP   bumetanide (BUMEX) 1 MG tablet Take 1 tablet by mouth 2 times daily 11/15/21  Yes MAGALY Patrick CNP   cholestyramine (QUESTRAN) 4 g packet Take 1 packet by mouth daily Extra dose prn diarrhea   Yes Historical Provider, MD lisinopril (PRINIVIL;ZESTRIL) 20 MG tablet 1 po daily 10/21/21  Yes MAGALY Rodriguez - CNP   Peppermint Oil (IBGARD PO) Take by mouth 3 times daily    Yes Historical Provider, MD   metoprolol succinate (TOPROL XL) 100 MG extended release tablet TAKE 1 TABLET DAILY 9/27/21  Yes Shara Sharp APRN - CNP   Handicap Placard MISC by Does not apply route Expires: 8/23/2023 8/23/21  Yes MAGALY Rodriguez - CNP   colchicine (MITIGARE) 0.6 MG capsule Take 1 capsule by mouth 2 times daily REYNALDO 4/14/21  Yes Shara Sharp APRN - CNP   Sodium Chloride, Hypertonic, (SODIUM CHLORIDE OP) Place 1 drop into both eyes as needed (dry eyes)    Yes Historical Provider, MD   timolol (TIMOPTIC) 0.5 % ophthalmic solution Place 1 drop into both eyes 2 times daily   Yes Historical Provider, MD   Multiple Vitamins-Minerals (ICAPS AREDS 2) CHEW Take 1 tablet by mouth 2 times daily   Yes Historical Provider, MD   magnesium oxide (MAG-OX) 400 MG tablet Take 1 tablet by mouth nightly 12/21/15  Yes Caesar Leal DO   Cholecalciferol (VITAMIN D) 2000 UNITS CAPS capsule Take 1 capsule by mouth daily   Yes Historical Provider, MD   Acetaminophen (TYLENOL PO) Take 500 mg by mouth every 8 hours as needed (pain) Rarely uses   Yes Historical Provider, MD   aspirin 81 MG EC tablet Take 81 mg by mouth daily    Yes Historical Provider, MD       Social History     Tobacco Use    Smoking status: Never Smoker    Smokeless tobacco: Never Used    Tobacco comment: LYLA Allen RRT 11/28/18   Substance Use Topics    Alcohol use: No     Alcohol/week: 0.0 standard drinks     Review of Systems: All 12 systems reviewed and pertinent positives noted above.   Objective:  Vascular: DP and PT pulses palpable 2/4, bilateral.  CFT <3 seconds, bilateral.  Hair growth present to the level of the digits, bilateral.  Edema present, bilateral.  Varicosities present, bilateral. Erythema absent, bilateral. Distal Rubor absent bilateral. Temperature within normal limits bilateral. Hyperpigmentation present bilateral. Atrophic skin yes. Neurological: Sensation intact to light touch to level of digits, bilateral.  Protective sensation intact 10/10 sites via 5.07/10g [place]-Shlomo Monofilament, bilateral.  negative Tinel's, bilateral.  negative Valleix sign, bilateral.  Vibratory intact bilateral.  Reflexes Decreased bilateral.  Paresthesias negative. Dysthesias negative. Sharp/dull intact bilateral.    Integument:  Open lesion absent, Bilateral.  Interdigital maceration absent to web spaces,absent Bilateral.  Nails left 1, 2, 5 and right 1, 2, 5 thickened, dystrophic and crumbly, discolored with subungual debris. Fissures absent, Bilateral. Hyperkeratotic tissue is absent. Assessment:    Diagnosis Orders   1. Controlled type 2 DM with peripheral circulatory disorder ([facility] [address]     2. Dermatophytosis of nail 1-5R/L         Plan:  Diabetic foot education and exam.  All Nails as mentioned above debrided in length and thickness. Patient advised about OTC treatments for nails and callous. Patient will follow up in 10 weeks for routine foot care or PRN if any further problems arise.

## 2021-12-09 NOTE — PROGRESS NOTES
Today's Date: 12/9/2021  Patient Name: Dinah Bradley  Patient's age: 80 y. o., 10/27/1928       HPI:   The patient is a 80 y.o.  female is in the office for 4 weeks follow-up. She underwent EGD with esophageal dilatation at [facility] without any perioperative complications. Stable from cardiac standpoint. No chest discomfort besides some acid reflux at night occasionally. She is eating and drinking better after esophageal dilatation. No dyspnea at rest  Lower extremity edema is improved after switching Lasix to Bumex  No orthopnea or palpitations      Past Medical History:   has a past medical history of Abnormal ANCA (antineutrophil cytoplasmic antibody), Basal cell cancer, CKD (chronic kidney disease), stage III ([address] Depression, Dry skin dermatitis, Dyspnea, Edema, Gout, Hard of hearing, Hiatal hernia, Hypercholesterolemia, Hypertension, Hypomagnesemia, Incontinence, Lower extremity edema, Macular degeneration, dry, Obstructive sleep apnea, Open-angle glaucoma, Peripheral edema, Pulmonary hypertension ([address] Sick sinus syndrome ([address] Type 2 diabetes mellitus ([address] Venous insufficiency, and Vitamin D deficiency. Past Surgical History:   has a past surgical history that includes Pacemaker insertion; Hysterectomy; fracture surgery (Right); Cardiac catheterization (2010); Tubal ligation (1970); Cataract removal with implant (Bilateral); and eye surgery (03/31/2017). Home Medications:    Prior to Admission medications    Medication Sig Start Date End Date Taking? Authorizing Provider   omeprazole (PRILOSEC) 20 MG delayed release capsule Take 20 mg by mouth Daily  11/11/21  Yes Historical Provider, MD   cholestyramine light 4 g packet MIX 4 GRAMS WITH WATER AND DRINK TWICE DAILY WITH BREAKFAST AND SUPPER.  TAKE 1 HOUR AFTER OTHER MEDICATIONS AND AT LEAST 4 HOURS PRIOR 11/5/21  Yes Historical Provider, MD   sertraline (ZOLOFT) 100 MG tablet Take 1 tablet by mouth daily pain, Yes dyspnea on exertion, No palpitations. Lower extremity edema: Yes  RESPIRATORY: LYNNE  GASTROINTESTINAL:  negative  GENITOURINARY:  negative  INTEGUMENT:  negative  MUSCULOSKELETAL:  positive for  pain  NEUROLOGICAL:  negative    PHYSICAL EXAM:      LMP  (LMP Unknown)    HEENT: PERRL, no cervical lymphadenopathy. No masses palpable. Cardiovascular:  · The apical impulse is not displaced  · Heart  Sounds:RRR, NO S3  · Jugular venous pulsation Normal  · The carotid upstroke is NL  · Peripheral pulses are symmetrical and full  Respiratory: Good respiratory effort. On auscultation: reduced air entry bilaterally at bases with insp rales. Abdomen:  · No masses or tenderness  · Bowel sounds present  Extremities:  ·  No Cyanosis or Clubbing  ·  Lower extremity edema: 1+ bilateral ankle edema. Skin: Warm and dry    Cardiac data:      ECG 10/26/21: AV paced rhythm       TTE 11/3/2021  Normal left ventricular diameter. Mild left ventricular hypertrophy. Left ventricular systolic function is normal.  Left ventricular ejection fraction 55 %. Unable to evaluate diastolic function. Left atrium is mildly dilated. Aortic valve leaflet calcification. Visually at least mild stenosis. Mild mitral valve thickening with annular calcification. Mild mitral regurgitation. Normal tricuspid valve leaflets. Mild tricuspid regurgitation. Estimated right ventricular systolic pressure is 71 mmHg. Severe pulmonary hypertension. No significant pericardial effusion is seen. Labs:     FASTING LIPID PANEL:  Lab Results   Component Value Date    HDL 41 03/31/2021    TRIG 142 03/31/2021       IMPRESSION:    SSS s/p PPM, Generator Change Out 11/19/2018 (pacer dependent)  Chronic HFpEF  Pulmonary hypertension, severe   Mild MR and TR  HTN: well controlled. CKD: being followed by nephrology. DM2      RECOMMENDATIONS:  1-Continue bumex 1 mg bid.   Structured to take extra dose in case there is increased lower extremity edema.  2-Repeat Echocardiogram reviewed   3-The pacemaker interrogation from 10/22/2021 reviewed, appropriately functioning device with no arrhythmias. Battery longevity of 8 years. Continue routine interrogation every 6 months. 4-The patient and her daughters would only like conservative management at this time.  They opted not to proceed with any ischemia work up including a stress test.   5-continue PPI  6-routine follow-up in 6 months with pacer interrogation      Electronically signed by Eligio Babcock MD on 12/9/2021 at [facility]  [phone]

## 2021-12-14 NOTE — TELEPHONE ENCOUNTER
Received call from KAISER Muniz at [facility] with [employer] Complaint. Brief description of triage:   cyst near private area , had to go to  for assessment was told it was boil/cyst was given medicine antibiotics for infection, it drained was better and today its worse pt called daughter crying in pain - daughter reports had to assess patient pain level    Limited triage due to patient not being present during this call. Triage indicates for patient to go to office today pts daughter agrees to take pt to  if cant get appt    Care advice provided, patient verbalizes understanding; denies any other questions or concerns; instructed to call back for any new or worsening symptoms. Writer provided warm transfer to hilda at [facility] for appointment scheduling. Attention Provider: Thank you for allowing me to participate in the care of your patient. The patient was connected to triage in response to information provided to the ECC/PSC. Please do not respond through this encounter as the response is not directed to a shared pool. Reason for Disposition   Boil > 2 inches across (> 5 cm; larger than a golf ball or ping pong ball)    Answer Assessment - Initial Assessment Questions  1. APPEARANCE of BOIL: \"What does the boil look like? \"       Unable to assess daughter reports    2. LOCATION: \"Where is the boil located? \"       Groin     3. NUMBER: \"How many boils are there? \"       daughter thinks there is only 1 but is unsure     4. SIZE: \"How big is the boil? \" (e.g., inches, cm; compare to size of a coin or other object)     Unknown per daughter report unable to assess     5. ONSET: \"When did the boil start? \"       3 weeks ago, seen at [facility] given keflex , was getter better now worse today pt called daughter was weepy on phone c/o of pain    6. PAIN: \"Is there any pain? \" If so, ask: \"How bad is the pain? \"   (Scale 1-10; or mild, moderate, severe)       Daughter not able to assess  Urine touches it stings    7. FEVER: \"Do you have a fever? \" If so, ask: \"What is it, how was it measured, and when did it start? \"       Denies    8. SOURCE: \"Have you been around anyone with boils or other Staph infections? \" \"Have you ever had boils before? \"      unknown    9. OTHER SYMPTOMS: \"Do you have any other symptoms? \" (e.g., shaking chills, weakness, rash elsewhere on body)      denies  10. PREGNANCY: \"Is there any chance you are pregnant? \" \"When was your last menstrual period? \"        n/a    Protocols used: BOIL (SKIN ABSCESS)-ADULT-OH

## 2021-12-15 NOTE — PROGRESS NOTES
tablet by mouth daily 90 tablet 3    bumetanide (BUMEX) 1 MG tablet Take 1 tablet by mouth 2 times daily 180 tablet 3    cholestyramine (QUESTRAN) 4 g packet Take 1 packet by mouth daily Extra dose prn diarrhea      lisinopril (PRINIVIL;ZESTRIL) 20 MG tablet 1 po daily 90 tablet 3    Peppermint Oil (IBGARD PO) Take by mouth 3 times daily       metoprolol succinate (TOPROL XL) 100 MG extended release tablet TAKE 1 TABLET DAILY 90 tablet 3    colchicine (MITIGARE) 0.6 MG capsule Take 1 capsule by mouth 2 times daily REYNALDO 180 capsule 3    Sodium Chloride, Hypertonic, (SODIUM CHLORIDE OP) Place 1 drop into both eyes as needed (dry eyes)       timolol (TIMOPTIC) 0.5 % ophthalmic solution Place 1 drop into both eyes 2 times daily      Multiple Vitamins-Minerals (ICAPS AREDS 2) CHEW Take 1 tablet by mouth 2 times daily      magnesium oxide (MAG-OX) 400 MG tablet Take 1 tablet by mouth nightly 30 tablet 3    Cholecalciferol (VITAMIN D) 2000 UNITS CAPS capsule Take 1 capsule by mouth daily      Acetaminophen (TYLENOL PO) Take 500 mg by mouth every 8 hours as needed (pain) Rarely uses      aspirin 81 MG EC tablet Take 81 mg by mouth daily        No current facility-administered medications for this visit. Review of Systems   Constitutional: Negative. Negative for fever. HENT: Negative. Eyes: Negative. Respiratory: Negative. Cardiovascular: Negative. Gastrointestinal: Negative. Endocrine: Negative. Genitourinary: Negative. Musculoskeletal: Positive for arthralgias and gait problem. Skin: Positive for wound. Allergic/Immunologic: Negative. Hematological: Negative. Psychiatric/Behavioral: Negative. Objective:   Physical Exam  Exam conducted with a chaperone present. Constitutional:       General: She is not in acute distress. Appearance: She is not toxic-appearing. HENT:      Head: Normocephalic and atraumatic.    Cardiovascular:      Rate and Rhythm: Normal rate.   Pulmonary:      Effort: Pulmonary effort is normal. No respiratory distress. Genitourinary:     Exam position: Lithotomy position. Pubic Area: Rash present. Labia:         Right: Tenderness present. Left: Tenderness present. Comments: Extensive bruising of the labia , posterior fourchette area with some erythema, recent tear. No bleeding . Bruising extends to the groin bilaterally. No cyst found. Musculoskeletal:      Cervical back: Tenderness present. Skin:     General: Skin is warm. Findings: Rash present. Neurological:      Mental Status: She is alert. Psychiatric:         Mood and Affect: Mood normal.         Behavior: Behavior normal.         Thought Content: Thought content normal.       /78 (Site: Left Upper Arm, Position: Sitting, Cuff Size: Large Adult)   Pulse 68   Ht 5' (1.524 m)   Wt 161 lb (73 kg)   LMP  (LMP Unknown)   BMI 31.44 kg/m²     Assessment:      Encounter Diagnosis   Name Primary?  Labial swelling Yes     Extensive bruising, likely due to transfers/sliding off chair/bed. Care givers using the back of her pants to help left and control her during transfers, may be a source for bruising. Some damage to the lower introitus suspected. Plan:      Discussed ways to avoid further trauma to the herminia vaginal area. No sliding. No lifting by the back of her pants. Plan lotrisone cream to help with irritation and any fungal issues . Recheck prn concerns. Considering ecf placement.          Shakira Damico MD

## 2022-01-01 ENCOUNTER — TELEPHONE (OUTPATIENT)
Dept: INTERNAL MEDICINE | Age: 87
End: 2022-01-01

## 2022-01-01 ENCOUNTER — OFFICE VISIT (OUTPATIENT)
Dept: NEPHROLOGY | Age: 87
End: 2022-01-01
Payer: MEDICARE

## 2022-01-01 ENCOUNTER — HOSPITAL ENCOUNTER (INPATIENT)
Age: 87
LOS: 3 days | Discharge: HOSPICE | DRG: 291 | End: 2022-01-16
Attending: EMERGENCY MEDICINE | Admitting: INTERNAL MEDICINE
Payer: MEDICARE

## 2022-01-01 ENCOUNTER — OUTSIDE SERVICES (OUTPATIENT)
Dept: INTERNAL MEDICINE | Age: 87
End: 2022-01-01
Payer: MEDICARE

## 2022-01-01 ENCOUNTER — APPOINTMENT (OUTPATIENT)
Dept: GENERAL RADIOLOGY | Age: 87
DRG: 291 | End: 2022-01-01
Payer: MEDICARE

## 2022-01-01 VITALS
SYSTOLIC BLOOD PRESSURE: 102 MMHG | HEART RATE: 62 BPM | OXYGEN SATURATION: 97 % | BODY MASS INDEX: 30.31 KG/M2 | HEIGHT: 60 IN | DIASTOLIC BLOOD PRESSURE: 62 MMHG | WEIGHT: 154.4 LBS

## 2022-01-01 VITALS
HEART RATE: 70 BPM | RESPIRATION RATE: 18 BRPM | SYSTOLIC BLOOD PRESSURE: 127 MMHG | DIASTOLIC BLOOD PRESSURE: 90 MMHG | TEMPERATURE: 97.6 F | OXYGEN SATURATION: 95 %

## 2022-01-01 VITALS
RESPIRATION RATE: 14 BRPM | SYSTOLIC BLOOD PRESSURE: 57 MMHG | DIASTOLIC BLOOD PRESSURE: 27 MMHG | BODY MASS INDEX: 29.8 KG/M2 | TEMPERATURE: 93.9 F | WEIGHT: 152.6 LBS | OXYGEN SATURATION: 96 % | HEART RATE: 60 BPM

## 2022-01-01 DIAGNOSIS — R60.0 LOWER EXTREMITY EDEMA: ICD-10-CM

## 2022-01-01 DIAGNOSIS — N18.4 CKD (CHRONIC KIDNEY DISEASE) STAGE 4, GFR 15-29 ML/MIN (HCC): ICD-10-CM

## 2022-01-01 DIAGNOSIS — I44.2 COMPLETE HEART BLOCK (HCC): ICD-10-CM

## 2022-01-01 DIAGNOSIS — R77.8 ELEVATED TROPONIN: ICD-10-CM

## 2022-01-01 DIAGNOSIS — I27.20 PULMONARY HYPERTENSION (HCC): ICD-10-CM

## 2022-01-01 DIAGNOSIS — I49.5 SICK SINUS SYNDROME (HCC): ICD-10-CM

## 2022-01-01 DIAGNOSIS — N17.9 ACUTE RENAL FAILURE SUPERIMPOSED ON CHRONIC KIDNEY DISEASE, UNSPECIFIED CKD STAGE, UNSPECIFIED ACUTE RENAL FAILURE TYPE (HCC): ICD-10-CM

## 2022-01-01 DIAGNOSIS — N18.9 ACUTE RENAL FAILURE SUPERIMPOSED ON CHRONIC KIDNEY DISEASE, UNSPECIFIED CKD STAGE, UNSPECIFIED ACUTE RENAL FAILURE TYPE (HCC): ICD-10-CM

## 2022-01-01 DIAGNOSIS — I50.32 CHRONIC DIASTOLIC CONGESTIVE HEART FAILURE (HCC): Primary | ICD-10-CM

## 2022-01-01 DIAGNOSIS — U07.1 COVID-19: Primary | ICD-10-CM

## 2022-01-01 DIAGNOSIS — N18.31 TYPE 2 DIABETES MELLITUS WITH STAGE 3A CHRONIC KIDNEY DISEASE, WITHOUT LONG-TERM CURRENT USE OF INSULIN (HCC): ICD-10-CM

## 2022-01-01 DIAGNOSIS — R32 URINARY INCONTINENCE, UNSPECIFIED TYPE: ICD-10-CM

## 2022-01-01 DIAGNOSIS — R76.8 P-ANCA AND MPO ANTIBODIES POSITIVE: ICD-10-CM

## 2022-01-01 DIAGNOSIS — R41.0 CONFUSION: ICD-10-CM

## 2022-01-01 DIAGNOSIS — I50.9 CONGESTIVE HEART FAILURE, UNSPECIFIED HF CHRONICITY, UNSPECIFIED HEART FAILURE TYPE (HCC): ICD-10-CM

## 2022-01-01 DIAGNOSIS — E83.42 HYPOMAGNESEMIA: ICD-10-CM

## 2022-01-01 DIAGNOSIS — N18.32 STAGE 3B CHRONIC KIDNEY DISEASE (HCC): Primary | ICD-10-CM

## 2022-01-01 DIAGNOSIS — E78.00 HYPERCHOLESTEROLEMIA: ICD-10-CM

## 2022-01-01 DIAGNOSIS — I10 PRIMARY HYPERTENSION: ICD-10-CM

## 2022-01-01 DIAGNOSIS — R13.10 DYSPHAGIA, UNSPECIFIED TYPE: ICD-10-CM

## 2022-01-01 DIAGNOSIS — I10 ESSENTIAL HYPERTENSION: ICD-10-CM

## 2022-01-01 DIAGNOSIS — E11.22 TYPE 2 DIABETES MELLITUS WITH STAGE 3B CHRONIC KIDNEY DISEASE, WITHOUT LONG-TERM CURRENT USE OF INSULIN (HCC): ICD-10-CM

## 2022-01-01 DIAGNOSIS — E11.22 TYPE 2 DIABETES MELLITUS WITH STAGE 3A CHRONIC KIDNEY DISEASE, WITHOUT LONG-TERM CURRENT USE OF INSULIN (HCC): ICD-10-CM

## 2022-01-01 DIAGNOSIS — N17.9 AKI (ACUTE KIDNEY INJURY) (HCC): ICD-10-CM

## 2022-01-01 DIAGNOSIS — E87.1 HYPONATREMIA: ICD-10-CM

## 2022-01-01 DIAGNOSIS — I50.32 CHRONIC DIASTOLIC CONGESTIVE HEART FAILURE (HCC): ICD-10-CM

## 2022-01-01 DIAGNOSIS — E11.51 CONTROLLED TYPE 2 DM WITH PERIPHERAL CIRCULATORY DISORDER (HCC): Primary | ICD-10-CM

## 2022-01-01 DIAGNOSIS — Z95.0 PACEMAKER: ICD-10-CM

## 2022-01-01 DIAGNOSIS — N18.32 TYPE 2 DIABETES MELLITUS WITH STAGE 3B CHRONIC KIDNEY DISEASE, WITHOUT LONG-TERM CURRENT USE OF INSULIN (HCC): ICD-10-CM

## 2022-01-01 DIAGNOSIS — F33.0 MAJOR DEPRESSIVE DISORDER, RECURRENT, MILD (HCC): ICD-10-CM

## 2022-01-01 LAB
-: ABNORMAL
ABSOLUTE EOS #: 0 K/UL (ref 0–0.4)
ABSOLUTE IMMATURE GRANULOCYTE: 0 K/UL (ref 0–0.3)
ABSOLUTE LYMPH #: 1.44 K/UL (ref 1–4.8)
ABSOLUTE MONO #: 1.44 K/UL (ref 0.1–1.2)
AMORPHOUS: ABNORMAL
ANION GAP SERPL CALCULATED.3IONS-SCNC: 24 MMOL/L (ref 9–17)
BACTERIA: ABNORMAL
BASOPHILS # BLD: 0 % (ref 0–1)
BASOPHILS ABSOLUTE: 0 K/UL (ref 0–0.2)
BILIRUBIN URINE: ABNORMAL
BNP INTERPRETATION: ABNORMAL
BUN BLDV-MCNC: 124 MG/DL (ref 8–23)
BUN/CREAT BLD: 25 (ref 9–20)
CALCIUM SERPL-MCNC: 8.9 MG/DL (ref 8.6–10.4)
CASTS UA: ABNORMAL /LPF (ref 0–2)
CHLORIDE BLD-SCNC: 84 MMOL/L (ref 98–107)
CO2: 14 MMOL/L (ref 20–31)
COLOR: ABNORMAL
COMMENT UA: ABNORMAL
CREAT SERPL-MCNC: 4.99 MG/DL (ref 0.5–0.9)
CRYSTALS, UA: ABNORMAL /HPF
DIFFERENTIAL TYPE: ABNORMAL
DIRECT EXAM: NORMAL
EKG ATRIAL RATE: 71 BPM
EKG P AXIS: 87 DEGREES
EKG P-R INTERVAL: 138 MS
EKG Q-T INTERVAL: 534 MS
EKG QRS DURATION: 214 MS
EKG QTC CALCULATION (BAZETT): 580 MS
EKG R AXIS: -59 DEGREES
EKG T AXIS: 115 DEGREES
EKG VENTRICULAR RATE: 71 BPM
EOSINOPHILS RELATIVE PERCENT: 0 % (ref 1–7)
EPITHELIAL CELLS UA: ABNORMAL /HPF (ref 0–5)
GFR AFRICAN AMERICAN: 10 ML/MIN
GFR NON-AFRICAN AMERICAN: 8 ML/MIN
GFR SERPL CREATININE-BSD FRML MDRD: ABNORMAL ML/MIN/{1.73_M2}
GFR SERPL CREATININE-BSD FRML MDRD: ABNORMAL ML/MIN/{1.73_M2}
GLUCOSE BLD-MCNC: 93 MG/DL (ref 70–99)
GLUCOSE URINE: NEGATIVE
HCT VFR BLD CALC: 41.6 % (ref 36.3–47.1)
HEMOGLOBIN: 14.3 G/DL (ref 11.9–15.1)
IMMATURE GRANULOCYTES: 0 %
KETONES, URINE: NEGATIVE
LACTIC ACID, SEPSIS WHOLE BLOOD: NORMAL MMOL/L (ref 0.5–1.9)
LACTIC ACID, SEPSIS: 1.2 MMOL/L (ref 0.5–1.9)
LEUKOCYTE ESTERASE, URINE: NEGATIVE
LYMPHOCYTES # BLD: 5 % (ref 16–46)
Lab: NORMAL
MCH RBC QN AUTO: 30.3 PG (ref 25.2–33.5)
MCHC RBC AUTO-ENTMCNC: 34.4 G/DL (ref 25.2–33.5)
MCV RBC AUTO: 88.1 FL (ref 82.6–102.9)
MONOCYTES # BLD: 5 % (ref 4–11)
MORPHOLOGY: ABNORMAL
MORPHOLOGY: ABNORMAL
MUCUS: ABNORMAL
NITRITE, URINE: NEGATIVE
NRBC AUTOMATED: 0 PER 100 WBC
OTHER OBSERVATIONS UA: ABNORMAL
PDW BLD-RTO: 16 % (ref 11.8–14.4)
PH UA: 5 (ref 5–6)
PLATELET # BLD: 196 K/UL (ref 138–453)
PLATELET ESTIMATE: ABNORMAL
PMV BLD AUTO: 10.1 FL (ref 8.1–13.5)
POTASSIUM SERPL-SCNC: 3.9 MMOL/L (ref 3.7–5.3)
PRO-BNP: ABNORMAL PG/ML
PROTEIN UA: ABNORMAL
RBC # BLD: 4.72 M/UL (ref 3.95–5.11)
RBC # BLD: ABNORMAL 10*6/UL
RBC UA: ABNORMAL /HPF (ref 0–4)
RENAL EPITHELIAL, UA: ABNORMAL /HPF
SARS-COV-2, RAPID: DETECTED
SEG NEUTROPHILS: 90 % (ref 43–77)
SEGMENTED NEUTROPHILS ABSOLUTE COUNT: 25.82 K/UL (ref 1.5–8.1)
SODIUM BLD-SCNC: 122 MMOL/L (ref 135–144)
SPECIFIC GRAVITY UA: 1.03 (ref 1.01–1.02)
SPECIMEN DESCRIPTION: ABNORMAL
SPECIMEN DESCRIPTION: NORMAL
TRICHOMONAS: ABNORMAL
TROPONIN INTERP: ABNORMAL
TROPONIN INTERP: ABNORMAL
TROPONIN T: ABNORMAL NG/ML
TROPONIN T: ABNORMAL NG/ML
TROPONIN, HIGH SENSITIVITY: 146 NG/L (ref 0–14)
TROPONIN, HIGH SENSITIVITY: 166 NG/L (ref 0–14)
TURBIDITY: ABNORMAL
URINE HGB: NEGATIVE
UROBILINOGEN, URINE: NORMAL
WBC # BLD: 28.7 K/UL (ref 3.5–11.3)
WBC # BLD: ABNORMAL 10*3/UL
WBC UA: ABNORMAL /HPF (ref 0–4)
YEAST: ABNORMAL

## 2022-01-01 PROCEDURE — 87635 SARS-COV-2 COVID-19 AMP PRB: CPT

## 2022-01-01 PROCEDURE — 6360000002 HC RX W HCPCS: Performed by: EMERGENCY MEDICINE

## 2022-01-01 PROCEDURE — 36415 COLL VENOUS BLD VENIPUNCTURE: CPT

## 2022-01-01 PROCEDURE — 2500000003 HC RX 250 WO HCPCS

## 2022-01-01 PROCEDURE — 2580000003 HC RX 258

## 2022-01-01 PROCEDURE — 1250000000 HC SEMI PRIVATE HOSPICE R&B

## 2022-01-01 PROCEDURE — 6360000002 HC RX W HCPCS

## 2022-01-01 PROCEDURE — 96361 HYDRATE IV INFUSION ADD-ON: CPT

## 2022-01-01 PROCEDURE — 87804 INFLUENZA ASSAY W/OPTIC: CPT

## 2022-01-01 PROCEDURE — 81001 URINALYSIS AUTO W/SCOPE: CPT

## 2022-01-01 PROCEDURE — 93005 ELECTROCARDIOGRAM TRACING: CPT | Performed by: EMERGENCY MEDICINE

## 2022-01-01 PROCEDURE — 99239 HOSP IP/OBS DSCHRG MGMT >30: CPT | Performed by: FAMILY MEDICINE

## 2022-01-01 PROCEDURE — 99285 EMERGENCY DEPT VISIT HI MDM: CPT

## 2022-01-01 PROCEDURE — 99214 OFFICE O/P EST MOD 30 MIN: CPT

## 2022-01-01 PROCEDURE — 80048 BASIC METABOLIC PNL TOTAL CA: CPT

## 2022-01-01 PROCEDURE — 84484 ASSAY OF TROPONIN QUANT: CPT

## 2022-01-01 PROCEDURE — 96375 TX/PRO/DX INJ NEW DRUG ADDON: CPT

## 2022-01-01 PROCEDURE — 99213 OFFICE O/P EST LOW 20 MIN: CPT | Performed by: INTERNAL MEDICINE

## 2022-01-01 PROCEDURE — 96365 THER/PROPH/DIAG IV INF INIT: CPT

## 2022-01-01 PROCEDURE — 83605 ASSAY OF LACTIC ACID: CPT

## 2022-01-01 PROCEDURE — 83880 ASSAY OF NATRIURETIC PEPTIDE: CPT

## 2022-01-01 PROCEDURE — 85025 COMPLETE CBC W/AUTO DIFF WBC: CPT

## 2022-01-01 PROCEDURE — 71045 X-RAY EXAM CHEST 1 VIEW: CPT

## 2022-01-01 PROCEDURE — 99222 1ST HOSP IP/OBS MODERATE 55: CPT

## 2022-01-01 PROCEDURE — 2580000003 HC RX 258: Performed by: EMERGENCY MEDICINE

## 2022-01-01 RX ORDER — LORAZEPAM 2 MG/ML
2 INJECTION INTRAMUSCULAR
Status: DISCONTINUED | OUTPATIENT
Start: 2022-01-01 | End: 2022-01-01 | Stop reason: HOSPADM

## 2022-01-01 RX ORDER — ACETAMINOPHEN 325 MG/1
650 TABLET ORAL EVERY 6 HOURS PRN
Status: DISCONTINUED | OUTPATIENT
Start: 2022-01-01 | End: 2022-01-01 | Stop reason: HOSPADM

## 2022-01-01 RX ORDER — SODIUM CHLORIDE 9 MG/ML
INJECTION, SOLUTION INTRAVENOUS CONTINUOUS
Status: DISCONTINUED | OUTPATIENT
Start: 2022-01-01 | End: 2022-01-01

## 2022-01-01 RX ORDER — MORPHINE SULFATE 4 MG/ML
4 INJECTION, SOLUTION INTRAMUSCULAR; INTRAVENOUS
Status: DISCONTINUED | OUTPATIENT
Start: 2022-01-01 | End: 2022-01-01 | Stop reason: HOSPADM

## 2022-01-01 RX ORDER — SODIUM CHLORIDE 0.9 % (FLUSH) 0.9 %
5-40 SYRINGE (ML) INJECTION EVERY 12 HOURS SCHEDULED
Status: DISCONTINUED | OUTPATIENT
Start: 2022-01-01 | End: 2022-01-01 | Stop reason: HOSPADM

## 2022-01-01 RX ORDER — SODIUM CHLORIDE 0.9 % (FLUSH) 0.9 %
5-40 SYRINGE (ML) INJECTION PRN
Status: DISCONTINUED | OUTPATIENT
Start: 2022-01-01 | End: 2022-01-01 | Stop reason: HOSPADM

## 2022-01-01 RX ORDER — 0.9 % SODIUM CHLORIDE 0.9 %
250 INTRAVENOUS SOLUTION INTRAVENOUS ONCE
Status: COMPLETED | OUTPATIENT
Start: 2022-01-01 | End: 2022-01-01

## 2022-01-01 RX ORDER — POLYETHYLENE GLYCOL 3350 17 G/17G
17 POWDER, FOR SOLUTION ORAL DAILY PRN
Status: DISCONTINUED | OUTPATIENT
Start: 2022-01-01 | End: 2022-01-01 | Stop reason: HOSPADM

## 2022-01-01 RX ORDER — GLYCOPYRROLATE 0.2 MG/ML
0.2 INJECTION INTRAMUSCULAR; INTRAVENOUS EVERY 4 HOURS PRN
Status: DISCONTINUED | OUTPATIENT
Start: 2022-01-01 | End: 2022-01-01 | Stop reason: HOSPADM

## 2022-01-01 RX ORDER — LORAZEPAM 2 MG/ML
3 INJECTION INTRAMUSCULAR
Status: DISCONTINUED | OUTPATIENT
Start: 2022-01-01 | End: 2022-01-01 | Stop reason: HOSPADM

## 2022-01-01 RX ORDER — ONDANSETRON 2 MG/ML
4 INJECTION INTRAMUSCULAR; INTRAVENOUS EVERY 6 HOURS PRN
Status: DISCONTINUED | OUTPATIENT
Start: 2022-01-01 | End: 2022-01-01 | Stop reason: HOSPADM

## 2022-01-01 RX ORDER — MORPHINE SULFATE 2 MG/ML
2 INJECTION, SOLUTION INTRAMUSCULAR; INTRAVENOUS
Status: DISCONTINUED | OUTPATIENT
Start: 2022-01-01 | End: 2022-01-01 | Stop reason: HOSPADM

## 2022-01-01 RX ORDER — ACETAMINOPHEN 650 MG/1
650 SUPPOSITORY RECTAL EVERY 6 HOURS PRN
Status: DISCONTINUED | OUTPATIENT
Start: 2022-01-01 | End: 2022-01-01 | Stop reason: HOSPADM

## 2022-01-01 RX ORDER — MAGNESIUM CHLORIDE 64 MG
1 TABLET, DELAYED RELEASE (ENTERIC COATED) ORAL
Qty: 90 TABLET | Refills: 3 | Status: SHIPPED | OUTPATIENT
Start: 2022-01-01

## 2022-01-01 RX ORDER — LORAZEPAM 2 MG/ML
1 INJECTION INTRAMUSCULAR
Status: DISCONTINUED | OUTPATIENT
Start: 2022-01-01 | End: 2022-01-01 | Stop reason: HOSPADM

## 2022-01-01 RX ORDER — MORPHINE SULFATE 10 MG/ML
6 INJECTION, SOLUTION INTRAMUSCULAR; INTRAVENOUS
Status: DISCONTINUED | OUTPATIENT
Start: 2022-01-01 | End: 2022-01-01 | Stop reason: HOSPADM

## 2022-01-01 RX ORDER — LORAZEPAM 2 MG/ML
1 INJECTION INTRAMUSCULAR ONCE
Status: COMPLETED | OUTPATIENT
Start: 2022-01-01 | End: 2022-01-01

## 2022-01-01 RX ORDER — SODIUM CHLORIDE 9 MG/ML
25 INJECTION, SOLUTION INTRAVENOUS PRN
Status: DISCONTINUED | OUTPATIENT
Start: 2022-01-01 | End: 2022-01-01 | Stop reason: HOSPADM

## 2022-01-01 RX ADMIN — LORAZEPAM 1 MG: 2 INJECTION INTRAMUSCULAR; INTRAVENOUS at 23:28

## 2022-01-01 RX ADMIN — CEFTRIAXONE 1000 MG: 1 INJECTION, POWDER, FOR SOLUTION INTRAMUSCULAR; INTRAVENOUS at 19:56

## 2022-01-01 RX ADMIN — SODIUM CHLORIDE, PRESERVATIVE FREE 10 ML: 5 INJECTION INTRAVENOUS at 20:10

## 2022-01-01 RX ADMIN — MORPHINE SULFATE 2 MG: 2 INJECTION, SOLUTION INTRAMUSCULAR; INTRAVENOUS at 22:49

## 2022-01-01 RX ADMIN — SODIUM CHLORIDE, PRESERVATIVE FREE 10 ML: 5 INJECTION INTRAVENOUS at 23:29

## 2022-01-01 RX ADMIN — SODIUM CHLORIDE, PRESERVATIVE FREE 10 ML: 5 INJECTION INTRAVENOUS at 22:41

## 2022-01-01 RX ADMIN — MORPHINE SULFATE 2 MG: 2 INJECTION, SOLUTION INTRAMUSCULAR; INTRAVENOUS at 12:24

## 2022-01-01 RX ADMIN — SODIUM CHLORIDE 250 ML: 0.9 INJECTION, SOLUTION INTRAVENOUS at 19:20

## 2022-01-01 RX ADMIN — GLYCOPYRROLATE 0.2 MG: 0.2 INJECTION, SOLUTION INTRAMUSCULAR; INTRAVENOUS at 03:43

## 2022-01-01 RX ADMIN — LORAZEPAM 1 MG: 2 INJECTION INTRAMUSCULAR; INTRAVENOUS at 23:01

## 2022-01-01 RX ADMIN — SODIUM CHLORIDE, PRESERVATIVE FREE 10 ML: 5 INJECTION INTRAVENOUS at 12:24

## 2022-01-01 RX ADMIN — MORPHINE SULFATE 6 MG: 10 INJECTION, SOLUTION INTRAMUSCULAR; INTRAVENOUS at 13:20

## 2022-01-01 RX ADMIN — SODIUM CHLORIDE, PRESERVATIVE FREE 10 ML: 5 INJECTION INTRAVENOUS at 08:09

## 2022-01-01 RX ADMIN — SODIUM CHLORIDE, PRESERVATIVE FREE 10 ML: 5 INJECTION INTRAVENOUS at 22:49

## 2022-01-01 RX ADMIN — SODIUM CHLORIDE, PRESERVATIVE FREE 10 ML: 5 INJECTION INTRAVENOUS at 12:21

## 2022-01-01 RX ADMIN — MORPHINE SULFATE 2 MG: 2 INJECTION, SOLUTION INTRAMUSCULAR; INTRAVENOUS at 22:41

## 2022-01-01 RX ADMIN — GLYCOPYRROLATE 0.2 MG: 0.2 INJECTION, SOLUTION INTRAMUSCULAR; INTRAVENOUS at 03:47

## 2022-01-01 RX ADMIN — SODIUM CHLORIDE: 9 INJECTION, SOLUTION INTRAVENOUS at 19:57

## 2022-01-01 RX ADMIN — LORAZEPAM 1 MG: 2 INJECTION INTRAMUSCULAR; INTRAVENOUS at 06:09

## 2022-01-01 RX ADMIN — MORPHINE SULFATE 2 MG: 2 INJECTION, SOLUTION INTRAMUSCULAR; INTRAVENOUS at 20:09

## 2022-01-01 RX ADMIN — GLYCOPYRROLATE 0.2 MG: 0.2 INJECTION, SOLUTION INTRAMUSCULAR; INTRAVENOUS at 12:19

## 2022-01-01 RX ADMIN — MORPHINE SULFATE 2 MG: 2 INJECTION, SOLUTION INTRAMUSCULAR; INTRAVENOUS at 07:04

## 2022-01-01 RX ADMIN — SODIUM CHLORIDE, PRESERVATIVE FREE 10 ML: 5 INJECTION INTRAVENOUS at 13:20

## 2022-01-01 ASSESSMENT — ENCOUNTER SYMPTOMS
SORE THROAT: 0
SHORTNESS OF BREATH: 0
VOMITING: 0
DIARRHEA: 0
NAUSEA: 0
CHEST TIGHTNESS: 0

## 2022-01-01 ASSESSMENT — PAIN SCALES - GENERAL
PAINLEVEL_OUTOF10: 3
PAINLEVEL_OUTOF10: 0
PAINLEVEL_OUTOF10: 3
PAINLEVEL_OUTOF10: 1
PAINLEVEL_OUTOF10: 0
PAINLEVEL_OUTOF10: 3
PAINLEVEL_OUTOF10: 0
PAINLEVEL_OUTOF10: 5
PAINLEVEL_OUTOF10: 0

## 2022-01-01 ASSESSMENT — PAIN SCALES - WONG BAKER
WONGBAKER_NUMERICALRESPONSE: 0

## 2022-01-03 NOTE — PROGRESS NOTES
Nephrology Progress Note    Lanette Lawson Dawson, APRN - CNP      SUBJECTIVE      Chief Complaint   Patient presents with    Follow-up     5 month f/u with labs      1/3/2022:  Patient is here for follow-up Chronic kidney disease stage 3-4 with baseline creatinine seems to be running around 1.5-1.6 milligrams per DL. Etiology likely secondary to diabetic nephropathy. Patient lives by herself but has 7 kids who take turns and be with her everyday. Patient doing well. Has been eating and drinking less per daughter. +ve some cough and runny nose. No fever, no chills, no CP, no SOB, no N/V, +ve diarrhea, no abdomen pain, no urinary complaints, +ve some LE edema. She is currently on lisinopril 20 mg daily, Bumex 1 mg twice a day, metoprolol  mg daily, oral vitamin D 2000 units replacements and magnesium oxide 400 mg daily. Last labs 12/9/2021: BUN/Cr 68/2.14, Na 137, K 4.3, Cl 102, Bicarb 20, Ca 8.9, Phos 5.3, PTH 83.57, Vit D 43.8, Hb 12.8, UPC 0.19. BP today 102/62, HR 62.       8/2/2021:  Patient is here for follow-up Chronic kidney disease stage 3-4 with baseline creatinine seems to be running around 1.5-1.6 milligrams per DL. Etiology likely secondary to diabetic nephropathy. Patient lives by herself but has 7 kids who take turns and be with her everyday. Patient doing well. No new issues. No fever, no chills, no CP, no SOB, no N/V, +ve diarrhea, +ve abdomen pain, +ve poor appetite, no urinary complaints, some stable LE edema. She is currently on metoprolol  mg daily, lisinopril 20 mg daily, Lasix 40 mg BID, oral vitamin D 2000 units replacements and magnesium oxide 400 mg daily. Started on Paxil now. Last labs 7/16/2021: BUN/Cr 33/1.19, Na 139, K 4.2, Cl 100, Bicarb 30, Ca 9.1, Phos 3.0, PTH 45.02, Vit D 54.6, Hb 12.8, UPC 0.31.   Blood pressure today 132/74, heart rate 66.    4/5/2021:  Patient is here for follow-up Chronic kidney disease stage 3-4 with baseline creatinine seems to be running around 1.5-1.6 milligrams per DL. Etiology likely secondary to diabetic nephropathy. Patient lives by herself but has 7 kids who take turns and be with her everyday. Patient doing well. No new issues. No fever, no chills, no CP, no SOB, no N/V/D, no abdomen pain, no urinary complaints, +ve stable LE edema- uses compression stocking vs ACE bandage. She is currently on metoprolol  mg daily, lisinopril 20 mg daily, Lasix 40 mg BID, oral vitamin D 2000 units replacements and magnesium oxide 400 mg daily. Started new on Cymbalta. Last labs 3/31/21: BUN/Cr 40/1.26, Na 142, K 4.0, Cl 101, Bicarb 30, Ca 9.6, Mg 2.1, Hb 13.2.  3/23/21: Phos 3.5, PTH 89.11, Vit D 51.1,  UPC <0.14. Blood pressure today 116/80, heart rate 68.    10/5/2020:  Patient is here for follow-up Chronic kidney disease stage 4 with baseline creatinine seems to be running around 1.5-1.6 milligrams per DL. Etiology likely secondary to diabetic nephropathy. Patient lives by herself but has 7 kids who take turns and be with her everyday. Patient doing well. No new issues. No fever, no chills, no CP, no SOB, no N/V/D, no abdomen pain, no urinary complaints, stable chronic LE edema. Patient is currently on metoprolol  mg daily, lisinopril 20 mg daily, Lasix 40 mg BID, oral vitamin D 2000 units replacements and magnesium oxide 400 mg daily. Last labs 9/29/2020: BUN/Cr 57/1.67, Na 139, K 4.6, Cl 97, Bicarb 31, Ca 9.4, Phos 4.2, PTH 60.13, Vit D 53.1, Hb 13.1, UPC 0.22. Blood pressure today 124/78, heart rate 72.        6/8/2020:  Patient is here for follow-up Chronic kidney disease stage III with baseline creatinine running between 1.2-1.4 milligrams per DL. Etiology likely secondary to diabetic nephropathy. Patient lives by herself but has 7 kids who take turns and be with her everyday. Patient doing well. No new issues.  No fever, no chills, no CP, +ve chronic SOB, no N/V/D, no abdomen pain, no urinary complaints, +ve LE edema. She follows up with her Cardiologist.   States that Bumex makes her feel \"weird\", she is not able explain the feeling. She is not taking her 2nd dose of the day regularly. Does have +ve edema b/l. She drinks about 18-20 oz/day. Patient is currently on metoprolol  mg daily, lisinopril 20 mg daily, Bumex 1 mg twice a day, oral vitamin D 2000 units replacements and magnesium oxide 400 mg daily. Last labs 5/22/2020: BUN/Cr 30/1.07, Na 140, K 4.2, Cl 96, Bicarb 33, Ca 9.0, Phos 3.4, .3, Vit D 57.1, Hb 13.4, UPC 0.20. Blood pressure today 138/88, heart rate 69.      12/2/19:  Patient is here for follow-up Chronic kidney disease stage III with baseline creatinine running between 1.2-1.4 milligrams per DL. Etiology likely secondary to diabetic nephropathy. Patient doing well. No new issues. No CP, no SOB, no N/V/D, fawn difficulty swallowing will be getting EGD 12/13/19 and some abd pain with eating, no abdomen pain,  +ve urgency and different sensation (per patient), +ve incontinence, no other urinary complaints, trace LE edema. Patient is currently on metoprolol  mg daily, lisinopril 20 mg daily, Bumex 1 mg twice a day, oral vitamin D 2000 units replacements and magnesium oxide 400 mg daily. Last labs 11/21/19: BUN/Cr 25/1.12, Na 139, K 4.1, Cl 97, Bicarb 31, Ca 9.9, Phos 3.1, PTH 27.69, Vit D 58.3, Hb 14.2, UPC 0.16. Should today 122/84, heart rate 82.    6/3/19:  Patient is here for follow-up Chronic kidney disease stage III with baseline creatinine running between 1.2-1.4 milligrams per DL. Etiology likely secondary to diabetic nephropathy. Patient has gone as high as 1.62 MG/DL in the past.  Patient doing well. No new issues. No CP, no SOB, no N/V/D, no abdomen pain, no urinary complaints, some LE edema but much stable.    Last labs 5/23/19: BUN/Cr 42/1.52, Na 142, K 4.5, Cl 97, Bicarb 32, Ca 9.3, Phos 3.4, , Vit D 68.1, Hb 14.8, UPC 0.16, Uric Acid 10. 7.  BP today 148/82, HR 72  She drinks about 4-5 cups of water day. 12/3/18:  Patient is here for follow-up Chronic kidney disease stage III with baseline creatinine running between 1.2-1.4 milligrams per DL. Etiology likely secondary to diabetic nephropathy. Patient doing okay. No new issues reported. Was just admitted to [facility] 12/28/18-12/1/18 due to fluid overload and CHF got diuretics and now much better and stable. No SOB now. She walked down to her clinic appointment from the parking lot without issues with a cane. She went into CHF as she stopped taking her Diuretics for 3 days, as she went for wedding and did not want to go to the bathroom multiple times. Last labs 12/1/18: BUN/Cr 23/0.87, Na 143, K 3.7, Cl 99, Bicarb 31, Ca 8.7, Hb 11.8  BP today 140/68, HR 64    4/2/2018  Patient is here for follow-up of her chronic kidney disease stage III. She was supposed to be on Lasix 40 mg oral twice a day but looks like her edema was not getting any better and therefore Bumex was added. Currently she is taking Bumex 1 mg oral daily, edema is a little better but continues to have edema. She just came back from [place] a few weeks ago and last year the same thing happened when she was there about a year ago and was admitted with CHF exacerbation to the hospital.  She was not watching his salt or fluid intake very carefully. She is doing better with that now and edema has also improved a little  She is approaching her 89th birthday and is doing fairly well. Labs from March 2018 shows a blood area nitrogen of 29 and a creatinine of 1.4 with a sodium of 144 potassium of 4 bicarbonate of 31 calcium 9.2, vitamin D 67, , hemoglobin 13.9 and urine protein creatinine ratio was 0.3. Myeloperoxidase antibodies were repeated and it was 266-she does not have any signs or symptoms of vasculitis.   She has refused kidney biopsy and rheumatology referral in the past.  She also complains of a dry cough that she's been noticing and happens only at nighttime, none during the day. His throat also feels scratchy and may be related to postnasal drip. She is also on lisinopril 20 mg oral daily for over 25 years-doubt could be related to lisinopril but if it continues then we may need to switch her to losartan and see that helps. 10/2/2017  Patient is here for follow-up of her chronic kidney disease stage III. She feels okay, energy levels are good, she does complain of some right knee pain but does not take any pain medication. She is inconsistent with her Lasix use and is only taking it once a day instead of twice a day. She has gained about 8 pounds of weight since last visit. Looks like a blood pressure medications were discontinued through her PCP except for Lasix and lisinopril due to low blood pressure episode. Blood pressures are stable at this time. Labs from September 2017 shows a blood urea nitrogen of 32 and a creatinine of 0.9, potassium of 4.6, calcium ionized 1.2, PTH was 78, vitamin D was 68, hemoglobin was 12. 9.\  MPO antibodies still 193, PR3  was normal at 8 - she has no symptoms of malaise and tiredness , rash , sinusitis , cough , fever . She does not want to be referred to rheumatology       4.3.17  Patient is here for followup. She feels well, leg edema is down with Lasix 40 mg by mouth twice a day. She is not taking Aldactone currently. Pt's repeat ANCA testing is positive for MPO Level was 200 and PR3 was negative at 1. She does not have any microscopic hematuria, proteinuria is 0.2grams and creatinine is 1 which is her baseline . No signs and symptoms for vasculitis, no cutaneous lesions, sinusitis. Likely she has ANCA/MPO positivity sec to allopurinol use without any active renal vasculitis although could only be confirmed on kidney biopsy. This is discussed with patient and her daughter when her results showed up.   No need for kidney biopsy and patient also does not want it. Most recent labs from 3/28/2017 shows a blood urea nitrogen of 21 and cr of 1.08, sodium 142, potassium of 4.8, hemoglobin of 12.9, calcium of 9.2, PTH of 70, vitamin D of 66, urine protein creatinine 0.2, urinalysis had no RBCs. 3/6/17  Pt is here for follow up. She just came back from [place] yesterday. She was sick over there with bronchitis, hypotension, edema. Her creatinine had gone up to 2.45 from a baseline of 1.4. She was treated with antibiotics, some of her medications were changed to lisinopril dose was decreased and Lasix dose was increased because of edema. her Aldactone was also stopped jeweling that time due to hyperkalemia. She was also seen by a nephrologist over there. They did some more workup and her  MPO/ANCA was found to be mildly positive. Urine analysis was negative for any RBCs. She did not have any signs symptoms of vasculitis and her creatinine improved to 1.4. She was told to followup with me once she got back from [place]. Repeat labs from today shows a creatinine of 1.39, urinalysis had 0-4 RBCs. Potassium level was 4. 5. She has no rash no signs or symptoms of vasculitis. I have repeated her ANCA and anti-GBM and results of which are pending      12/8/2016  Patient is here for followup. She feels okay. In November she developed acute kidney injury and creatinine went up to 1.6 likely prerenal but that has now resolved and the last creatinine from 11/16/2016 was 1.26. She takes Lasix 40 alternating with 20 mg and Aldactone 12.5 mg by mouth daily. Per daughter her by mouth intake was poor and she was taking less than 30/40 ounces of water or liquids. Labs from 11/16/2016 shows a blood urea nitrogen of 26 and a creatinine of 1.26 and a calcium of 9.2 and a potassium of 4.4 and a bicarbonate of 28. Blood pressures are stable, her edema is well controlled. She has lost about 7 pounds of weight since last visit      9/15/2016  Patient here for followup.   She feels okay. Labs labs from 9/2/16 shows creatinine of 1.19, blood urea nitrogen of 35, hemoglobin of 13, bicarbonate 29 and potassium 4.7, urine protein creatinine ratio was 0.4, UA had 0-4 RBCs  Blood pressures are stable. Leg swelling has increased and she has gained 7 pounds from last visit. She is currently taking Lasix 20 alternating with 40 mg every other day and Aldactone 12.5 by mouth daily    5/2/2016  Patient here for followup. She was last seen by me last month for her renal dysfunction. Chronic kidney disease workup with the paraproteinemia workup was all negative, k/l was 1.7 and serum immunofixation was negative. Creatinine from April 2016 was up at 1.48 and baseline normally runs around 1.2. Sodium was slightly elevated at 145, bun was 35. She could have been a little dehydrated at that time and she had upper respiratory tract infection at that time. Urine analysis that was done in the past 5-10 RBCs and she tells me she had seen urology for cystoscopy and that was negative. Urine protein creatinine ratio was about 0.2. Ultrasound of the kidneys showed 9.3-9.4 cm kidneys. She was taken off losartan 100 mg on last visit that she was taking in conjunction with lisinopril 20 mg twice a day and was put on Aldactone 12.5 mg. When she was sick and blood pressure was slightly low in the 554 systolic but now it is in the 863M and 351O systolic. Her blood pressure seems to be a well-controlled and today it was 140/70. She complains of abdominal pain and tells me that Prilosec helps and was advised to take it on a daily basis. Her edema is controlled     Pt denies any hx of heavy or prolonged NSAID use and there is no history of OTC herbal medications . No history of dysuria or frequency. Pt denies any hx of recent UTI , incontinence or nocturia or recurrent nephrolithiasis. No unusual skin rashes . No tea coloured urine . No recent procedures involving IV contrast.     Patient Active Problem List Diagnosis Date Noted    Pacemaker at end of battery life - Change out 11/19/18 (Dr. Tavares Class) 11/19/2018     Priority: High    Major depressive disorder, recurrent, mild 10/21/2021    Major depressive disorder, recurrent, moderate 10/21/2021    Major depressive disorder, recurrent, unspecified 10/21/2021    CKD (chronic kidney disease) stage 4, GFR 15-29 ml/min ([address] 03/23/2021    Morbid obesity with BMI of 40.0-44.9, adult ([address] 07/08/2020    Herpes simplex keratitis of right eye 07/29/2019    Endothelial corneal dystrophy 04/16/2019    Early dry stage nonexudative age-related macular degeneration of both eyes 04/16/2019    Chronic diastolic congestive heart failure ([address]     CKD (chronic kidney disease), stage III ([address] 12/03/2018    Lower extremity edema 12/03/2018    Abnormal ANCA (antineutrophil cytoplasmic antibody) 12/03/2018    Pulmonary hypertension ([address] 12/03/2018    Type 2 diabetes mellitus ([address]     Primary open angle glaucoma of both eyes, moderate stage 11/14/2016    Posterior vitreous detachment of both eyes 11/14/2016    Macular degeneration, bilateral 11/14/2016    Controlled type 2 DM with peripheral circulatory disorder ([address] 04/15/2016    Complete heart block ([address] 04/11/2016    Peripheral edema 12/03/2015    Hypomagnesemia 12/03/2015    Dry skin dermatitis 12/03/2015    Dermatophytosis of nail 1-5R/L 01/26/2015    Gout     Pacemaker 10/24/2013     Overview Note:     DUAL CHAMBER      Hypercholesterolemia     Obstructive sleep apnea     Hard of hearing     Venous insufficiency     Dyspnea      Overview Note:     chronic      Sick sinus syndrome (HCC)      Overview Note:     with pacemaker  placement      Vitamin D deficiency     Malignant basal cell neoplasm of skin      Overview Note:     nose  replace inactive diagnosis      Hypertension     Hiatal hernia          CURRENT MEDICATIONS      Current Outpatient Medications   Medication Sig Dispense Refill    clotrimazole-betamethasone (LOTRISONE) 1-0.05 % cream Apply topically 2 times daily. 45 g 1    omeprazole (PRILOSEC) 20 MG delayed release capsule Take 1 capsule by mouth Daily 30 capsule 3    cholestyramine light 4 g packet MIX 4 GRAMS WITH WATER AND DRINK TWICE DAILY WITH BREAKFAST AND SUPPER. TAKE 1 HOUR AFTER OTHER MEDICATIONS AND AT LEAST 4 HOURS PRIOR      sertraline (ZOLOFT) 100 MG tablet Take 1 tablet by mouth daily 90 tablet 3    bumetanide (BUMEX) 1 MG tablet Take 1 tablet by mouth 2 times daily 180 tablet 3    cholestyramine (QUESTRAN) 4 g packet Take 1 packet by mouth daily Extra dose prn diarrhea      lisinopril (PRINIVIL;ZESTRIL) 20 MG tablet 1 po daily 90 tablet 3    Peppermint Oil (IBGARD PO) Take by mouth 3 times daily       metoprolol succinate (TOPROL XL) 100 MG extended release tablet TAKE 1 TABLET DAILY 90 tablet 3    colchicine (MITIGARE) 0.6 MG capsule Take 1 capsule by mouth 2 times daily REYNALDO 180 capsule 3    Sodium Chloride, Hypertonic, (SODIUM CHLORIDE OP) Place 1 drop into both eyes as needed (dry eyes)       timolol (TIMOPTIC) 0.5 % ophthalmic solution Place 1 drop into both eyes 2 times daily      Multiple Vitamins-Minerals (ICAPS AREDS 2) CHEW Take 1 tablet by mouth 2 times daily      magnesium oxide (MAG-OX) 400 MG tablet Take 1 tablet by mouth nightly 30 tablet 3    Cholecalciferol (VITAMIN D) 2000 UNITS CAPS capsule Take 1 capsule by mouth daily      Acetaminophen (TYLENOL PO) Take 500 mg by mouth every 8 hours as needed (pain) Rarely uses      aspirin 81 MG EC tablet Take 81 mg by mouth daily        No current facility-administered medications for this visit. REVIEW OF SYSTEMS     Constitutional: No fever, no chills, no lethargy, no weakness. HEENT:  No headache, otalgia, itchy eyes, nasal discharge or sore throat, some difficulty swallowing. Cardiac:  No chest pain, dyspnea, orthopnea or PND. Chest:   No cough, phlegm or wheezing or hemoptysis .   Abdomen: +ve abdominal pain, +ve diarrhea, no nausea or vomiting. +ve poor appetite. Neuro:  No focal weakness, abnormal movements or seizure like activity. Skin:   No rashes, no itching. :   No hematuria, no pyuria, no dysuria, no flank pain, +ve urgency and different sensation (per patient), +ve incontinence. Extremities:    + swelling, no joint pains. ROS was otherwise negative except as mentioned in the [address] OBJECTIVE      Vitals:    01/03/22 1145   BP: 102/62   Pulse: 62   SpO2: 97%     Wt Readings from Last 2 Encounters:   01/03/22 154 lb 6.4 oz (70 kg)   12/15/21 161 lb (73 kg)     Body mass index is 30.15 kg/m².      PHYSICAL EXAM      GENERAL APPEARANCE: awake, alert, in no acute distress  SKIN: warm and dry, no rash or erythema  EYES: conjunctivae normal and sclera anicteric  ENT: no thrush no pharyngeal congestion   NECK: supple  PULMONARY: clear to auscultation and no wheezing noted   CADRDIOVASCULAR: normal S1 & S2,  no murmur   ABDOMEN: soft nontender, bowel sounds, present, no organomegaly,  no ascites   EXTREMITIES: 1+ edema, mostly in ankle and feet area- stable  NEURO: alert and oriented, no deficits    LABS    CBC:   Lab Results   Component Value Date    WBC 10.1 12/09/2021    HGB 12.8 12/09/2021    HCT 40.0 12/09/2021    MCV 94.8 12/09/2021    RDW 15.8 12/09/2021     12/09/2021    MPV 11.1 12/09/2021      BMP:   Lab Results   Component Value Date     12/09/2021    K 4.3 12/09/2021     12/09/2021    CO2 20 12/09/2021    BUN 68 12/09/2021    CREATININE 2.14 12/09/2021    GLUCOSE 131 12/09/2021    CALCIUM 8.9 12/09/2021      PHOSPHORUS:    Lab Results   Component Value Date    PHOS 5.3 12/09/2021     MAGNESIUM:   Lab Results   Component Value Date    MG 2.1 03/31/2021     ALBUMIN:   Lab Results   Component Value Date    LABALBU 3.9 03/31/2021     PTH: No components found for: PTHINTACT  VIT D3,25 HYDROXY: No results found for: VITD3     IRON:  No results found for: IRON  IRON SATURATION:  No results found for: LABIRON  TIBC:  No results found for: TIBC  FERRITIN:  No results found for: FERRITIN          TAMERA: No results found for: TAMERA    SPEP:   Lab Results   Component Value Date    PROT 6.6 03/31/2021    ALBCAL 4.0 04/13/2016    ALBPCT 55 04/13/2016    LABALPH 0.2 04/13/2016    LABALPH 0.9 04/13/2016    A1PCT 3 04/13/2016    A2PCT 12 04/13/2016    LABBETA 1.1 04/13/2016    BETAPCT 15 04/13/2016    GAMGLOB 1.1 04/13/2016    GGPCT 15 04/13/2016    PATH ELECTRONICALLY SIGNED. Chuck Ravi M.D. 04/14/2016     UPEP:   Lab Results   Component Value Date    TPU 9 04/14/2016    TPU 9 04/14/2016      HEPBSAG:No results found for: HEPBSAG  HEPCAB:No results found for: HEPCAB  C3:   Lab Results   Component Value Date    C3 157 04/13/2016     C4:   Lab Results   Component Value Date    C4 36 04/13/2016     MPO ANCA:   Lab Results   Component Value Date     03/27/2018    . PR3 ANCA:    Lab Results   Component Value Date    PR3 6 03/27/2018                      URINALYSIS/URINE CHEMISTRIES      URINE CREATININE:    Lab Results   Component Value Date    LABCREA 41.7 12/09/2021     URINE EOSINOPHILS : No results found for: UREO  URINE PROTEIN:    Lab Results   Component Value Date    TPU 9 04/14/2016    TPU 9 04/14/2016           RADIOLOGY    No results found for this or any previous visit. ASSESSMENT     1. Chronic kidney disease stage III with baseline creatinine now seems to be running around 1.5-1.6 milligrams per DL. Most recent creatinine was 2.14 mg/dl and 2.20 mg/DL both from 12/9/2021. Etiology likely secondary to diabetic nephropathy. CKD workup was negative and urine protein creatinine ratio  is 0.3 , no significant microscopic hematuria. UA had 0-4 RBCs   Previously urinalysis had microscopic hematuria and she has a history of cystoscopy which was negative in the past. Most recent UA had No diabetes  Ultrasound of the kidneys showed 9.3 and 9.4 cm kidneys   2. Hypertension well-controlled  BP Readings from Last 3 Encounters:   22 102/62   12/15/21 132/78   21 102/60    :  3. Type 2 Diabetes mellitus    4. Diastolic CHF  5. Aortic valve sclerosis  6. Pulmonary hypertension with right ventricular systolic pressure being 53  7. Lower extremity edema Worse  8. Secondary hyperparathyroidism PTH was 101 and vitamin D was 67  9. ANCA/MPO positivity without any signs or symptoms active renal vasculitis, patient does not want further workup and does not want to be referred to rheumatology. Will continue to monitor. 10.  Dry cough at nighttime could be related to postnasal drip. 11.  Diarrhea - Follows up with GI.  12.  Hypomagnesemia. PLAN     1. Based on available labs patients renal function did increase some, likely pre-renal as she recently has been having  appetite and h/o diarrhea. Patient's volume status is acceptable. Importance of tight blood pressure, glycemic control, lipid control was outlined to patient . 2. Cont Bumex 1mg BID and metoprolol  mg daily, lisinopril 20 mg daily. 3. Avoid canned foods including soups, salt restriction <2gm/day and fluid restriction no more than 50-60 ounces a day. 4. Increase fluid intake. Keep water intake at around 30 oz/day. 5. Will change magnesium to slow Mg tab due to h/o GI upset. 6. Patient counseled about taking her meds regularly and not stopping abruptly without medical advise. 7. Cont GI follow up. 8. Follow up labs ordered : bmp, cbc, phos, intact pth, vitaminD 25 [place]           9. Urine for random protein and creat to be done. 10. BMP in 4 weeks. 11. Follow up in the office in 5 months    Patient was asked to avoid NSAIDS. Please do not hesitate to call with questions. Electronically signed by Jasmin Soto MD on 1/3/2022 at 12:05 PM  [facility].      This note is created with the assistance of a speech-recognition program. While intending to generate a document that actually reflects the content of the visit, no guarantees can be provided that every mistake has been identified and corrected by editing.

## 2022-01-13 PROBLEM — N17.9 ARF (ACUTE RENAL FAILURE) (HCC): Status: ACTIVE | Noted: 2022-01-01

## 2022-01-13 PROBLEM — I50.9 HEART FAILURE (HCC): Status: ACTIVE | Noted: 2022-01-01

## 2022-01-13 PROBLEM — J98.11 ATELECTASIS: Status: ACTIVE | Noted: 2021-01-01

## 2022-01-13 PROBLEM — I51.7 CARDIOMEGALY: Status: ACTIVE | Noted: 2021-01-01

## 2022-01-13 PROBLEM — K31.84 GASTROPARESIS: Status: ACTIVE | Noted: 2021-01-01

## 2022-01-13 PROBLEM — R13.10 DYSPHAGIA, UNSPECIFIED: Status: ACTIVE | Noted: 2021-01-01

## 2022-01-13 PROBLEM — U07.1 COVID-19: Status: ACTIVE | Noted: 2022-01-01

## 2022-01-13 NOTE — TELEPHONE ENCOUNTER
KP response 1) vs added to your note. 2) yes, [facility] after labs and xray completed.

## 2022-01-13 NOTE — PROGRESS NOTES
Cardiovascular: Positive for leg swelling. Negative for chest pain. Gastrointestinal: Negative for diarrhea, nausea and vomiting. Genitourinary: Negative for difficulty urinating and dysuria. Musculoskeletal: Positive for arthralgias. Neurological: Negative for dizziness and headaches. Psychiatric/Behavioral: Positive for confusion. Negative for decreased concentration. Objective:     Vitals:    01/13/22 1523   BP: (!) 127/90   Pulse: 70   Resp: 18   Temp: 97.6 °F (36.4 °C)   SpO2: 95%     Physical Exam  Constitutional:       General: She is not in acute distress. Appearance: Normal appearance. HENT:      Head: Normocephalic and atraumatic. Right Ear: External ear normal.      Left Ear: External ear normal.      Ears:      Comments: Patient noted to have significant hearing impairment     Nose: No rhinorrhea. Mouth/Throat:      Lips: No lesions. Eyes:      Conjunctiva/sclera: Conjunctivae normal.   Neck:      Vascular: No JVD. Pulmonary:      Effort: Pulmonary effort is normal. No accessory muscle usage or respiratory distress. Musculoskeletal:      Cervical back: Normal range of motion. Right lower leg: Edema present. Left lower leg: Edema present. Skin:     Coloration: Skin is not cyanotic or jaundiced. Neurological:      Mental Status: She is alert. Psychiatric:         Attention and Perception: Attention and perception normal.         Mood and Affect: Mood and affect normal.         Speech: Speech normal.         Behavior: Behavior is cooperative. Thought Content: Thought content normal.         Assessment/Plan:        1. Controlled type 2 DM with peripheral circulatory disorder ([address] stable  2. Type 2 diabetes mellitus with stage 3a chronic kidney disease, without long-term current use of insulin ([address] stable  - Continue to monitor diet    3. Primary hypertension,  stable  - Continue to monitor    4.  Hypercholesterolemia,  stable  - Continue to monitor    5. Chronic diastolic congestive heart failure ([address] worsening  - Concern for CHF exacerbation. Stat BNP, BMP, CBC, and CXR. To ER if develops any dyspnea. - Brain Natriuretic Peptide; Future  - CBC Auto Differential; Future  - Basic Metabolic Panel; Future  - XR CHEST STANDARD (2 VW); Future    6. Complete heart block ([address] stable  7. Pacemaker, stable  8. Pulmonary hypertension ([address] [address] stable  9. Sick sinus syndrome ([address] stable  - Continue to follow with cardiology    10. CKD (chronic kidney disease) stage 4, GFR 15-29 ml/min ([facility]), stable  - Continue to follow with nephrology    11. Hypomagnesemia,  stable  - Continue to monitor    12. Dysphagia, unspecified type, stable  - Continue to follow with GI    13. Confusion, new problem  - Check UA C&S in addition to above noted labs  - Urinalysis Reflex to Culture; Future    14. Major depressive disorder, recurrent, mild,  stable  - Continue to monitor        Return in about 3 months (around 4/13/2022). Orders Placed This Encounter   Procedures    XR CHEST STANDARD (2 VW)     Standing Status:   Future     Standing Expiration Date:   1/13/2023     Order Specific Question:   Reason for exam:     Answer:   CHF, worsening edema    Brain Natriuretic Peptide     Standing Status:   Future     Standing Expiration Date:   1/13/2023    CBC Auto Differential     Standing Status:   Future     Standing Expiration Date:   1/13/2023    Basic Metabolic Panel     Standing Status:   Future     Standing Expiration Date:   1/13/2023    Urinalysis Reflex to Culture     Standing Status:   Future     Standing Expiration Date:   1/13/2023     Order Specific Question:   SPECIFY(EX-CATH,MIDSTREAM,CYSTO,ETC)? Answer:   clean catch     No orders of the defined types were placed in this encounter.               Electronically signed by MAGALY Andrea CNP on 1/14/2022 at 9:26 AM

## 2022-01-13 NOTE — TELEPHONE ENCOUNTER
Received call from [facility])- Sudeep Chavira saw a New Patient virtually today. She ordered STAT testing (Labs, chest xray). Hair Bell reports \"the lab will not come to the facility to draw STAT labs without order slips\". All orders faxed to [facility].

## 2022-01-13 NOTE — TELEPHONE ENCOUNTER
Patient was add-on today (virtual visit after rounds completed). New admit to [facility]    Please fax order for stat BNP, BMP, UA & CXR that were placed during visit.  Please also request VS.    Would also like to verify if family is still considering hospice

## 2022-01-13 NOTE — ED TRIAGE NOTES
Pt brought to ER d/t 5 day decline in orientation, congestion, cough. Has had diarrhea stools for 4 months.

## 2022-01-14 NOTE — H&P
HOSPITALIST ADMISSION H&P      REASON FOR ADMISSION:  End stage heart failure, End stage renal failure, COVID  ESTIMATED LENGTH OF STAY: >2 midnights, 2-3 days. ATTENDING/ADMITTING PHYSICIAN: Violette Price. Barbara Staton MD  PCP: MAGALY Salcido CNP    HISTORY OF PRESENT ILLNESS:      The patient is a 80 y.o. female patient of MAGALY Salcido CNP who presents from ER with c/o decrease in mental status over the last 5 days, has not been eating or drinking well for the last week. Family reports she has been having a cough and low oxygen levels and blood pressure. Was admitted today to [facility] place assisted living and was being evaluated for admission to hospice today also. Was told if they wanted to bring patient to ER for evaluation not to sign the admission agreement for hospice at that time, so they brought her to ER. Patient saw nephrology last Monday per daughters and they were told she was dehydrated and to increase her fluid intake, patient then developed shortness of breath and increases swelling to her feet. Incidental finding on screening was positive rapid Covid test. Family denies patient exposure to anyone who is ill, that they are aware of. Writer and ER MD discussed options with patients daughters regardin. Transfer to [place] for nephrology and cardiology care. 2. Return to [facility] for hospice services. 3. Admit for the night and have hospice admit her here, with possible discharge back to [facility] when arrangements can be made. Family decided to make patient [facility] and have hospice admit her here as [facility]. Writer called referral to [address] [place] [address]. Wounds and LDAs present prior to admission: See media     See below for additional PMH. In ER: IVF bolus, MIVF, Rocephin, Ativan  CXR:   Impression   Patient's chin and battery pack obscures portions of the lung fields.  A   small left effusion is not excluded.  Stable cardiac enlargement is noted.    No extrapleural air or topically 2 times daily. omeprazole (PRILOSEC) 20 MG delayed release capsule, Take 1 capsule by mouth Daily  cholestyramine light 4 g packet, MIX 4 GRAMS WITH WATER AND DRINK TWICE DAILY WITH BREAKFAST AND SUPPER. TAKE 1 HOUR AFTER OTHER MEDICATIONS AND AT LEAST 4 HOURS PRIOR  sertraline (ZOLOFT) 100 MG tablet, Take 1 tablet by mouth daily  bumetanide (BUMEX) 1 MG tablet, Take 1 tablet by mouth 2 times daily  cholestyramine (QUESTRAN) 4 g packet, Take 1 packet by mouth daily Extra dose prn diarrhea  lisinopril (PRINIVIL;ZESTRIL) 20 MG tablet, 1 po daily  Peppermint Oil (IBGARD PO), Take by mouth 3 times daily   colchicine (MITIGARE) 0.6 MG capsule, Take 1 capsule by mouth 2 times daily REYNALDO  Sodium Chloride, Hypertonic, (SODIUM CHLORIDE OP), Place 1 drop into both eyes as needed (dry eyes)   timolol (TIMOPTIC) 0.5 % ophthalmic solution, Place 1 drop into both eyes 2 times daily  Multiple Vitamins-Minerals (ICAPS AREDS 2) CHEW, Take 1 tablet by mouth 2 times daily  Cholecalciferol (VITAMIN D) 2000 UNITS CAPS capsule, Take 1 capsule by mouth daily  Acetaminophen (TYLENOL PO), Take 500 mg by mouth every 8 hours as needed (pain) Rarely uses  aspirin 81 MG EC tablet, Take 81 mg by mouth daily   metoprolol succinate (TOPROL XL) 100 MG extended release tablet, TAKE 1 TABLET DAILY    Allergies:    Atorvastatin, Statins [statins], and Penicillin g    Social History:    reports that she has never smoked. She has never used smokeless tobacco. She reports that she does not drink alcohol and does not use drugs. Family History:   family history includes Diabetes in her mother; Other in her father and mother. REVIEW OF SYSTEMS:  See HPI and problem list; otherwise no other new complaints with respect to eyes, ENT, neck, pulmonary, coronary, chest, GI, , endocrine, musculoskeletal, immune system/connective tissue disease, hematologic, neurologic, psychiatric, skin, lymphatics, or malignancies.      Code status: patient/family wishes for [facility] at this time. PHYSICAL EXAM:  Vitals:  BP (!) 65/47   Pulse 63   Temp 96.4 °F (35.8 °C) (Tympanic)   Resp 18   Wt 150 lb (68 kg)   LMP  (LMP Unknown)   SpO2 98%   BMI 29.29 kg/m²     General: Repeatedly calls out due to difficulty swallowing fluids \"it didn't go down\", awake, alert, cooperative, frail and drowsy  HEENT: Neck contracted to the right, red area with white spots under right side of chin on chest, External nose normal, Normocephalic and Atraumatic  Neck: Supple and Carotid Pulses Present  Chest/Lungs: Bases Diminished Bilaterally and Respirations even and unlabored  Cardiac: Regular Rate and Rhythm and Pedal Pulses Palpable Bilaterally  GI/Abdomen: Bowel Sounds Present, Soft, Non-tender, without Guarding or Rebound Tenderness, No Masses and No Tenderness  : Open area to rt labia-see media and Normal Female  Extremities/Musculoskeletal: 4+ BLE edema up to shins, BLE with edema and Generalized weakness  Skin: Pale, excoriated areas to sacrum, open area to rt labia, discoloration to perineal areas and upper thigh regions. Rt 4th toe with skin abraded around nail, family unsure of how it happened.  (See media)  Neuro: Dementia at baseline, to Person, follows some simple commands and Strength equal bilaterally  Psychiatric: Repeats self frequently, anxious and poor concentration      LABS:    CBC with Differential:    Lab Results   Component Value Date    WBC 28.7 01/13/2022    RBC 4.72 01/13/2022    HGB 14.3 01/13/2022    HCT 41.6 01/13/2022     01/13/2022    MCV 88.1 01/13/2022    MCH 30.3 01/13/2022    MCHC 34.4 01/13/2022    RDW 16.0 01/13/2022    LYMPHOPCT 5 01/13/2022    MONOPCT 5 01/13/2022    BASOPCT 0 01/13/2022    MONOSABS 1.44 01/13/2022    LYMPHSABS 1.44 01/13/2022    EOSABS 0.00 01/13/2022    BASOSABS 0.00 01/13/2022    DIFFTYPE NOT REPORTED 01/13/2022     CMP:    Lab Results   Component Value Date     01/13/2022    K 3.9 01/13/2022    CL 84 01/13/2022    CO2 14 01/13/2022     01/13/2022    CREATININE 4.99 01/13/2022    GFRAA 10 01/13/2022    LABGLOM 8 01/13/2022    GLUCOSE 93 01/13/2022    PROT 6.6 03/31/2021    LABALBU 3.9 03/31/2021    CALCIUM 8.9 01/13/2022    BILITOT 0.43 03/31/2021    ALKPHOS 96 03/31/2021    AST 19 03/31/2021    ALT 14 03/31/2021     U/A:    Lab Results   Component Value Date    COLORU NOT REPORTED 01/13/2022    PROTEINU 1+ 01/13/2022    PHUR 5.0 01/13/2022    WBCUA 0 TO 4 01/13/2022    RBCUA 0 TO 4 01/13/2022    MUCUS NOT REPORTED 01/13/2022    TRICHOMONAS NOT REPORTED 01/13/2022    YEAST NOT REPORTED 01/13/2022    BACTERIA TRACE 01/13/2022    SPECGRAV 1.030 01/13/2022    LEUKOCYTESUR NEGATIVE 01/13/2022    UROBILINOGEN Normal 01/13/2022    BILIRUBINUR 1+ 01/13/2022    GLUCOSEU NEGATIVE 01/13/2022    AMORPHOUS NOT REPORTED 01/13/2022     HgBA1c:    Lab Results   Component Value Date    LABA1C 5.6 11/03/2021     ProBNP 75465, Troponin ->146, Covid (+), Influenza A/B (-).     ASSESSMENT:      Patient Active Problem List   Diagnosis    Hypertension    Hiatal hernia    Hypercholesterolemia    Obstructive sleep apnea    Hard of hearing    Venous insufficiency    Dyspnea    Sick sinus syndrome (HCC)    Vitamin D deficiency    Malignant basal cell neoplasm of skin    Pacemaker    Gout    Dermatophytosis of nail 1-5R/L    Peripheral edema    Hypomagnesemia    Dry skin dermatitis    Complete heart block (HCC)    Controlled type 2 DM with peripheral circulatory disorder ([address]    Pacemaker at end of battery life - Change out 11/19/18 (Dr. Julieth Isaacs)    Acute on chronic heart failure ([facility]    Type 2 diabetes mellitus ([facility]    CKD (chronic kidney disease), stage III (HCC)    Lower extremity edema    Abnormal ANCA (antineutrophil cytoplasmic antibody)    Pulmonary hypertension (HCC)    Chronic diastolic congestive heart failure (HCC)    Morbid obesity with BMI of 40.0-44.9, adult (HCC)    CKD (chronic kidney disease) stage 4, GFR 15-29 ml/min (HCC)    Primary open angle glaucoma of both eyes, moderate stage    Posterior vitreous detachment of both eyes    Macular degeneration, bilateral    Herpes simplex keratitis of right eye    Endothelial corneal dystrophy    Early dry stage nonexudative age-related macular degeneration of both eyes    Major depressive disorder, recurrent, mild    Major depressive disorder, recurrent, moderate    Major depressive disorder, recurrent, unspecified    Atelectasis    Cardiomegaly    Dysphagia, unspecified    Gastroparesis    ARF (acute renal failure) ([address]    COVID-19    Heart failure ([facility]     Patient tkud-gadartidrv-lwtqelva-available records reviewed, including, but not limited to,  ER reports---labs--imaging---office records---personal notes     DANIEL POZO              80 WF  [RACHEL Hartley;  [place] Card[facility]]  DNR--CC     PACEMAKER  COVID---19--POSITIVE    [facility] \[facility]"" OF LIFE CARE  CHF----end stage1.13.2022           Acute diastolic----11.28.2018           Elevated troponin---11.29.2018           2D ECHO---11.29.2018---CHRISTINA---mild-to-moderate LVHNLVSFdilated RV---                          reduced RVSFJEFRY without stenosisMACtrivial MR----                          mild TR---RVSP ~ 59 mm Hg---Grade II moderate DD---                          LVEF ~ 55%           EKG---11.29.2018---atrial-sensedventricular paced rhythm--65           EKG---11.28.2018---atrial-sensedventricular paced rhythm---85           CXR11.28.2018shallow inflation---edemabilateral atelectasis---                          small effusions           Elevated BNP446511.28.2018           CTA pulmonarychest11.28.2018---no PEground-glass opacities                         moderate sized pleural effusions----pulmonary edemabilateral                        adrenal adenomas            2D ECHO---12.22.2017mild CHRISTINAmildly increased LV wallNLVSF mildly dilated RVNRVSFno significant valvular regurgitation                        or stenosisRVSP ~ 28 mm HgGrade I mild DD---pacemaker                        lead right heart---LVEF ~ 55%            2D ECHO10.12.2015CHRISTINAborderline LVH--NLVSF---NRVSF                       borderline MAC---trivial MRmild-to-moderate TR-JEFRY                       mild-to moderate TR---RVSP ~ 53 mm Hg---pacemaker wire                        right heartRVSP ~ 53 mm HgGrade II moderate DD---                       LVEF ~ 55%           Cardiac catheterization----2010----no obstruction            Acute respiratory failure---hypoxia11.28.2018---requiring---BiPAP  ESRD---end stage renal disease-----kidney failure  Sick sinus syndrome---complete heart block           PACEMAKERchange out11.29.2018---[employer]   Hypertension  Hyperlipidemia   Diabetes Mellitus Type 2  CKD---Stage 3  Peripheral edema---chronic  ÓSCAR---obstructive sleep apnea  Depression   Dermatitis---skin folds   HEARING IMPAIRMENT---bilaterally  Esophageal stricturespeech---swallowing intactbut sensation of stuck                mid-chest---           Modified barium swallow---11.20.2018flash thin liquids            Esophagram---11.30.2018---no evidence of esophageal stricture---tertiary                   contractions involving mid-distal esophagus---likely presbyesophagus    PMH:   gout, HH, BCCnose, dry skin2015, hypomagnesemia2015,              macular degeneration, open angle glaucoma, Vitamin D deficiency,               esophageal stenosis---stricture  PSH:    see above, hysterectomy---cervix unknown, right wrist fracture,                bilateral cataract---IOL, right eye lid biopsySCC2017, esophageal              dilatation--2017    Allergies:         NKDA  Intolerances:   statinsatorvastatinmuscle aches---elevated LFTs    Attending Supervising Physician's Attestation Statement  I performed a history and physical examination on the patient and discussed the management with the nurse practitioner. I reviewed and agree with the findings and plan as documented in her note . Electronically signed by Gloria Lau on 1/14/22 at 3:37 PM EST      PLAN:    1. End-stage heart failure with End-stage renal failure: Hospice referral, MIVF,  DNRCC, Comfort measures, EPC's.   Home medications reviewed  See orders     Note that over 50 minutes was spent in evaluation of the patient, review of the chart and pertinent records, discussion with family/staff, etc    MAGALY Mayer NP    4:38 AM  1/14/2022

## 2022-01-14 NOTE — ED NOTES
Dr Crenshaw Shock notified of BP of 65/30. No further orders given.       Jessica Cifuentes RN  01/13/22 1113

## 2022-01-14 NOTE — ACP (ADVANCE CARE PLANNING)
Advance Care Planning     Advance Care Planning Activator (Inpatient)  Conversation Note      Date of ACP Conversation: 1/14/2022     Conversation Conducted with: Patient with Decision Making Capacity   Healthcare Decision Maker: Next of Kin by law (only applies in absence of above) (name) Daughter Amena Arevalo    ACP Activator: Eve Wagner, [address]:     Current Designated Health Care Decision Maker:     Click here to complete Healthcare Decision Makers including section of the Healthcare Decision Maker Relationship (ie \"Primary\")  Today we documented Decision Maker(s) consistent with Legal Next of Kin hierarchy. Care Preferences    Ventilation: \"If you were in your present state of health and suddenly became very ill and were unable to breathe on your own, what would your preference be about the use of a ventilator (breathing machine) if it were available to you? \"      Would the patient desire the use of ventilator (breathing machine)?: no    \"If your health worsens and it becomes clear that your chance of recovery is unlikely, what would your preference be about the use of a ventilator (breathing machine) if it were available to you? \"     Would the patient desire the use of ventilator (breathing machine)?: No      Resuscitation  \"CPR works best to restart the heart when there is a sudden event, like a heart attack, in someone who is otherwise healthy. Unfortunately, CPR does not typically restart the heart for people who have serious health conditions or who are very sick. \"    \"In the event your heart stopped as a result of an underlying serious health condition, would you want attempts to be made to restart your heart (answer \"yes\" for attempt to resuscitate) or would you prefer a natural death (answer \"no\" for do not attempt to resuscitate)? \" no       [] Yes   [x] No   Educated Patient / Mitra Pham regarding differences between Advance Directives and portable DNR orders.     Length of ACP Conversation in minutes:  4 minutes     Conversation Outcomes:  [x] ACP discussion completed  [] Existing advance directive reviewed with patient; no changes to patient's previously recorded wishes  [] New Advance Directive completed  [] Portable Do Not Rescitate prepared for Provider review and signature  [] POLST/POST/MOLST/MOST prepared for Provider review and signature      Follow-up plan:    [] Schedule follow-up conversation to continue planning  [] Referred individual to Provider for additional questions/concerns   [] Advised patient/agent/surrogate to review completed ACP document and update if needed with changes in condition, patient preferences or care setting    [x] This note routed to one or more involved healthcare providers

## 2022-01-14 NOTE — PROGRESS NOTES
ACP Note completed with patients daughter Perez Arteaga who is with patient in room at this time. No additional services needed at this time. SW will continue to monitor needs and assist as appropriate.            Electronically signed by GOLDEN Wang on 1/14/2022 at 2:52 PM

## 2022-01-14 NOTE — FLOWSHEET NOTE
rounding on PCU. Assessment: Patient is declining quickly. Family has consented to hospice protocol. Hospice nurse has visited family. Children and spouses are present and are calm and supportive of each other. Patient is well connected to her [employer].  visited today and was provided proper PPE. Intervention: Engaged in conversation.  provided support for family and  as needed Patient expressed appreciation for visit and offer of continued prayer. Plan: Chaplains are available on site or on call 24/7 for spiritual and emotional support.  plans to return at time of death.     01/14/22 1438   Encounter Summary   Services provided to: Family   Referral/Consult From: [facility];Latter day/kathryn community   Place of [facility]. Contact Latter day Completed  (by family)   Continue Visiting   (1/14/22)   Complexity of Encounter High   Length of Encounter 30 minutes   Spiritual Assessment Completed Yes   Grief and Life Adjustment   Type End of life; Hospice   Assessment Calm; Approachable;Grieving   Intervention Active listening;Explored coping resources;Sustaining presence/ Ministry of presence   Outcome Comfort;Expressed gratitude;Engaged in conversation;Expressed feelings/needs/concerns;Grieving

## 2022-01-14 NOTE — PROGRESS NOTES
Nutrition Note    Type and Reason for Visit: +Nutrition Screen: 2-13 lb weight loss, decreased appetite, malnutrition score: 2 ; difficulty chewing and swallowing with food (swallowing/esophagus closed) and wounds     Hospice status noted. Full assessment not needed. No additional nutrition intervention is planned at this time. Patient will be followed based on length of stay, unless otherwise requested.       Kate Bruce RD, LD  Office Number: [phone]

## 2022-01-14 NOTE — ED PROVIDER NOTES
ATTENDING  ADDENDUM     Care of this patient was assumed from [facility]. The patient was seen for Cough and Other (congestion, confusion)  . The patient's initial evaluation and plan have been discussed with the prior provider who initially evaluated the patient. Nursing Notes, Past Medical Hx, Past Surgical Hx, Social Hx, Allergies, and Family Hx were all reviewed. Reevaluation: Upon signout to this patient the previous doctor had discussed the prognosis and disposition with the family the only thing that we are waiting for was whether or not we will be able to keep the patient in the hospital here at our facility or whether or not they would like us to try to transfer the patient to [facility]. Family really did not want the patient going to [facility] the patient is extremely sick has coronavirus has chronic renal failure has congestive heart failure and high white count. The patient is a DNR and they are going to make her comfort care I did discuss this case with Serene Bloch who is the petitioner upstairs on today and she agrees to put the patient in hospice care upstairs and the family has been advised of this alternative and have chosen hospice care for their mother. DIFFERENTIAL DIAGNOSIS/ MDM:           Follow Exit Care instructions closely. I have reviewed the disposition diagnosis with the patient and or their family/guardian. I have answered their questions and given discharge instructions. They voiced understanding of these instructions and did not have any further questions or complaints. DIAGNOSTIC RESULTS     RADIOLOGY:   Non-plain film images such as CT, Ultrasound and MRI are read by the radiologist. Plain radiographic images are visualized and preliminarily interpreted by the emergency physician with the below findings:  XR CHEST PORTABLE   Final Result   Patient's chin and battery pack obscures portions of the lung fields. A   small left effusion is not excluded.   Stable cardiac enlargement is noted. No extrapleural air or suspicious for focal consolidation. No evidence of   edema. LABS:  Results for orders placed or performed during the hospital encounter of 01/13/22   Rapid influenza A/B antigens    Specimen: Nares   Result Value Ref Range    Specimen Description . NARES     Special Requests NOT REPORTED     Direct Exam       NEGATIVE FOR INFLUENZA A AND B ANTIGEN. * A negative result does not exclude Influenza infection. Negative results should be confirmed by PCR testing. COVID-19, Rapid    Specimen: Nasopharyngeal Swab   Result Value Ref Range    Specimen Description . NASOPHARYNGEAL SWAB     SARS-CoV-2, Rapid DETECTED (A) Not Detected   CBC Auto Differential   Result Value Ref Range    WBC 28.7 (H) 3.5 - 11.3 k/uL    RBC 4.72 3.95 - 5.11 m/uL    Hemoglobin 14.3 11.9 - 15.1 g/dL    Hematocrit 41.6 36.3 - 47.1 %    MCV 88.1 82.6 - 102.9 fL    MCH 30.3 25.2 - 33.5 pg    MCHC 34.4 (H) 25.2 - 33.5 g/dL    RDW 16.0 (H) 11.8 - 14.4 %    Platelets 493 889 - 500 k/uL    MPV 10.1 8.1 - 13.5 fL    NRBC Automated 0.0 0.0 per 100 WBC    Differential Type NOT REPORTED     WBC Morphology NOT REPORTED     RBC Morphology NOT REPORTED     Platelet Estimate NOT REPORTED     Monocytes 5 4 - 11 %    Lymphocytes 5 (L) 16 - 46 %    Seg Neutrophils 90 (H) 43 - 77 %    Eosinophils % 0 (L) 1 - 7 %    Basophils 0 0 - 1 %    Immature Granulocytes 0 0 %    Absolute Mono # 1.44 (H) 0.1 - 1.2 k/uL    Absolute Lymph # 1.44 1.0 - 4.8 k/uL    Segs Absolute 25.82 (H) 1.50 - 8.10 k/uL    Absolute Eos # 0.00 0.0 - 0.4 k/uL    Basophils Absolute 0.00 0.0 - 0.2 k/uL    Absolute Immature Granulocyte 0.00 0.00 - 0.30 k/uL    Morphology ANISOCYTOSIS PRESENT     Morphology Platelet count adequate    Basic Metabolic Panel   Result Value Ref Range    Glucose 93 70 - 99 mg/dL     (HH) 8 - 23 mg/dL    CREATININE 4.99 (H) 0.50 - 0.90 mg/dL    Bun/Cre Ratio 25 (H) 9 - 20    Calcium 8.9 8.6 - 10.4 mg/dL Sodium 122 (L) 135 - 144 mmol/L    Potassium 3.9 3.7 - 5.3 mmol/L    Chloride 84 (L) 98 - 107 mmol/L    CO2 14 (L) 20 - 31 mmol/L    Anion Gap 24 (H) 9 - 17 mmol/L    GFR Non-African American 8 (L) >60 mL/min    GFR  10 (L) >60 mL/min    GFR Comment          GFR Staging NOT REPORTED    Urinalysis Reflex to Culture    Specimen: Urine, clean catch   Result Value Ref Range    Color, UA NOT REPORTED Yellow    Turbidity UA NOT REPORTED Clear    Glucose, Ur NEGATIVE NEGATIVE    Bilirubin Urine 1+ (A) NEGATIVE    Ketones, Urine NEGATIVE NEGATIVE    Specific Gravity, UA 1.030 (H) 1.010 - 1.025    Urine Hgb NEGATIVE NEGATIVE    pH, UA 5.0 5.0 - 6.0    Protein, UA 1+ (A) NEGATIVE    Urobilinogen, Urine Normal Normal    Nitrite, Urine NEGATIVE NEGATIVE    Leukocyte Esterase, Urine NEGATIVE NEGATIVE    Urinalysis Comments NOT REPORTED    Troponin   Result Value Ref Range    Troponin, High Sensitivity 166 (HH) 0 - 14 ng/L    Troponin T NOT REPORTED <0.03 ng/mL    Troponin Interp NOT REPORTED    Lactate, Sepsis   Result Value Ref Range    Lactic Acid, Sepsis 1.2 0.5 - 1.9 mmol/L    Lactic Acid, Sepsis, Whole Blood NOT REPORTED 0.5 - 1.9 mmol/L   Brain Natriuretic Peptide   Result Value Ref Range    Pro-BNP 25,022 (H) <300 pg/mL    BNP Interpretation NOT REPORTED    Troponin   Result Value Ref Range    Troponin, High Sensitivity 146 (HH) 0 - 14 ng/L    Troponin T NOT REPORTED <0.03 ng/mL    Troponin Interp NOT REPORTED    Microscopic Urinalysis   Result Value Ref Range    -          WBC, UA 0 TO 4 0 - 4 /HPF    RBC, UA 0 TO 4 0 - 4 /HPF    Casts UA NOT REPORTED 0 - 2 /LPF    Crystals, UA NOT REPORTED None /HPF    Epithelial Cells UA 0 TO 4 0 - 5 /HPF    Renal Epithelial, UA NOT REPORTED 0 /HPF    Bacteria, UA TRACE (A) None    Mucus, UA NOT REPORTED None    Trichomonas, UA NOT REPORTED None    Amorphous, UA NOT REPORTED None    Other Observations UA NOT REPORTED NOT REQ.     Yeast, UA NOT REPORTED None   EKG 12 Lead Result Value Ref Range    Ventricular Rate 71 BPM    Atrial Rate 71 BPM    P-R Interval 138 ms    QRS Duration 214 ms    Q-T Interval 534 ms    QTc Calculation (Bazett) 580 ms    P Axis 87 degrees    R Axis -59 degrees    T Axis 115 degrees       ABNORMAL LABS:  Labs Reviewed   COVID-19, RAPID - Abnormal; Notable for the following components:       Result Value    SARS-CoV-2, Rapid DETECTED (*)     All other components within normal limits   CBC WITH AUTO DIFFERENTIAL - Abnormal; Notable for the following components:    WBC 28.7 (*)     MCHC 34.4 (*)     RDW 16.0 (*)     Lymphocytes 5 (*)     Seg Neutrophils 90 (*)     Eosinophils % 0 (*)     Absolute Mono # 1.44 (*)     Segs Absolute 25.82 (*)     All other components within normal limits   BASIC METABOLIC PANEL - Abnormal; Notable for the following components:     (*)     CREATININE 4.99 (*)     Bun/Cre Ratio 25 (*)     Sodium 122 (*)     Chloride 84 (*)     CO2 14 (*)     Anion Gap 24 (*)     GFR Non- 8 (*)     GFR  10 (*)     All other components within normal limits   URINE RT REFLEX TO CULTURE - Abnormal; Notable for the following components:    Bilirubin Urine 1+ (*)     Specific Gravity, UA 1.030 (*)     Protein, UA 1+ (*)     All other components within normal limits   TROPONIN - Abnormal; Notable for the following components:    Troponin, High Sensitivity 166 (*)     All other components within normal limits   BRAIN NATRIURETIC PEPTIDE - Abnormal; Notable for the following components:    Pro-BNP 25,022 (*)     All other components within normal limits   TROPONIN - Abnormal; Notable for the following components:    Troponin, High Sensitivity 146 (*)     All other components within normal limits   MICROSCOPIC URINALYSIS - Abnormal; Notable for the following components:    Bacteria, UA TRACE (*)     All other components within normal limits   RAPID INFLUENZA A/B ANTIGENS   LACTATE, SEPSIS        EKG:      EMERGENCY DEPARTMENT COURSE:   Vitals:    Vitals:    01/13/22 1715 01/13/22 1846 01/13/22 1958 01/13/22 2118   BP: (!) 75/59 (!) 77/55  (!) 65/30   Pulse:  67 65 61   Resp:  20 20 16   Temp:       TempSrc:       SpO2:  98% 96% 100%   Weight:         -------------------------  BP: (!) 65/30, Temp: 97.7 °F (36.5 °C), Pulse: 61, Resp: 16          FINAL IMPRESSION      1. COVID-19    2. Acute renal failure superimposed on chronic kidney disease, unspecified CKD stage, unspecified acute renal failure type ([facility] [address]    3. Hyponatremia    4. Elevated troponin          DISPOSITION/PLAN   DISPOSITION        Condition on Disposition    Critical    PATIENT REFERRED TO:  No follow-up provider specified.     DISCHARGE MEDICATIONS:  New Prescriptions    No medications on file       (Please note that portions of this note were completed with a voice recognition program.  Efforts were made to edit the dictations but occasionally words are mis-transcribed.)    Suellen Crisostomo MD  Attending Emergency Physician        Suellen Crisostomo MD  01/16/22 1847

## 2022-01-14 NOTE — FLOWSHEET NOTE
Patient yelling out \" need to pee\" difficult to understand. Brief wet, changed and placed a external female cath. Patient tolerated well, Family at bedside no needs voiced.

## 2022-01-15 NOTE — FLOWSHEET NOTE
Respirations shallow with minimal accessory muscle use, face relaxed, no sign of pain, extremities flaccid. Daughter awake at bedside, update given.

## 2022-01-15 NOTE — PROGRESS NOTES
Hospitalist Progress Note  1/15/2022 2:27 PM  Subjective:   Admit Date: 1/13/2022  PCP: MAGALY Chavez - [facility]     DNR-CC      C/c:  Chief Complaint   Patient presents with    Cough    Other     congestion, confusion         Interval History: under hospice/covid/surrounded family/outcome poor    Diet: ADULT DIET; Regular                                ip days:2  Medications:   Scheduled Meds:   sodium chloride flush  5-40 mL IntraVENous 2 times per day     Continuous Infusions:   sodium chloride       PRN Meds:.sodium chloride flush, sodium chloride, polyethylene glycol, acetaminophen **OR** acetaminophen, ondansetron, morphine **OR** morphine, morphine **OR** morphine, morphine **OR** morphine, LORazepam **OR** LORazepam, LORazepam **OR** LORazepam, LORazepam **OR** LORazepam, glycopyrrolate **OR** glycopyrrolate     CBC:   Recent Labs     01/13/22  1725   WBC 28.7*   HGB 14.3        BMP:    Recent Labs     01/13/22  1725   *   K 3.9   CL 84*   CO2 14*   *   CREATININE 4.99*   GLUCOSE 93     Hepatic: No results for input(s): AST, ALT, ALB, BILITOT, ALKPHOS in the last 72 hours. Troponin: No results for input(s): TROPONINI in the last 72 hours. BNP: No results for input(s): BNP in the last 72 hours. Lipids: No results for input(s): CHOL, HDL in the last 72 hours. Invalid input(s): LDLCALCU  INR: No results for input(s): INR in the last 72 hours.     Objective:   Vitals: BP (!) 62/34 Comment: BP taken per family request  Pulse 61   Temp 96.4 °F (35.8 °C) (Tympanic)   Resp 18   Wt 152 lb 9.6 oz (69.2 kg)   LMP  (LMP Unknown)   SpO2 95%   BMI 29.80 kg/m²   General appearance: sedated  Skin: Skin color, texture, turgor normal. No rashes or lesions  Lungs: clear to auscultation bilaterally  Heart: regular rate and rhythm, S1, S2 normal, no murmur, click, rub or gallop  Abdomen: soft, non-tender; bowel sounds normal; no masses,  no organomegaly  Extremities: extremities normal, atraumatic, no cyanosis or edema  Neurologic: Mental status: Alert, oriented, thought content appropriate    Prophylaxis:   DVT with  [] lovenox        [] heparin        [] Scd        [x] none:     Radiology:  XR CHEST PORTABLE    Result Date: 1/13/2022  EXAMINATION: ONE XRAY VIEW OF THE CHEST 1/13/2022 5:42 pm COMPARISON: 21 October 2021 HISTORY: ORDERING SYSTEM PROVIDED HISTORY: Shortness of breath hypoxia TECHNOLOGIST PROVIDED HISTORY: Shortness of breath hypoxia Reason for Exam: Cough, sob, hypoxia FINDINGS: AP portable view of the chest time stamped at 1744 hours demonstrates overlying cardiac monitoring electrodes. The patient's chin obscures a portion of both apices. Stable cardiomegaly is noted. Bipolar pacemaker enters from the left with intact leads in appropriate positions. The lung fields appear clear without evidence of extrapleural air. Small left effusion is not excluded due to obscuration of this area but the patient's pacemaker battery pack. Retention sutures are present right upper quadrant of the abdomen. Patient's chin and battery pack obscures portions of the lung fields. A small left effusion is not excluded. Stable cardiac enlargement is noted. No extrapleural air or suspicious for focal consolidation. No evidence of edema. Assessment :   1. Hospice/end of life issue  2. covid     Plan:   1. Discussed with family  2.   See order    Patient Active Problem List:     Hypertension     Hiatal hernia     Hypercholesterolemia     Obstructive sleep apnea     Hard of hearing     Venous insufficiency     Dyspnea     Sick sinus syndrome (HCC)     Vitamin D deficiency     Malignant basal cell neoplasm of skin     Pacemaker     Gout     Dermatophytosis of nail 1-5R/L     Peripheral edema     Hypomagnesemia     Dry skin dermatitis     Complete heart block ([address]     Controlled type 2 DM with peripheral circulatory disorder ([address]     Pacemaker at end of battery life - Change out 11/19/18 (Dr. Mimi Hanna)     Acute on chronic heart failure (HCC)     Type 2 diabetes mellitus ([facility] [address]     CKD (chronic kidney disease), stage III ([facility])     Lower extremity edema     Abnormal ANCA (antineutrophil cytoplasmic antibody)     Pulmonary hypertension ([facility])     Chronic diastolic congestive heart failure ([address]     Morbid obesity with BMI of 40.0-44.9, adult ([facility])     CKD (chronic kidney disease) stage 4, GFR 15-29 ml/min ([facility])     Primary open angle glaucoma of both eyes, moderate stage     Posterior vitreous detachment of both eyes     Macular degeneration, bilateral     Herpes simplex keratitis of right eye     Endothelial corneal dystrophy     Early dry stage nonexudative age-related macular degeneration of both eyes     Major depressive disorder, recurrent, mild     Major depressive disorder, recurrent, moderate     Major depressive disorder, recurrent, unspecified     Atelectasis     Cardiomegaly     Dysphagia, unspecified     Gastroparesis     ARF (acute renal failure) ([facility] [address]     COVID-19     Heart failure ([facility] [address]      Anticipated Disposition upon discharge: [] Home                                                                         [] Home with Home Health                                                                         [] [facility]                                                                         [] [address]      Patient is admitted as inpatient status because of co-morbidities listed above, severity of signs and symptoms as outlined, requirement for current medical therapies and most importantly because of direct risk to patient if care not provided in a hospital setting.           Corbin Busch MD, MD  [facility] Hospitalist

## 2022-01-15 NOTE — PROGRESS NOTES
Pt in bed with eyes closed. Does not appear to be in any discomfort at this time.   Family at bedside and denies needs at this time

## 2022-01-15 NOTE — FLOWSHEET NOTE
Hospice nurse from [facility] reports to writer that upon passing  family request [facility]  services in [facility].  [phone]

## 2022-01-15 NOTE — PROGRESS NOTES
Pt in bed resting with eyes closed. respers shallow. Family at bedside with pt.   Pt appears to be comfortable without distress

## 2022-01-15 NOTE — PLAN OF CARE
Problem: Airway Clearance - Ineffective  Goal: Achieve or maintain patent airway  Outcome: Ongoing     Problem: Gas Exchange - Impaired  Goal: Absence of hypoxia  Outcome: Ongoing  Goal: Promote optimal lung function  Outcome: Ongoing     Problem: Breathing Pattern - Ineffective  Goal: Ability to achieve and maintain a regular respiratory rate  Outcome: Ongoing     Problem:  Body Temperature -  Risk of, Imbalanced  Goal: Ability to maintain a body temperature within defined limits  Outcome: Ongoing  Goal: Will regain or maintain usual level of consciousness  Outcome: Ongoing  Goal: Complications related to the disease process, condition or treatment will be avoided or minimized  Outcome: Ongoing     Problem: Isolation Precautions - Risk of Spread of Infection  Goal: Prevent transmission of infection  Outcome: Ongoing     Problem: Nutrition Deficits  Goal: Optimize nutritional status  Outcome: Ongoing     Problem: Risk for Fluid Volume Deficit  Goal: Maintain normal heart rhythm  Outcome: Ongoing  Goal: Maintain absence of muscle cramping  Outcome: Ongoing  Goal: Maintain normal serum potassium, sodium, calcium, phosphorus, and pH  Outcome: Ongoing     Problem: Loneliness or Risk for Loneliness  Goal: Demonstrate positive use of time alone when socialization is not possible  Outcome: Ongoing     Problem: Fatigue  Goal: Verbalize increase energy and improved vitality  Outcome: Ongoing     Problem: Falls - Risk of:  Goal: Will remain free from falls  Description: Will remain free from falls  Outcome: Ongoing  Goal: Absence of physical injury  Description: Absence of physical injury  Outcome: Ongoing     Problem: Skin Integrity:  Goal: Will show no infection signs and symptoms  Description: Will show no infection signs and symptoms  Outcome: Ongoing  Goal: Absence of new skin breakdown  Description: Absence of new skin breakdown  Outcome: Ongoing

## 2022-01-16 NOTE — PROGRESS NOTES
RN called hospice nurse regarding transport for the patient, waiting on a hospital bed to be delivered. Hospice nurse states that the bed should be delivered within four hours.

## 2022-01-16 NOTE — PLAN OF CARE
Problem: Airway Clearance - Ineffective  Goal: Achieve or maintain patent airway  Outcome: Ongoing     Problem: Gas Exchange - Impaired  Goal: Absence of hypoxia  Outcome: Ongoing  Goal: Promote optimal lung function  Outcome: Ongoing     Problem: Breathing Pattern - Ineffective  Goal: Ability to achieve and maintain a regular respiratory rate  Outcome: Ongoing     Problem:  Body Temperature -  Risk of, Imbalanced  Goal: Ability to maintain a body temperature within defined limits  Outcome: Ongoing  Goal: Will regain or maintain usual level of consciousness  Outcome: Ongoing  Goal: Complications related to the disease process, condition or treatment will be avoided or minimized  Outcome: Ongoing     Problem: Isolation Precautions - Risk of Spread of Infection  Goal: Prevent transmission of infection  Outcome: Ongoing     Problem: Nutrition Deficits  Goal: Optimize nutritional status  Outcome: Ongoing     Problem: Risk for Fluid Volume Deficit  Goal: Maintain normal heart rhythm  Outcome: Ongoing  Goal: Maintain absence of muscle cramping  Outcome: Ongoing  Goal: Maintain normal serum potassium, sodium, calcium, phosphorus, and pH  Outcome: Ongoing     Problem: Loneliness or Risk for Loneliness  Goal: Demonstrate positive use of time alone when socialization is not possible  Outcome: Ongoing     Problem: Patient Education: Go to Patient Education Activity  Goal: Patient/Family Education  Outcome: Ongoing     Problem: Falls - Risk of:  Goal: Will remain free from falls  Description: Will remain free from falls  Outcome: Ongoing  Goal: Absence of physical injury  Description: Absence of physical injury  Outcome: Ongoing     Problem: Skin Integrity:  Goal: Will show no infection signs and symptoms  Description: Will show no infection signs and symptoms  Outcome: Ongoing  Goal: Absence of new skin breakdown  Description: Absence of new skin breakdown  Outcome: Ongoing

## 2022-01-16 NOTE — PROGRESS NOTES
Updated nurse Michelle Gay at [facility] re: patient comfortable, pulse 85, SpO2 96%.   Plan on transporting back to [facility] facility around 4 pm.

## 2022-01-16 NOTE — FLOWSHEET NOTE
Respirations shallow and even, no sign of pain or distress noted. Pillows repositioned, brief clean and dry, family at bedside deny needs.

## 2022-01-16 NOTE — FLOWSHEET NOTE
Increased audible secretions noted, Robinul IV administered, face relaxed, family asleep at bedside, no needs noted.

## 2022-01-16 NOTE — PROGRESS NOTES
Discussed the hospice nurses plan. Patient appears to to be comfortable and does not appear to be short of breath at this time. Family declines giving morphine and ativan at this time.

## 2022-01-16 NOTE — PROGRESS NOTES
Nurse at [facility] aware that the patient will be transported back to their facility and will set up transport around 4.

## 2022-01-16 NOTE — DISCHARGE SUMMARY
Hospitalist Discharge Summary    Rafael Bolden  :  10/27/1928  MRN:  [id number]    Admit date:  2022  Discharge date:  22    Admitting Physician:  Iris Haines    Discharge Diagnoses:   Patient Active Problem List   Diagnosis    Hypertension    Hiatal hernia    Hypercholesterolemia    Obstructive sleep apnea    Hard of hearing    Venous insufficiency    Dyspnea    Sick sinus syndrome (HCC)    Vitamin D deficiency    Malignant basal cell neoplasm of skin    Pacemaker    Gout    Dermatophytosis of nail 1-5R/L    Peripheral edema    Hypomagnesemia    Dry skin dermatitis    Complete heart block (HCC)    Controlled type 2 DM with peripheral circulatory disorder ([address]    Pacemaker at end of battery life - Change out 18 (Dr. Briseyda Brownlee)    Acute on chronic heart failure ([address]    Type 2 diabetes mellitus ([address]    CKD (chronic kidney disease), stage III ([facility])    Lower extremity edema    Abnormal ANCA (antineutrophil cytoplasmic antibody)    Pulmonary hypertension (HCC)    Chronic diastolic congestive heart failure (HCC)    Morbid obesity with BMI of 40.0-44.9, adult ([facility])    CKD (chronic kidney disease) stage 4, GFR 15-29 ml/min ([facility])    Primary open angle glaucoma of both eyes, moderate stage    Posterior vitreous detachment of both eyes    Macular degeneration, bilateral    Herpes simplex keratitis of right eye    Endothelial corneal dystrophy    Early dry stage nonexudative age-related macular degeneration of both eyes    Major depressive disorder, recurrent, mild    Major depressive disorder, recurrent, moderate    Major depressive disorder, recurrent, unspecified    Atelectasis    Cardiomegaly    Dysphagia, unspecified    Gastroparesis    ARF (acute renal failure) ([address]    COVID-19    Heart failure ([address]        Admission Condition:  poor      Discharged Condition:  poor    Hospital Course/Treatments   Pt was admitted with ac on chronic heart failure/pt did not do well, pt under hospice, pt will be transferred to hospice             Consults:  IP CONSULT TO HOSPICE    Significant Diagnostic Studies:  XR CHEST PORTABLE    Result Date: 1/13/2022  EXAMINATION: ONE XRAY VIEW OF THE CHEST 1/13/2022 5:42 pm COMPARISON: 21 October 2021 HISTORY: ORDERING SYSTEM PROVIDED HISTORY: Shortness of breath hypoxia TECHNOLOGIST PROVIDED HISTORY: Shortness of breath hypoxia Reason for Exam: Cough, sob, hypoxia FINDINGS: AP portable view of the chest time stamped at 1744 hours demonstrates overlying cardiac monitoring electrodes. The patient's chin obscures a portion of both apices. Stable cardiomegaly is noted. Bipolar pacemaker enters from the left with intact leads in appropriate positions. The lung fields appear clear without evidence of extrapleural air. Small left effusion is not excluded due to obscuration of this area but the patient's pacemaker battery pack. Retention sutures are present right upper quadrant of the abdomen. Patient's chin and battery pack obscures portions of the lung fields. A small left effusion is not excluded. Stable cardiac enlargement is noted. No extrapleural air or suspicious for focal consolidation. No evidence of edema.        Disposition:   hospice        Signed:  Emilia Posey MD  1/16/2022, 2:03 PM    Time spent in discharge of this pt is more than 30 minutes in examination,evaluvation,  counseling and review of medication and discharge plan

## 2022-01-16 NOTE — PROGRESS NOTES
Hospice nurse speaking to primary nurse. Orders to give morphine and ativan at 14:20 pm. Will update the patients family.

## 2022-01-16 NOTE — PROGRESS NOTES
Patients family requesting for the hospice nurse to be present when the patient's pacemaker is turned off. Hospice nurse, unavailable at this time.

## 2022-01-16 NOTE — PROGRESS NOTES
Family would like a couple minutes prior to giving Morphine. 1320: Morphine give, magnet placed on patients chest.     HR and pulse ox on patient, can see at the nurses station on the monitor. Patients room monitor changed to comfort care.